# Patient Record
Sex: MALE | Race: WHITE | NOT HISPANIC OR LATINO | Employment: UNEMPLOYED | URBAN - METROPOLITAN AREA
[De-identification: names, ages, dates, MRNs, and addresses within clinical notes are randomized per-mention and may not be internally consistent; named-entity substitution may affect disease eponyms.]

---

## 2018-04-03 ENCOUNTER — HOSPITAL ENCOUNTER (EMERGENCY)
Facility: CLINIC | Age: 28
Discharge: HOME | End: 2018-04-03
Attending: EMERGENCY MEDICINE | Admitting: EMERGENCY MEDICINE

## 2018-04-03 VITALS
WEIGHT: 175 LBS | DIASTOLIC BLOOD PRESSURE: 88 MMHG | SYSTOLIC BLOOD PRESSURE: 154 MMHG | OXYGEN SATURATION: 98 % | TEMPERATURE: 96.2 F | BODY MASS INDEX: 27.41 KG/M2 | HEART RATE: 100 BPM | RESPIRATION RATE: 18 BRPM

## 2018-04-03 DIAGNOSIS — F15.10 METHAMPHETAMINE ABUSE (H): ICD-10-CM

## 2018-04-03 LAB
AMPHETAMINES UR QL SCN: POSITIVE
BARBITURATES UR QL: NEGATIVE
BENZODIAZ UR QL: NEGATIVE
CANNABINOIDS UR QL SCN: NEGATIVE
COCAINE UR QL: NEGATIVE
ETHANOL UR QL SCN: NEGATIVE
OPIATES UR QL SCN: NEGATIVE

## 2018-04-03 PROCEDURE — 99284 EMERGENCY DEPT VISIT MOD MDM: CPT | Mod: 25 | Performed by: EMERGENCY MEDICINE

## 2018-04-03 PROCEDURE — 99284 EMERGENCY DEPT VISIT MOD MDM: CPT | Mod: Z6 | Performed by: EMERGENCY MEDICINE

## 2018-04-03 PROCEDURE — 80320 DRUG SCREEN QUANTALCOHOLS: CPT | Performed by: EMERGENCY MEDICINE

## 2018-04-03 PROCEDURE — 25000132 ZZH RX MED GY IP 250 OP 250 PS 637: Performed by: EMERGENCY MEDICINE

## 2018-04-03 PROCEDURE — 80307 DRUG TEST PRSMV CHEM ANLYZR: CPT | Performed by: EMERGENCY MEDICINE

## 2018-04-03 PROCEDURE — 25000128 H RX IP 250 OP 636: Performed by: EMERGENCY MEDICINE

## 2018-04-03 RX ORDER — DIPHENHYDRAMINE HYDROCHLORIDE 50 MG/ML
50 INJECTION INTRAMUSCULAR; INTRAVENOUS ONCE
Status: COMPLETED | OUTPATIENT
Start: 2018-04-03 | End: 2018-04-03

## 2018-04-03 RX ORDER — HALOPERIDOL 5 MG/ML
10 INJECTION INTRAMUSCULAR ONCE
Status: COMPLETED | OUTPATIENT
Start: 2018-04-03 | End: 2018-04-03

## 2018-04-03 RX ORDER — OLANZAPINE 10 MG/1
10 TABLET, ORALLY DISINTEGRATING ORAL ONCE
Status: DISCONTINUED | OUTPATIENT
Start: 2018-04-03 | End: 2018-04-03 | Stop reason: HOSPADM

## 2018-04-03 RX ORDER — LORAZEPAM 2 MG/ML
1 INJECTION INTRAMUSCULAR ONCE
Status: COMPLETED | OUTPATIENT
Start: 2018-04-03 | End: 2018-04-03

## 2018-04-03 RX ADMIN — LORAZEPAM 1 MG: 2 INJECTION INTRAMUSCULAR at 10:53

## 2018-04-03 RX ADMIN — DIPHENHYDRAMINE HYDROCHLORIDE 50 MG: 50 INJECTION, SOLUTION INTRAMUSCULAR; INTRAVENOUS at 10:53

## 2018-04-03 RX ADMIN — HALOPERIDOL LACTATE 10 MG: 5 INJECTION, SOLUTION INTRAMUSCULAR at 10:52

## 2018-04-03 ASSESSMENT — ENCOUNTER SYMPTOMS
COLOR CHANGE: 0
HEADACHES: 0
HYPERACTIVE: 1
SLEEP DISTURBANCE: 1
NERVOUS/ANXIOUS: 0
SHORTNESS OF BREATH: 0
NECK STIFFNESS: 0
DIFFICULTY URINATING: 0
FEVER: 0
ABDOMINAL PAIN: 0
EYE REDNESS: 0
ARTHRALGIAS: 0
CONFUSION: 0
AGITATION: 1

## 2018-04-03 NOTE — ED NOTES
Sign out Provider: Cindi      Sign out Plan: Patient presented with symptoms of agitation and psychosis, likely due to a reported history of significant methamphetamine abuse.  He required physical restraint and was given medications for anxiety and psychosis.  The evaluating physician requested that he be reassessed once he is more alert and awake, and presumably is not as intoxicated with methamphetamine.      Reassessment: The patient has been sleeping continuously since sign out.  I attempted to arouse him with little success.  He appears in no medical distress.      Disposition: Patient will have to be signed out to the evening physician at shift change.  Once he is awake and alert he can be reassessed, as to whether his symptoms appear more due to true mental health issues or simply to an episode of methamphetamine intoxication.       Chalo Reddy MD  04/03/18 6136

## 2018-04-03 NOTE — ED NOTES
Patient placed into 5 point restraints. Continues to yell out and swear at staff 1:1 attendant initiated upon application of restraints.

## 2018-04-03 NOTE — ED NOTES
"Pt awake and was given a dinner tray, pt then attempted to leave and was stopped by security, escorted back to room per security, able to talk with pt, cooperative at this time, pt denies feeling suicidal or wanting to harm others, pt states \"I got into some bad shit\"  "

## 2018-04-03 NOTE — ED AVS SNAPSHOT
[facility], Emergency Department    [address]    [place] [address]    Phone:  [phone]    Fax:  [phone]                                       Etienne Dupree   MRN: [id number]    Department:  [facility], Emergency Department   Date of Visit:  4/3/2018           Patient Information     Date Of Birth          1990        Your diagnoses for this visit were:     Methamphetamine abuse        You were seen by oRbbie Puente MD.        Discharge Instructions       Follow-up for outpatient chemical dependency assessment and treatment (see Resource Sheet provided).    Return if you feel unsafe or for other concerns.        24 Hour Appointment Hotline       To make an appointment at any [facility] clinic, call [phone] ([phone]). If you don't have a family doctor or clinic, we will help you find one. [facility] clinics are conveniently located to serve the needs of you and your family.             Review of your medicines      Our records show that you are taking the medicines listed below. If these are incorrect, please call your family doctor or clinic.        Dose / Directions Last dose taken    LISINOPRIL PO        Take  by mouth.   Refills:  0        NO ACTIVE MEDICATIONS        Refills:  0        OLANZapine 10 MG tablet   Commonly known as:  zyPREXA   Dose:  10 mg   Quantity:  30 tablet        Take 1 tablet (10 mg) by mouth At Bedtime   Refills:  1                Procedures and tests performed during your visit     Drug abuse screen 6 urine (tox)    Restraints Violent or Self-Destructive Adult (Age 18 or Older)      Orders Needing Specimen Collection     None      Pending Results     No orders found from 4/1/2018 to 4/4/2018.            Pending Culture Results     No orders found from 4/1/2018 to 4/4/2018.            Pending Results Instructions     If you had any lab results that were not finalized at the time of your Discharge, you can call the ED Lab Result RN at  "[phone]. You will be contacted by this team for any positive Lab results or changes in treatment. The nurses are available 7 days a week from 10A to 6:30P.  You can leave a message 24 hours per day and they will return your call.        Thank you for choosing [facility]       Thank you for choosing [facility] for your care. Our goal is always to provide you with excellent care. Hearing back from our patients is one way we can continue to improve our services. Please take a few minutes to complete the written survey that you may receive in the mail after you visit with us. Thank you!        [employer] Information     [employer] lets you send messages to your doctor, view your test results, renew your prescriptions, schedule appointments and more. To sign up, go to [email or web address] . Click on \"Log in\" on the left side of the screen, which will take you to the Welcome page. Then click on \"Sign up Now\" on the right side of the page.     You will be asked to enter the access code listed below, as well as some personal information. Please follow the directions to create your username and password.     Your access code is: [email or web address]  Expires: 2018  7:03 PM     Your access code will  in 90 days. If you need help or a new code, please call your [facility] clinic or [phone].        Care EveryWhere ID     This is your Care EveryWhere ID. This could be used by other organizations to access your [facility] medical records  [id number]        Equal Access to Services     LINDSAY MARTIN : Hadii marisela brown Solelia, waaxda luqadaha, qaybta kaalmada edward, jorje chandler . So [facility] [phone].    ATENCIÓN: Si habla español, tiene a melton disposición servicios gratuitos de asistencia lingüística. Llame al [phone].    We comply with applicable federal civil rights laws and [place] laws. We do not discriminate on the basis of race, color, national origin, age, disability, sex, " sexual orientation, or gender identity.            After Visit Summary       This is your record. Keep this with you and show to your community pharmacist(s) and doctor(s) at your next visit.

## 2018-04-03 NOTE — ED NOTES
Out in hallway, increasing loud and agitated. Refusing to return to room. Able to eventually talk him back to his room.  while in room barricaded door with bed. Talking loudly but standing in calmly in room

## 2018-04-03 NOTE — DISCHARGE INSTRUCTIONS
Follow-up for outpatient chemical dependency assessment and treatment (see Resource Sheet provided).    Return if you feel unsafe or for other concerns.

## 2018-04-03 NOTE — ED NOTES
Refusing to open door to take medication. Continues to talk loudly in room staring out window, MD aware.  Will allow time alone as long as he remains safe, security present in hallway

## 2018-04-03 NOTE — ED AVS SNAPSHOT
[facility], [place], Emergency Department    [address]    [facility] [address]    Phone:  [phone]    Fax:  [phone]                                       Etienne Dupree   MRN: [id number]    Department:  [facility], Emergency Department   Date of Visit:  4/3/2018           After Visit Summary Signature Page     I have received my discharge instructions, and my questions have been answered. I have discussed any challenges I see with this plan with the nurse or doctor.    ..........................................................................................................................................  Patient/Patient Representative Signature      ..........................................................................................................................................  Patient Representative Print Name and Relationship to Patient    ..................................................               ................................................  Date                                            Time    ..........................................................................................................................................  Reviewed by Signature/Title    ...................................................              ..............................................  Date                                                            Time

## 2018-04-03 NOTE — ED PROVIDER NOTES
"    [facility] EMERGENCY DEPARTMENT ([facility])    4/03/18       History     Chief Complaint   Patient presents with     Agitation     disorganzied thought, manic behaviors,  reports took \"medications maybe too much\"  unable to verbalize what medications,\" something like Straterra but natural form\"     HPI  Etienne Dupree is a 27 year old male with a medical history significant for bipolar 1 disorder, schizophrenia, polysubstance abuse, ADHD and depression who presents to the Emergency Department by police for evaluation of agitation.  Police reports that the patient was found by a bystander who saw the patient on a corner talking to himself.  The bystander also noted that the patient was wearing shorts outside, which was not appropriate clothing as it was snowing out.  The bystander then called 911.  The police report that the patient had disorganized thoughts and manic behaviors.    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  The patient reports that he is currently transitioning to the [employer] from [place].  He is currently staying at a [facility] with a friend.  The patient states that today he was looking to buy a car, by a new phone and look for lodging.  Patient states that he went to look at a few cars today and after went to a [employer] store to buy a phone.  Patient was attempting to by phone; however, the  were called to the [employer].  He reports that the police stated he was \"wandering\".  Patient here states that his last methamphetamine use was a few days ago and states that he uses marijuana only intermittently.  He denies any other illicit drug use.  Patient here is wanting to just \"crash here\".  Patient reports that he last slept yesterday.    I have reviewed the Medications, Allergies, Past Medical and Surgical History, and Social History " in the Epic system.    Past Medical History:   Diagnosis Date     ADHD (attention deficit hyperactivity disorder)      Bipolar 1 disorder (H)      Depressive disorder        History reviewed. No pertinent surgical history.    No family history on file.    Social History   Substance Use Topics     Smoking status: Current Every Day Smoker     Packs/day: 0.50     Smokeless tobacco: Never Used     Alcohol use Yes      Comment: 1.75 on weekends       Current Facility-Administered Medications   Medication     OLANZapine zydis (zyPREXA) ODT tab 10 mg     Current Outpatient Prescriptions   Medication     OLANZapine (ZYPREXA) 10 MG tablet     LISINOPRIL PO     NO ACTIVE MEDICATIONS      No Known Allergies      Review of Systems   Constitutional: Negative for fever.   HENT: Negative for congestion.    Eyes: Negative for redness.   Respiratory: Negative for shortness of breath.    Cardiovascular: Negative for chest pain.   Gastrointestinal: Negative for abdominal pain.   Genitourinary: Negative for difficulty urinating.   Musculoskeletal: Negative for arthralgias and neck stiffness.   Skin: Negative for color change.   Neurological: Negative for headaches.   Psychiatric/Behavioral: Positive for agitation, behavioral problems and sleep disturbance. Negative for confusion, self-injury and suicidal ideas. The patient is hyperactive. The patient is not nervous/anxious.    All other systems reviewed and are negative.      Physical Exam   BP: (!) 174/120  Pulse: 78  Heart Rate: 74  Temp: 96.2  F (35.7  C)  Resp: 18  Weight: 79.4 kg (175 lb)  SpO2: 100 %      Physical Exam   Constitutional: No distress.   HENT:   Head: Atraumatic.   Mouth/Throat: Oropharynx is clear and moist. No oropharyngeal exudate.   Eyes: Pupils are equal, round, and reactive to light. No scleral icterus.   Cardiovascular: Normal heart sounds and intact distal pulses.    Pulmonary/Chest: Breath sounds normal. No respiratory distress.   Abdominal: Soft. Bowel  sounds are normal. There is no tenderness.   Musculoskeletal: He exhibits no edema or tenderness.   Skin: Skin is warm. No rash noted. He is not diaphoretic.   Psychiatric: His affect is labile and inappropriate. His speech is tangential. He is agitated. Cognition and memory are impaired. He expresses impulsivity and inappropriate judgment. He expresses no suicidal ideation. He expresses no suicidal plans. He exhibits abnormal recent memory.       ED Course   9:00 AM  The patient was seen and examined by Robbie Puente MD in Room ED10.     ED Course     Procedures        Results for orders placed or performed during the hospital encounter of 04/03/18 (from the past 48 hour(s))   Drug abuse screen 6 urine (tox)   Result Value Ref Range    Amphetamine Qual Urine Positive (A) NEG^Negative    Barbiturates Qual Urine Negative NEG^Negative    Benzodiazepine Qual Urine Negative NEG^Negative    Cannabinoids Qual Urine Negative NEG^Negative    Cocaine Qual Urine Negative NEG^Negative    Ethanol Qual Urine Negative NEG^Negative    Opiates Qualitative Urine Negative NEG^Negative     11:34 PM  In person face to face assessment completed, including an evaluation of the patient's immediate reaction to the intervention, complete review of systems assessment, behavioral assessment and review/assessment of history, drugs and medications, recent labs, etc., and behavioral condition.  The patient experienced: No adverse physical outcome from seclusion/restraint initiation.  The intervention of restraint or seclusion needs to terminate.       Labs Ordered and Resulted from Time of ED Arrival Up to the Time of Departure from the ED   DRUG ABUSE SCREEN 6 CHEM DEP URINE ([facility]) - Abnormal; Notable for the following:        Result Value    Amphetamine Qual Urine Positive (*)     All other components within normal limits            Assessments & Plan (with Medical Decision Making)   27-year-old male with a history of bipolar 1, ADHD,  depression and methamphetamine abuse presents via police after bystander noted patient to be talking to himself and wearing shorts outside.  Differential includes julianna, methamphetamine or other drug abuse, psychosis, other.  In the emergency room, urine toxicology screen was positive for amphetamines.  Secondary to progressive agitation, the patient was treated with Haldol, Ativan, Benadryl and placed in restraints.  This was effective in reducing patient's symptoms.  He was observed to be able to sleep multiple hours without difficulty and without restraints.  Patient will be reassessed upon awakening.  If he continues manifesting evidence of psychosis presumably related to methamphetamine use, he may require admission.    I have reviewed the nursing notes.    I have reviewed the findings, diagnosis, plan and need for follow up with the patient.    New Prescriptions    No medications on file       Final diagnoses:   Methamphetamine abuse     IFlorentin, am serving as a trained medical scribe to document services personally performed by Robbie Puente MD, based on the provider's statements to me.   Robbie DARBY MD, was physically present and have reviewed and verified the accuracy of this note documented by Florentin Alex.    4/3/2018   [facility], EMERGENCY DEPARTMENT     Robbie Puente MD  04/03/18 0598

## 2018-04-03 NOTE — ED NOTES
Opened room door and states he would take medication.  Given Zyprexa ODT but took out of his mouth immediately and placed medication on window ledge then closed door.  Began to talk louder and pace in room

## 2018-04-03 NOTE — ED NOTES
Continued to talk loudly then started to pull thermostat off wall. security opened door and code 21 called.  Unable to redirect and unwilling to cooperate with instructions to move away from the door.  Bed force away from door and security did hands on to attempt to walk to room 14,  Patient began to resist and fought with security.  Take down completed by code 21 team according to policy.

## 2018-04-03 NOTE — ED NOTES
Sleeping, writer woke patient up and verbally asked if patient could cooperate and remain safe , with no property destruction or threats to staff.  Patient agreed by shaking head yes. Restraints removed. Patient rolled onto his side and fell back to sleep

## 2020-05-29 ENCOUNTER — TRANSFERRED RECORDS (OUTPATIENT)
Dept: HEALTH INFORMATION MANAGEMENT | Facility: CLINIC | Age: 30
End: 2020-05-29

## 2020-07-17 NOTE — ED NOTES
Patient received as sign out at change of shift pending sobriety and awakening from chemical sedation.  The patient arrived to the emergency department agitated and required chemical sedation.  He is now awake, alert, conversant, and completely oriented.  He is ambulatory with a normal gait pattern.  The patient reports using some methamphetamine and having an adverse reaction to it today.  He states that he feels back to baseline.  He denies any hallucinations.  He denies any paranoia.  He denies suicidal and homicidal ideation.  He denies any recent illness or medical concerns.  The patient states that he is interested in some resources for outpatient chemical dependency assessment and treatment.  The patient was provided those resources and is discharged at this time.     Artis Burrows MD  04/03/18 1591     Benzoyl Peroxide Counseling: Patient counseled that medicine may cause skin irritation and bleach clothing.  In the event of skin irritation, the patient was advised to reduce the amount of the drug applied or use it less frequently.   The patient verbalized understanding of the proper use and possible adverse effects of benzoyl peroxide.  All of the patient's questions and concerns were addressed.

## 2021-02-05 ENCOUNTER — HOSPITAL ENCOUNTER (EMERGENCY)
Facility: CLINIC | Age: 31
Discharge: HOME | End: 2021-02-05
Attending: EMERGENCY MEDICINE | Admitting: EMERGENCY MEDICINE
Payer: MEDICAID

## 2021-02-05 VITALS
RESPIRATION RATE: 16 BRPM | HEIGHT: 71 IN | SYSTOLIC BLOOD PRESSURE: 177 MMHG | DIASTOLIC BLOOD PRESSURE: 106 MMHG | TEMPERATURE: 98.6 F | WEIGHT: 215.4 LBS | BODY MASS INDEX: 30.15 KG/M2 | HEART RATE: 76 BPM | OXYGEN SATURATION: 99 %

## 2021-02-05 DIAGNOSIS — Z76.0 ENCOUNTER FOR MEDICATION REFILL: ICD-10-CM

## 2021-02-05 PROCEDURE — 99282 EMERGENCY DEPT VISIT SF MDM: CPT | Performed by: EMERGENCY MEDICINE

## 2021-02-05 PROCEDURE — 99283 EMERGENCY DEPT VISIT LOW MDM: CPT | Performed by: EMERGENCY MEDICINE

## 2021-02-05 RX ORDER — BUPROPION HYDROCHLORIDE 150 MG/1
150 TABLET ORAL EVERY MORNING
Qty: 30 TABLET | Refills: 0 | Status: SHIPPED | OUTPATIENT
Start: 2021-02-05 | End: 2021-02-11

## 2021-02-05 RX ORDER — HYDROCHLOROTHIAZIDE 12.5 MG/1
12.5 TABLET ORAL DAILY
Qty: 30 TABLET | Refills: 0 | Status: SHIPPED | OUTPATIENT
Start: 2021-02-05 | End: 2021-02-11

## 2021-02-05 ASSESSMENT — MIFFLIN-ST. JEOR: SCORE: 1949.18

## 2021-02-05 NOTE — ED TRIAGE NOTES
Pt arrives to triage with needing medication refill. Pt [facility] released from senior living. Pt takes Wellbutrin 150mg daily and hydrochlorothiazide 12.5mg. Pt does not have a primary care provider or psychiatrist. A&O BP elevated

## 2021-02-05 NOTE — ED PROVIDER NOTES
[facility] EMERGENCY DEPARTMENT ([facility])  2/05/21  History     Chief Complaint   Patient presents with     Medication Refill     The history is provided by the patient and medical records.     Etienne Dupree is a 30 year old male with a past medical history significant for bipolar disorder, ADHD, schizophrenia and polysubstance abuse, and hypertension who presents to the Emergency Department requesting a refill of his prescription medication.  Patient reports that he was just released from snf, and would like a refill of his Wellbutrin 150 mg, and hydrochlorothiazide 12.5 mg.  Patient reports he would like a referral to a primary physician, and notes that he does have a follow-up appointment with a psychiatrist in approximately 2 weeks    I have reviewed the Medications, Allergies, Past Medical and Surgical History, and Social History in the [employer] system.  PAST MEDICAL HISTORY:   Past Medical History:   Diagnosis Date     ADHD (attention deficit hyperactivity disorder)      Bipolar 1 disorder (H)      Depressive disorder        PAST SURGICAL HISTORY: History reviewed. No pertinent surgical history.    Past medical history, past surgical history, medications, and allergies were reviewed with the patient. Additional pertinent items: None    FAMILY HISTORY: History reviewed. No pertinent family history.    SOCIAL HISTORY:   Social History     Tobacco Use     Smoking status: Current Every Day Smoker     Packs/day: 0.50     Types: Vaping Device     Smokeless tobacco: Current User   Substance Use Topics     Alcohol use: Not Currently     Social history was reviewed with the patient. Additional pertinent items: None      Discharge Medication List as of 2/5/2021  6:18 PM      START taking these medications    Details   buPROPion (WELLBUTRIN XL) 150 MG 24 hr tablet Take 1 tablet (150 mg) by mouth every morning, Disp-30 tablet, R-0, E-Prescribe      hydrochlorothiazide (HYDRODIURIL) 12.5 MG tablet  "Take 1 tablet (12.5 mg) by mouth daily, Disp-30 tablet, R-0, E-Prescribe         CONTINUE these medications which have NOT CHANGED    Details   OLANZapine (ZYPREXA) 10 MG tablet Take 1 tablet (10 mg) by mouth At Bedtime, Disp-30 tablet, R-1, Local Print         STOP taking these medications       LISINOPRIL PO Comments:   Reason for Stopping:         NO ACTIVE MEDICATIONS Comments:   Reason for Stopping:                No Known Allergies     Review of Systems  A complete review of systems was performed with pertinent positives and negatives noted in the HPI, and all other systems negative.    Physical Exam   BP: (!) 177/106  Pulse: 76  Temp: 98.6  F (37  C)  Resp: 16  Height: 180.3 cm (5' 11\")  Weight: 97.7 kg (215 lb 6.4 oz)  SpO2: 99 %      Physical Exam  Vitals signs and nursing note reviewed.   Constitutional:       General: He is not in acute distress.     Appearance: He is not diaphoretic.   HENT:      Head: Normocephalic and atraumatic.   Eyes:      General: No scleral icterus.     Pupils: Pupils are equal, round, and reactive to light.   Cardiovascular:      Rate and Rhythm: Normal rate and regular rhythm.      Heart sounds: Normal heart sounds.   Pulmonary:      Effort: No respiratory distress.      Breath sounds: Normal breath sounds.   Abdominal:      General: Bowel sounds are normal.      Palpations: Abdomen is soft.      Tenderness: There is no abdominal tenderness.   Musculoskeletal:         General: No tenderness.   Skin:     General: Skin is warm.      Capillary Refill: Capillary refill takes less than 2 seconds.      Findings: No rash.   Neurological:      General: No focal deficit present.      Mental Status: He is alert and oriented to person, place, and time.   Psychiatric:         Mood and Affect: Mood normal.         Behavior: Behavior normal.         ED Course        Procedures    No results found for this or any previous visit (from the past 24 hour(s)).  Medications - No data to display "         Assessments & Plan (with Medical Decision Making)   Patient was seen and examined. Patient has no acute complaints today. Will provide one month prescriptions for both hydrochlorothiazide and Wellbutrin. Provided info for [facility]. Discharged in stable condition.     I have reviewed the nursing notes.    I have reviewed the findings, diagnosis, plan and need for follow up with the patient.    Discharge Medication List as of 2/5/2021  6:18 PM      START taking these medications    Details   buPROPion (WELLBUTRIN XL) 150 MG 24 hr tablet Take 1 tablet (150 mg) by mouth every morning, Disp-30 tablet, R-0, E-Prescribe      hydrochlorothiazide (HYDRODIURIL) 12.5 MG tablet Take 1 tablet (12.5 mg) by mouth daily, Disp-30 tablet, R-0, E-Prescribe             Final diagnoses:   Encounter for medication refill   Moy DARBY am serving as a trained medical scribe to document services personally performed by Madeline South DO, based on the provider's statements to me.      IMadeline DO, was physically present and have reviewed and verified the accuracy of this note documented by Moy Clemente     2/5/2021   [facility] EMERGENCY DEPARTMENT       Madeline South DO  02/06/21 0031

## 2021-02-09 ENCOUNTER — TRANSFERRED RECORDS (OUTPATIENT)
Dept: HEALTH INFORMATION MANAGEMENT | Facility: CLINIC | Age: 31
End: 2021-02-09

## 2021-02-11 ENCOUNTER — OFFICE VISIT (OUTPATIENT)
Dept: FAMILY MEDICINE | Facility: CLINIC | Age: 31
End: 2021-02-11
Payer: MEDICAID

## 2021-02-11 VITALS
OXYGEN SATURATION: 97 % | HEIGHT: 71 IN | SYSTOLIC BLOOD PRESSURE: 145 MMHG | RESPIRATION RATE: 16 BRPM | HEART RATE: 94 BPM | TEMPERATURE: 98.3 F | WEIGHT: 216 LBS | BODY MASS INDEX: 30.24 KG/M2 | DIASTOLIC BLOOD PRESSURE: 80 MMHG

## 2021-02-11 DIAGNOSIS — F33.41 RECURRENT MAJOR DEPRESSIVE DISORDER, IN PARTIAL REMISSION (H): ICD-10-CM

## 2021-02-11 DIAGNOSIS — Z23 NEED FOR PROPHYLACTIC VACCINATION AND INOCULATION AGAINST INFLUENZA: ICD-10-CM

## 2021-02-11 DIAGNOSIS — F90.9 ATTENTION DEFICIT HYPERACTIVITY DISORDER (ADHD), UNSPECIFIED ADHD TYPE: ICD-10-CM

## 2021-02-11 DIAGNOSIS — I10 ESSENTIAL HYPERTENSION: ICD-10-CM

## 2021-02-11 DIAGNOSIS — F15.21 METHAMPHETAMINE USE DISORDER, MODERATE, IN EARLY REMISSION (H): ICD-10-CM

## 2021-02-11 DIAGNOSIS — F17.200 NICOTINE USE DISORDER: Primary | ICD-10-CM

## 2021-02-11 LAB
% GRANULOCYTES: 66.6 %G (ref 40–75)
ANION GAP SERPL CALCULATED.3IONS-SCNC: 2 MMOL/L (ref 3–14)
BILIRUBIN UR: NEGATIVE MG/DL
BLOOD UR: NEGATIVE MG/DL
BUN SERPL-MCNC: 12 MG/DL (ref 7–30)
CALCIUM SERPL-MCNC: 9.4 MG/DL (ref 8.5–10.1)
CHLORIDE SERPL-SCNC: 105 MMOL/L (ref 94–109)
CHOLEST SERPL-MCNC: 113 MG/DL
CO2 SERPL-SCNC: 33 MMOL/L (ref 20–32)
CREAT SERPL-MCNC: 0.92 MG/DL (ref 0.66–1.25)
GFR SERPL CREATININE-BSD FRML MDRD: >90 ML/MIN/{1.73_M2}
GLUCOSE SERPL-MCNC: 101 MG/DL (ref 70–99)
GLUCOSE URINE: NEGATIVE
GRANULOCYTES #: 3.9 K/UL (ref 1.6–8.3)
HBA1C MFR BLD: 5.3 % (ref 4.1–5.7)
HCT VFR BLD AUTO: 46.2 % (ref 40–53)
HDLC SERPL-MCNC: 52 MG/DL
HEMOGLOBIN: 14.5 G/DL (ref 13.3–17.7)
HIV 1+2 AB+HIV1 P24 AG SERPL QL IA: NONREACTIVE
KETONES UR QL: NEGATIVE MG/DL
LDLC SERPL CALC-MCNC: 48 MG/DL
LEUKOCYTE ESTERASE UR: NEGATIVE
LYMPHOCYTES # BLD AUTO: 1.6 K/UL (ref 0.8–5.3)
LYMPHOCYTES NFR BLD AUTO: 27.6 %L (ref 20–48)
MCH RBC QN AUTO: 28.5 PG (ref 26.5–35)
MCHC RBC AUTO-ENTMCNC: 31.4 G/DL (ref 32–36)
MCV RBC AUTO: 91 FL (ref 78–100)
MID #: 0.3 K/UL (ref 0–2.2)
MID %: 5.8 %M (ref 0–20)
NITRITE UR QL STRIP: NEGATIVE MG/DL
NONHDLC SERPL-MCNC: 61 MG/DL
PH UR STRIP: 6 [PH] (ref 4.5–8)
PLATELET # BLD AUTO: 240 K/UL (ref 150–450)
POTASSIUM SERPL-SCNC: 4.2 MMOL/L (ref 3.4–5.3)
PROTEIN UR: NEGATIVE MG/DL
RBC # BLD AUTO: 5.08 M/UL (ref 4.4–5.9)
SODIUM SERPL-SCNC: 140 MMOL/L (ref 133–144)
SP GR UR STRIP: 1.02 (ref 1–1.03)
TRIGL SERPL-MCNC: 64 MG/DL
TSH SERPL DL<=0.005 MIU/L-ACNC: 1.63 MU/L (ref 0.4–4)
UROBILINOGEN UR STRIP-ACNC: NORMAL E.U./DL
WBC # BLD AUTO: 5.9 K/UL (ref 4–11)

## 2021-02-11 PROCEDURE — 87389 HIV-1 AG W/HIV-1&-2 AB AG IA: CPT | Mod: QW | Performed by: FAMILY MEDICINE

## 2021-02-11 PROCEDURE — 36415 COLL VENOUS BLD VENIPUNCTURE: CPT | Performed by: FAMILY MEDICINE

## 2021-02-11 PROCEDURE — 90471 IMMUNIZATION ADMIN: CPT | Performed by: STUDENT IN AN ORGANIZED HEALTH CARE EDUCATION/TRAINING PROGRAM

## 2021-02-11 PROCEDURE — 85025 COMPLETE CBC W/AUTO DIFF WBC: CPT | Performed by: STUDENT IN AN ORGANIZED HEALTH CARE EDUCATION/TRAINING PROGRAM

## 2021-02-11 PROCEDURE — 80061 LIPID PANEL: CPT | Mod: QW | Performed by: FAMILY MEDICINE

## 2021-02-11 PROCEDURE — 84443 ASSAY THYROID STIM HORMONE: CPT | Mod: QW | Performed by: FAMILY MEDICINE

## 2021-02-11 PROCEDURE — 81003 URINALYSIS AUTO W/O SCOPE: CPT | Performed by: STUDENT IN AN ORGANIZED HEALTH CARE EDUCATION/TRAINING PROGRAM

## 2021-02-11 PROCEDURE — 99204 OFFICE O/P NEW MOD 45 MIN: CPT | Mod: 25 | Performed by: STUDENT IN AN ORGANIZED HEALTH CARE EDUCATION/TRAINING PROGRAM

## 2021-02-11 PROCEDURE — 80048 BASIC METABOLIC PNL TOTAL CA: CPT | Mod: QW | Performed by: FAMILY MEDICINE

## 2021-02-11 PROCEDURE — 90686 IIV4 VACC NO PRSV 0.5 ML IM: CPT | Performed by: STUDENT IN AN ORGANIZED HEALTH CARE EDUCATION/TRAINING PROGRAM

## 2021-02-11 PROCEDURE — 83036 HEMOGLOBIN GLYCOSYLATED A1C: CPT | Performed by: STUDENT IN AN ORGANIZED HEALTH CARE EDUCATION/TRAINING PROGRAM

## 2021-02-11 RX ORDER — LOSARTAN POTASSIUM 25 MG/1
25 TABLET ORAL DAILY
Qty: 90 TABLET | Refills: 3 | Status: SHIPPED | OUTPATIENT
Start: 2021-02-11 | End: 2021-03-25

## 2021-02-11 RX ORDER — BUPROPION HYDROCHLORIDE 150 MG/1
150 TABLET ORAL 2 TIMES DAILY
Qty: 180 TABLET | Refills: 3 | Status: SHIPPED | OUTPATIENT
Start: 2021-02-11 | End: 2021-05-13

## 2021-02-11 RX ORDER — HYDROCHLOROTHIAZIDE 12.5 MG/1
12.5 TABLET ORAL DAILY
Qty: 90 TABLET | Refills: 3 | Status: SHIPPED | OUTPATIENT
Start: 2021-02-11 | End: 2021-03-25

## 2021-02-11 ASSESSMENT — MIFFLIN-ST. JEOR: SCORE: 1951.9

## 2021-02-11 NOTE — PROGRESS NOTES
Preceptor Attestation:   Patient seen and discussed with the resident. Assessment and plan reviewed with resident and agreed upon.   Supervising Physician:  DO [facility]

## 2021-02-11 NOTE — PATIENT INSTRUCTIONS
Patient Education   Here is the plan from today's visit    1. Nicotine use disorder  - nicotine (NICOTROL) 10 MG inhaler; Use 1 cartridge as needed for urge to smoke by puffing over course of 20min.  Use 6-16 cart/day; reduce number of cart/day over 6-12 weeks.  Dispense: 12 each; Refill: 3    2. Recurrent major depressive disorder, in partial remission (H)  - buPROPion (WELLBUTRIN XL) 150 MG 24 hr tablet; Take 1 tablet (150 mg) by mouth 2 times daily  Dispense: 180 tablet; Refill: 3    3. Attention deficit hyperactivity disorder (ADHD), unspecified ADHD type  - buPROPion (WELLBUTRIN XL) 150 MG 24 hr tablet; Take 1 tablet (150 mg) by mouth 2 times daily  Dispense: 180 tablet; Refill: 3    4. Essential hypertension      Please call or return to clinic if your symptoms don't go away.    Follow up plan  Please make a clinic appointment for follow up with me (JOSE A BOSS) in 4-6 weeks for blood pressure follow up.     Thank you for coming to [facility] today.  Lab Testing:  **If you had lab testing today and your results are reassuring or normal they will be mailed to you or sent through [employer] within 7 days.   **If the lab tests need quick action we will call you with the results.  The phone number we will call with results is # [phone] (home) . If this is not the best number please call our clinic and change the number.  Medication Refills:  If you need any refills please call your pharmacy and they will contact us.   If you need to  your refill at a new pharmacy, please contact the new pharmacy directly. The new pharmacy will help you get your medications transferred faster.   Scheduling:  If you have any concerns about today's visit or wish to schedule another appointment please call our office during normal business hours [phone] (8-5:00 M-F)  If a referral was made to a [facility] and you don't get a call from central scheduling please call [phone].  If a  Mammogram was ordered for you at [facility] call [phone] to schedule or change your appointment.  If you had an XRay/CT/Ultrasound/MRI ordered the number is [phone] to schedule or change your radiology appointment.   Medical Concerns:  If you have urgent medical concerns please call [phone] at any time of the day.    Mariana Banks MD

## 2021-02-11 NOTE — PROGRESS NOTES
Male New Patient Note   Assessment & Plan   Nicotine use disorder  Pt reports that he is currently smoking 7-10 cigarettes/day and using smokeless tobacco. He reports that he was previously able to quit smoking using the Nicotrol inhaler   - nicotine (NICOTROL) 10 MG inhaler  Dispense: 12 each; Refill: 3    Recurrent major depressive disorder, in partial remission (H)  Patient has been on Wellbutrin for quite some time.  Feels that the 150 mg dose in the morning helped significantly, though seems to wear off in the mid afternoon despite being a 24-hour tablet.  In discussion with the patient today today, as 150 mg is a lower dose of Wellbutrin, will double that and increase to 300 mg daily, split 150 mg twice daily.  If this becomes an issue with insurance, we certainly can increase a single once daily dose to 30 mg at 1 time.  However, based on how the patient is describing the medications wearing off as far as being able to focus, I think the twice daily dosing dosing will likely be better suited for this patient.  In addition to helping with major depressive disorder, Wellbutrin may also help with smoking cessation for nicotine use disorder as above.  - buPROPion (WELLBUTRIN XL) 150 MG 24 hr tablet  Dispense: 180 tablet; Refill: 3  - TSH with free T4 reflex    Attention deficit hyperactivity disorder (ADHD), unspecified ADHD type  Patient has had longstanding attention deficit disorder, has previously been on Vyvanse.  However, patient also has a history of substance use disorder, primarily methamphetamine.  This is since concerning as far as getting him started back up on a an ADHD medication.  He is already on Wellbutrin, which certainly can be used for ADHD.  It does seem to improve his concentration at least early in the day.  As above, we will increase his Wellbutrin dose 250 mg twice daily.  He has an appointment upcoming with [facility], where he should have any ADHD prescriptions  addressed.  He did have a TSH drawn today, which was normal.  - buPROPion (WELLBUTRIN XL) 150 MG 24 hr tablet  Dispense: 180 tablet; Refill: 3  - TSH with free T4 reflex    Essential hypertension  Patient has a history of hypertension, beginning in his 20s.  He has previously been on a combination ACE and thiazide.  Due to recent incarceration, he has not been optimized on his blood pressure management.  He has significant concerns regarding the level of his blood pressure.  His systolic blood pressures are often in the 160s to 170s.  On exam today, exam is largely unremarkable other than an elevated blood pressure of 145/88.  As it is unclear if he has ever had a full work-up for hypertension, a significant work-up was completed today, which shows no obvious causes of hypertension, nor any electrolyte derangement from his HCTZ.  Patient is continue on his HCTZ 12.5.  As he has previously needed to be on a combination therapy, he is also started on losartan low-dose of 25.  I suspect, over time, we will likely have to increase these.  Ultimately, the goal would be to find a regimen that we will be able to be dosed as a single tablet combination medication.  - CBC with Diff Plt ([facility])  - Urinalysis, Micro If (UA) ([facility])  - Hemoglobin A1c ([facility])  - hydrochlorothiazide (HYDRODIURIL) 12.5 MG tablet  Dispense: 90 tablet; Refill: 3  - losartan (COZAAR) 25 MG tablet  Dispense: 90 tablet; Refill: 3  - TSH with free T4 reflex  - HIV Antigen Antibody Combo  - Lipid panel reflex to direct LDL Non-fasting  - Basic metabolic panel    Methamphetamine use disorder, moderate, in early remission (H)  Patient has a history of methamphetamine use disorder.  He was recently incarcerated related to substance use this use disorder.  He is currently living in safe housing/recovery housing, feels like it is okay there.  He does have concerns as there do seem to be people coming and going frequently, it is a little difficult to  "feel motivated at sometimes.  However, he is very involved in politics regarding arrests related to substances.  He does seem quite motivated to maintain her sobriety, currently 15 months sober.  He does not drink any alcohol at this time, no other drug use, other than nicotine use.  As he has previously injected substances, despite having a reported negative HIV test on initially entering assisted, we did repeat an HIV test today, which is negative.  - HIV Antigen Antibody Combo  - Pt counseled to reach out if he feels tempted toward relapse     Need for prophylactic vaccination and inoculation against influenza  - INFLUENZA VACCINE IM > 6 MONTHS VALENT IIV4 [90142]      Tobacco Cessation:   reports that he has been smoking vaping device. He has been smoking about 0.50 packs per day. He uses smokeless tobacco. 7-10 1/3-1/2 ppd  Tobacco Cessation Action Plan: Pharmacotherapies : other Nicotine replacement    BMI:   Estimated body mass index is 30.13 kg/m  as calculated from the following:    Height as of this encounter: 1.803 m (5' 11\").    Weight as of this encounter: 98 kg (216 lb).     No follow-ups on file.    Emilio Wells MS4     I was present with the medical student who participated in the service and in the documentation of this note. I have verified the history and personally performed the physical exam and medical decision making, and have verified the content of the note, which accurately reflects my assessment of the patient and the plan of care    Mariana Banks MD  [facility] RHODA Clifton is a 30 year old who presents to clinic today to establish primary care and get assessment and evaluation for depression, HTN, ADHD, and Nicotine dependence.     HPI   Pt reports that he was recently released from assisted on 2/1/21. He had previously received his healthcare through the assisted system. And came in today to establish a PCP. Pt has been sober for 15 months and is " "currently active in a program to support his sobriety.    Pt reports that he is currently smoking 7-10 cigarettes/day and using smokeless tobacco. He reports that he was previously able to quit smoking using the Nicotrol inhaler.   Pt reports that the bupropion is working well during the am, but around 1400 he becomes depressed and losses ability to focus and concentrate during tasks.  Pt reports that he has experiences HTN beginning at age 22yo. He has been monitoring his BP at home and has had sys BP in the 170s. Today's BP of 145/88 he reports to be one of his best. He denies and CP, palpitations, tremors or headaches.   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     Living at [facility]    Meth - started cocaine at 16, and 17 meth. 19 ecstacy, 23 meth again -3rd degree possession.     Alcohol - not been much of a problem   15 months sober from all substances (except nicotine)  Working with [employer] to DOC. 2nd chance act.     Last vyvanse has been about 1-2 years       Review of Systems   Constitutional, HEENT, cardiovascular, pulmonary, gi and gu systems are negative, except as otherwise noted.      Objective    BP (!) 145/80   Pulse 94   Temp 98.3  F (36.8  C) (Oral)   Resp 16   Ht 1.803 m (5' 11\")   Wt 98 kg (216 lb)   SpO2 97%   BMI 30.13 kg/m    Body mass index is 30.13 kg/m .  Physical Exam  Constitutional:       Appearance: Normal appearance.   HENT:      Head: Normocephalic.   Eyes:      General: No scleral icterus.     Extraocular Movements: Extraocular movements intact.      Conjunctiva/sclera: Conjunctivae normal.   Neck:      Musculoskeletal: Normal range of motion.   Cardiovascular:      Rate and Rhythm: Normal rate.   Pulmonary:      Effort: Pulmonary effort is normal.   Musculoskeletal: Normal range of motion. "   Neurological:      General: No focal deficit present.      Mental Status: He is alert and oriented to person, place, and time.   Psychiatric:         Mood and Affect: Mood normal.         Behavior: Behavior normal.         Thought Content: Thought content normal.         Judgment: Judgment normal.             Results from this visit  Results for orders placed or performed in visit on 02/11/21   CBC with Diff Plt ([facility])     Status: Abnormal   Result Value Ref Range    WBC 5.9 4.0 - 11.0 K/uL    Lymphocytes # 1.6 0.8 - 5.3 K/uL    % Lymphocytes 27.6 20.0 - 48.0 %L    Mid # 0.3 0.0 - 2.2 K/uL    Mid % 5.8 0.0 - 20.0 %M    GRANULOCYTES # 3.9 1.6 - 8.3 K/uL    % Granulocytes 66.6 40.0 - 75.0 %G    RBC 5.08 4.40 - 5.90 M/uL    Hemoglobin 14.5 13.3 - 17.7 g/dL    Hematocrit 46.2 40.0 - 53.0 %    MCV 91.0 78.0 - 100.0 fL    MCH 28.5 26.5 - 35.0 pg    MCHC 31.4 (L) 32.0 - 36.0 g/dL    Platelets 240.0 150.0 - 450.0 K/uL   Urinalysis, Micro If (UA) ([facility])     Status: None   Result Value Ref Range    Specific Gravity Urine 1.025 1.005 - 1.030    pH Urine 6.0 4.5 - 8.0    Leukocyte Esterase UR Negative NEGATIVE    Nitrite Urine Negative NEGATIVE mg/dL    Protein UR Negative NEGATIVE mg/dL    Glucose Urine Negative NEGATIVE    Ketones Urine Negative NEGATIVE mg/dL    Urobilinogen mg/dL 0.2 E.U./dL 0.2 E.U./dL E.U./dL    Bilirubin UR Negative NEGATIVE mg/dL    Blood UR Negative NEGATIVE mg/dL   Hemoglobin A1c ([facility])     Status: None   Result Value Ref Range    Hemoglobin A1C 5.3 4.1 - 5.7 %   TSH with free T4 reflex     Status: None   Result Value Ref Range    TSH 1.63 0.40 - 4.00 mU/L   HIV Antigen Antibody Combo     Status: None   Result Value Ref Range    HIV Antigen Antibody Combo Nonreactive NR^Nonreactive       Lipid panel reflex to direct LDL Non-fasting     Status: None   Result Value Ref Range    Cholesterol 113 <200 mg/dL    Triglycerides 64 <150 mg/dL    HDL Cholesterol 52 >39 mg/dL    LDL Cholesterol  Calculated 48 <100 mg/dL    Non HDL Cholesterol 61 <130 mg/dL   Basic metabolic panel     Status: Abnormal   Result Value Ref Range    Sodium 140 133 - 144 mmol/L    Potassium 4.2 3.4 - 5.3 mmol/L    Chloride 105 94 - 109 mmol/L    Carbon Dioxide 33 (H) 20 - 32 mmol/L    Anion Gap 2 (L) 3 - 14 mmol/L    Glucose 101 (H) 70 - 99 mg/dL    Urea Nitrogen 12 7 - 30 mg/dL    Creatinine 0.92 0.66 - 1.25 mg/dL    GFR Estimate >90 >60 mL/min/[1.73_m2]    GFR Estimate If Black >90 >60 mL/min/[1.73_m2]    Calcium 9.4 8.5 - 10.1 mg/dL

## 2021-02-12 ENCOUNTER — TELEPHONE (OUTPATIENT)
Dept: FAMILY MEDICINE | Facility: CLINIC | Age: 31
End: 2021-02-12

## 2021-02-12 DIAGNOSIS — F17.200 NICOTINE USE DISORDER: Primary | ICD-10-CM

## 2021-02-12 NOTE — TELEPHONE ENCOUNTER
Central Prior Authorization Team   Phone: [phone]      Prior Authorization Approval    Authorization Effective Date: 2/11/2021  Authorization Expiration Date: 6/10/2021  Medication: Nicotrol 10mg inh - APPROVED  Approved Dose/Quantity: 168 FOR 10  Reference #:     Insurance Company: [place] Medicaid ([facility]) - Phone [phone] Fax [phone]  Expected CoPay:       CoPay Card Available:      Foundation Assistance Needed:    Which Pharmacy is filling the prescription (Not needed for infusion/clinic administered): [facility] [place], [place] - [address]   Pharmacy Notified: Yes  Patient Notified: Yes (**Instructed pharmacy to notify patient when script is ready to /ship.**)

## 2021-02-12 NOTE — TELEPHONE ENCOUNTER
Central Prior Authorization Team   Phone: [phone]      PA Initiation    Medication: Nicotrol 10mg inh - INITIATED  Insurance Company: [place] Medicaid ([facility]) - Phone [phone] Fax [phone]  Pharmacy Filling the Rx: [place], [place] - [address]   Filling Pharmacy Phone: [phone]  Filling Pharmacy Fax: [phone]  Start Date: 2/12/2021

## 2021-02-12 NOTE — TELEPHONE ENCOUNTER
Hello, this patient's insurance will not pay for the following medication without a Prior Authorization.   ?  The Patient's insurance company is:  [place] Medicaid  Insurance Phone Number: [phone]  The patient's ID number is: [id number]  Drug Name: Nicotrol 10 mg inh?  NDC: [address]-2020-18?  Qty: 12 gm    If needed, our pharmacy's NPI is: [id number]  Please advise if you will pursue a prior authorization for this medication or change the order. Contact [facility] with any questions.  ?  Thanks,  ?  Onesimo Bryan CPhT  ?  [facility]?  PH: [phone]  Fax: [phone]

## 2021-02-15 ENCOUNTER — TRANSFERRED RECORDS (OUTPATIENT)
Dept: HEALTH INFORMATION MANAGEMENT | Facility: CLINIC | Age: 31
End: 2021-02-15

## 2021-02-27 ENCOUNTER — HEALTH MAINTENANCE LETTER (OUTPATIENT)
Age: 31
End: 2021-02-27

## 2021-03-01 ENCOUNTER — IMMUNIZATION (OUTPATIENT)
Dept: NURSING | Facility: CLINIC | Age: 31
End: 2021-03-01
Payer: MEDICAID

## 2021-03-01 PROCEDURE — 0001A PR COVID VAC PFIZER DIL RECON 30 MCG/0.3 ML IM: CPT

## 2021-03-01 PROCEDURE — 91300 PR COVID VAC PFIZER DIL RECON 30 MCG/0.3 ML IM: CPT

## 2021-03-15 ENCOUNTER — OFFICE VISIT (OUTPATIENT)
Dept: OPTOMETRY | Facility: CLINIC | Age: 31
End: 2021-03-15
Payer: MEDICAID

## 2021-03-15 DIAGNOSIS — H52.13 MYOPIA OF BOTH EYES: ICD-10-CM

## 2021-03-15 DIAGNOSIS — H52.223 REGULAR ASTIGMATISM OF BOTH EYES: ICD-10-CM

## 2021-03-15 DIAGNOSIS — Z01.00 ENCOUNTER FOR EXAMINATION OF EYES AND VISION WITHOUT ABNORMAL FINDINGS: Primary | ICD-10-CM

## 2021-03-15 PROCEDURE — 92015 DETERMINE REFRACTIVE STATE: CPT | Performed by: OPTOMETRIST

## 2021-03-15 PROCEDURE — 92004 COMPRE OPH EXAM NEW PT 1/>: CPT | Performed by: OPTOMETRIST

## 2021-03-15 ASSESSMENT — REFRACTION_MANIFEST
OS_AXIS: 095
OS_AXIS: 084
OD_AXIS: 100
OS_CYLINDER: +0.75
OD_AXIS: 095
OS_SPHERE: -0.50
OD_CYLINDER: +0.50
OD_CYLINDER: +0.50
OD_SPHERE: -0.75
OS_SPHERE: -0.25
OS_CYLINDER: +1.00
METHOD_AUTOREFRACTION: 1
OD_SPHERE: -0.75

## 2021-03-15 ASSESSMENT — VISUAL ACUITY
OS_SC: 20/30
OS_SC: 20/25
OS_SC+: -1
OD_SC: 20/30-1
OD_SC: 20/20
METHOD: SNELLEN - LINEAR

## 2021-03-15 ASSESSMENT — CUP TO DISC RATIO
OS_RATIO: 0.4
OD_RATIO: 0.4

## 2021-03-15 ASSESSMENT — EXTERNAL EXAM - RIGHT EYE: OD_EXAM: NORMAL

## 2021-03-15 ASSESSMENT — TONOMETRY
IOP_METHOD: APPLANATION
OD_IOP_MMHG: 20
OS_IOP_MMHG: 24

## 2021-03-15 ASSESSMENT — SLIT LAMP EXAM - LIDS
COMMENTS: NORMAL
COMMENTS: NORMAL

## 2021-03-15 ASSESSMENT — CONF VISUAL FIELD
METHOD: COUNTING FINGERS
OS_NORMAL: 1
OD_NORMAL: 1

## 2021-03-15 ASSESSMENT — EXTERNAL EXAM - LEFT EYE: OS_EXAM: NORMAL

## 2021-03-15 NOTE — PROGRESS NOTES
Chief Complaint   Patient presents with     Annual Eye Exam      Struggles with night driving, reports astigmatism.  States he's worn glasses in the past as needed.   Has been without glasses for 4 years   Last Eye Exam: 2016  Dilated Previously: Yes. Signs and symptoms of dilation were discussed. Patient consents to dilation today.    What are you currently using to see?  does not use glasses or contacts       Distance Vision Acuity: Noticed gradual change in both eyes    Near Vision Acuity: Satisfied with vision while reading and using computer unaided    Eye Comfort: good  Do you use eye drops? : No    Renetta Gaytan CPO        Medical, surgical and family histories reviewed and updated 3/15/2021.       OBJECTIVE: See Ophthalmology exam    ASSESSMENT:    ICD-10-CM    1. Encounter for examination of eyes and vision without abnormal findings  Z01.00    2. Myopia of both eyes  H52.13    3. Regular astigmatism of both eyes  H52.223       PLAN:     Patient Instructions   Etienne was advised of today's exam findings.  Optional to fill new glasses prescription, mild glasses prescription   Wearing the glasses will improve your vision slightly, and you may notice the largest improvement when driving at night.   Copy of glasses Rx provided today.    Return in 2 years for eye exam, or sooner if needed.    The effects of the dilating drops last for 4- 6 hours.  You will be more sensitive to light and vision will be blurry up close.  Mydriatic sunglasses were given if needed.      Robbie Anton O.D.  [address] [place]  [address]    [phone]

## 2021-03-15 NOTE — PATIENT INSTRUCTIONS
Etienne was advised of today's exam findings.  Optional to fill new glasses prescription, mild glasses prescription   Wearing the glasses will improve your vision slightly, and you may notice the largest improvement when driving at night.   Copy of glasses Rx provided today.    Return in 2 years for eye exam, or sooner if needed.    The effects of the dilating drops last for 4- 6 hours.  You will be more sensitive to light and vision will be blurry up close.  Mydriatic sunglasses were given if needed.      Robbie Anton O.D.  [address] [place]  [address]    [phone]

## 2021-03-15 NOTE — LETTER
3/15/2021         RE: Etienne Dupree  [address]  [place] [address]        Dear Colleague,    Thank you for referring your patient, Etienne Dupree, to the [facility]. Please see a copy of my visit note below.    Chief Complaint   Patient presents with     Annual Eye Exam      Struggles with night driving, reports astigmatism.  States he's worn glasses in the past as needed.   Has been without glasses for 4 years   Last Eye Exam: 2016  Dilated Previously: Yes. Signs and symptoms of dilation were discussed. Patient consents to dilation today.    What are you currently using to see?  does not use glasses or contacts       Distance Vision Acuity: Noticed gradual change in both eyes    Near Vision Acuity: Satisfied with vision while reading and using computer unaided    Eye Comfort: good  Do you use eye drops? : No    Renetta Gaytan CPO        Medical, surgical and family histories reviewed and updated 3/15/2021.       OBJECTIVE: See Ophthalmology exam    ASSESSMENT:    ICD-10-CM    1. Encounter for examination of eyes and vision without abnormal findings  Z01.00    2. Myopia of both eyes  H52.13    3. Regular astigmatism of both eyes  H52.223       PLAN:     Patient Instructions   Etienne was advised of today's exam findings.  Optional to fill new glasses prescription, mild glasses prescription   Wearing the glasses will improve your vision slightly, and you may notice the largest improvement when driving at night.   Copy of glasses Rx provided today.    Return in 2 years for eye exam, or sooner if needed.    The effects of the dilating drops last for 4- 6 hours.  You will be more sensitive to light and vision will be blurry up close.  Mydriatic sunglasses were given if needed.      Robbie Anton O.D.  [facility]  [address]  [place]  [address]    [phone]           Again, thank you for allowing me to participate in the care of  your patient.        Sincerely,        Robbie Anton, OD

## 2021-03-22 ENCOUNTER — IMMUNIZATION (OUTPATIENT)
Dept: NURSING | Facility: CLINIC | Age: 31
End: 2021-03-22
Attending: NURSE PRACTITIONER
Payer: MEDICAID

## 2021-03-22 PROCEDURE — 0002A PR COVID VAC PFIZER DIL RECON 30 MCG/0.3 ML IM: CPT

## 2021-03-22 PROCEDURE — 91300 PR COVID VAC PFIZER DIL RECON 30 MCG/0.3 ML IM: CPT

## 2021-03-25 ENCOUNTER — OFFICE VISIT (OUTPATIENT)
Dept: FAMILY MEDICINE | Facility: CLINIC | Age: 31
End: 2021-03-25
Payer: MEDICAID

## 2021-03-25 VITALS
BODY MASS INDEX: 30.27 KG/M2 | TEMPERATURE: 99.4 F | SYSTOLIC BLOOD PRESSURE: 123 MMHG | RESPIRATION RATE: 18 BRPM | WEIGHT: 217 LBS | DIASTOLIC BLOOD PRESSURE: 86 MMHG | HEART RATE: 136 BPM | OXYGEN SATURATION: 97 %

## 2021-03-25 DIAGNOSIS — F90.9 ATTENTION DEFICIT HYPERACTIVITY DISORDER (ADHD), UNSPECIFIED ADHD TYPE: Primary | ICD-10-CM

## 2021-03-25 DIAGNOSIS — I10 ESSENTIAL HYPERTENSION: ICD-10-CM

## 2021-03-25 DIAGNOSIS — R00.0 TACHYCARDIA: ICD-10-CM

## 2021-03-25 PROCEDURE — 99214 OFFICE O/P EST MOD 30 MIN: CPT | Mod: GC | Performed by: STUDENT IN AN ORGANIZED HEALTH CARE EDUCATION/TRAINING PROGRAM

## 2021-03-25 RX ORDER — LOSARTAN POTASSIUM AND HYDROCHLOROTHIAZIDE 12.5; 5 MG/1; MG/1
1 TABLET ORAL DAILY
Qty: 90 TABLET | Refills: 3 | Status: SHIPPED | OUTPATIENT
Start: 2021-03-25 | End: 2021-05-13

## 2021-03-25 ASSESSMENT — PATIENT HEALTH QUESTIONNAIRE - PHQ9: SUM OF ALL RESPONSES TO PHQ QUESTIONS 1-9: 10

## 2021-03-25 NOTE — PATIENT INSTRUCTIONS
Patient Education   Here is the plan from today's visit    1. Attention deficit hyperactivity disorder (ADHD), unspecified ADHD type  - BEHAVIORAL HEALTH REFERRAL ([facility] interal and external)    2. Essential hypertension  - losartan-hydrochlorothiazide (HYZAAR) 50-12.5 MG tablet; Take 1 tablet by mouth daily  Dispense: 90 tablet; Refill: 3      Please call or return to clinic if your symptoms don't go away.    Follow up plan  Please make a clinic appointment for follow up with me (JOSE A BOSS) in 2-3 weeks for ADHD and hypertension follow up.     Thank you for coming to [facility] today.  Lab Testing:  **If you had lab testing today and your results are reassuring or normal they will be mailed to you or sent through [employer] within 7 days.   **If the lab tests need quick action we will call you with the results.  The phone number we will call with results is # [phone] (home) [phone] (work). If this is not the best number please call our clinic and change the number.  Medication Refills:  If you need any refills please call your pharmacy and they will contact us.   If you need to  your refill at a new pharmacy, please contact the new pharmacy directly. The new pharmacy will help you get your medications transferred faster.   Scheduling:  If you have any concerns about today's visit or wish to schedule another appointment please call our office during normal business hours [phone] (8-5:00 M-F)  If a referral was made to a [facility] and you don't get a call from central scheduling please call [phone].  If a Mammogram was ordered for you at [facility] call [phone] to schedule or change your appointment.  If you had an XRay/CT/Ultrasound/MRI ordered the number is [phone] to schedule or change your radiology appointment.   Medical Concerns:  If you have urgent medical concerns please call [phone] at any time of the day.    Jose A Frederick  MD Darren

## 2021-03-25 NOTE — PROGRESS NOTES
Preceptor Attestation:   Patient seen, evaluated and discussed with the resident. I have verified the content of the note, which accurately reflects my assessment of the patient and the plan of care.   Supervising Physician:  Ainsley Josue MD

## 2021-03-25 NOTE — PROGRESS NOTES
Assessment & Plan     Attention deficit hyperactivity disorder (ADHD), unspecified ADHD type  Patient reports a history of ADHD.  At her last visit, he was given a referral to an outside location for ADHD testing.  He states that he has been seen at [facility], no medications were started at that time.  He states that he had formal ADHD testing while incarcerated in [facility].  We do not have those records nor do we have records from [facility] at this time.  He states that he is to be starting school in May, would like to get on something that will be helpful for him now prior to that time.  He is at times hard to redirect, gives an inconsistent history.  He is very fidgety in the room.  As I do not have supporting documentation for his diagnosis of ADHD, in the context of his history of substance use disorder, I am very very hesitant to start him on any medications at this time.  He states that he has previously tried Strattera, had GI upset from that.  He states that he is also tried albuterol in the past, which was too strong for him.  He is specifically requesting Vyvanse.  I am hesitant to start him on anything at this time.  He is given a referral to our behavioral health specialist and psychiatry for assistance with medication therapy.  We are also going to be obtaining medical records from the correctional facility as well as [facility] in the meantime.  - BEHAVIORAL HEALTH REFERRAL ([facility] interal and external)    Essential hypertension  Tachycardia  Patient has had hypertension for many years, continues to be uncontrolled. At our last visit he was prescribed losartan at 23, and was not taking it consistently over the past few weeks. He continues to have elevated pressures, though they are somewhat improved, at 140-150/60-80. He had significant workup at our last visit which does not reveal any underlying cause of his hypertension. He reported a headache with losartan, however, he is agreeable to  increasing the dose in order to provide a combination of losartan and hydrochlorothiazide. We will plan to follow up in about 3 weeks for recheck on hypertension. Notably, his heart rate was also elevated initially. On auscultation, it does sound that his heart rate is close to the low 100's, upper 90's. I suspect his tachycardia may be related to his current fidgeting/aggitation on exam. However, if he remains tachycardic at our next visit, will plan to check an EKG.   - losartan-hydrochlorothiazide (HYZAAR) 50-12.5 MG tablet  Dispense: 90 tablet; Refill: 3      Return in about 3 weeks (around 4/15/2021) for ADHD and HTN follow up.    Mariana Banks MD  [facility] RHODA Clifton is a 30 year old who presents for the following health issues   Chief Complaint   Patient presents with     Hypertension     he would like to discuss switching to a combination pill instead of 2 pills     Medication Request     ADHD- starting school and would like to restart medications. Unable to concentrate or sit still. Disruptive to people around him per pt.      Refill Request     hydrochlorothiazide       HPI   Patient has had blood pressures in the 140-150/60-80's at his current living place, taken by staff, not by himself. He has intermittently refused taking losartan. States losartan gave him a headache.  Denies any lightheadedness, dizziness, chest pain, shortness of breath.    Also inquiring about ADHD medications, saying that [facility].  He is requesting Vyvanse.      Review of Systems   Constitutional, HEENT, cardiovascular, pulmonary, gi and gu systems are negative, except as otherwise noted.      Objective    /86   Pulse 136   Temp 99.4  F (37.4  C) (Oral)   Resp 18   Wt 98.4 kg (217 lb)   SpO2 97%   BMI 30.27 kg/m    Body mass index is 30.27 kg/m .  Physical Exam  Constitutional:       Appearance: Normal appearance.   HENT:      Head: Normocephalic.   Eyes:       General: No scleral icterus.     Extraocular Movements: Extraocular movements intact.      Conjunctiva/sclera: Conjunctivae normal.   Neck:      Musculoskeletal: Normal range of motion.   Cardiovascular:      Rate and Rhythm: Normal rate.   Pulmonary:      Effort: Pulmonary effort is normal.   Musculoskeletal: Normal range of motion.   Neurological:      General: No focal deficit present.      Mental Status: He is alert and oriented to person, place, and time.   Psychiatric:         Attention and Perception: Attention normal.         Mood and Affect: Affect normal.         Speech: Speech is rapid and pressured and tangential.         Thought Content: Thought content normal.         Cognition and Memory: Cognition normal.      Comments: Inconsistent history/memory            Results from last visit:  Office Visit on 02/11/2021   Component Date Value Ref Range Status     WBC 02/11/2021 5.9  4.0 - 11.0 K/uL Final     Lymphocytes # 02/11/2021 1.6  0.8 - 5.3 K/uL Final     % Lymphocytes 02/11/2021 27.6  20.0 - 48.0 %L Final     Mid # 02/11/2021 0.3  0.0 - 2.2 K/uL Final     Mid % 02/11/2021 5.8  0.0 - 20.0 %M Final     GRANULOCYTES # 02/11/2021 3.9  1.6 - 8.3 K/uL Final     % Granulocytes 02/11/2021 66.6  40.0 - 75.0 %G Final     RBC 02/11/2021 5.08  4.40 - 5.90 M/uL Final     Hemoglobin 02/11/2021 14.5  13.3 - 17.7 g/dL Final     Hematocrit 02/11/2021 46.2  40.0 - 53.0 % Final     MCV 02/11/2021 91.0  78.0 - 100.0 fL Final     MCH 02/11/2021 28.5  26.5 - 35.0 pg Final     MCHC 02/11/2021 31.4* 32.0 - 36.0 g/dL Final     Platelets 02/11/2021 240.0  150.0 - 450.0 K/uL Final     Specific Gravity Urine 02/11/2021 1.025  1.005 - 1.030 Final     pH Urine 02/11/2021 6.0  4.5 - 8.0 Final     Leukocyte Esterase UR 02/11/2021 Negative  NEGATIVE Final     Nitrite Urine 02/11/2021 Negative  NEGATIVE mg/dL Final     Protein UR 02/11/2021 Negative  NEGATIVE mg/dL Final     Glucose Urine 02/11/2021 Negative  NEGATIVE Final      Ketones Urine 02/11/2021 Negative  NEGATIVE mg/dL Final     Urobilinogen mg/dL 02/11/2021 0.2 E.U./dL  0.2 E.U./dL E.U./dL Final     Bilirubin UR 02/11/2021 Negative  NEGATIVE mg/dL Final     Blood UR 02/11/2021 Negative  NEGATIVE mg/dL Final     Hemoglobin A1C 02/11/2021 5.3  4.1 - 5.7 % Final     TSH 02/11/2021 1.63  0.40 - 4.00 mU/L Final     HIV Antigen Antibody Combo 02/11/2021 Nonreactive  NR^Nonreactive     Final    HIV-1 p24 Ag & HIV-1/HIV-2 Ab Not Detected     Cholesterol 02/11/2021 113  <200 mg/dL Final     Triglycerides 02/11/2021 64  <150 mg/dL Final     HDL Cholesterol 02/11/2021 52  >39 mg/dL Final     LDL Cholesterol Calculated 02/11/2021 48  <100 mg/dL Final    Desirable:       <100 mg/dl     Non HDL Cholesterol 02/11/2021 61  <130 mg/dL Final     Sodium 02/11/2021 140  133 - 144 mmol/L Final     Potassium 02/11/2021 4.2  3.4 - 5.3 mmol/L Final     Chloride 02/11/2021 105  94 - 109 mmol/L Final     Carbon Dioxide 02/11/2021 33* 20 - 32 mmol/L Final     Anion Gap 02/11/2021 2* 3 - 14 mmol/L Final     Glucose 02/11/2021 101* 70 - 99 mg/dL Final     Urea Nitrogen 02/11/2021 12  7 - 30 mg/dL Final     Creatinine 02/11/2021 0.92  0.66 - 1.25 mg/dL Final     GFR Estimate 02/11/2021 >90  >60 mL/min/[1.73_m2] Final    Comment: Non  GFR Calc  Starting 12/18/2018, serum creatinine based estimated GFR (eGFR) will be   calculated using the Chronic Kidney Disease Epidemiology Collaboration   (CKD-EPI) equation.       GFR Estimate If Black 02/11/2021 >90  >60 mL/min/[1.73_m2] Final    Comment:  GFR Calc  Starting 12/18/2018, serum creatinine based estimated GFR (eGFR) will be   calculated using the Chronic Kidney Disease Epidemiology Collaboration   (CKD-EPI) equation.       Calcium 02/11/2021 9.4  8.5 - 10.1 mg/dL Final         This office note has been dictated.

## 2021-03-30 ENCOUNTER — APPOINTMENT (OUTPATIENT)
Dept: OPTOMETRY | Facility: CLINIC | Age: 31
End: 2021-03-30
Payer: MEDICAID

## 2021-03-30 PROCEDURE — 92340 FIT SPECTACLES MONOFOCAL: CPT | Performed by: OPTOMETRIST

## 2021-03-31 ENCOUNTER — TELEPHONE (OUTPATIENT)
Dept: PSYCHOLOGY | Facility: CLINIC | Age: 31
End: 2021-03-31

## 2021-03-31 NOTE — TELEPHONE ENCOUNTER
Psychiatry Consult Referral:  Please schedule this patient with Dr. Raygoza. Check with the patient if they are able to do a video visit.  They will need access to either a smartphone or a laptop with camera/microphone.  Please talk to patient about ability to do video visits.  These consults should really be done as a video visit if at all possible.  If patient does not have access to technology, we can offer them to come into clinic to be at one of our computers to do the video visit with the psychiatrist.      This is for a one time consult only, not for ongoing psychiatric care.  She will provide recommendations to the PCP for ongoing care.     Please let the patient know that this is an educational consult clinic.  For that reason, Dr. Raygoza and a resident will be seeing the patient together virtually.     If you are unable to reach the patient after two phone calls, please send a letter and close this encounter.    Thank you!

## 2021-04-16 ENCOUNTER — TELEPHONE (OUTPATIENT)
Dept: FAMILY MEDICINE | Facility: CLINIC | Age: 31
End: 2021-04-16

## 2021-04-16 ENCOUNTER — VIRTUAL VISIT (OUTPATIENT)
Dept: FAMILY MEDICINE | Facility: CLINIC | Age: 31
End: 2021-04-16
Payer: MEDICAID

## 2021-04-16 DIAGNOSIS — Z76.5 MALINGERING: ICD-10-CM

## 2021-04-16 DIAGNOSIS — F60.2 ANTISOCIAL PERSONALITY DISORDER (H): ICD-10-CM

## 2021-04-16 DIAGNOSIS — I10 BENIGN ESSENTIAL HYPERTENSION: ICD-10-CM

## 2021-04-16 DIAGNOSIS — F60.3 BORDERLINE PERSONALITY DISORDER (H): Primary | ICD-10-CM

## 2021-04-16 PROCEDURE — 99214 OFFICE O/P EST MOD 30 MIN: CPT | Mod: 95 | Performed by: STUDENT IN AN ORGANIZED HEALTH CARE EDUCATION/TRAINING PROGRAM

## 2021-04-16 RX ORDER — ATOMOXETINE 10 MG/1
10 CAPSULE ORAL DAILY PRN
Qty: 30 CAPSULE | Refills: 0 | Status: SHIPPED | OUTPATIENT
Start: 2021-04-16 | End: 2021-05-13

## 2021-04-16 ASSESSMENT — PATIENT HEALTH QUESTIONNAIRE - PHQ9: SUM OF ALL RESPONSES TO PHQ QUESTIONS 1-9: 7

## 2021-04-16 NOTE — PATIENT INSTRUCTIONS
Patient Education   Here is the plan from today's visit    Attention deficit hyperactivity disorder (ADHD), unspecified ADHD type  We will try strattera for inattention. Let me know how it is going.   - atomoxetine (STRATTERA) 10 MG capsule; Take 1 capsule (10 mg) by mouth daily as needed (inattention)  Dispense: 30 capsule; Refill: 0      Please call or return to clinic if your symptoms don't go away.    Follow up plan  No follow-ups on file.    Thank you for coming to [facility] today.  Lab Testing:  **If you had lab testing today and your results are reassuring or normal they will be mailed to you or sent through [facility] within 7 days.   **If the lab tests need quick action we will call you with the results.  **If you are having labs done on a different day, please call [phone] to schedule at [facility] or [phone] for other [facility] locations.   The phone number we will call with results is # [phone] (home) [phone] (work). If this is not the best number please call our clinic and change the number.  Medication Refills:  If you need any refills please call your pharmacy and they will contact us.   If you need to  your refill at a new pharmacy, please contact the new pharmacy directly. The new pharmacy will help you get your medications transferred faster.   Scheduling:  If you have any concerns about today's visit or wish to schedule another appointment please call our office during normal business hours [phone] (8-5:00 M-F)  If a referral was made to a [facility] and you don't get a call from central scheduling please call [phone].  If a Mammogram was ordered for you at [facility] call [phone] to schedule or change your appointment.  If you had an XRay/CT/Ultrasound/MRI ordered the number is [phone] to schedule or change your radiology appointment.   Medical Concerns:  If you have urgent medical concerns please  call [phone] at any time of the day.    Mariana Banks MD

## 2021-04-16 NOTE — TELEPHONE ENCOUNTER
Rn spoke to patient as requested and attempted to cancel  appointment with Dr. Raygoza. Patient expressed frustration and did not understand why he can't be seen by [facility]. Patient states he told PCP that he wanted to keep all of his services in the same system-does not want to continue with [facility]. Patient states he is about to graduate from [employer] and start school in June and therefore needs to make sure he has his medications in order to be successful. Patient requesting to speak with Dr. Banks about a plan as writer did not know the details of his situation- RN assisted with scheduling video visit for this afternoon with PCP. Routing to providers as BRENDAN.   Mariia Short RN

## 2021-04-16 NOTE — TELEPHONE ENCOUNTER
4/16/21 Per Mariia KIRKLAND telephone note this morning after talking to patient, it was agreed with  and  that I would wait until after video visit today to schedule. Will know more after appointment.    Shannan Traylor  Care Coordinator

## 2021-04-16 NOTE — TELEPHONE ENCOUNTER
"4/15/21 Contacted patient, per  to discuss scheduling back into [facility]. Patient is currently residing at [facility] as in patient. We established that patient prefer to go to the [place] location [address] [place] [address], [phone]. [phone]. Patient states that anytime of the day works for him. Per patient, he \"is in group(s) from 8:00a.m - 2:00p.m but, can make the appointment happen\". I explained that I would contact him tomorrow morning with appointment information, patient agrees.    Shannan Traylor  Care Coordinator    "

## 2021-04-16 NOTE — PROGRESS NOTES
Preceptor Attestation:   I discussed the patient with the resident. I have verified the content of the note, which accurately reflects my assessment of the patient and the plan of care. Consider formal adhd evaluation before any adhd medications for future with current complex history.    Supervising Physician:  Alistair Villarreal MD, MD.

## 2021-04-16 NOTE — PROGRESS NOTES
Etienne is a 30 year old who is being evaluated via a billable video visit.      How would you like to obtain your AVS? MyChart  Will anyone else be joining your video visit? No    Video Start Time: 4:38 PM    Assessment & Plan   Patient is a 30-year-old male with past medical history of substance use disorder, substance of choice primarily meth.  He was incarcerated last year for meth possession.  During her last visit, patient was noting that he believes he has ADHD, at that time had not recalled that he had seen [facility].  Release of information had been obtained previously for [facility] as well as [facility].  In the interim, I did obtain these records, see below for more details on these.  Today, patient primarily has a visit to discuss why a psychiatry consultation with our psychiatric professional here in clinic has been canceled.    This patient has already been seen by a psychiatrist, and as a documentation has noted, he had been offered multiple nonstimulant medications for ADHD treatment, without any formal testing documentation completed.  He, per documentation, did not like any of these suggestions, actually became quite angry during some of their visits.  Patient continues to reside in a prison house.  He notes that he is set up to start school in June, worried about returning to drugs again to focus.  He is already maxed out on Wellbutrin.  In review of documentation from the [email or web address]], patient apparently had MMPI and other personality testing completed, which have been reviewed by multiple mental health providers.  It seems that there is not one true consensus, though antisocial personality disorder, borderline personality disorder, malingering were all suggested.  In some documentation, they suspect that he was not truthfully answering some of the questions.  However, does note that he was able to concentrate on the entire  questionnaires without difficulty.    Nonetheless, in our conversation today, I did offer multiple solutions including returning to [facility] for medication therapy, reconnecting with [facility] (as he states he has previously been given stimulants through them, though broke his contract with them) and plan to have urine drug screens completed at our clinic, referral to long-term psychiatry at the [facility], though this takes quite a bit of time to get into, weight less often 6 months or longer.  I also offered to prescribe what [facility] had suggested, which was Strattera.  He became frustrated with all of these options, though ultimately did agree to Strattera.  He does continue to state that Strattera has previously caused upset stomach.  Due to my concern that ADHD may not actually be a true diagnosis for him anymore, though he may have had the diagnosis of the child, a very low dose of Strattera was ordered at 10 mg daily.  This, if it does work, also may decrease his GI side effects due to the low dose.    Ultimately, I do worry that this patient does have an underlying personality disorder, is not fully committed to sobriety, and I do worry that he will, despite what I do, return to street drug use, with high likelihood of repeat incarceration.    Borderline personality disorder (H)  ADHD  - atomoxetine (STRATTERA) 10 MG capsule  Dispense: 30 capsule; Refill: 0    Antisocial personality disorder (H)    In review of prior documentation, patient was released from prison on 25 mg of hydrochlorothiazide, metoprolol 50 mg BID, venlafaxine 150 daily. While there, he was also on D-cream once daily PRN, bacitracin to abrasions, ibuprofen as needed, omeprazole 20 mg daily, and variations of lamotrigine doses anywhere from 25 mg daily to 100 daily.     Patient had multiple negative covid tests including, almost all of which were in the [facility] system.   January 2021: 6, 14, 21st December 2020: 1,8,15,22,29 November 2020:  "13, 19 July 2020: 7, 14, 24  June 2020: 9, 16, 23,30    Reviewed notes from [facility] form visits on 2/15/21  Recommendations to do 300 every day of buproprion rather than BID dosing for th eXL prescription. Offered strattera for ahdd, which he declined, also ddlining guanfacine, clonididine, and increased bupropion off lable.     Visit 3/8/2021, no changes  2/9/2021 (initial visit) dianosis of MDD, recurrent moderate.     Review of prior external note(s) from - Outside records from [facility], [facility]       No follow-ups on file.    Mariana Banks MD  [facility]    The author of this note documented a reason for not sharing it with the patient.  Note is intentionally not shared with the patient as we have not been able to discuss these prior diagnoses in person.  I do feel that it will harm our patient provider relationship for him to first hear about this through documentation accessible over my chart.      Deep Clifton is a 30 year old who presents for the following health issues   Chief Complaint   Patient presents with     RECHECK     mental health        HPI   Unsure what to talk about.   Set up a plan.     Therapist there. Wanted a copy. Starting couples therapy soon.     Treatment: graduate on April 29th. wtoe a proprosal to not have to go to [place].   After graduation, going to [employer] [place] and inensive outpatient at [facility]  Talk with . Registered for June classes. (2). Starting in June - 1st?   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  Have a position at [employer]. 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     Classes: english, political science .     Daughter 13, been cutting, trying to run away.       Worried about using to get homework done  Relapses have been because needing to get stuff down.             Review of Systems "   Constitutional, HEENT, cardiovascular, pulmonary, gi and gu systems are negative, except as otherwise noted.      Objective           Vitals:  No vitals were obtained today due to virtual visit.    Physical Exam   GENERAL: Healthy, alert and no distress  EYES: Eyes grossly normal to inspection.  No discharge or erythema, or obvious scleral/conjunctival abnormalities.  RESP: No audible wheeze, cough, or visible cyanosis.  No visible retractions or increased work of breathing.    SKIN: Visible skin clear. No significant rash, abnormal pigmentation or lesions.  NEURO: Cranial nerves grossly intact.  Mentation and speech appropriate for age.  PSYCH: Mentation appears normal, affect normal with occasional anger/frustration, judgement and insight impaired. normal speech and appearance well-groomed.      This office note has been dictated.          Video-Visit Details    Type of service:  Video Visit    Video End Time:5:04    Originating Location (pt. Location): Other treatment center    Distant Location (provider location):  [facility]     Platform used for Video Visit: [employer]

## 2021-04-21 ENCOUNTER — TELEPHONE (OUTPATIENT)
Dept: FAMILY MEDICINE | Facility: CLINIC | Age: 31
End: 2021-04-21

## 2021-04-21 NOTE — TELEPHONE ENCOUNTER
4/21/21 Reached out to patient once again and had to leave a message. Again, left my direct number. [facility].    Shannan Traylor  Care Coordinator

## 2021-04-21 NOTE — TELEPHONE ENCOUNTER
4/19/21 Reached out to patient to schedule with [facility]. Left message for patient, along with my direct number. Will try to reach patient again.    Shannan Traylor  Care Coordinator

## 2021-04-26 NOTE — TELEPHONE ENCOUNTER
4/23/21 Attempted to return patient call, no answer.    Shannan Traylor  Care Coordinator      4/26/21 Attempted once again to reach patient, left message.    Shannan Traylor  Care Coordinator

## 2021-05-05 ENCOUNTER — NURSE TRIAGE (OUTPATIENT)
Dept: FAMILY MEDICINE | Facility: CLINIC | Age: 31
End: 2021-05-05

## 2021-05-05 ENCOUNTER — ALLIED HEALTH/NURSE VISIT (OUTPATIENT)
Dept: NURSING | Facility: CLINIC | Age: 31
End: 2021-05-05
Payer: COMMERCIAL

## 2021-05-05 VITALS — SYSTOLIC BLOOD PRESSURE: 179 MMHG | DIASTOLIC BLOOD PRESSURE: 106 MMHG

## 2021-05-05 DIAGNOSIS — F41.9 ANXIETY: Primary | ICD-10-CM

## 2021-05-05 PROCEDURE — 99207 PR NO CHARGE NURSE ONLY: CPT

## 2021-05-05 NOTE — NURSING NOTE
Etienne Dupree is a 30 year old male who presents with Anxiety, Suicidal ideation/plan intention and consumption of marijuana that may have been laced.    See triage note.

## 2021-05-05 NOTE — TELEPHONE ENCOUNTER
Patient ambulated independently into the clinic.  Patient states that he smoked Marijuana that may have been laced with unknown substance at 8.30am before going to work. Patient is thinking maybe PCP.  He was late for his shift this morning.  After smoking the Marijuana joint, he reports his throat started tightening and developed dry mouth-patient is noted pacing back and forth to the clinic room sink, spitting and drinking water from the faucet.  Patient is in a recovery treatment group, has been sober for 60 days and relapsed 2 days ago.  Patient reports that he has a therapist virtually.  Patient was making random statements, stated that he was at the ED and was paranoid about their findings.  He was speaking fast, expressed concerns of overdosing and if he can gain access to narcan.  Patient denies any other symptoms or concerns at this time.    Patient was advised to return to the ER for further evaluation and treatment for substances that he ingested.  Patient was advised to reach out to his therapist for additional support and guidance on recovery and to establish care with a provider to continue managing his overall health.    Patient verbalized understanding and has no further questions or concerns at this time.    30-45 minutes later:  Noted patient coming from the clinic lab area.  He was asked why he was still in the clinic instead of the ER.  Patient states that he has just vomited (gel like substances) and continues to feel like there is more.  Patient was noted to be sweating profusely on face but he stated that it was water.  Patient requested a new mask from the  and left the building.              Reason for Disposition    Very strange, paranoid, or confused behavior    Additional Information    Negative: Coma (e.g., not moving, not talking, not responding to stimuli)    Negative: Difficult to awaken or acting confused (e.g., disoriented, slurred speech)    Negative: Seeing or hearing or  "feeling things that are not there (i.e., auditory, visual, or tactile hallucinations)    Negative: Slow, shallow and weak breathing    Negative: Seizure    Negative: Violent behavior, or threatening to physically hurt or kill someone    Negative: Sounds like a life-threatening emergency to the triager    Negative: Suicide thoughts, threats, and attempts: questions or concerns about    Negative: Other significant medical symptom is present, see that guideline (e.g., chest pain, headache, vomiting)    Negative: Alcohol use, abuse, or dependence: question or problem related to    Negative: Depression is main problem or symptom (e.g., feelings of sadness or hopelessness)    Answer Assessment - Initial Assessment Questions  1. DRUG: \"What drug(s) are you using?\"       Marijuana (maybe laced with unknown substance)  2. ROUTE: \"How are you using this drug?\" (e.g., swallow, smoke, inject, moeller, snort)      smoked  3. HOW OFTEN: \"How many days per week do you typically use it?\"      Relapsed 2 days ago  4.  HOW MUCH: \"How much do you use?\"       A few hits  5. LAST 24 HOURS: \"Have you used it in the last 24 hours?\"      8.30am  6. DRINKING PROBLEM: \"Do you have or have you ever had an alcohol problem?\"        7. SYMPTOMS: \"What symptoms are you currently experiencing?\" (e.g., none, headache, chest pain, abdominal pain, vomiting)      Seating, pacing, anxious, paranoid, reports emesis X 1 (gel like stuff)  8. DETOX PROGRAM: \"Have you ever gone through a substance abuse (detox) program?\"      Is in treatment and has been sober X 60 days  9. THERAPIST: \"Do you have a counselor or therapist? Name?\"      In a treatment group  10. SUPPORT: \"Who is with you now?\" \"Who do you live with?\" \"Do you have family or friends nearby who you can talk to?\"         yes    Protocols used: SUBSTANCE ABUSE AND [id number]      "

## 2021-05-13 ENCOUNTER — OFFICE VISIT (OUTPATIENT)
Dept: FAMILY MEDICINE | Facility: CLINIC | Age: 31
End: 2021-05-13
Payer: COMMERCIAL

## 2021-05-13 VITALS
OXYGEN SATURATION: 97 % | DIASTOLIC BLOOD PRESSURE: 85 MMHG | SYSTOLIC BLOOD PRESSURE: 161 MMHG | HEART RATE: 71 BPM | BODY MASS INDEX: 30.13 KG/M2 | TEMPERATURE: 99 F | RESPIRATION RATE: 16 BRPM | WEIGHT: 216 LBS

## 2021-05-13 DIAGNOSIS — F33.41 RECURRENT MAJOR DEPRESSIVE DISORDER, IN PARTIAL REMISSION (H): ICD-10-CM

## 2021-05-13 DIAGNOSIS — F15.21 METHAMPHETAMINE USE DISORDER, MODERATE, IN EARLY REMISSION (H): Primary | ICD-10-CM

## 2021-05-13 DIAGNOSIS — I10 ESSENTIAL HYPERTENSION: ICD-10-CM

## 2021-05-13 DIAGNOSIS — F90.9 ATTENTION DEFICIT HYPERACTIVITY DISORDER (ADHD), UNSPECIFIED ADHD TYPE: ICD-10-CM

## 2021-05-13 LAB
AMPHETAMINES QUAL: NEGATIVE
BARBITURATES QUAL URINE: NEGATIVE
BENZODIAZEPINE QUAL URINE: NEGATIVE
BUPRENORPHINE QUAL URINE: NEGATIVE
CANNABINOIDS UR QL SCN: NEGATIVE
COCAINE QUAL URINE: NEGATIVE
METHAMPHETAMINE: NEGATIVE
METHODONE QUAL: NEGATIVE
MORPHINE QUAL: NEGATIVE
OXYCODONE QUAL: NEGATIVE
PHENCYCLIDINE: NEGATIVE
PROPOXYPHENE: NEGATIVE
TEMPERATURE OF URINE WAS BETWEEN 90-100 DEGREES F: YES
TRICYCLIC ANTIDEPRESSANTS: NEGATIVE

## 2021-05-13 PROCEDURE — 80306 DRUG TEST PRSMV INSTRMNT: CPT | Performed by: STUDENT IN AN ORGANIZED HEALTH CARE EDUCATION/TRAINING PROGRAM

## 2021-05-13 PROCEDURE — 99214 OFFICE O/P EST MOD 30 MIN: CPT | Mod: GC | Performed by: STUDENT IN AN ORGANIZED HEALTH CARE EDUCATION/TRAINING PROGRAM

## 2021-05-13 RX ORDER — HYDROCHLOROTHIAZIDE 12.5 MG/1
12.5 TABLET ORAL EVERY MORNING
Qty: 90 TABLET | Refills: 3 | Status: ON HOLD | OUTPATIENT
Start: 2021-05-13 | End: 2022-11-11

## 2021-05-13 RX ORDER — LOSARTAN POTASSIUM 25 MG/1
25 TABLET ORAL DAILY
Qty: 90 TABLET | Refills: 3 | Status: SHIPPED | OUTPATIENT
Start: 2021-05-13 | End: 2022-09-08

## 2021-05-13 RX ORDER — ATOMOXETINE 10 MG/1
10 CAPSULE ORAL DAILY PRN
Qty: 30 CAPSULE | Refills: 0 | Status: SHIPPED | OUTPATIENT
Start: 2021-05-13 | End: 2021-06-09

## 2021-05-13 RX ORDER — BUPROPION HYDROCHLORIDE 300 MG/1
300 TABLET ORAL EVERY MORNING
Qty: 90 TABLET | Refills: 3 | Status: SHIPPED | OUTPATIENT
Start: 2021-05-13 | End: 2021-09-09

## 2021-05-13 NOTE — PATIENT INSTRUCTIONS
DZ discs  [place] for the [facility] has a full 18    Plan for ADHD treatment:   1. Full dose wellbutrin in the morning (300 mg all at once)  2. At your 9 o'clock break, bring the strattera and take one of them, see if that is working to get you through group.     Give this a try for a few days and see if it is getting you through group. If not, you can go ahead and take the strattera right away in the morning then. We will revisit this at your next appointment.    Patient Education   Here is the plan from today's visit    1. Methamphetamine use disorder, moderate, in early remission (H)  - Rapid Urine Drug Screen ([facility])    2. Recurrent major depressive disorder, in partial remission (H)  - buPROPion (WELLBUTRIN XL) 300 MG 24 hr tablet; Take 1 tablet (300 mg) by mouth every morning  Dispense: 90 tablet; Refill: 3    3. Attention deficit hyperactivity disorder (ADHD), unspecified ADHD type    4. Essential hypertension  - losartan (COZAAR) 25 MG tablet; Take 1 tablet (25 mg) by mouth daily  Dispense: 90 tablet; Refill: 3  - hydrochlorothiazide (HYDRODIURIL) 12.5 MG tablet; Take 1 tablet (12.5 mg) by mouth every morning  Dispense: 90 tablet; Refill: 3    5. Borderline personality disorder (H)  - atomoxetine (STRATTERA) 10 MG capsule; Take 1 capsule (10 mg) by mouth daily as needed (inattention)  Dispense: 30 capsule; Refill: 0      Please call or return to clinic if your symptoms don't go away.    Follow up plan  Return in about 2 weeks (around 5/27/2021).    Thank you for coming to [facility] today.  Lab Testing:  **If you had lab testing today and your results are reassuring or normal they will be mailed to you or sent through [employer] within 7 days.   **If the lab tests need quick action we will call you with the results.  **If you are having labs done on a different day, please call [phone] to schedule at \[facility]\"" Lab or [phone] for other [facility] locations.    The phone number we will call with results is # [phone] (home) [phone] (work). If this is not the best number please call our clinic and change the number.  Medication Refills:  If you need any refills please call your pharmacy and they will contact us.   If you need to  your refill at a new pharmacy, please contact the new pharmacy directly. The new pharmacy will help you get your medications transferred faster.   Scheduling:  If you have any concerns about today's visit or wish to schedule another appointment please call our office during normal business hours [phone] (8-5:00 M-F)  If a referral was made to a [facility] and you don't get a call from central scheduling please call [phone].  If a Mammogram was ordered for you at [facility] call [phone] to schedule or change your appointment.  If you had an EKG/XRay/CT/Ultrasound/MRI ordered the number is [phone] to schedule or change your radiology appointment.   Medical Concerns:  If you have urgent medical concerns please call [phone] at any time of the day.    Mariana Banks MD

## 2021-05-13 NOTE — PROGRESS NOTES
Preceptor Attestation:  Patient seen and evaluated in person. I discussed the patient with the resident. I have verified the content of the note, which accurately reflects my assessment of the patient and the plan of care.   Supervising Physician:  Caryl Morse DO

## 2021-05-13 NOTE — PROGRESS NOTES
Assessment & Plan     Methamphetamine use disorder, moderate, in early remission (H)  Patient is a 31-year-old male who has been incarcerated previously for methamphetamine possession.  He most recently got out of an outpatient treatment program, into sober housing.  However, on 5/4/2021, a another resident at the sober housing had methamphetamine, which he used.  Patient had horrible side effects from this, was seen in the emergency department.  He has continued to be sober since that incident.  Urine drug screen is collected today which is negative, without any signs of substance abuse.  - Rapid Urine Drug Screen ([facility])    Recurrent major depressive disorder, in partial remission (H)  Patient is a history of major depression, he is also on Wellbutrin for inattention.  His insurance is preferring if he were to go on the 300 mg tablet rather than the 150 mg extended release tablet twice daily.  We will try this, so a new prescription for 300 mg tablets is sent to the pharmacy for him.  Instructed him to take this right away in the morning, as it will have some good effects for his attention.  - buPROPion (WELLBUTRIN XL) 300 MG 24 hr tablet  Dispense: 90 tablet; Refill: 3    Attention deficit hyperactivity disorder (ADHD), unspecified ADHD type  Patient has inattention, we started him on Strattera about a month ago.  He states that this actually seems to be helping him, though does tend to wear off later in the day.  For now, as we are changing the Wellbutrin regimen, will keep him on 10 mg once daily.  However, I would be willing to go up to 10 mg twice daily, maximum dose of 40 mg daily.  Want to be hesitant with the increase in this as I worry that ultimately it may worsen his desire to use methamphetamine.  For now, instructed him to take the 10 mg tablets sometime in the mid morning, which should help him get through the rest of his morning work shift, as well as the early afternoon counseling.  This may  need to be adjusted based on how this seems to be helping him.  At our visit in 2 weeks, we will revisit this and consider increasing the dose to 10 mg twice daily.  - atomoxetine (STRATTERA) 10 MG capsule  Dispense: 30 capsule; Refill: 0    Essential hypertension  Patient is a history of hypertension.  He has not had any of his medications since his last sober house.  He also noted that the combination of losartan 50 mg grams hydrochlorothiazide 12.5 mg was causing some upset stomach.  We will go back to the split dosing of losartan 25 mg and hydrochlorothiazide 12.5.  He should return in 2 weeks to have her blood pressure recheck as well as repeat discussion of ADHD.  - losartan (COZAAR) 25 MG tablet  Dispense: 90 tablet; Refill: 3  - hydrochlorothiazide (HYDRODIURIL) 12.5 MG tablet  Dispense: 90 tablet; Refill: 3       Return in about 2 weeks (around 5/27/2021).    aMriana Banks MD  [facility] RHODA Clifton is a 30 year old who presents for the following health issues   Chief Complaint   Patient presents with     Recheck Medication       HPI   [employer] now [address] [place] [id number]  Got a new job through [employer]. uma information technology course.  12:30-2:50   Another interview at a golf course.     Strattera, no gut side effects.     Collegiate program at [employer]? Doesn't start until the fall. - stipend.     Start school next month in June for summer courses, 2 classes.   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       Review of Systems   Constitutional, HEENT, cardiovascular, pulmonary, gi and gu systems are negative, except as otherwise noted.      Objective    BP (!) 161/85   Pulse 71   Temp 99  F (37.2  C) (Oral)   Resp 16   Wt 98 kg (216 lb)   SpO2 97%   BMI 30.13 kg/m    Body mass index is 30.13 kg/m .  Physical Exam  Constitutional:       Appearance: Normal appearance.   HENT:      Head: Normocephalic.   Eyes:      General: No scleral icterus.     Extraocular Movements:  Extraocular movements intact.      Conjunctiva/sclera: Conjunctivae normal.   Neck:      Musculoskeletal: Normal range of motion.   Cardiovascular:      Rate and Rhythm: Normal rate.   Pulmonary:      Effort: Pulmonary effort is normal.   Musculoskeletal: Normal range of motion.   Neurological:      General: No focal deficit present.      Mental Status: He is alert and oriented to person, place, and time.   Psychiatric:         Attention and Perception: Attention normal.         Mood and Affect: Mood and affect normal.         Speech: Speech normal.         Behavior: Behavior normal. Behavior is cooperative.         Thought Content: Thought content normal.         Cognition and Memory: Cognition and memory normal.         Results from this visit  Results for orders placed or performed in visit on 05/13/21   Rapid Urine Drug Screen ([facility])     Status: None   Result Value Ref Range    Cannabinoids Qual Urine Negative NEGATIVE    Phencyclidine Negative NEGATIVE    Cocaine Qual Urine Negative NEGATIVE    Methamphetamine Qual Negative NEGATIVE    Morphine Qual Negative NEGATIVE    Amphetamines Qual Negative NEGATIVE    Benzodiazepine Qual Urine Negative NEGATIVE    Tricyclic Antidepressants Negative NEGATIVE    Methadone Qual Negative NEGATIVE    Barbiturates Qual Urine Negative NEGATIVE    Oxycodone Qual Negative NEGATIVE    Propoxyphene Negative NEGATIVE    Buprenorphine Qual Urine Negative NEGATIVE    Temperature of Urine was Between  Degrees F YES YES         This office note has been dictated.

## 2021-05-14 ENCOUNTER — TELEPHONE (OUTPATIENT)
Dept: FAMILY MEDICINE | Facility: CLINIC | Age: 31
End: 2021-05-14

## 2021-05-14 NOTE — TELEPHONE ENCOUNTER
Prior Authorization Retail Medication Request    Medication/Dose: Bupropion xl 150mg  ICD code Recurrent major depressive disorder, in partial remission (H) [F33.41]     Insurance Name: [facility]  Insurance ID:           Subscriber: [id number]

## 2021-05-17 NOTE — TELEPHONE ENCOUNTER
PA Initiation    Medication: Bupropion xl 150mg  Insurance Company: [employer] - Phone [phone] Fax [phone]  Pharmacy Filling the Rx: [place], [place] - [address]   Filling Pharmacy Phone: [phone]  Filling Pharmacy Fax: [phone]  Start Date: 5/17/2021

## 2021-05-20 NOTE — TELEPHONE ENCOUNTER
Prior Authorization:     Denied Reason: see chart    Alternatives: None available    Pharmacy notified.  Routing to MD    Please advise if you would like to move forward with the appeal process or plan to  prescribe an alternative medication. If Appeal is desired a letter of medical necessity with denial rationale is needed to start the appeal process.   Route to [place] Pool when completed.    Claire Schuster CMA on 5/20/2021 at 1:30 PM

## 2021-05-26 ENCOUNTER — OFFICE VISIT (OUTPATIENT)
Dept: FAMILY MEDICINE | Facility: CLINIC | Age: 31
End: 2021-05-26
Payer: COMMERCIAL

## 2021-05-26 VITALS
WEIGHT: 203.2 LBS | HEART RATE: 96 BPM | OXYGEN SATURATION: 99 % | SYSTOLIC BLOOD PRESSURE: 148 MMHG | TEMPERATURE: 98.1 F | DIASTOLIC BLOOD PRESSURE: 91 MMHG | RESPIRATION RATE: 16 BRPM | BODY MASS INDEX: 28.34 KG/M2

## 2021-05-26 DIAGNOSIS — R41.840 INATTENTION: ICD-10-CM

## 2021-05-26 DIAGNOSIS — I10 ESSENTIAL HYPERTENSION: Primary | ICD-10-CM

## 2021-05-26 PROCEDURE — 99214 OFFICE O/P EST MOD 30 MIN: CPT | Mod: GC | Performed by: STUDENT IN AN ORGANIZED HEALTH CARE EDUCATION/TRAINING PROGRAM

## 2021-05-26 NOTE — PROGRESS NOTES
Assessment & Plan   Patient is a pleasant 31-year-old male with past medical history of substance use disorder, hypertension, and attention.  He presents today for follow-up on blood pressure and recent mental health medication changes.    Essential hypertension  Regarding hypertension, patient's blood pressure today initially mildly elevated in the 140s over 90s, improved on recheck to the 140s over 80s.  Patient has not had any lightheadedness, dizziness, headaches, vision changes, chest pain, shortness of breath, new leg pain or leg swelling, upset stomach, or any other new symptoms.  He feels the separate dosing of the losartan and the hydrochlorothiazide has helped decrease his upset stomach symptoms.  He has not had any falls.  While his blood pressure does seem to be coming within normal range, we certainly should follow-up on this again soon.  No medication changes are made today, however, in the future, could consider addition of guanfacine both for the mental health and blood pressure effects.    Inattention  Regarding inattention, patient regimen was changed during her last visit to 300 mg of Wellbutrin in the morning, with the plan to take the 1 capsule of Strattera midmorning.  He has been doing this as we have discussed, which does seem to help actually quite significantly with getting him through his early afternoon group therapy sessions.  Of note, patient is to be starting school soon within the next couple weeks.  I instructed him that if he is feeling like he is having a hard time with maintaining focus during this, we should have another visit to discuss this, could potentially increase to twice daily dosing of the Strattera as needed.  Overall, we do need to be mindful of the fact that he has a history of substance use disorder, with a recent brief relapse.      No follow-ups on file.    Mariana Banks MD  [facility] RHODA Clifton is a 31 year old who  presents for the following health issues   Chief Complaint   Patient presents with     Recheck Medication       HPI   Presents for follow-up primarily on blood pressure.  He also discuss the recent changes in Strattera and Wellbutrin.    Patient has been taking the losartan and hydrochlorothiazide tablets separately, which has not had any return of upset stomach.    144/84      Review of Systems   Constitutional, HEENT, cardiovascular, pulmonary, gi and gu systems are negative, except as otherwise noted.      Objective    BP (!) 148/91   Pulse 96   Temp 98.1  F (36.7  C) (Oral)   Resp 16   Wt 92.2 kg (203 lb 3.2 oz)   SpO2 99%   BMI 28.34 kg/m    Body mass index is 28.34 kg/m .  Physical Exam  Constitutional:       Appearance: Normal appearance.   HENT:      Head: Normocephalic.   Eyes:      General: No scleral icterus.     Extraocular Movements: Extraocular movements intact.      Conjunctiva/sclera: Conjunctivae normal.   Neck:      Musculoskeletal: Normal range of motion.   Cardiovascular:      Rate and Rhythm: Normal rate.   Pulmonary:      Effort: Pulmonary effort is normal.   Musculoskeletal: Normal range of motion.   Neurological:      General: No focal deficit present.      Mental Status: He is alert and oriented to person, place, and time.   Psychiatric:      Comments: Pleasant, normal mood and affect today          This office note has been dictated.

## 2021-05-26 NOTE — TELEPHONE ENCOUNTER
Decision made with patient to go to 300 mg at once rather than BID dosing of 150 mg. No further need for prior auth.   Mariana Banks MD

## 2021-05-26 NOTE — PROGRESS NOTES
Preceptor Attestation:   Patient seen, evaluated and discussed with the resident. I have verified the content of the note, which accurately reflects my assessment of the patient and the plan of care.   Supervising Physician:  Juanpablo Peguero MD

## 2021-06-05 NOTE — PATIENT INSTRUCTIONS
Patient Education   Here is the plan from today's visit    1. Essential hypertension    2. Inattention      Please call or return to clinic if your symptoms don't go away.    Follow up plan  No follow-ups on file.    Thank you for coming to [facility] today.  COVID-19 Vaccine:  If you are eligible for the COVID-19 vaccine, you can schedule via [employer] or call [facility] at [address] [place] [address]. If you need assistance with scheduling, please speak to a Care Coordinator or your provider.   Lab Testing:  **If you had lab testing today and your results are reassuring or normal they will be mailed to you or sent through [employer] within 7 days.   **If the lab tests need quick action we will call you with the results.  **If you are having labs done on a different day, please call [phone] to schedule at [facility] or [phone] for other [facility] locations.   The phone number we will call with results is # [phone] (home) [phone] (work). If this is not the best number please call our clinic and change the number.  Medication Refills:  If you need any refills please call your pharmacy and they will contact us.   If you need to  your refill at a new pharmacy, please contact the new pharmacy directly. The new pharmacy will help you get your medications transferred faster.   Scheduling:  If you have any concerns about today's visit or wish to schedule another appointment please call our office during normal business hours [phone] (8-5:00 M-F)  If a referral was made to a [facility] and you don't get a call from central scheduling please call [phone].  If a Mammogram was ordered for you at [facility] call [phone] to schedule or change your appointment.  If you had an EKG/XRay/CT/Ultrasound/MRI ordered the number is [phone] to schedule or change your radiology appointment.   Medical Concerns:  If you have urgent medical concerns  please call [phone] at any time of the day.    Mariana Banks MD

## 2021-06-09 DIAGNOSIS — F90.9 ATTENTION DEFICIT HYPERACTIVITY DISORDER (ADHD), UNSPECIFIED ADHD TYPE: ICD-10-CM

## 2021-06-09 RX ORDER — ATOMOXETINE 10 MG/1
10 CAPSULE ORAL DAILY PRN
Qty: 30 CAPSULE | Refills: 2 | Status: ON HOLD | OUTPATIENT
Start: 2021-06-09 | End: 2022-11-11

## 2021-06-09 NOTE — TELEPHONE ENCOUNTER
"Request for medication refill:  atomoxetine (STRATTERA) 10 MG capsule    Providers if patient needs an appointment and you are willing to give a one month supply please refill for one month and  send a letter/MyChart using \".SMILLIMITEDREFILL\" .smillimited and route chart to \"P [facility] \" (Giving one month refill in non controlled medications is strongly recommended before denial)    If refill has been denied, meaning absolutely no refills without visit, please complete the smart phrase \".smirxrefuse\" and route it to the \"P [facility] MED REFILLS\"  pool to inform the patient and the pharmacy.    Rosario Dave        "

## 2021-07-12 ENCOUNTER — HOSPITAL ENCOUNTER (EMERGENCY)
Facility: CLINIC | Age: 31
Discharge: LEFT BEFORE DISCHARGE | End: 2021-07-12
Payer: COMMERCIAL

## 2021-07-12 ENCOUNTER — HOSPITAL ENCOUNTER (EMERGENCY)
Facility: CLINIC | Age: 31
Discharge: HOME | End: 2021-07-12
Payer: COMMERCIAL

## 2021-07-12 VITALS
RESPIRATION RATE: 18 BRPM | TEMPERATURE: 98.9 F | OXYGEN SATURATION: 98 % | DIASTOLIC BLOOD PRESSURE: 111 MMHG | SYSTOLIC BLOOD PRESSURE: 163 MMHG | HEART RATE: 123 BPM

## 2021-07-17 ENCOUNTER — HOSPITAL ENCOUNTER (EMERGENCY)
Facility: CLINIC | Age: 31
Discharge: HOME | End: 2021-07-17
Payer: COMMERCIAL

## 2021-08-12 ENCOUNTER — HOSPITAL ENCOUNTER (EMERGENCY)
Facility: CLINIC | Age: 31
Discharge: LEFT BEFORE DISCHARGE | End: 2021-08-12
Attending: EMERGENCY MEDICINE | Admitting: EMERGENCY MEDICINE
Payer: COMMERCIAL

## 2021-08-12 ENCOUNTER — APPOINTMENT (OUTPATIENT)
Dept: GENERAL RADIOLOGY | Facility: CLINIC | Age: 31
End: 2021-08-12
Attending: EMERGENCY MEDICINE
Payer: COMMERCIAL

## 2021-08-12 VITALS
OXYGEN SATURATION: 95 % | BODY MASS INDEX: 26.51 KG/M2 | TEMPERATURE: 98.1 F | RESPIRATION RATE: 19 BRPM | HEIGHT: 69 IN | DIASTOLIC BLOOD PRESSURE: 92 MMHG | WEIGHT: 179 LBS | HEART RATE: 110 BPM | SYSTOLIC BLOOD PRESSURE: 141 MMHG

## 2021-08-12 DIAGNOSIS — R46.89 BEHAVIORAL CHANGE: ICD-10-CM

## 2021-08-12 DIAGNOSIS — S93.402A SPRAIN OF LEFT ANKLE, UNSPECIFIED LIGAMENT, INITIAL ENCOUNTER: ICD-10-CM

## 2021-08-12 LAB
ALBUMIN SERPL-MCNC: 4.7 G/DL (ref 3.4–5)
ALBUMIN UR-MCNC: NEGATIVE MG/DL
ALP SERPL-CCNC: 93 U/L (ref 40–150)
ALT SERPL W P-5'-P-CCNC: 63 U/L (ref 0–70)
AMPHETAMINES UR QL SCN: ABNORMAL
ANION GAP SERPL CALCULATED.3IONS-SCNC: 9 MMOL/L (ref 3–14)
APPEARANCE UR: CLEAR
AST SERPL W P-5'-P-CCNC: 138 U/L (ref 0–45)
BARBITURATES UR QL: ABNORMAL
BASOPHILS # BLD AUTO: 0.1 10E3/UL (ref 0–0.2)
BASOPHILS NFR BLD AUTO: 0 %
BENZODIAZ UR QL: ABNORMAL
BILIRUB SERPL-MCNC: 0.7 MG/DL (ref 0.2–1.3)
BILIRUB UR QL STRIP: NEGATIVE
BUN SERPL-MCNC: 13 MG/DL (ref 7–30)
CALCIUM SERPL-MCNC: 8.7 MG/DL (ref 8.5–10.1)
CANNABINOIDS UR QL SCN: ABNORMAL
CHLORIDE BLD-SCNC: 102 MMOL/L (ref 94–109)
CO2 SERPL-SCNC: 22 MMOL/L (ref 20–32)
COCAINE UR QL: ABNORMAL
COLOR UR AUTO: ABNORMAL
CREAT SERPL-MCNC: 0.98 MG/DL (ref 0.66–1.25)
EOSINOPHIL # BLD AUTO: 0 10E3/UL (ref 0–0.7)
EOSINOPHIL NFR BLD AUTO: 0 %
ERYTHROCYTE [DISTWIDTH] IN BLOOD BY AUTOMATED COUNT: 12.9 % (ref 10–15)
GFR SERPL CREATININE-BSD FRML MDRD: >90 ML/MIN/1.73M2
GLUCOSE BLD-MCNC: 101 MG/DL (ref 70–99)
GLUCOSE UR STRIP-MCNC: NEGATIVE MG/DL
HCT VFR BLD AUTO: 44.6 % (ref 40–53)
HGB BLD-MCNC: 14.7 G/DL (ref 13.3–17.7)
HGB UR QL STRIP: NEGATIVE
HOLD SPECIMEN: NORMAL
IMM GRANULOCYTES # BLD: 0.1 10E3/UL
IMM GRANULOCYTES NFR BLD: 0 %
KETONES UR STRIP-MCNC: NEGATIVE MG/DL
LEUKOCYTE ESTERASE UR QL STRIP: NEGATIVE
LYMPHOCYTES # BLD AUTO: 1.7 10E3/UL (ref 0.8–5.3)
LYMPHOCYTES NFR BLD AUTO: 11 %
MCH RBC QN AUTO: 28.7 PG (ref 26.5–33)
MCHC RBC AUTO-ENTMCNC: 33 G/DL (ref 31.5–36.5)
MCV RBC AUTO: 87 FL (ref 78–100)
MONOCYTES # BLD AUTO: 1.1 10E3/UL (ref 0–1.3)
MONOCYTES NFR BLD AUTO: 7 %
NEUTROPHILS # BLD AUTO: 12 10E3/UL (ref 1.6–8.3)
NEUTROPHILS NFR BLD AUTO: 82 %
NITRATE UR QL: NEGATIVE
NRBC # BLD AUTO: 0 10E3/UL
NRBC BLD AUTO-RTO: 0 /100
OPIATES UR QL SCN: ABNORMAL
PH UR STRIP: 6 [PH] (ref 5–7)
PLATELET # BLD AUTO: 251 10E3/UL (ref 150–450)
POTASSIUM BLD-SCNC: 3.6 MMOL/L (ref 3.4–5.3)
PROT SERPL-MCNC: 8.2 G/DL (ref 6.8–8.8)
RBC # BLD AUTO: 5.12 10E6/UL (ref 4.4–5.9)
RBC URINE: 1 /HPF
SODIUM SERPL-SCNC: 133 MMOL/L (ref 133–144)
SP GR UR STRIP: 1.01 (ref 1–1.03)
SPERM #/AREA URNS HPF: PRESENT /HPF
UROBILINOGEN UR STRIP-MCNC: NORMAL MG/DL
WBC # BLD AUTO: 14.9 10E3/UL (ref 4–11)
WBC URINE: 2 /HPF

## 2021-08-12 PROCEDURE — 99285 EMERGENCY DEPT VISIT HI MDM: CPT | Mod: 25 | Performed by: EMERGENCY MEDICINE

## 2021-08-12 PROCEDURE — 80053 COMPREHEN METABOLIC PANEL: CPT | Performed by: EMERGENCY MEDICINE

## 2021-08-12 PROCEDURE — 73610 X-RAY EXAM OF ANKLE: CPT | Mod: 26 | Performed by: RADIOLOGY

## 2021-08-12 PROCEDURE — 80307 DRUG TEST PRSMV CHEM ANLYZR: CPT | Performed by: EMERGENCY MEDICINE

## 2021-08-12 PROCEDURE — 85025 COMPLETE CBC W/AUTO DIFF WBC: CPT | Performed by: EMERGENCY MEDICINE

## 2021-08-12 PROCEDURE — 81001 URINALYSIS AUTO W/SCOPE: CPT | Performed by: EMERGENCY MEDICINE

## 2021-08-12 PROCEDURE — 36415 COLL VENOUS BLD VENIPUNCTURE: CPT | Performed by: EMERGENCY MEDICINE

## 2021-08-12 PROCEDURE — 73610 X-RAY EXAM OF ANKLE: CPT | Mod: LT

## 2021-08-12 PROCEDURE — 73630 X-RAY EXAM OF FOOT: CPT | Mod: LT

## 2021-08-12 PROCEDURE — 73630 X-RAY EXAM OF FOOT: CPT | Mod: 26 | Performed by: RADIOLOGY

## 2021-08-12 PROCEDURE — 250N000013 HC RX MED GY IP 250 OP 250 PS 637: Performed by: EMERGENCY MEDICINE

## 2021-08-12 PROCEDURE — 99284 EMERGENCY DEPT VISIT MOD MDM: CPT | Performed by: EMERGENCY MEDICINE

## 2021-08-12 RX ORDER — IBUPROFEN 600 MG/1
600 TABLET, FILM COATED ORAL ONCE
Status: COMPLETED | OUTPATIENT
Start: 2021-08-12 | End: 2021-08-12

## 2021-08-12 RX ORDER — IBUPROFEN 200 MG
400 TABLET ORAL EVERY 8 HOURS PRN
Qty: 60 TABLET | Refills: 0 | Status: ON HOLD | OUTPATIENT
Start: 2021-08-12 | End: 2022-11-11

## 2021-08-12 RX ADMIN — IBUPROFEN 600 MG: 600 TABLET ORAL at 12:20

## 2021-08-12 ASSESSMENT — ENCOUNTER SYMPTOMS
DYSURIA: 0
NAUSEA: 0
VOMITING: 0
COUGH: 0
SHORTNESS OF BREATH: 0
FEVER: 0
COLOR CHANGE: 1
SEIZURES: 0
APPETITE CHANGE: 1

## 2021-08-12 ASSESSMENT — MIFFLIN-ST. JEOR: SCORE: 1747.32

## 2021-08-12 NOTE — ED NOTES
"Pt refusing IV and Labs. Stated, \"I feel mistreated and I don't want to be here anymore I have a lot of things to do.\" Dr. Dow notified and printed Rx for Ibuprofen and bought pt AMA paperwork. Pt let writer draw blood and he provided a urine sample. Pt in agreement to stay and wait for his test results. Ok for pt to eat per Dr. Dow. Pt given meal and is waiting in his room.     Jeannie Cameron RN on 8/12/2021 at 1:34 PM    "

## 2021-08-12 NOTE — ED NOTES
Bed: ED08  Expected date:   Expected time:   Means of arrival:   Comments:  [facility] 426 with a 32 yo M reports of seizure, anxiety. 5 mg versed given, restrained. Triaged yellow in 8 mins

## 2021-08-12 NOTE — ED TRIAGE NOTES
"[facility] from [facility]. Pt has been there the past 16 days for SA treatment. Pt with hx of substance abuse - meth hx. Staff noted a change in his behavior - eating pennies, ripping up cardboard, anxious, and agitated. Then pt had \"full body seizure\" for approx 2 minutes. 5 mg Versed given in route @ 1003 to calm him down. VSS ex HTN and tachy 125's.   "

## 2021-08-12 NOTE — DISCHARGE INSTRUCTIONS
Please make an appointment to follow up with Your Primary Care Provider in 1-2 days for further evaluation and care. We have not finished your evaluation in the emergency department today for your possible seizure and change in behavior.  Since you are requesting to leave before finishing the evaluation, you are leaving AGAINST MEDICAL ADVICE.  Please return the emergency department if you have any numbness or weakness in the left foot or toes, any headaches, fever, confusion, any other concerns.

## 2021-08-12 NOTE — ED NOTES
"Pt spoke with Dr. Dow and stated \"I am not suicidal. I am in residential treatment for drugs. I just got out of [facility] four days ago and I haven't used.\" . Pt just wants to go to get to his acupuncture appointment. Pt declined DEC assignment and needs for  treatment. Pt signed AMA paperwork and left at  2:23 PM.    Jeannie Cameron RN on 8/12/2021 at 2:26 PM    "

## 2021-08-12 NOTE — ED PROVIDER NOTES
"    [place] EMERGENCY DEPARTMENT ([facility])  8/12/21  History     Chief Complaint   Patient presents with     Seizures     The history is provided by the patient and medical records.     Etienne Dupree is a 31 year old male with a past medical history significant for bipolar disorder, ADHD, schizophrenia and polysubstance abuse, and hypertension who presents to the Emergency Department via EMS from [facility].  Per EMS, patient has been at the [facility] for the last 16 days for methamphetamine treatment.  EMS reports that the treatment staff had noticed a change in the patient's behavior which included \"eating pennies, ripping up cardboard, worsening anxiety and agitation\".  Staff also noted the patient \"had a full body seizure for approximately 2 minutes\".  EMS reportedly gave 5 mg of Versed in route. They reported to the ED nurse that this behavior change occurred today after he had been outside the facility yesterday for a doctor's appointment patient states that he was a little shaky earlier, but denies possibility of seizure. Denies tongue biting, no incontinence. No headache. Based on report from EMS, there was no postictal period.    Upon his ED evaluation, patient believes he is currently here for an evaluation of left ankle pain after playing football 2 days ago.  Patient reports he had twisted his left ankle while playing, and subsequently endorses some pain and bruising to the lateral left ankle.  Patient also endorses some tenderness over the top of his left foot.  He has not had this injury evaluated yet.  He denies any numbness to the lower extremities.  No current headache or neck pain.  In regards to the patient's suspected seizure, patient \"remembers being a little shaky this morning\" that he attributes to him being increasingly tired.  He actively denies experiencing any loss of consciousness.  Patient adamantly denies having a seizure earlier this morning.  He does " report he has not been eating lately, and is not sure why.  He otherwise denies any fever, cough, or other URI symptoms.  No abdominal pain, nausea, or vomiting.  Patient denies any recent methamphetamine use, and states he last used approximately 2 weeks ago.  Patient denies any abnormal diet, or ingestion of foreign objects.      Past Medical History  Past Medical History:   Diagnosis Date     ADHD (attention deficit hyperactivity disorder)      Bipolar 1 disorder (H)      Depressive disorder      No past surgical history on file.  ibuprofen (ADVIL/MOTRIN) 200 MG tablet  atomoxetine (STRATTERA) 10 MG capsule  buPROPion (WELLBUTRIN XL) 300 MG 24 hr tablet  hydrochlorothiazide (HYDRODIURIL) 12.5 MG tablet  losartan (COZAAR) 25 MG tablet      No Known Allergies  Family History  No family history on file.  Social History   Social History     Tobacco Use     Smoking status: Current Every Day Smoker     Packs/day: 0.50     Types: Vaping Device     Smokeless tobacco: Current User   Substance Use Topics     Alcohol use: Not Currently     Drug use: Yes      Past medical history, past surgical history, medications, allergies, family history, and social history were reviewed with the patient. No additional pertinent items.     I have reviewed the Medications, Allergies, Past Medical and Surgical History, and Social History in the Epic system.    Review of Systems   Constitutional: Positive for appetite change (Decreased). Negative for fever.   Respiratory: Negative for cough and shortness of breath.    Cardiovascular: Negative for chest pain.   Gastrointestinal: Negative for nausea and vomiting.   Genitourinary: Negative for dysuria.   Musculoskeletal:        L-ankle pain   L-anterior foot pain   Skin: Positive for color change (Bruising to lateral L-ankle).   Neurological: Negative for seizures and syncope.   All other systems reviewed and are negative.      Physical Exam   BP: (!) 141/92  Pulse: 110  Temp: 98.1  F (36.7  " C)  Resp: 19  Height: 175.3 cm (5' 9\")  Weight: 81.2 kg (179 lb)  SpO2: 95 %      Physical Exam  Vitals and nursing note reviewed.       GEN:  Well developed, no acute distress  HEENT:  EOMI, Mucous membranes are moist.   Cardio:  RRR, no murmur, radial pulses equal bilaterally  PULM:  Lungs clear, good air movement, no wheezes, rales   Abd:  Soft, normal bowel sounds, no focal tenderness  Musculoskeletal:  normal range of motion of the left foot and ankle. There is swelling and ecchymosis on the dorsum of the left foot and on the lateral aspect of the left ankle. Some tenderness just inferior and anterior to the lateral malleolus. No tenderness over the fifth metatarsal no tenderness over the proximal fibula. No open wound or bleeding. No abrasion. No calf swelling or calf tenderness  Neuro:  Alert and oriented X3, Follows commands, moving all extremities spontaneously. Normal strength and sensation throughout the lower extremities bilaterally.  Skin:  Warm, dry    ED Course     At 11:15 AM the patient was seen and examined by Kelly Dow MD in Room ED08.        Procedures       Patient was given ibuprofen for his pain  X-rays of the left ankle and left foot were reviewed by me and results are shown below.  Labs were ordered to evaluate for why he may have had a change in behavior. Patient refused to wait for the lab results. He states he has another doctor appointment this afternoon and needs to leave in order to get to this appointment. Patient is oriented, able to answer questions appropriately, I do not think he is holdable. He was discharged AGAINST MEDICAL ADVICE since he had not finished his work-up.  Shortly after the patient signed out, his labs returned and they are nondiagnostic.  Prior to signing out AMA, patient denied having any suicidal thoughts. I asked if he was having difficulty with his bipolar disease and he states \"it's not bipolar, its drugs\".       Results for orders placed or " performed during the hospital encounter of 08/12/21 (from the past 24 hour(s))   Foot XR, G/E 3 views, left    Narrative    3 views left foot and 3 views left ankle radiographs 8/12/2021 12:37  PM    History: trauma    Comparison: None available.    Findings:  AP, oblique and lateral views of the left ankle were obtained.    No acute osseous abnormality.    No substantial degenerative change.    Soft tissue swelling particularly around ankle especially over the  lateral malleolus and anteriorly.    AP, oblique, and lateral  views of the left foot were obtained.     No acute osseous abnormality.      Lisfranc articulation alignment is congruent on these non-weight  bearing images.    No substantial degenerative change. Tiny Achilles tendon insertion  enthesophyte.    Soft tissue is unremarkable.      Impression    Impression: Soft tissue swelling particularly around ankle especially  over the lateral malleolus and anteriorly. No acute osseous  abnormality.    Back&         SYSTEM ID:  [id number]   XR Ankle Left G/E 3 Views    Narrative    3 views left foot and 3 views left ankle radiographs 8/12/2021 12:37  PM    History: trauma    Comparison: None available.    Findings:  AP, oblique and lateral views of the left ankle were obtained.    No acute osseous abnormality.    No substantial degenerative change.    Soft tissue swelling particularly around ankle especially over the  lateral malleolus and anteriorly.    AP, oblique, and lateral  views of the left foot were obtained.     No acute osseous abnormality.      Lisfranc articulation alignment is congruent on these non-weight  bearing images.    No substantial degenerative change. Tiny Achilles tendon insertion  enthesophyte.    Soft tissue is unremarkable.      Impression    Impression: Soft tissue swelling particularly around ankle especially  over the lateral malleolus and anteriorly. No acute osseous  abnormality.    Back&         SYSTEM ID:   [id number]   CBC with platelets differential    Narrative    The following orders were created for panel order CBC with platelets differential.  Procedure                               Abnormality         Status                     ---------                               -----------         ------                     CBC with platelets and d...[[id number]]  Abnormal            Final result                 Please view results for these tests on the individual orders.   Comprehensive metabolic panel   Result Value Ref Range    Sodium 133 133 - 144 mmol/L    Potassium 3.6 3.4 - 5.3 mmol/L    Chloride 102 94 - 109 mmol/L    Carbon Dioxide (CO2) 22 20 - 32 mmol/L    Anion Gap 9 3 - 14 mmol/L    Urea Nitrogen 13 7 - 30 mg/dL    Creatinine 0.98 0.66 - 1.25 mg/dL    Calcium 8.7 8.5 - 10.1 mg/dL    Glucose 101 (H) 70 - 99 mg/dL    Alkaline Phosphatase 93 40 - 150 U/L     (H) 0 - 45 U/L    ALT 63 0 - 70 U/L    Protein Total 8.2 6.8 - 8.8 g/dL    Albumin 4.7 3.4 - 5.0 g/dL    Bilirubin Total 0.7 0.2 - 1.3 mg/dL    GFR Estimate >90 >60 mL/min/1.73m2   [facility] Draw    Narrative    The following orders were created for panel order [facility] Draw.  Procedure                               Abnormality         Status                     ---------                               -----------         ------                     Extra Blue Top Tube[[id number]]                              Final result               Extra Red Top Tube[[id number]]                               Final result               Extra Green Top (Lithium...[[id number]]                      Final result               Extra Purple Top Tube[[id number]]                            Final result                 Please view results for these tests on the individual orders.   CBC with platelets and differential   Result Value Ref Range    WBC Count 14.9 (H) 4.0 - 11.0 10e3/uL    RBC Count 5.12 4.40 - 5.90 10e6/uL    Hemoglobin 14.7 13.3 - 17.7 g/dL    Hematocrit 44.6 40.0 - 53.0  %    MCV 87 78 - 100 fL    MCH 28.7 26.5 - 33.0 pg    MCHC 33.0 31.5 - 36.5 g/dL    RDW 12.9 10.0 - 15.0 %    Platelet Count 251 150 - 450 10e3/uL    % Neutrophils 82 %    % Lymphocytes 11 %    % Monocytes 7 %    % Eosinophils 0 %    % Basophils 0 %    % Immature Granulocytes 0 %    NRBCs per 100 WBC 0 <1 /100    Absolute Neutrophils 12.0 (H) 1.6 - 8.3 10e3/uL    Absolute Lymphocytes 1.7 0.8 - 5.3 10e3/uL    Absolute Monocytes 1.1 0.0 - 1.3 10e3/uL    Absolute Eosinophils 0.0 0.0 - 0.7 10e3/uL    Absolute Basophils 0.1 0.0 - 0.2 10e3/uL    Absolute Immature Granulocytes 0.1 (H) <=0.0 10e3/uL    Absolute NRBCs 0.0 10e3/uL   Extra Blue Top Tube   Result Value Ref Range    Hold Specimen JIC    Extra Red Top Tube   Result Value Ref Range    Hold Specimen JIC    Extra Green Top (Lithium Heparin) Tube   Result Value Ref Range    Hold Specimen JIC    Extra Purple Top Tube   Result Value Ref Range    Hold Specimen JIC    Urine Drugs of Abuse Screen    Narrative    The following orders were created for panel order Urine Drugs of Abuse Screen.  Procedure                               Abnormality         Status                     ---------                               -----------         ------                     Drug abuse screen 1 urin...[[id number]]  Abnormal            Final result                 Please view results for these tests on the individual orders.   UA with Microscopic reflex to Culture    Specimen: Urine, Clean Catch   Result Value Ref Range    Color Urine Light Yellow Colorless, Straw, Light Yellow, Yellow    Appearance Urine Clear Clear    Glucose Urine Negative Negative mg/dL    Bilirubin Urine Negative Negative    Ketones Urine Negative Negative mg/dL    Specific Gravity Urine 1.009 1.003 - 1.035    Blood Urine Negative Negative    pH Urine 6.0 5.0 - 7.0    Protein Albumin Urine Negative Negative mg/dL    Urobilinogen Urine Normal Normal, 2.0 mg/dL    Nitrite Urine Negative Negative    Leukocyte Esterase  Urine Negative Negative    Sperm Urine Present (A) None Seen /HPF    RBC Urine 1 <=2 /HPF    WBC Urine 2 <=5 /HPF    Narrative    Urine Culture not indicated   Drug abuse screen 1 urine (ED)   Result Value Ref Range    Amphetamines Urine Screen Negative Screen Negative    Barbiturates Urine Screen Negative Screen Negative    Benzodiazepines Urine Screen Positive (A) Screen Negative    Cannabinoids Urine Screen Negative Screen Negative    Cocaine Urine Screen Negative Screen Negative    Opiates Urine Screen Negative Screen Negative     Medications   ibuprofen (ADVIL/MOTRIN) tablet 600 mg (600 mg Oral Given 8/12/21 1220)             Assessments & Plan (with Medical Decision Making)   Patient was brought in by EMS for possible seizure. Based on EMS description, the patient was not postictal at all and patient states he was shaky but did not have loss of consciousness, no incontinence, no tongue biting. I think seizure is unlikely. The rehab facility noticed odd behavior. Patient is denying recent drug use, however, does admit to regular use of methamphetamine in the past. I suspect that his behavior today may be drug use related. He is able to contract for safety and is ambulating normally without a limp or apparent pain in the left foot or ankle. I do not think the patient is holdable. He left the emergency department prior to finishing the work-up. Patient states he needed to go in order to get to an appointment later today for acupuncture.    I have reviewed the nursing notes.    I have reviewed the findings, diagnosis, plan and need for follow up with the patient.    Discharge Medication List as of 8/12/2021  2:32 PM      START taking these medications    Details   ibuprofen (ADVIL/MOTRIN) 200 MG tablet Take 2 tablets (400 mg) by mouth every 8 hours as needed for pain, Disp-60 tablet, R-0, Local Print             Final diagnoses:   Behavioral change - suspect substance use   Sprain of left ankle, unspecified  ligament, initial encounter       I, Moy Clemente am serving as a trained medical scribe to document services personally performed by Kelly Dow MD, based on the provider's statements to me.      I, Kelly Dow MD, was physically present and have reviewed and verified the accuracy of this note documented by Moy Clemente.     Kelly Dow MD  8/12/2021   [facility] EMERGENCY DEPARTMENT     Kelly Dow MD  08/12/21 5629

## 2021-09-09 ENCOUNTER — OFFICE VISIT (OUTPATIENT)
Dept: FAMILY MEDICINE | Facility: CLINIC | Age: 31
End: 2021-09-09
Payer: COMMERCIAL

## 2021-09-09 ENCOUNTER — TELEPHONE (OUTPATIENT)
Dept: FAMILY MEDICINE | Facility: CLINIC | Age: 31
End: 2021-09-09

## 2021-09-09 VITALS
TEMPERATURE: 98.2 F | RESPIRATION RATE: 16 BRPM | HEART RATE: 69 BPM | WEIGHT: 204 LBS | BODY MASS INDEX: 28.56 KG/M2 | HEIGHT: 71 IN | SYSTOLIC BLOOD PRESSURE: 133 MMHG | DIASTOLIC BLOOD PRESSURE: 82 MMHG | OXYGEN SATURATION: 98 %

## 2021-09-09 DIAGNOSIS — F60.3 BORDERLINE PERSONALITY DISORDER (H): ICD-10-CM

## 2021-09-09 DIAGNOSIS — Z02.89 ENCOUNTER FOR COMPLETION OF FORM WITH PATIENT: Primary | ICD-10-CM

## 2021-09-09 DIAGNOSIS — I10 BENIGN ESSENTIAL HYPERTENSION: ICD-10-CM

## 2021-09-09 DIAGNOSIS — Z00.00 PREVENTATIVE HEALTH CARE: ICD-10-CM

## 2021-09-09 DIAGNOSIS — F17.200 TOBACCO DEPENDENCE: ICD-10-CM

## 2021-09-09 DIAGNOSIS — Z23 ENCOUNTER FOR IMMUNIZATION: ICD-10-CM

## 2021-09-09 PROBLEM — F15.10 METHAMPHETAMINE ABUSE (H): Status: ACTIVE | Noted: 2021-09-09

## 2021-09-09 PROBLEM — T14.91XA SUICIDAL BEHAVIOR WITH ATTEMPTED SELF-INJURY (H): Status: ACTIVE | Noted: 2021-08-27

## 2021-09-09 PROBLEM — F15.221: Status: ACTIVE | Noted: 2018-08-03

## 2021-09-09 PROCEDURE — 90746 HEPB VACCINE 3 DOSE ADULT IM: CPT | Performed by: STUDENT IN AN ORGANIZED HEALTH CARE EDUCATION/TRAINING PROGRAM

## 2021-09-09 PROCEDURE — 99214 OFFICE O/P EST MOD 30 MIN: CPT | Mod: 25 | Performed by: STUDENT IN AN ORGANIZED HEALTH CARE EDUCATION/TRAINING PROGRAM

## 2021-09-09 PROCEDURE — 90471 IMMUNIZATION ADMIN: CPT | Mod: 59 | Performed by: STUDENT IN AN ORGANIZED HEALTH CARE EDUCATION/TRAINING PROGRAM

## 2021-09-09 RX ORDER — HYDROCHLOROTHIAZIDE 12.5 MG/1
12.5 TABLET ORAL
COMMUNITY
Start: 2021-08-15 | End: 2022-09-08

## 2021-09-09 RX ORDER — LOSARTAN POTASSIUM 25 MG/1
25 TABLET ORAL
COMMUNITY
Start: 2021-08-29 | End: 2021-09-09

## 2021-09-09 RX ORDER — HYDROCHLOROTHIAZIDE 12.5 MG/1
12.5 CAPSULE ORAL
COMMUNITY
Start: 2021-08-24 | End: 2021-09-09

## 2021-09-09 RX ORDER — ATOMOXETINE 10 MG/1
10 CAPSULE ORAL
COMMUNITY
Start: 2021-08-29 | End: 2021-09-09

## 2021-09-09 RX ORDER — BUPROPION HYDROCHLORIDE 300 MG/1
300 TABLET ORAL
COMMUNITY
Start: 2021-08-29 | End: 2021-09-09

## 2021-09-09 RX ORDER — BUPROPION HYDROCHLORIDE 150 MG/1
150 TABLET ORAL
COMMUNITY
Start: 2021-07-19 | End: 2021-09-09

## 2021-09-09 RX ORDER — LOSARTAN POTASSIUM AND HYDROCHLOROTHIAZIDE 12.5; 5 MG/1; MG/1
1 TABLET ORAL
COMMUNITY
End: 2021-09-09

## 2021-09-09 RX ORDER — BUPROPION HYDROCHLORIDE 150 MG/1
150 TABLET ORAL DAILY
COMMUNITY
Start: 2021-07-19 | End: 2021-09-09

## 2021-09-09 RX ORDER — IBUPROFEN 200 MG
400 TABLET ORAL
COMMUNITY
End: 2021-09-09

## 2021-09-09 ASSESSMENT — MIFFLIN-ST. JEOR: SCORE: 1893.47

## 2021-09-09 ASSESSMENT — PATIENT HEALTH QUESTIONNAIRE - PHQ9: SUM OF ALL RESPONSES TO PHQ QUESTIONS 1-9: 13

## 2021-09-09 NOTE — PATIENT INSTRUCTIONS
Patient Education   Here is the plan from today's visit    1. Encounter for completion of form with patient  Faxed to the [facility] for you.    2. Preventative health care  3. Encounter for immunization  - HEPATITIS B VACCINE,ADULT,IM    4. Benign essential hypertension  Off of blood pressure medications currently, has refill available to him.    6. Tobacco dependence  Quitting discussed, encouraged.      Please call or return to clinic if your symptoms don't go away.    Follow up plan  Return in about 6 months (around 3/9/2022), or if symptoms worsen or fail to improve, for Routine preventive.    Thank you for coming to [facility] today.  COVID-19 Vaccine:  [facility] has walk-in appointments for COVID-19 vaccines. No appointment needed!   You also have the option of receiving Moderna vaccine during your physician appointment. Please ask your care team for more information!  Lab Testing:  **If you had lab testing today and your results are reassuring or normal they will be mailed to you or sent through [employer] within 7 days.   **If the lab tests need quick action we will call you with the results.  **If you are having labs done on a different day, please call [phone] to schedule at [facility] or [phone] for other [facility] locations.   The phone number we will call with results is # [phone] (home) [phone] (work). If this is not the best number please call our clinic and change the number.  Medication Refills:  If you need any refills please call your pharmacy and they will contact us.   If you need to  your refill at a new pharmacy, please contact the new pharmacy directly. The new pharmacy will help you get your medications transferred faster.   Scheduling:  If you have any concerns about today's visit or wish to schedule another appointment please call our office during normal business hours [phone] (8-5:00 M-F)  If a referral was made to a  [facility] and you don't get a call from central scheduling please call [phone].  If a Mammogram was ordered for you at [facility] call [phone] to schedule or change your appointment.  If you had an EKG/XRay/CT/Ultrasound/MRI ordered the number is [phone] to schedule or change your radiology appointment.   Medical Concerns:  If you have urgent medical concerns please call [phone] at any time of the day.    Anisha Donohue, DO

## 2021-09-09 NOTE — TELEPHONE ENCOUNTER
Hello, this patient's insurance will not pay for the following medication without a Prior Authorization.   ?  The Patient's insurance company is: [employer]  Insurance Phone Number: [phone]  The patient's ID number is: [id number]  Drug Name: Nicotrol 10 mg inh  NDC: [address]-38  Qty: 168  If needed, our pharmacy's NPI is: [id number]     Please advise if you will pursue a prior authorization for this medication or change the order. Contact [facility] with any questions.  ?  Thanks,  ?  Onesimo Bryan CPhT  ?  [facility]?  PH: [phone]  Fax: [phone]

## 2021-09-09 NOTE — TELEPHONE ENCOUNTER
Prior Authorization Approval    Authorization Effective Date: 8/10/2021  Authorization Expiration Date: 9/9/2022  Medication: Nicotrol 10 mg inh-APPROVED  Approved Dose/Quantity:   Reference #:     Insurance Company: [employer] - Phone [phone] Fax [phone]  Expected CoPay:       CoPay Card Available:      Foundation Assistance Needed:    Which Pharmacy is filling the prescription (Not needed for infusion/clinic administered): [facility] [place], [place] - [address]   Pharmacy Notified: Yes  Patient Notified: No    Pharmacy will notify patient when medication is ready.

## 2021-09-09 NOTE — TELEPHONE ENCOUNTER
Central Prior Authorization Team   Phone: [phone]      PA Initiation    Medication: Nicotrol 10 mg inh-Initiated  Insurance Company: [employer] - Phone [phone] Fax [phone]  Pharmacy Filling the Rx: [facility] [place], [place] - [address]   Filling Pharmacy Phone: [phone]  Filling Pharmacy Fax:    Start Date: 9/9/2021

## 2021-09-09 NOTE — PROGRESS NOTES
"    Assessment & Plan     Encounter for completion of form with patient  Form was reviewed with patient and filled out with their participation.  Patient had reasonable expectations of form.  Listed his disability as psychiatric, and listed at lasting possibly until winter 2022.  I agree with his assessment, especially since he is working with a residential treatment center at this time.  Records reviewed from [facility] emergency department, [facility] emergency department, [facility], and [facility] emergency department for information for form completion.    Preventative health care  Patient due for preventative care visit,  hepatitis C screening, and pneumococcal vaccine. Current tobacco user, cessation counseling advised.    Encounter for immunization  - HEPATITIS B VACCINE,ADULT,IM      Borderline personality disorder (H)  Psychiatric diagnoses considered when completing form.  Has been referred to psychiatry at an external visit.  When filling out the PHQ-9, patient admitted to self-injurious thoughts.  When discussing these results patient revealed that these thoughts are in the past, and he has sought treatment for them.  Not currently expressing suicidal or homicidal thoughts.  Currently in a treatment facility.    Form was reviewed with patient and filled out with their participation.          BMI:   Estimated body mass index is 28.4 kg/m  as calculated from the following:    Height as of this encounter: 1.805 m (5' 11.06\").    Weight as of this encounter: 92.5 kg (204 lb).   Weight management plan: Discussed healthy diet and exercise guidelines    Work on weight loss  Regular exercise    Return in about 6 months (around 3/9/2022), or if symptoms worsen or fail to improve, for Routine preventive.    Anisha Donohue DO  [facility] RHODA Clifton is a 31 year old who presents for the completion of a medical opinion form.   HPI     Completion of a Form  Etienne has visited several " "healthcare facilities over the past few months, including emergency department visits.  He has been in treatment facilities in the past.  Recently he has been asked to leave a treatment facility due to methamphetamine use while inpatient.  He visited an emergency department with suicidal thoughts after this incident, was referred to a crisis center and then residential treatment facility -where he currently resides.  Due to the nature of his treatment he is unable to do part or full-time work until winter 2022, or approximately 3 months.    Review of Systems   Constitutional, HEENT, cardiovascular, pulmonary, gi and gu systems are negative, except as otherwise noted.      Objective    /82   Pulse 69   Temp 98.2  F (36.8  C) (Oral)   Resp 16   Ht 1.805 m (5' 11.06\")   Wt 92.5 kg (204 lb)   SpO2 98%   BMI 28.40 kg/m    Body mass index is 28.4 kg/m .  Physical Exam   GENERAL: healthy, alert and no distress  EYES: Eyes grossly normal to inspection, PERRL and conjunctivae and sclerae normal  NECK: no adenopathy, no asymmetry, masses, or scars and thyroid normal to palpation  RESP: lungs clear to auscultation - no rales, rhonchi or wheezes  CV: regular rate and rhythm, normal S1 S2, no S3 or S4, no murmur, click or rub, no peripheral edema and peripheral pulses strong  MS: no gross musculoskeletal defects noted, no edema  NEURO: Normal strength and tone, mentation intact and speech normal  PSYCH: mentation appears normal, affect normal/bright      "

## 2021-09-14 ENCOUNTER — TELEPHONE (OUTPATIENT)
Dept: FAMILY MEDICINE | Facility: CLINIC | Age: 31
End: 2021-09-14

## 2021-09-14 NOTE — TELEPHONE ENCOUNTER
BOY sent the medical opinion form via email to [email or web address] BOY notified Etienne that the form had been resent and that SW had a copy of it if they still have not received it, he should just call again.     BRIAN Bishop  Pronouns: She/Her/Hers  , Care Coordination  [facility]  [phone]

## 2021-09-14 NOTE — TELEPHONE ENCOUNTER
[facility] phone call message - order or referral request for patient:     Order or referral being requested:   Pt states had a medical opinion form filled out with Dr Donohue, was told it was sent. Pt called [facility] and they said they did not receive it and pt only has an unsigned copy of the forms. Please advise.    Fax#: [phone]    Additional Comments: Pt had called from a different number than one on file.    OK to leave a message on voice mail? Yes    Primary language: English      needed? No    Call taken on September 14, 2021 at 2:24 PM by Tino Odonnell

## 2021-10-02 ENCOUNTER — HEALTH MAINTENANCE LETTER (OUTPATIENT)
Age: 31
End: 2021-10-02

## 2022-03-19 ENCOUNTER — HEALTH MAINTENANCE LETTER (OUTPATIENT)
Age: 32
End: 2022-03-19

## 2022-05-16 DIAGNOSIS — F90.9 ATTENTION DEFICIT HYPERACTIVITY DISORDER (ADHD), UNSPECIFIED ADHD TYPE: Primary | ICD-10-CM

## 2022-05-16 RX ORDER — BUPROPION HYDROCHLORIDE 300 MG/1
TABLET ORAL
COMMUNITY
Start: 2022-02-07 | End: 2022-05-16

## 2022-05-16 NOTE — TELEPHONE ENCOUNTER

## 2022-05-17 RX ORDER — BUPROPION HYDROCHLORIDE 300 MG/1
300 TABLET ORAL EVERY MORNING
Qty: 30 TABLET | Refills: 0 | Status: ON HOLD | OUTPATIENT
Start: 2022-05-17 | End: 2022-11-11

## 2022-06-02 ENCOUNTER — TRANSFERRED RECORDS (OUTPATIENT)
Dept: HEALTH INFORMATION MANAGEMENT | Facility: CLINIC | Age: 32
End: 2022-06-02

## 2022-06-02 ENCOUNTER — TELEPHONE (OUTPATIENT)
Dept: CARE COORDINATION | Facility: CLINIC | Age: 32
End: 2022-06-02
Payer: COMMERCIAL

## 2022-06-02 NOTE — TELEPHONE ENCOUNTER
Reached out to patient to schedule per MD request. Phone was unanswered and unable to LVM. I will continue to reach out.

## 2022-06-23 NOTE — TELEPHONE ENCOUNTER
Reached out to patient to schedule follow up with MD. No answer, no VM.    Jada Hart  Care Coordinator  [facility]  Phone:[phone]

## 2022-06-28 ENCOUNTER — MEDICAL CORRESPONDENCE (OUTPATIENT)
Dept: HEALTH INFORMATION MANAGEMENT | Facility: CLINIC | Age: 32
End: 2022-06-28

## 2022-09-02 ENCOUNTER — DOCUMENTATION ONLY (OUTPATIENT)
Dept: FAMILY MEDICINE | Facility: CLINIC | Age: 32
End: 2022-09-02

## 2022-09-02 NOTE — PROGRESS NOTES
"When opening a documentation only encounter, be sure to enter in \"Chief Complaint\" Forms and in \" Comments\" Title of form, description if needed.    Etienne is a 32 year old  male  Form received via: Fax  Form now resides in: Provider Ready    Gale Garcia      Form has been completed by provider.     Form sent out via: Fax to [facility] at Fax Number: [phone]  Patient informed: n/a  Output date: September 2, 2022    Gale Garcia      **Please close the encounter**                    "

## 2022-09-03 ENCOUNTER — HEALTH MAINTENANCE LETTER (OUTPATIENT)
Age: 32
End: 2022-09-03

## 2022-09-08 ENCOUNTER — HOSPITAL ENCOUNTER (INPATIENT)
Facility: CLINIC | Age: 32
LOS: 62 days | Discharge: PSYCHIATRIC OR SUBSTANCE USE FACILITY | End: 2022-11-14
Attending: EMERGENCY MEDICINE | Admitting: PSYCHIATRY & NEUROLOGY
Payer: COMMERCIAL

## 2022-09-08 ENCOUNTER — TELEPHONE (OUTPATIENT)
Dept: BEHAVIORAL HEALTH | Facility: CLINIC | Age: 32
End: 2022-09-08

## 2022-09-08 DIAGNOSIS — F60.9: ICD-10-CM

## 2022-09-08 DIAGNOSIS — F15.10 METHAMPHETAMINE ABUSE (H): ICD-10-CM

## 2022-09-08 DIAGNOSIS — F31.32 MODERATE DEPRESSED BIPOLAR I DISORDER (H): ICD-10-CM

## 2022-09-08 DIAGNOSIS — F60.3 BORDERLINE PERSONALITY DISORDER (H): Primary | ICD-10-CM

## 2022-09-08 DIAGNOSIS — F15.94 METHAMPHETAMINE-INDUCED MOOD DISORDER (H): ICD-10-CM

## 2022-09-08 DIAGNOSIS — F60.9 PERSONALITY DISORDER (H): ICD-10-CM

## 2022-09-08 DIAGNOSIS — I10 ESSENTIAL HYPERTENSION: ICD-10-CM

## 2022-09-08 DIAGNOSIS — F15.20 AMPHETAMINE DEPENDENCE (H): ICD-10-CM

## 2022-09-08 DIAGNOSIS — Z11.52 ENCOUNTER FOR SCREENING LABORATORY TESTING FOR SEVERE ACUTE RESPIRATORY SYNDROME CORONAVIRUS 2 (SARS-COV-2): ICD-10-CM

## 2022-09-08 LAB
AMPHETAMINES UR QL SCN: ABNORMAL
BARBITURATES UR QL: ABNORMAL
BENZODIAZ UR QL: ABNORMAL
CANNABINOIDS UR QL SCN: ABNORMAL
COCAINE UR QL: ABNORMAL
OPIATES UR QL SCN: ABNORMAL
SARS-COV-2 RNA RESP QL NAA+PROBE: NEGATIVE

## 2022-09-08 PROCEDURE — 99283 EMERGENCY DEPT VISIT LOW MDM: CPT | Performed by: EMERGENCY MEDICINE

## 2022-09-08 PROCEDURE — U0003 INFECTIOUS AGENT DETECTION BY NUCLEIC ACID (DNA OR RNA); SEVERE ACUTE RESPIRATORY SYNDROME CORONAVIRUS 2 (SARS-COV-2) (CORONAVIRUS DISEASE [COVID-19]), AMPLIFIED PROBE TECHNIQUE, MAKING USE OF HIGH THROUGHPUT TECHNOLOGIES AS DESCRIBED BY CMS-2020-01-R: HCPCS | Performed by: EMERGENCY MEDICINE

## 2022-09-08 PROCEDURE — 99285 EMERGENCY DEPT VISIT HI MDM: CPT | Mod: 25 | Performed by: EMERGENCY MEDICINE

## 2022-09-08 PROCEDURE — 90791 PSYCH DIAGNOSTIC EVALUATION: CPT

## 2022-09-08 PROCEDURE — C9803 HOPD COVID-19 SPEC COLLECT: HCPCS | Performed by: EMERGENCY MEDICINE

## 2022-09-08 PROCEDURE — 80307 DRUG TEST PRSMV CHEM ANLYZR: CPT | Performed by: EMERGENCY MEDICINE

## 2022-09-08 PROCEDURE — 250N000013 HC RX MED GY IP 250 OP 250 PS 637: Performed by: FAMILY MEDICINE

## 2022-09-08 RX ORDER — LOSARTAN POTASSIUM 25 MG/1
25 TABLET ORAL DAILY
Status: DISCONTINUED | OUTPATIENT
Start: 2022-09-08 | End: 2022-09-08

## 2022-09-08 RX ORDER — OLANZAPINE 10 MG/1
10 TABLET ORAL
Status: ON HOLD | COMMUNITY
End: 2022-11-11

## 2022-09-08 RX ORDER — OLANZAPINE 10 MG/1
20 TABLET ORAL AT BEDTIME
Status: DISCONTINUED | OUTPATIENT
Start: 2022-09-08 | End: 2022-11-04

## 2022-09-08 RX ORDER — MULTIVIT WITH MINERALS/LUTEIN
500 TABLET ORAL DAILY
Status: ON HOLD | COMMUNITY
End: 2022-11-11

## 2022-09-08 RX ORDER — DIVALPROEX SODIUM 500 MG/1
1000 TABLET, DELAYED RELEASE ORAL 2 TIMES DAILY
Status: DISCONTINUED | OUTPATIENT
Start: 2022-09-08 | End: 2022-09-14

## 2022-09-08 RX ORDER — DOCUSATE SODIUM 100 MG/1
100 CAPSULE, LIQUID FILLED ORAL DAILY PRN
Status: ON HOLD | COMMUNITY
End: 2022-11-11

## 2022-09-08 RX ORDER — OLANZAPINE 20 MG/1
20 TABLET ORAL AT BEDTIME
Status: ON HOLD | COMMUNITY
End: 2022-11-11

## 2022-09-08 RX ORDER — DIVALPROEX SODIUM 500 MG/1
1000 TABLET, DELAYED RELEASE ORAL 2 TIMES DAILY
Status: ON HOLD | COMMUNITY
End: 2022-11-11

## 2022-09-08 RX ORDER — IBUPROFEN 400 MG/1
400 TABLET, FILM COATED ORAL EVERY 8 HOURS PRN
Status: DISCONTINUED | OUTPATIENT
Start: 2022-09-08 | End: 2022-11-14 | Stop reason: HOSPADM

## 2022-09-08 RX ORDER — VITAMIN B COMPLEX
1 TABLET ORAL DAILY
Status: ON HOLD | COMMUNITY
End: 2022-11-11

## 2022-09-08 RX ORDER — OLANZAPINE 10 MG/1
10 TABLET ORAL DAILY
Status: DISCONTINUED | OUTPATIENT
Start: 2022-09-09 | End: 2022-10-31

## 2022-09-08 RX ORDER — OMEGA-3 FATTY ACIDS/FISH OIL 300-1000MG
1 CAPSULE ORAL DAILY
COMMUNITY
End: 2022-12-01

## 2022-09-08 RX ORDER — HYDROCHLOROTHIAZIDE 12.5 MG/1
12.5 TABLET ORAL EVERY MORNING
Status: DISCONTINUED | OUTPATIENT
Start: 2022-09-09 | End: 2022-09-08

## 2022-09-08 RX ADMIN — OLANZAPINE 20 MG: 10 TABLET, FILM COATED ORAL at 21:02

## 2022-09-08 RX ADMIN — DIVALPROEX SODIUM 1000 MG: 500 TABLET, DELAYED RELEASE ORAL at 21:02

## 2022-09-08 ASSESSMENT — ACTIVITIES OF DAILY LIVING (ADL)
ADLS_ACUITY_SCORE: 35

## 2022-09-08 ASSESSMENT — ENCOUNTER SYMPTOMS
NERVOUS/ANXIOUS: 0
HALLUCINATIONS: 0
HYPERACTIVE: 0
AGITATION: 1
SLEEP DISTURBANCE: 0

## 2022-09-08 NOTE — ED TRIAGE NOTES
Triage Assessment     Row Name 09/08/22 4380       Triage Assessment (Adult)    Airway WDL WDL       Respiratory WDL    Respiratory WDL WDL       Skin Circulation/Temperature WDL    Skin Circulation/Temperature WDL WDL       Cardiac WDL    Cardiac WDL WDL       Peripheral/Neurovascular WDL    Peripheral Neurovascular WDL WDL       Cognitive/Neuro/Behavioral WDL    Cognitive/Neuro/Behavioral WDL WDL

## 2022-09-08 NOTE — ED PROVIDER NOTES
[facility] ED Mental Health Handoff Note:       Brief HPI:  This is a 32 year old male signed out to me by Dr. Gordillo.  See initial ED Provider note for full details of the presentation. Interval history is pertinent for no acute events.    Home meds reviewed and ordered/administered: Yes    Medically stable for inpatient mental health admission: Yes.    Evaluated by mental health: Yes. The recommendation is for inpatient mental health treatment. Bed search in process    Safety concerns: At the time I received sign out, there were no safety concerns.    Hold Status:  Active Orders   Legal    Emergency Hospitalization Hold (72 Hr Hold)     Frequency: Effective Now     Start Date/Time: 09/08/22 1632      Number of Occurrences: Until Specified            Exam:   Patient Vitals for the past 24 hrs:   BP Temp Temp src Pulse Resp SpO2   09/08/22 1314 (!) 142/105 -- -- 72 -- --   09/08/22 1313 -- 98.1  F (36.7  C) Oral -- 16 99 %           ED Course:    Medications   divalproex sodium delayed-release (DEPAKOTE) DR tablet 1,000 mg (has no administration in time range)   hydrochlorothiazide (HYDRODIURIL) tablet 12.5 mg (has no administration in time range)   ibuprofen (ADVIL/MOTRIN) tablet 400 mg (has no administration in time range)   losartan (COZAAR) tablet 25 mg (has no administration in time range)   nicotine (NICORETTE) gum 4 mg (has no administration in time range)   OLANZapine (zyPREXA) tablet 10 mg (has no administration in time range)   OLANZapine (zyPREXA) tablet 20 mg (has no administration in time range)            There were no significant events during my shift.    Patient was signed out to the oncoming provider      Impression:    ICD-10-CM    1. Methamphetamine abuse (H)  F15.10    2. Personality disorder (H)  F60.9        Plan:    1. Awaiting inpatient mental health admission/transfer.      RESULTS:   Results for orders placed or performed during the hospital encounter of 09/08/22 (from the past 24  hour(s))   Urine Drugs of Abuse Screen     Status: Abnormal    Collection Time: 09/08/22  2:22 PM    Narrative    The following orders were created for panel order Urine Drugs of Abuse Screen.  Procedure                               Abnormality         Status                     ---------                               -----------         ------                     Drug abuse screen 1 urin...[[id number]]  Abnormal            Final result                 Please view results for these tests on the individual orders.   Drug abuse screen 1 urine (ED)     Status: Abnormal    Collection Time: 09/08/22  2:22 PM   Result Value Ref Range    Amphetamines Urine Screen Positive (A) Screen Negative    Barbiturates Urine Screen Negative Screen Negative    Benzodiazepines Urine Screen Negative Screen Negative    Cannabinoids Urine Screen Negative Screen Negative    Cocaine Urine Screen Negative Screen Negative    Opiates Urine Screen Negative Screen Negative   Asymptomatic COVID-19 Virus (Coronavirus) by PCR Nasopharyngeal     Status: Normal    Collection Time: 09/08/22  3:50 PM    Specimen: Nasopharyngeal; Swab   Result Value Ref Range    SARS CoV2 PCR Negative Negative    Narrative    Testing was performed using the Xpert Xpress SARS-CoV-2 Assay on the   [employer]. Additional information about   this Emergency Use Authorization (EUA) assay can be found via the Lab   Guide. This test should be ordered for the detection of SARS-CoV-2 in   individuals who meet SARS-CoV-2 clinical and/or epidemiological   criteria. Test performance is unknown in asymptomatic patients. This   test is for in vitro diagnostic use under the FDA EUA for   laboratories certified under CLIA to perform high complexity testing.   This test has not been FDA cleared or approved. A negative result   does not rule out the presence of PCR inhibitors in the specimen or   target RNA in concentration below the limit of detection for the    assay. The possibility of a false negative should be considered if   the patient's recent exposure or clinical presentation suggests   COVID-19. This test was validated by the [facility]. This laboratory is certified under the Clinical Laboratory Improvement Amendments of 1988 (CLIA-88) as qualified to perform high complexity laboratory testing.               MD Maureen Vasquez David, MD  09/08/22 6025

## 2022-09-08 NOTE — PHARMACY-ADMISSION MEDICATION HISTORY
Admission Medication History Completed by Pharmacy    See [facility] Admission Navigator for allergy information, preferred outpatient pharmacy, prior to admission medications and immunization status.     Medication History Sources:     Dispense report and patient history    Changes made to PTA medication list (reason):    Added:   o Ascorbic Acid 250 mg tablet --- 2 tab PO daily  o Vitamin D3 1000 unit tablet -- 1 tab PO daily  o Omega 3 Fatty Acid 1000 mg Capsule -- 1 cap PO daily   o Docusate 100 mg capsule PRN constipation    Deleted:   o Omeprazole 20 mg capsule -- 1 cap PO daily QAM  o Losartan 25 mg tab -- 1 tab PO daily (patient stated he thought he was taking this medication but there is no recent fill history and was not included in medications brought at admittance)    Changed: None    Additional Information:    Patient was able to clearly communicate his medication history including strength and frequency.     He stated that he uses on average 12 mg of nicotine per day with the nicotine inhaler and nicorette gum 4 mg     He takes one 10 mg olanzapine daily in the AM and one 10 mg with a 20 mg olanzapine tablet in PM for total PM dose of 30 mg     He discontinued Atomoxetine 10 mg cap -- once daily and hydrochlorothiazide 12.5 mg-- once daily, over a month ago     Prior to Admission medications    Medication Sig Last Dose Taking? Auth Provider Long Term End Date   buPROPion (WELLBUTRIN XL) 300 MG 24 hr tablet Take 1 tablet (300 mg) by mouth every morning NEED APPOINTMENT Past Month Yes Tamera Gomez MD Yes    divalproex sodium delayed-release (DEPAKOTE) 500 MG DR tablet Take 1,000 mg by mouth 2 times daily 9/8/2022 at AM Yes Reported, Patient Yes    docusate sodium (COLACE) 100 MG capsule Take 100 mg by mouth daily as needed for constipation Past Week at PRN Yes Reported, Patient     ibuprofen (ADVIL/MOTRIN) 200 MG tablet Take 2 tablets (400 mg) by mouth every 8 hours as needed for pain Past Week at PRN Yes  Kelly Dow MD     nicotine (NICOTROL) 10 MG inhaler Inhale 1 Cartridge into the lungs 3 times daily as needed 9/8/2022 at PRN Yes Reported, Patient     nicotine polacrilex (NICORETTE) 4 MG gum Take 4 mg by mouth every 6 hours as needed Past Week at PRN Yes Reported, Patient     OLANZapine (ZYPREXA) 10 MG tablet Take 10 mg by mouth in the morning and take 10 mg tablet with 20 mg tablet by mouth in the evening for a total PM dose of 30 mg 9/8/2022 at AM Yes Reported, Patient No    OLANZapine (ZYPREXA) 20 MG tablet Take 20 mg by mouth At Bedtime 9/7/2022 at PM Yes Reported, Patient No    omega 3 1000 MG CAPS Take 1 capsule by mouth daily 9/8/2022 at AM Yes Reported, Patient     vitamin C (ASCORBIC ACID) 250 MG tablet Take 500 mg by mouth daily 9/8/2022 at AM Yes Reported, Patient     Vitamin D3 (CHOLECALCIFEROL) 25 mcg (1000 units) tablet Take 1 tablet by mouth daily 9/8/2022 at AM Yes Reported, Patient     atomoxetine (STRATTERA) 10 MG capsule Take 1 capsule (10 mg) by mouth daily as needed (inattention)  Patient not taking: Reported on 9/8/2022 Not Taking  Mariana Banks MD     hydrochlorothiazide (HYDRODIURIL) 12.5 MG tablet Take 1 tablet (12.5 mg) by mouth every morning  Patient not taking: Reported on 9/8/2022 Not Taking  Caryl Morse DO Yes        Date completed: 09/08/22    Medication history completed by: Mary Kate Cox, Pharmacy intern
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
## 2022-09-08 NOTE — ED NOTES
Pt was in [facility]   Jose Durham arranged to have him transferred here.
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
## 2022-09-08 NOTE — SAFE
Etienne Dupree  September 8, 2022  SAFE Note    Critical Safety Issues: Pt presents due revocation of provisional discharge following behavioral concerns at an IRT facility after relapsing on meth.       Current Suicidal Ideation/Self-Injurious Concerns/Methods: None - N/A      Current or Historical Inappropriate Sexual Behavior: No      Current or Historical Aggression/Homicidal Ideation: History of Violence and Impaired Self-Control      Triggers: None    Guardianship Status: Etienne Dupree is his own guardian.. Guardianship paperwork is not required.    This patient is a child/adolescent: No    This patient has additional special visitor precautions: No    Updated care team: Yes: Provider in agreement.     For additional details see full LMHP assessment.       Radha Langford, MARGARITASW

## 2022-09-08 NOTE — PROGRESS NOTES
[facility]  Inpatient Primary Care Note    Etienne Dupree MRN# [id number]   Age: 32 year old YOB: 1990     9/8/2022 2:17 PM    Patient admitted: 9/8/2022 12:43 PM  Reason for admission: No admission diagnoses are documented for this encounter.  Primary inpatient team: ED, [facility] clinic: [facility]  Primary care provider: Opal Hanks DO         Primary provider communication:     1. I have reviewed the patient s admission History & Physical: Yes  2. I have reviewed associated daily notes: Yes Appreciate thoughtful cares by primary team  3. Additional comments: Visited Etienne for a social visit as his PCP at [facility]. We discussed the events leading up to his hospitalization and reviewed his history with sobriety. I recommended that he make an appointment at [facility] when he discharges to follow up on preventative medicine and chronic health conditions.     I appreciate the contributions of the inpatient team to the care of my patient.  If there are questions regarding this patient, the best way to contact me is Epic message.    Opal Hanks, DO   she/her  PGY-3  Family Medicine

## 2022-09-09 ENCOUNTER — TELEPHONE (OUTPATIENT)
Dept: BEHAVIORAL HEALTH | Facility: CLINIC | Age: 32
End: 2022-09-09

## 2022-09-09 PROCEDURE — 250N000013 HC RX MED GY IP 250 OP 250 PS 637: Performed by: FAMILY MEDICINE

## 2022-09-09 RX ADMIN — DIVALPROEX SODIUM 1000 MG: 500 TABLET, DELAYED RELEASE ORAL at 22:02

## 2022-09-09 RX ADMIN — DIVALPROEX SODIUM 1000 MG: 500 TABLET, DELAYED RELEASE ORAL at 08:40

## 2022-09-09 RX ADMIN — OLANZAPINE 10 MG: 10 TABLET, FILM COATED ORAL at 08:42

## 2022-09-09 RX ADMIN — OLANZAPINE 20 MG: 10 TABLET, FILM COATED ORAL at 22:03

## 2022-09-09 RX ADMIN — NICOTINE POLACRILEX 4 MG: 2 GUM, CHEWING ORAL at 19:06

## 2022-09-09 ASSESSMENT — ACTIVITIES OF DAILY LIVING (ADL)
ADLS_ACUITY_SCORE: 35

## 2022-09-09 NOTE — ED NOTES
[facility] ED Mental Health Handoff Note:       Brief HPI:  This is a 32 year old male signed out to me by Dr. Dr. Owens see initial ED Provider note for full details of the presentation. No events during my shift.    Home meds reviewed and ordered/administered: Yes    Medically stable for inpatient mental health admission: Yes.    Evaluated by mental health: Yes. The recommendation is for inpatient mental health treatment. Bed search in process    Safety concerns: At the time I received sign out, there were no safety concerns.    Hold Status:  Active Orders   Legal    Emergency Hospitalization Hold (72 Hr Hold)     Frequency: Effective Now     Start Date/Time: 09/08/22 1632      Number of Occurrences: Until Specified       Exam:   Patient Vitals for the past 24 hrs:   BP Temp Temp src Pulse Resp SpO2   09/09/22 1401 122/51 97.3  F (36.3  C) Oral 56 16 97 %   09/08/22 2002 (!) 144/71 98.4  F (36.9  C) Oral 64 18 96 %     General: No acute distress, calm and cooperative  Lungs: Breathing comfortably in room air  Neuro: Awake alert normal gait.  Moves all sides of body equally  Psych: Calm and cooperative.    ED Course:    Medications   divalproex sodium delayed-release (DEPAKOTE) DR tablet 1,000 mg (1,000 mg Oral Given 9/9/22 0840)   ibuprofen (ADVIL/MOTRIN) tablet 400 mg (has no administration in time range)   nicotine polacrilex (NICORETTE) gum 4 mg (has no administration in time range)   OLANZapine (zyPREXA) tablet 10 mg (10 mg Oral Given 9/9/22 0842)   OLANZapine (zyPREXA) tablet 20 mg (20 mg Oral Given 9/8/22 2102)            There were no significant events during my shift.    Patient was signed out to the oncoming provider, Dr. Maya      Impression:    ICD-10-CM    1. Methamphetamine abuse (H)  F15.10    2. Personality disorder (H)  F60.9        Plan:    1. Awaiting mental health evaluation/recommendations.      RESULTS:   Results for orders placed or performed during the hospital encounter of  09/08/22 (from the past 24 hour(s))   Asymptomatic COVID-19 Virus (Coronavirus) by PCR Nasopharyngeal     Status: Normal    Collection Time: 09/08/22  3:50 PM    Specimen: Nasopharyngeal; Swab   Result Value Ref Range    SARS CoV2 PCR Negative Negative    Narrative    Testing was performed using the Xpert Xpress SARS-CoV-2 Assay on the   Cepheid Gene-Xpert Instrument Systems. Additional information about   this Emergency Use Authorization (EUA) assay can be found via the Lab   Guide. This test should be ordered for the detection of SARS-CoV-2 in   individuals who meet SARS-CoV-2 clinical and/or epidemiological   criteria. Test performance is unknown in asymptomatic patients. This   test is for in vitro diagnostic use under the FDA EUA for   laboratories certified under CLIA to perform high complexity testing.   This test has not been FDA cleared or approved. A negative result   does not rule out the presence of PCR inhibitors in the specimen or   target RNA in concentration below the limit of detection for the   assay. The possibility of a false negative should be considered if   the patient's recent exposure or clinical presentation suggests   COVID-19. This test was validated by the [facility]. This laboratory is certified under the Clinical Laboratory Improvement Amendments of 1988 (CLIA-88) as qualified to perform high complexity laboratory testing.               MD Willie Blanc, Ford Martinez MD  09/09/22 4492

## 2022-09-09 NOTE — ED NOTES
Triage & Transition Services, Extended Care     Etienne Dupree  September 9, 2022    Etienne is followed related to Placement delay: Patient under committment. Please see initial DEC Crisis Assessment completed for complete assessment information. Medical record is reviewed. While patient is in the ED, care team is working towards Learn and Demonstrate at [facility] on Crisis Stabilization. Additional notes include: Patient is to be held at  until his CM identifies alternative placement.    Writer attempted to meet with patient x2 for a therapeutic check-in. Patient was asleep during both attempts.    Patient is currently on the list for inpatient placement.    There are not significant status changes.     Plan:    Extended Care will follow and meet with patient/family/care team as able or requested.     ROBERTO BEARD, BAYRON, LGSW, [facility], Extended Care   [phone]

## 2022-09-09 NOTE — TELEPHONE ENCOUNTER
R: per 8 am bed search:    [facility] is posting 0 beds.     [facility] is posting 0 beds.     [facility] is posting 0 beds.     [facility] is posting 0 beds.     [facility] is posting 0 beds.      [facility] is posting 0 beds.     [facility] is posting 0 beds. Mixed unit 12+. Low acuity only.     [facility] is positing 0 beds. No aggression.     [facility] is posting 0 beds.     [facility] is posting 0 beds. Low acuity only.    [facility] is posting 0 beds.    [facility] is posting 0 beds. Low acuity.     [facility] is posting 1 beds. 72 hr hold required. Per Brit, they have low acuity beds only.        [facility] is posting 1 beds. Very low acuity only    [facility] is posting 1 beds. Vol only, No Hx of aggression, violence or assault. No sexual offenders. No 72 hr holds.     [facility] is posting 5 beds. (Must have the cognitive ability to do programming. No aggressive or violent behavior or recent HX in the last 2 yrs. MH must be primary.)    [facility] is posting 0 beds. Low acuity only. Violence and aggression capped. Pt meets their exclusionary criteria.    [facility] hospital is posting 0 beds. Low acuity, Neg Covid.    [facility] is posting 0 beds. Covid neg. Vol only. Combined adolescent and adult unit. No aggressive or violent behavior. No registered sex offenders.     [place] is posting 6 beds. Per Melissa, low acuity only.    [facility] is posting 3 beds. Per Delfina, no high acuity beds avail.

## 2022-09-09 NOTE — TELEPHONE ENCOUNTER
R:      Bed search update @ 02:25       [facility]: @ cap per website      [facility]: @ cap per website      [facility]: @ cap per website      [facility]: @ cap per website      [facility]: @ cap per website      [facility]: @ cap per website  [place]: @ cap per website      [facility]: @ cap per website      [facility]: @ cap per website      [facility]: @ cap per website      [facility]: @ cap per website      [facility]: @ cap per website      [facility]: @ cap per website  [facility]: @ cap per website      [facility]: @ cap per website      [facility]: Posting 3 beds. Per Richardson @ 01:50 they are reviewing beyond their cap and only have very low acuity available. He recommends calling back later to see if beds are still available. Pt not appropriate      [facility]: Posting 1 bed. No hx of aggression. Voluntary patients only. Meets exclusionary criteria  [facility]: Posting 6 beds. Must have the cognitive ability to do programming. No aggressive or violent behavior or recent HX in the last 2 yrs. MH must be primary. Meets exclusionary criteria       [facility]: @ cap per website      [facility] @ cap per website  [facility]: @ cap per website  [facility]: Posting 6 beds. Per Lili @ 02:00, they do not have an acuity-appropriate bed avail       [facility]: Posting 3 beds. Low acuity only. Pt not appropriate            Pt remains on work list until appropriate placement is available

## 2022-09-10 ENCOUNTER — TELEPHONE (OUTPATIENT)
Dept: BEHAVIORAL HEALTH | Facility: CLINIC | Age: 32
End: 2022-09-10

## 2022-09-10 PROCEDURE — 250N000013 HC RX MED GY IP 250 OP 250 PS 637: Performed by: FAMILY MEDICINE

## 2022-09-10 RX ADMIN — DIVALPROEX SODIUM 1000 MG: 500 TABLET, DELAYED RELEASE ORAL at 20:14

## 2022-09-10 RX ADMIN — NICOTINE POLACRILEX 4 MG: 2 GUM, CHEWING ORAL at 08:58

## 2022-09-10 RX ADMIN — DIVALPROEX SODIUM 1000 MG: 500 TABLET, DELAYED RELEASE ORAL at 08:15

## 2022-09-10 RX ADMIN — NICOTINE POLACRILEX 4 MG: 2 GUM, CHEWING ORAL at 15:21

## 2022-09-10 RX ADMIN — OLANZAPINE 10 MG: 10 TABLET, FILM COATED ORAL at 08:15

## 2022-09-10 RX ADMIN — OLANZAPINE 20 MG: 10 TABLET, FILM COATED ORAL at 21:08

## 2022-09-10 ASSESSMENT — ACTIVITIES OF DAILY LIVING (ADL)
ADLS_ACUITY_SCORE: 35

## 2022-09-10 NOTE — TELEPHONE ENCOUNTER
R: 7:45AM Bed search update: anywhere, PD revoked.     [facility] is posting 0 beds.   [facility] is posting 0 beds.  [facility] is posting 0 beds.  [facility] is posting 0 beds.  [facility] is posting 0 beds.  [facility] is posting 0 beds.  [facility] is posting 0 beds.  [facility] is posting 0 beds.  [facility] is posting 0 bed. Mixed unit 12+. Low acuity only.   [facility] is positing 0 beds. No aggression.  Neg COVID required.  [facility] is posting 0 beds.   [facility] is posting 0 bed. Low acuity only.  [facility] is posting 0 beds. Low acuity only. Current neg COVID required  [facility] is posting 0 beds. Low acuity.   [facility] is posting 3 beds. Low acuity only. 72 hr hold required. [phone].  Cannot take anyone on commitment.    [facility] is posting 0 bed.  Low acuity only at this time.   [facility] is posting 3 beds. Vol only, No Hx of aggression, violence or assault. No sexual offenders. No 72 hr holds.  [facility] is posting 5 beds. (Must have the cognitive ability to do programming. No aggressive or violent behavior or recent HX in the last 2 yrs. MH must be primary.)  [facility] is posting 0 beds. Low acuity only. Violence and aggression capped.   [facility] is posting 0 bed. Low acuity, Neg Covid.   [facility] is posting 0 beds. Covid neg. Vol only. Combined adolescent and adult unit. No aggressive or violent behavior. No registered sex offenders.   [facility] is posting 14 beds. Call for details. [phone]  [facility] is posting 3 beds. No aggression.  [phone]    Pt remains on waitlist pending available bed.

## 2022-09-10 NOTE — ED NOTES
Pt has been sleeping most of this shift, will wake up only to eat.  When awake pt's behavior has been appropriate,pt stays in his room when awake.  VSS. Denies any pain. Compliant with his meds this shift.  Offered patient personal hygiene items.  Pt declined.

## 2022-09-10 NOTE — TELEPHONE ENCOUNTER
R:      Bed search update @ 2AM:       [facility]: @ cap per website      [facility]: @ cap per website      [facility]: @ cap per website      [facility]: @ cap per website      [facility]: @ cap per website      [facility]: @ cap per website  [facility]: @ cap per website      [facility]: @ cap per website      [facility]: Posting 1 bed. Low acuity only/Mixed unit. Meets exclusionary criteria       [facility]: @ cap per website      [facility]: @ cap per website      [facility]: @ cap per website      [facility]: Does not review after 10PM      [facility]: @ cap per website      [facility]: @ cap per website      [facility]: @ cap per website       [facility]: @ cap per website      [facility]: Posting 6 beds. Must have the cognitive ability to do programming. No aggressive or violent behavior or recent HX in the last 2 yrs. MH must be primary. Meets exclusionary criteria       [facility]: @ cap per website      [facility]: @ cap per website  [facility]: @ cap per website  [place]: Posting 14 beds. Called @ 01:58 - no answer/Left VM. Facility already reviewing multiple referrals      [facility]: Posting 3 beds. Low acuity. Pt not appropriate             Pt remains on work list until appropriate placement is available

## 2022-09-10 NOTE — TELEPHONE ENCOUNTER
Updated Bed Search @ 6pm:  Per chart review, intake can look anywhere for placement     [facility] has 0 appropriate beds available. Phone: [phone]  [facility] posting 0 available beds. Phone: [phone]  [facility] posting 0 available beds. Phone: [phone]  [facility] posting 0 available beds. Phone: [phone]  [place] posting 0 available beds. Phone: [phone]  [facility] posting 0 available beds. Phone:[phone]  [facility] posting 0 available beds. Phone: [phone]  [facility] posting 0 available beds. Phone: [phone]   [place] posting 0 available beds. Phone: [phone]  [facility] posting 0 available beds. Phone: [phone]  [facility] posting 0 available beds. Phone: [phone]  [place] posting 0 available beds. Phone: [phone]  [facility] posting 0 available beds. Phone: [phone]  [facility] posting 0 available beds. Phone: [phone]  [place] posting 0 available beds. Phone: [phone]  [facility] posting 1 available beds. Phone:[phone]. Very low acuity, mood disorder only. Pt not appropriate.   [facility] posting 0 available beds. Phone: [phone]  [facility] posting 3 available beds. Phone:[phone]. 72 HH required. Low acuity only. Pt not appropriate.   [facility] posting 1 available beds. Phone: [phone]. Low acuity only. Pt not appropriate.   [facility] posting 1 available beds. Phone: [phone]. Voluntary pts only. Low acuity. Pt not appropriate.   [facility] posting 0 available beds. Phone: [phone]   [facility] posting 5 available beds. Phone: [phone]. NO CD. Low acuity only. Pt not appropriate.   [facility] posting 0 available beds. Phone: [phone]  [facility] posting 0 available beds. Phone: [phone]  [facility] posting 0 available beds. Phone: [phone]  [facility] posting 0 available beds. Phone: [phone]  [facility] posting 0 available beds. Phone:  [phone]  [facility] posting 0 available beds. Phone: [phone]  [facility] posting 14 available beds. Phone: [phone]. Pt not appropriate d/t legal/MCC/PO concerns and crossing state lines.   [facility] posting 3 available beds. Phone: [phone]. Low acuity only. Pt not appropriate.     Pt remains on work list pending appropriate bed placement

## 2022-09-10 NOTE — PROGRESS NOTES
Patient received in sign-out from previous ED provider with the patient awaiting behavioral health inpatient bed availability. Home medications reconciled by previous provider. No acute events during this provider's care. Patient signed out to oncoming provider with plan for continued monitoring until behavioral health inpatient bed is ready.     --  Cristin Henderson MD   9/10/2022 at 9:13 AM   Emergency Medicine

## 2022-09-10 NOTE — ED NOTES
Triage & Transition Services, Extended Care     Etienne Dupree  September 10, 2022    Etienne is followed related to Complex Care Plan which indicates commitment and IP recommendation. Please see initial DEC Crisis Assessment completed for complete assessment information. Medical record is reviewed. While patient is in the ED, care team is working towards Learn and Demonstrate at Le[facility] One Skill Focused on Crisis Stabilization. Additional notes include: N/A    Patient remains on the worklist for IP admission.    There are not significant status changes.     Plan:    Extended Care will follow and meet with patient/family/care team as able or requested.     ROBERTO BEARD, BAYRON, LGSW, [facility], Extended Care   [phone]

## 2022-09-10 NOTE — ED NOTES
Pt requesting shower, unable to take pt to shower at this time. Pt offered other hygiene products, pt declined. Calm and cooperative in room.

## 2022-09-11 ENCOUNTER — TELEPHONE (OUTPATIENT)
Dept: BEHAVIORAL HEALTH | Facility: CLINIC | Age: 32
End: 2022-09-11

## 2022-09-11 PROCEDURE — 250N000013 HC RX MED GY IP 250 OP 250 PS 637: Performed by: EMERGENCY MEDICINE

## 2022-09-11 PROCEDURE — 250N000013 HC RX MED GY IP 250 OP 250 PS 637: Performed by: FAMILY MEDICINE

## 2022-09-11 RX ORDER — BUPROPION HYDROCHLORIDE 300 MG/1
300 TABLET ORAL EVERY MORNING
Status: DISCONTINUED | OUTPATIENT
Start: 2022-09-11 | End: 2022-11-14 | Stop reason: HOSPADM

## 2022-09-11 RX ADMIN — OLANZAPINE 10 MG: 10 TABLET, FILM COATED ORAL at 08:01

## 2022-09-11 RX ADMIN — BUPROPION HYDROCHLORIDE 300 MG: 150 TABLET, EXTENDED RELEASE ORAL at 09:13

## 2022-09-11 RX ADMIN — DIVALPROEX SODIUM 1000 MG: 500 TABLET, DELAYED RELEASE ORAL at 19:51

## 2022-09-11 RX ADMIN — NICOTINE POLACRILEX 4 MG: 2 GUM, CHEWING ORAL at 09:21

## 2022-09-11 RX ADMIN — OLANZAPINE 20 MG: 10 TABLET, FILM COATED ORAL at 19:54

## 2022-09-11 RX ADMIN — NICOTINE POLACRILEX 4 MG: 2 GUM, CHEWING ORAL at 17:56

## 2022-09-11 RX ADMIN — DIVALPROEX SODIUM 1000 MG: 500 TABLET, DELAYED RELEASE ORAL at 08:00

## 2022-09-11 ASSESSMENT — ACTIVITIES OF DAILY LIVING (ADL)
ADLS_ACUITY_SCORE: 35

## 2022-09-11 NOTE — TELEPHONE ENCOUNTER
R:      Bed search update @  02:33:     [facility]: @ cap per website      [facility]: @ cap per website      [facility]: @ cap per website      [facility]: @ cap per website      [facility]: @ cap per website      [facility]: @ cap per website  [facility]: @ cap per website      [facility]: @ cap per website      [facility]: @ cap per website      [facility]: @ cap per website      [facility]: @ cap per website      [facility]: @ cap per website      [facility]: Does not review after 10PM      [facility]: @ cap per website      [facility]: Posting 1 bed. Per Makenna @ 00:41 they are full     [facility]: Posting 2 beds. Makenna @ 00:41 is reviewing another referral currently  [facility]: Posting 1 bed. No hx of aggression. Voluntary only. Meets exclusionary criteria       [facility]: Posting 5 beds. Low acuity only. Must have the cognitive ability to do programming. No aggressive or violent behavior or recent HX in the last 2 yrs. MH must be primary. Meets exclusionary criteria        [facility]: @ cap per website      [facility]  [facility]: @ cap per website  [facility]: Posting 14 beds. Per Alida @ 02:08 they do not have high acuity beds available (No psychosis, julianna, aggression hx). Meets exclusionary criteria        [facility]: Posting 3 beds. Low acuity only. Pt not appropriate            Pt remains on work list until appropriate placement is available

## 2022-09-11 NOTE — ED NOTES
[facility] ED Mental Health Handoff Note:       Brief HPI:  This is a 32 year old male signed out to me by Dr. Spencer.  See initial ED Provider note for full details of the presentation.  Patient with methamphetamine use, acute psychosis and aggressive behavior.  Placed on 72-hour hold.  Plan for mental health admission.    Home meds reviewed and ordered/administered: Yes    Medically stable for inpatient mental health admission: Yes.    Evaluated by mental health: Yes. The recommendation is for inpatient mental health treatment. Bed search in process    Safety concerns: At the time I received sign out, there were no safety concerns.    Hold Status:  Active Orders   Legal    Emergency Hospitalization Hold (72 Hr Hold)     Frequency: Effective Now     Start Date/Time: 09/08/22 1632      Number of Occurrences: Until Specified           Exam:   Patient Vitals for the past 24 hrs:   BP Temp Temp src Pulse Resp SpO2   09/11/22 1054 (!) 158/78 97.9  F (36.6  C) Oral 57 20 98 %   09/10/22 1934 139/83 97.6  F (36.4  C) Oral 63 18 98 %           ED Course:    Medications   divalproex sodium delayed-release (DEPAKOTE) DR tablet 1,000 mg (1,000 mg Oral Given 9/11/22 0800)   ibuprofen (ADVIL/MOTRIN) tablet 400 mg (has no administration in time range)   nicotine (NICORETTE) gum 4 mg (4 mg Oral Given 9/11/22 0921)   OLANZapine (zyPREXA) tablet 10 mg (10 mg Oral Given 9/11/22 0801)   OLANZapine (zyPREXA) tablet 20 mg (20 mg Oral Given 9/10/22 2108)   buPROPion (WELLBUTRIN XL) 24 hr tablet 300 mg (300 mg Oral Given 9/11/22 0913)            There were no significant events during my shift.    Patient was signed out to the oncoming provider, Dr. Lund      Impression:    ICD-10-CM    1. Methamphetamine abuse (H)  F15.10    2. Personality disorder (H)  F60.9        Plan:    1. Awaiting inpatient mental health admission/transfer.      RESULTS:   No results found for this visit on 09/08/22 (from the past 24  hour(s)).          Etienne Iyer, DO Iyer, Etienne Chavarria,   09/11/22 1503

## 2022-09-11 NOTE — ED NOTES
Triage & Transition Services, Extended Care     Etienne Dupree  September 11, 2022    Etienne is followed related to Long wait time for admission: Patient has been in the ED for 74+ hours. Please see initial DEC Crisis Assessment completed for complete assessment information. Medical record is reviewed. While patient is in the ED, care team is working towards Learn and Demonstrate at Le[facility] One Skill Focused on Crisis Stabilization. Additional notes include: patient being followed by CM for treatment/commitment needs.    Writer met with patient briefly in the ED. Patient was pleasant and reported no mental health concerns at this time. Per RN, patient has been stable in the ED with no concerns or additional symptoms at this time.    There are not significant status changes.     Plan:    Extended Care will follow and meet with patient/family/care team as able or requested.     ROBERTO BEARD, BAYRON, LGSW, [facility], Extended Care   [phone]

## 2022-09-11 NOTE — TELEPHONE ENCOUNTER
R:  Patient cleared and ready for behavioral bed placement: Yes    Bed Search (completed at 9am ; 1pm  [facility] is [facility] 0 beds.   [facility] is [facility] 0 beds.  [facility] is posting 0 beds.  [facility] is posting 0 beds.  [facility] is posting 0 beds.  [facility] is posting 0 beds.      Called outside the [facility] facilities  With no availability to review.    [facility] at capacity. The pt remains on the waitlist. Intake continues to identify appropriate bed placement.

## 2022-09-12 PROCEDURE — 250N000013 HC RX MED GY IP 250 OP 250 PS 637: Performed by: FAMILY MEDICINE

## 2022-09-12 PROCEDURE — 250N000013 HC RX MED GY IP 250 OP 250 PS 637: Performed by: EMERGENCY MEDICINE

## 2022-09-12 RX ADMIN — NICOTINE POLACRILEX 4 MG: 2 GUM, CHEWING ORAL at 17:19

## 2022-09-12 RX ADMIN — NICOTINE POLACRILEX 4 MG: 2 GUM, CHEWING ORAL at 08:09

## 2022-09-12 RX ADMIN — OLANZAPINE 10 MG: 10 TABLET, FILM COATED ORAL at 08:09

## 2022-09-12 RX ADMIN — DIVALPROEX SODIUM 1000 MG: 500 TABLET, DELAYED RELEASE ORAL at 22:31

## 2022-09-12 RX ADMIN — BUPROPION HYDROCHLORIDE 300 MG: 150 TABLET, EXTENDED RELEASE ORAL at 08:09

## 2022-09-12 RX ADMIN — OLANZAPINE 20 MG: 10 TABLET, FILM COATED ORAL at 22:02

## 2022-09-12 RX ADMIN — DIVALPROEX SODIUM 1000 MG: 500 TABLET, DELAYED RELEASE ORAL at 08:09

## 2022-09-12 ASSESSMENT — ACTIVITIES OF DAILY LIVING (ADL)
ADLS_ACUITY_SCORE: 35

## 2022-09-12 NOTE — ED NOTES
Triage & Transition Services, Extended Care     Etienne Dpuree  September 12, 2022       Etienne is followed related to Long wait time for admission: 97+ hours in the ED. Please see initial DEC/[facility] Crisis Assessment completed by Radha Maher on 9/8/2022 for complete assessment information. Notable concerns include: pt is currently on a 72 hour hold which expires 9/13/2022 at 1631 however, Pt has an active committment through [facility] and revocation paperwork was received from [employer]  Jose.      Current Supports and Contact Information  [facility]  Jose- [phone]    Case Management  Writer spoke with MARIA EUGENIA Garcia and provided requested update.     Current Presentation:   Pt was calm, cooperative and engaged. Pt reported that he is doing alright. He inquired about the [facility] and he was informed that he would be transferred there shortly. Writer provided answers to questions about [facility] and care plan. Pt shared that his ultimate goal is to find a customized living program and gave verbal consent for Writer to speak with his [facility] .      Mental Status Exam:   Appearance: awake, alert  Attitude: cooperative  Eye Contact: good  Mood: good  Affect: appropriate and in normal range  Speech: clear, coherent  Psychomotor Behavior: no evidence of tardive dyskinesia, dystonia, or tics  Thought Process:  linear  Associations: no loose associations  Thought Content: no evidence of suicidal ideation or homicidal ideation  Insight: fair  Judgement: fair  Oriented to: time, person, and place  Attention Span and Concentration: intact  Recent and Remote Memory: intact    Diagnosis:   296.52 Bipolar I Disorder Current or Most Recent Episode Depressed, Moderate  Substance-Related & Addictive Disorders Stimulant Use Disorder:  Meth, Specify current severity:  Moderate  304.40 (F15.20) Moderate, Amphetamine type substance  301.9 (F60.9) Unspecified Personality Disorder     Plan  Inpatient  mental health admission  Pt has an active committment in [facility]. Revocation paperwork was received and sent to central intake.     Extended Care will follow and meet with patient/family/care team as able or requested.     Mago Bauer, [facility]  Extended Care   [phone]

## 2022-09-12 NOTE — ED NOTES
Pt. Provided with new scrubs . Pt states I would like bigger scrubs. Pt does not appear to be in distress at this time

## 2022-09-12 NOTE — TELEPHONE ENCOUNTER
0103 Bed Search Update:    [facility]-No beds available  [facility]-No beds available  [facility]-No beds available  [facility]-No beds available  [facility]-No beds available  [facility]-No beds available  [facility]-No beds available  [facility]-No beds available  [facility]-No beds available  [facility]-No beds available.  Low acuity only.  Mixed unit adol/adult/kimber  -No beds available  [facility]-No beds available  [facility]-No beds available  [place]-No beds available  [facility]-No beds available  [facility]  [facility]-No beds available.  Must be on a 72 HH. No intake after 10 PM.  [facility]-No beds available  [facility] [place]-Voluntary/Habematolel only. No hx violence or sexual assault.  [facility]-No beds available  [facility]-No beds available  [facility]-No recent hx violence/aggression. Must be able to program in groups.  [facility]-Low acuity only.  [facility]-No beds available.  No recent violence or aggression.  [facility]-No beds available  [facility] [place]-No beds available  [facility]-Per [facility] on 9/11, pt meets exclusionary criteria.  [facility]-No beds available    Remains on wait list.

## 2022-09-12 NOTE — TELEPHONE ENCOUNTER
Updated Bed Search @ 630pm:   Per chart review, intake can look anywhere for placement     [facility] has 0 appropriate beds available. Phone: [phone]  [facility] posting 0 available beds. Phone: [phone]  [facility] posting 0 available beds. Phone: [phone]  [facility] posting 0 available beds. Phone: [phone]  [facility] posting 0 available beds. Phone: [phone]  [facility] posting 0 available beds. Phone:[phone]  [facility] posting 0 available beds. Phone: [phone]  [facility] posting 0 available beds. Phone: [phone]   [place] posting 0 available beds. Phone: [phone]  [facility] posting 0 available beds. Phone: [phone]  [facility] posting 1 available beds. Phone: [phone]. Mixed unit. Covid neg. Low acuity. Pt not appropriate.   [place] posting 0 available beds. Phone: [phone]  [facility] posting 0 available beds. Phone: [phone]  [facility] posting 1 available beds. Phone: [phone]. Low acuity. Pt not appropriate.   [place] posting 0 available beds. Phone: [phone]  [facility] posting 0 available beds. Phone:[phone]  [facility] posting 0 available beds. Phone: [phone]  [facility] posting 1 available beds. Phone:[phone]. 72 HH required, low acuity, covid evelyne. Pt not appropriate.   [facility] posting 1 available beds. Phone: [phone]. Low acuity. Pt not appropriate.   [facility] posting 0 available beds. Phone: [phone]  [facility] posting 0 available beds. Phone: [phone]   [facility] posting 8 available beds. Phone: [phone]. Low acuity. No CD. Covid neg. Pt not appropriate.   [facility] posting 0 available beds. Phone: [phone]  [facility] posting 0 available beds. Phone: [phone]  [facility] posting 0 available beds. Phone: [phone]  [facility] posting 0 available beds. Phone: [phone]  [facility] posting 0 available beds. Phone:  [phone]  [facility] posting 0 available beds. Phone: [phone]  [facility] posting 8 available beds. Phone: [phone]. Per call to facility, no answer, left VM, will check back later.   [facility] posting 0 available beds. Phone: [phone]    Pt remains on work list pending appropriate bed placement.

## 2022-09-13 ENCOUNTER — HOSPITAL ENCOUNTER (EMERGENCY)
Age: 32
End: 2022-09-13
Attending: PSYCHIATRY & NEUROLOGY
Payer: COMMERCIAL

## 2022-09-13 ENCOUNTER — TELEPHONE (OUTPATIENT)
Dept: BEHAVIORAL HEALTH | Facility: CLINIC | Age: 32
End: 2022-09-13

## 2022-09-13 PROBLEM — F60.9 PERSONALITY DISORDER (H): Status: ACTIVE | Noted: 2022-09-13

## 2022-09-13 PROCEDURE — 250N000013 HC RX MED GY IP 250 OP 250 PS 637: Performed by: EMERGENCY MEDICINE

## 2022-09-13 PROCEDURE — 124N000002 HC R&B MH UMMC

## 2022-09-13 PROCEDURE — 250N000013 HC RX MED GY IP 250 OP 250 PS 637: Performed by: FAMILY MEDICINE

## 2022-09-13 PROCEDURE — 250N000013 HC RX MED GY IP 250 OP 250 PS 637: Performed by: PSYCHIATRY & NEUROLOGY

## 2022-09-13 RX ORDER — ACETAMINOPHEN 325 MG/1
650 TABLET ORAL EVERY 4 HOURS PRN
Status: DISCONTINUED | OUTPATIENT
Start: 2022-09-13 | End: 2022-11-14 | Stop reason: HOSPADM

## 2022-09-13 RX ORDER — AMOXICILLIN 250 MG
1 CAPSULE ORAL 2 TIMES DAILY PRN
Status: DISCONTINUED | OUTPATIENT
Start: 2022-09-13 | End: 2022-11-14 | Stop reason: HOSPADM

## 2022-09-13 RX ORDER — HYDROXYZINE HYDROCHLORIDE 25 MG/1
25 TABLET, FILM COATED ORAL EVERY 4 HOURS PRN
Status: DISCONTINUED | OUTPATIENT
Start: 2022-09-13 | End: 2022-11-14 | Stop reason: HOSPADM

## 2022-09-13 RX ORDER — LANOLIN ALCOHOL/MO/W.PET/CERES
3 CREAM (GRAM) TOPICAL
Status: DISCONTINUED | OUTPATIENT
Start: 2022-09-13 | End: 2022-11-14 | Stop reason: HOSPADM

## 2022-09-13 RX ORDER — OLANZAPINE 10 MG/2ML
10 INJECTION, POWDER, FOR SOLUTION INTRAMUSCULAR 3 TIMES DAILY PRN
Status: DISCONTINUED | OUTPATIENT
Start: 2022-09-13 | End: 2022-11-14 | Stop reason: HOSPADM

## 2022-09-13 RX ORDER — HYDROCHLOROTHIAZIDE 12.5 MG/1
12.5 TABLET ORAL EVERY MORNING
Status: DISCONTINUED | OUTPATIENT
Start: 2022-09-13 | End: 2022-11-14 | Stop reason: HOSPADM

## 2022-09-13 RX ORDER — OLANZAPINE 10 MG/1
10 TABLET ORAL 3 TIMES DAILY PRN
Status: DISCONTINUED | OUTPATIENT
Start: 2022-09-13 | End: 2022-11-14 | Stop reason: HOSPADM

## 2022-09-13 RX ORDER — MAGNESIUM HYDROXIDE/ALUMINUM HYDROXICE/SIMETHICONE 120; 1200; 1200 MG/30ML; MG/30ML; MG/30ML
30 SUSPENSION ORAL EVERY 4 HOURS PRN
Status: DISCONTINUED | OUTPATIENT
Start: 2022-09-13 | End: 2022-11-14 | Stop reason: HOSPADM

## 2022-09-13 RX ADMIN — NICOTINE POLACRILEX 4 MG: 2 GUM, CHEWING ORAL at 07:01

## 2022-09-13 RX ADMIN — DIVALPROEX SODIUM 1000 MG: 500 TABLET, DELAYED RELEASE ORAL at 21:04

## 2022-09-13 RX ADMIN — BUPROPION HYDROCHLORIDE 300 MG: 150 TABLET, EXTENDED RELEASE ORAL at 09:03

## 2022-09-13 RX ADMIN — OLANZAPINE 10 MG: 10 TABLET, FILM COATED ORAL at 09:03

## 2022-09-13 RX ADMIN — NICOTINE POLACRILEX 4 MG: 4 GUM, CHEWING BUCCAL at 17:25

## 2022-09-13 RX ADMIN — HYDROCHLOROTHIAZIDE 12.5 MG: 12.5 TABLET ORAL at 09:03

## 2022-09-13 RX ADMIN — OLANZAPINE 20 MG: 10 TABLET, FILM COATED ORAL at 21:03

## 2022-09-13 RX ADMIN — DIVALPROEX SODIUM 1000 MG: 500 TABLET, DELAYED RELEASE ORAL at 09:02

## 2022-09-13 ASSESSMENT — ACTIVITIES OF DAILY LIVING (ADL)
ADLS_ACUITY_SCORE: 35
DRESSING/BATHING_DIFFICULTY: NO
ADLS_ACUITY_SCORE: 35
CONCENTRATING,_REMEMBERING_OR_MAKING_DECISIONS_DIFFICULTY: NO
ADLS_ACUITY_SCORE: 28
ADLS_ACUITY_SCORE: 35
FALL_HISTORY_WITHIN_LAST_SIX_MONTHS: NO
WALKING_OR_CLIMBING_STAIRS_DIFFICULTY: NO
WEAR_GLASSES_OR_BLIND: NO
DOING_ERRANDS_INDEPENDENTLY_DIFFICULTY: NO
ADLS_ACUITY_SCORE: 28
CHANGE_IN_FUNCTIONAL_STATUS_SINCE_ONSET_OF_CURRENT_ILLNESS/INJURY: NO
ADLS_ACUITY_SCORE: 35
ADLS_ACUITY_SCORE: 35
ADLS_ACUITY_SCORE: 28
ADLS_ACUITY_SCORE: 35
ADLS_ACUITY_SCORE: 45
DIFFICULTY_EATING/SWALLOWING: NO
ADLS_ACUITY_SCORE: 35
TOILETING_ISSUES: NO
ADLS_ACUITY_SCORE: 35

## 2022-09-13 NOTE — ED NOTES
Etienne Dupree  September 13, 2022        Etienne is followed related to Long wait time for admission: 123+ hours in the ED. Please see initial DEC/[facility] Crisis Assessment completed by Radha Maher on 9/8/2022 for complete assessment information. Notable concerns include: pt is currently on a 72 hour hold which expires 9/13/2022 at 1631 however, Pt has an active committment through [facility] and revocation paperwork was received from [facility]  Jose.      Current Supports and Contact Information  [facility]  Jose- [phone]     Case Management  Writer spoke with MARIA EUGENIA Garcia about revocation documents and need for additional documents.     Writer received copy of order to apprehend & detain, transport and hold from [facility]. There was some confusing between extended care, central intake and providers whether this was the document needed to continue holding pt past 72 hours. Risk management was consulted. They reported that the above document requires [facility] to hold the patient until there is a hearing on the case. Pt will remain on the inpatient mental health admission wait list.        Current Presentation:   Pt was calm, cooperative and engaged. Pt reported that he is doing alright. Writer and pt reviewed current care plan. Pt voiced understanding.                 Mental Status Exam:   Appearance: awake, alert  Attitude: cooperative  Eye Contact: good  Mood: good  Affect: appropriate and in normal range  Speech: clear, coherent  Psychomotor Behavior: no evidence of tardive dyskinesia, dystonia, or tics  Thought Process:  linear  Associations: no loose associations  Thought Content: no evidence of suicidal ideation or homicidal ideation  Insight: fair  Judgement: fair  Oriented to: time, person, and place  Attention Span and Concentration: intact  Recent and Remote Memory: intact     Diagnosis:   296.52 Bipolar I Disorder Current or Most Recent Episode Depressed,  Moderate  Substance-Related & Addictive Disorders Stimulant Use Disorder:  Meth, Specify current severity:  Moderate  304.40 (F15.20) Moderate, Amphetamine type substance  301.9 (F60.9) Unspecified Personality Disorder      Plan  Inpatient mental health admission  Pt has an active committment in [facility]. Revocation paperwork was received and sent to central intake.      Extended Care will follow and meet with patient/family/care team as able or requested.      Mago Bauer, [facility]  Extended Care   [phone]

## 2022-09-13 NOTE — ED NOTES
[facility] ED Mental Health Handoff Note:       Brief HPI:  This is a 32 year old male signed out to me by Dr. Peacock.  See initial ED Provider note for full details of the presentation. Patient with methamphetamine use, acute psychosis and aggressive behavior.  Placed on 72-hour hold.  Plan for mental health admission.    Home meds reviewed and ordered/administered: Yes    Medically stable for inpatient mental health admission: Yes.    Evaluated by mental health: Yes. The recommendation is for inpatient mental health treatment. Bed search in process    Safety concerns: At the time I received sign out, there were no safety concerns.    Hold Status:  Active Orders   Legal    Emergency Hospitalization Hold (72 Hr Hold)     Frequency: Effective Now     Start Date/Time: 09/08/22 1632      Number of Occurrences: Until Specified           Exam:   Patient Vitals for the past 24 hrs:   BP Temp Temp src Pulse Resp SpO2   09/12/22 2159 (!) 153/75 97.1  F (36.2  C) Oral 67 18 95 %   09/12/22 0820 (!) 169/78 98.2  F (36.8  C) Oral 82 18 98 %           ED Course:    Medications   divalproex sodium delayed-release (DEPAKOTE) DR tablet 1,000 mg (1,000 mg Oral Given 9/12/22 2231)   ibuprofen (ADVIL/MOTRIN) tablet 400 mg (has no administration in time range)   nicotine (NICORETTE) gum 4 mg (4 mg Oral Given 9/12/22 1719)   OLANZapine (zyPREXA) tablet 10 mg (10 mg Oral Given 9/12/22 0809)   OLANZapine (zyPREXA) tablet 20 mg (20 mg Oral Given 9/12/22 2202)   buPROPion (WELLBUTRIN XL) 24 hr tablet 300 mg (300 mg Oral Given 9/12/22 0809)   hydrochlorothiazide (HYDRODIURIL) tablet 12.5 mg (has no administration in time range)            There were no significant events during my shift.    Patient was signed out to the oncoming provider, Dr. Lopez        Impression:    ICD-10-CM    1. Methamphetamine abuse (H)  F15.10    2. Personality disorder (H)  F60.9        Plan:    1. Awaiting inpatient mental health  admission/transfer.      RESULTS:   No results found for this visit on 09/08/22 (from the past 24 hour(s)).          DO Shireen Cronin, Etienne Chavarria DO  09/13/22 0434

## 2022-09-13 NOTE — TELEPHONE ENCOUNTER
0005 Bed Search Update:    [facility] [place] [facility] available  [facility]-No beds available  [facility]-No beds available  [facility]-No beds available  [facility]-No beds available  [facility]-No beds available  [facility]-No beds available  [facility]-No beds available  [facility]-No beds available  [facility]-  Low acuity only.  Mixed unit adol/adult/kimber  [facility]-No beds available  [facility]-No beds available  [facility]-Low acuity only  [place]-No beds available  [facility]-No beds available  [facility]-No beds available  [facility]-Cannot take pts on commitment.  Must be on a 72 HH. No intake after 10 PM.  [facility]-No beds available  [facility] [place]-Voluntary/[place] only. No hx violence or sexual assault.  [facility]-No beds available  [facility]-No beds available  [facility]-No recent hx violence/aggression. Must be able to program in groups.  [facility]-Low acuity only.  [facility]-No beds available.  No recent violence or aggression.  [facility]-No beds available  [facility]-No beds available  [facility]-Declined on 9/11 due to meeting exclusionary criteria.  [facility]-No beds available    Remains on wait list.

## 2022-09-13 NOTE — PLAN OF CARE
09/13/22 1758   Valuables   Patient Belongings locker;sent to security per site process   Patient Belongings Put in Hospital Secure Location (Security or Locker, etc.) bracelet;cash/credit card;cell phone/electronics;clothing;jewelry;laptop computer;medication(s);money (see comment);necklace;purse/wallet;wallet;other (see comments)   Did you bring any home meds/supplements to the hospital?  Yes   Disposition of meds  Sent to security/pharmacy per site process     In [address] locker (Transfer to [address]):  - 1 backpack  - 1 black bag  - 2 phones, no  cord  - laptop w/  cord  - two wallets w/ various cards  - vape   - tin of Grizzly chewing tobacco  - pair socks  - pair boxers  - black pants  - t shirt   - beanie  - folders w/ various paperwork  - multiple notebooks  - 2 books   - cloth mask  - various jewelry (5 necklaces, three bracelets)  - various rocks and crystals   - various change  - bag of various toiletries    Sent to security in envelope #[id number]:  [place] 's License; - Visa Debit Card #[id number]; - [employer] Credit Card #[id number]; - 3 sealed bags of coins; - unsealed half dollar coin     Bag of medications delivered to nursing station on [address] upon admission to unit    Sent to security in envelope #[id number] (While at the Hospital, 10/2):  Cash: $60 (20 X 3); EBT card (8349); Visa card ([id number]).  A             Admission:  I am responsible for any personal items that are not sent to the safe or pharmacy.  [facility] is not responsible for loss, theft or damage of any property in my possession.    Signature:  _________________________________ Date: _______  Time: _____                                              Staff Signature:  ____________________________ Date: ________  Time: _____      2nd Staff person, if patient is unable/unwilling to sign:    Signature: ________________________________ Date: ________  Time: _____     Discharge:  Grzegorz has returned all of my personal  belongings:    Signature: _________________________________ Date: ________  Time: _____                                          Staff Signature:  ____________________________ Date: ________  Time: _____

## 2022-09-13 NOTE — ED NOTES
ED to Behavioral Floor Handoff    SITUATION  Etienne Dupree is a 32 year old male who speaks English and [place] on the streets The patient arrived in the ED by ambulance from [facility]\[id number] with a complaint of Psychiatric Evaluation (Came from [facility]- mental health assessment)  .The patient's current symptoms started/worsened not any mins ago and during this time the symptoms have decreased.   In the ED, pt was diagnosed with   Final diagnoses:   Methamphetamine abuse (H)   Personality disorder (H)        Initial vitals were: BP: (!) 142/105  Pulse: 72  Temp: 98.1  F (36.7  C)  Resp: 16  SpO2: 99 %   --------  Is the patient diabetic? No   If yes, last blood glucose? --     If yes, was this treated in the ED? --  --------  Is the patient inebriated (ETOH) No or Impaired on other substances? No  MSSA done? No  Last MSSA score: --    Were withdrawal symptoms treated? No  Does the patient have a seizure history? No. If yes, date of most recent seizure--  --------  Is the patient patient experiencing suicidal ideation? denies current or recent suicidal ideation     Homicidal ideation? denies current or recent homicidal ideation or behaviors.    Self-injurious behavior/urges? denies current or recent self injurious behavior or ideation.  ------  Was pt aggressive in the ED No  Was a code called No  Is the pt now cooperative? Yes  -------  Meds given in ED:   Medications   divalproex sodium delayed-release (DEPAKOTE) DR tablet 1,000 mg (1,000 mg Oral Given 9/13/22 0902)   ibuprofen (ADVIL/MOTRIN) tablet 400 mg (has no administration in time range)   nicotine (NICORETTE) gum 4 mg (4 mg Oral Given 9/13/22 0701)   OLANZapine (zyPREXA) tablet 10 mg (10 mg Oral Given 9/13/22 0903)   OLANZapine (zyPREXA) tablet 20 mg (20 mg Oral Given 9/12/22 2202)   buPROPion (WELLBUTRIN XL) 24 hr tablet 300 mg (300 mg Oral Given 9/13/22 0903)   hydrochlorothiazide (HYDRODIURIL) tablet 12.5 mg (12.5 mg Oral Given 9/13/22 0903)       Family present during ED course? No  Family currently present? No    BACKGROUND  Does the patient have a cognitive impairment or developmental disability? No  Allergies:   Allergies   Allergen Reactions     Penicillins    .   Social demographics are   Social History     Socioeconomic History     Marital status: Single     Spouse name: None     Number of children: None     Years of education: None     Highest education level: None   Tobacco Use     Smoking status: Current Every Day Smoker     Packs/day: 0.00     Types: Other     Smokeless tobacco: Current User     Tobacco comment: Rx nicotene gum    Substance and Sexual Activity     Alcohol use: Not Currently     Drug use: Yes        ASSESSMENT  Labs results   Labs Ordered and Resulted from Time of ED Arrival to Time of ED Departure   DRUG ABUSE SCREEN 1 URINE (ED) - Abnormal       Result Value    Amphetamines Urine Screen Positive (*)     Barbiturates Urine Screen Negative      Benzodiazepines Urine Screen Negative      Cannabinoids Urine Screen Negative      Cocaine Urine Screen Negative      Opiates Urine Screen Negative     COVID-19 VIRUS (CORONAVIRUS) BY PCR - Normal    SARS CoV2 PCR Negative        Imaging Studies: No results found for this or any previous visit (from the past 24 hour(s)).   Most recent vital signs /79   Pulse 79   Temp 98.2  F (36.8  C) (Oral)   Resp 18   SpO2 98%    Abnormal labs/tests/findings requiring intervention:---   Pain control: good  Nausea control: good    RECOMMENDATION  Are any infection precautions needed (MRSA, VRE, etc.)? No If yes, what infection? --  ---  Does the patient have mobility issues? independently. If yes, what device does the pt use? ---  ---  Is patient on 72 hour hold or commitment? Yes If on 72 hour hold, have hold and rights been given to patient? commitment  Are admitting orders written if after 10 p.m. ?No  Tasks needing to be completed:---     Dani Jean-Baptiste RN   ascom-- [facility]   [phone] [facility]    [phone] [facility]

## 2022-09-13 NOTE — ED NOTES
[facility] ED Mental Health Handoff Note:       Brief HPI:  This is a 32 year old male signed out to me by Dr. Iyer.  See initial ED Provider note for full details of the presentation. Interval history is pertinent for patient wanting to be discharged.  Patient is that he wants to be go to an urgent facility.  Patient has a history of aggression and psychosis when using methamphetamine.  Patient is currently calm and cooperative and very pleasant.  Case was discussed with the extended care behavioral health .  Patient is currently on a commitment and so cannot be discharged from the hospital.  Patient 72-hour hold will run out this evening but since he is on a commitment he will be kept in the hospital until he is admitted.  This plan of care was discussed with the patient..    Home meds reviewed and ordered/administered: Yes    Medically stable for inpatient mental health admission: Yes.    Evaluated by mental health: Yes. The recommendation is for inpatient mental health treatment. Bed search in process    Safety concerns: At the time I received sign out, there were no safety concerns.    Hold Status:  Active Orders   Legal    Emergency Hospitalization Hold (72 Hr Hold)     Frequency: Effective Now     Start Date/Time: 09/08/22 1632      Number of Occurrences: Until Specified            Exam:   Patient Vitals for the past 24 hrs:   BP Temp Temp src Pulse Resp SpO2   09/13/22 0555 136/57 -- -- 64 18 98 %   09/12/22 2159 (!) 153/75 97.1  F (36.2  C) Oral 67 18 95 %           ED Course:    Medications   divalproex sodium delayed-release (DEPAKOTE) DR tablet 1,000 mg (1,000 mg Oral Given 9/13/22 0902)   ibuprofen (ADVIL/MOTRIN) tablet 400 mg (has no administration in time range)   nicotine (NICORETTE) gum 4 mg (4 mg Oral Given 9/13/22 0701)   OLANZapine (zyPREXA) tablet 10 mg (10 mg Oral Given 9/13/22 0903)   OLANZapine (zyPREXA) tablet 20 mg (20 mg Oral Given 9/12/22 2202)   buPROPion (WELLBUTRIN XL)  24 hr tablet 300 mg (300 mg Oral Given 9/13/22 0903)   hydrochlorothiazide (HYDRODIURIL) tablet 12.5 mg (12.5 mg Oral Given 9/13/22 0903)            There were no significant events during my shift.    Patient was signed out to the oncoming provider.       Impression:    ICD-10-CM    1. Methamphetamine abuse (H)  F15.10    2. Personality disorder (H)  F60.9        Plan:    1. Awaiting mental health evaluation/recommendations.      RESULTS:   No results found for this visit on 09/08/22 (from the past 24 hour(s)).          MD Jessica Mcgraw Kruti Tripathi, MD  09/13/22 1047

## 2022-09-13 NOTE — ED NOTES
Pt a&ox4 and speaks in clear and full sentences, breathing even and unlabored, no visible signs of distress, pt calm and cooperative at this time, pt denies si/hi, pt denies ah/vh, denies pain, safety precautions observed, will continue to monitor. Pt watchinng movies on his phone calm and cooperative.

## 2022-09-13 NOTE — CARE PLAN
Client admitted to floor to Room 135, brought to floor by office, male staff searched belongings, meds given to writer and put in envelope.  Client oriented to room and admission completed.   Denies SI/HI AVH.

## 2022-09-13 NOTE — ED NOTES
Pt report and continuity of care given to  RN station 12. Naima valdez arranging transportation via staff and security.

## 2022-09-13 NOTE — TELEPHONE ENCOUNTER
R: The pt is currently in the [facility] awaiting placement.     Intake Morning Bed Search (Done at 8:00a)  [facility] is posting 0 beds.   [facility] is posting 0 beds.  [facility] is posting 0 beds.  [facility] is posting 0 beds.  [facility] is posting 0 beds.  [facility] is posting 0 beds.  [facility] is posting 0 beds.  [facility] is posting 0 beds. Low acuity.    [facility] is posting 1 bed. Mixed unit 12+. Low acuity only. Pt does not fit open bed available.  [facility] is positing 0 beds. No aggression.   [facility] is posting 0 beds.   [facility] is posting 2 beds. Low acuity only.Pt does not fit open bed available.  [facility] is posting 0 beds. Low acuity only.   [facility] is posting 0 beds.   [facility] is posting 1 bed. Low acuity only. 72 hr hold required. Pt does not fit open bed available.  [facility] is posting 0 beds. Low acuity.    [facility] is posting 0 beds. Vol only, No Hx of aggression, violence, or assault. No sexual offenders. No 72 hr holds.    [facility] is posting 7 beds. (Must have the cognitive ability to do programming. No aggressive or violent behavior or recent HX in the last 2 yrs. MH must be primary). Pt does not fit open bed available.  [facility] is posting 0 beds. Low acuity only. Violence and aggression capped.   [facility] is posting 0 beds. Low acuity, Neg Covid.   [facility] is posting 0 beds. Covid neg. Vol only. Combined adolescent and adult unit. No aggressive or violent behavior. No registered sex offenders.   [facility] is posting 8 beds. No Covid needed. Call for details. Pt does not fit open bed available - pt has legal concerns preventing him from crossing state lines.  [facility] is posting 2 beds. Mixed Unit (13+). Pt does not fit open bed available.    8:06a [facility] at capacity. The pt remains on the  "waitlist. Intake continues to identify appropriate bed placement.    9:25a Intake received call from [facility]. [facility] will work on figuring out the pt's commitment status- pt's hold is due to  today 2022. Petition to commit the pt was filed 2022- no response has been recived.  will connect with the court for an update on the pt's pt to commit. Intake awaiting response.     10:31a Intake received notification from [facility]- court paperwork has been faxed.     11:15a Intake reviewed fax- paperwork is for apprehension, detain and hold orders not a petition to commit. Paperwork states \"medical facility shall hold the respondent pending further order of the court\".    11:22a Intake notified [facility] that this not the correct documentation. [facility] to continue look into the pt's court documentation papers for commitment. Per [facility] & [facility] supervisor the pt is already on commitment through [facility] and was on a provisional d/c. D/c was revoked and it is our understanding that this document means the hospital has to hold and treat until further court orders as it is signed by a . Intake will page provider to discuss    11:49a Intake paged the provider to go over court document for Unit 12 (Federico TIRADO).     12:19p Provider returned paged- per provider the pt is not meeting IP MH criteria. Pt is clinically stable- not endorsing SI/HI/AH/VH, pt is calm and cooperative. Provider suggest reaching out to the risk management department to support the pt for clarification for admission regarding legal documents. Pt would benefit from a Crisis bed or IRTS Facility vs MICD treatment.    12:25p Intake reached out to [facility] to rely provider concerns. Intake requested Risk Management to assess the legal documents. Intake requests the pt be reassessed due to not meeting IP MH criteria. Intake relayed the providers recommendation for a crisis bed, IRTS or MICD support treatment. " Intake awaiting an update from [facility].      1:01p Intake received notification from Risk Management- pt cannot be discharged, pt is under a court order. Leadership has been notified. Intake awaiting leadership's response.     1:20p Per Dr Caballero, it is appropriate to proceed with admission until the court hearing is completed.    2:08p Intake called Unit 12. Intake went over admission details with the RN. The unit will call the ER for report.     2:14p Intake called the [place] [facility] to go over bed placement.

## 2022-09-13 NOTE — ED NOTES
"Pt to nurse desk on the phone, states he's on the phone with \"[facility]\". Stating they are willing to take him and the SW \"just needs to send a referral\". RN called [facility] again asking for them to update him.  "

## 2022-09-14 LAB
ALBUMIN SERPL-MCNC: 3.4 G/DL (ref 3.4–5)
ALP SERPL-CCNC: 69 U/L (ref 40–150)
ALT SERPL W P-5'-P-CCNC: 17 U/L (ref 0–70)
ANION GAP SERPL CALCULATED.3IONS-SCNC: 6 MMOL/L (ref 3–14)
AST SERPL W P-5'-P-CCNC: 12 U/L (ref 0–45)
BASOPHILS # BLD AUTO: 0 10E3/UL (ref 0–0.2)
BASOPHILS NFR BLD AUTO: 1 %
BILIRUB SERPL-MCNC: 0.2 MG/DL (ref 0.2–1.3)
BUN SERPL-MCNC: 16 MG/DL (ref 7–30)
CALCIUM SERPL-MCNC: 8.8 MG/DL (ref 8.5–10.1)
CHLORIDE BLD-SCNC: 105 MMOL/L (ref 94–109)
CHOLEST SERPL-MCNC: 106 MG/DL
CO2 SERPL-SCNC: 29 MMOL/L (ref 20–32)
CREAT SERPL-MCNC: 0.74 MG/DL (ref 0.66–1.25)
EOSINOPHIL # BLD AUTO: 0.2 10E3/UL (ref 0–0.7)
EOSINOPHIL NFR BLD AUTO: 4 %
ERYTHROCYTE [DISTWIDTH] IN BLOOD BY AUTOMATED COUNT: 13.2 % (ref 10–15)
GFR SERPL CREATININE-BSD FRML MDRD: >90 ML/MIN/1.73M2
GLUCOSE BLD-MCNC: 91 MG/DL (ref 70–99)
HBA1C MFR BLD: 5.5 % (ref 0–5.6)
HCT VFR BLD AUTO: 43 % (ref 40–53)
HDLC SERPL-MCNC: 29 MG/DL
HGB BLD-MCNC: 14.1 G/DL (ref 13.3–17.7)
IMM GRANULOCYTES # BLD: 0.1 10E3/UL
IMM GRANULOCYTES NFR BLD: 2 %
LDLC SERPL CALC-MCNC: 24 MG/DL
LYMPHOCYTES # BLD AUTO: 2 10E3/UL (ref 0.8–5.3)
LYMPHOCYTES NFR BLD AUTO: 40 %
MCH RBC QN AUTO: 28.1 PG (ref 26.5–33)
MCHC RBC AUTO-ENTMCNC: 32.8 G/DL (ref 31.5–36.5)
MCV RBC AUTO: 86 FL (ref 78–100)
MONOCYTES # BLD AUTO: 0.3 10E3/UL (ref 0–1.3)
MONOCYTES NFR BLD AUTO: 7 %
NEUTROPHILS # BLD AUTO: 2.2 10E3/UL (ref 1.6–8.3)
NEUTROPHILS NFR BLD AUTO: 46 %
NONHDLC SERPL-MCNC: 77 MG/DL
NRBC # BLD AUTO: 0 10E3/UL
NRBC BLD AUTO-RTO: 0 /100
PLATELET # BLD AUTO: 178 10E3/UL (ref 150–450)
POTASSIUM BLD-SCNC: 3.8 MMOL/L (ref 3.4–5.3)
PROT SERPL-MCNC: 6.8 G/DL (ref 6.8–8.8)
RBC # BLD AUTO: 5.01 10E6/UL (ref 4.4–5.9)
SODIUM SERPL-SCNC: 140 MMOL/L (ref 133–144)
TRIGL SERPL-MCNC: 263 MG/DL
TSH SERPL DL<=0.005 MIU/L-ACNC: 2.66 MU/L (ref 0.4–4)
VALPROATE SERPL-MCNC: 89 MG/L
WBC # BLD AUTO: 4.9 10E3/UL (ref 4–11)

## 2022-09-14 PROCEDURE — 250N000013 HC RX MED GY IP 250 OP 250 PS 637: Performed by: PSYCHIATRY & NEUROLOGY

## 2022-09-14 PROCEDURE — 80053 COMPREHEN METABOLIC PANEL: CPT | Performed by: PSYCHIATRY & NEUROLOGY

## 2022-09-14 PROCEDURE — 250N000013 HC RX MED GY IP 250 OP 250 PS 637: Performed by: EMERGENCY MEDICINE

## 2022-09-14 PROCEDURE — 80061 LIPID PANEL: CPT | Performed by: PSYCHIATRY & NEUROLOGY

## 2022-09-14 PROCEDURE — 99222 1ST HOSP IP/OBS MODERATE 55: CPT | Mod: AI | Performed by: PSYCHIATRY & NEUROLOGY

## 2022-09-14 PROCEDURE — 124N000002 HC R&B MH UMMC

## 2022-09-14 PROCEDURE — 83036 HEMOGLOBIN GLYCOSYLATED A1C: CPT | Performed by: PSYCHIATRY & NEUROLOGY

## 2022-09-14 PROCEDURE — 36415 COLL VENOUS BLD VENIPUNCTURE: CPT | Performed by: PSYCHIATRY & NEUROLOGY

## 2022-09-14 PROCEDURE — 84443 ASSAY THYROID STIM HORMONE: CPT | Performed by: PSYCHIATRY & NEUROLOGY

## 2022-09-14 PROCEDURE — 80164 ASSAY DIPROPYLACETIC ACD TOT: CPT | Performed by: PSYCHIATRY & NEUROLOGY

## 2022-09-14 PROCEDURE — 85025 COMPLETE CBC W/AUTO DIFF WBC: CPT | Performed by: PSYCHIATRY & NEUROLOGY

## 2022-09-14 PROCEDURE — G0177 OPPS/PHP; TRAIN & EDUC SERV: HCPCS

## 2022-09-14 RX ORDER — DIVALPROEX SODIUM 500 MG/1
1000 TABLET, DELAYED RELEASE ORAL 2 TIMES DAILY
Status: DISCONTINUED | OUTPATIENT
Start: 2022-09-14 | End: 2022-11-14 | Stop reason: HOSPADM

## 2022-09-14 RX ADMIN — HYDROCHLOROTHIAZIDE 12.5 MG: 12.5 TABLET ORAL at 08:06

## 2022-09-14 RX ADMIN — NICOTINE POLACRILEX 4 MG: 4 GUM, CHEWING BUCCAL at 11:43

## 2022-09-14 RX ADMIN — OLANZAPINE 20 MG: 10 TABLET, FILM COATED ORAL at 21:18

## 2022-09-14 RX ADMIN — BUPROPION HYDROCHLORIDE 300 MG: 150 TABLET, EXTENDED RELEASE ORAL at 07:57

## 2022-09-14 RX ADMIN — NICOTINE POLACRILEX 4 MG: 4 GUM, CHEWING BUCCAL at 16:02

## 2022-09-14 RX ADMIN — NICOTINE POLACRILEX 4 MG: 4 GUM, CHEWING BUCCAL at 19:41

## 2022-09-14 RX ADMIN — OLANZAPINE 10 MG: 10 TABLET, FILM COATED ORAL at 07:58

## 2022-09-14 RX ADMIN — DIVALPROEX SODIUM 1000 MG: 500 TABLET, DELAYED RELEASE ORAL at 19:41

## 2022-09-14 RX ADMIN — DIVALPROEX SODIUM 1000 MG: 500 TABLET, DELAYED RELEASE ORAL at 08:06

## 2022-09-14 RX ADMIN — MELATONIN TAB 3 MG 3 MG: 3 TAB at 19:41

## 2022-09-14 ASSESSMENT — ACTIVITIES OF DAILY LIVING (ADL)
ADLS_ACUITY_SCORE: 28
ORAL_HYGIENE: INDEPENDENT
DRESS: INDEPENDENT
ADLS_ACUITY_SCORE: 28

## 2022-09-14 NOTE — PROGRESS NOTES
Patient has been calm and cooperative on the unit with peers and staff and denies pain and discomfort at this time, patient has been medication compliant and denies thoughts of harming himself or others, patient denies auditory or visual hallucinations and is able to communicate his needs clearly to staff, patient had a snack and slept through the night, will continue to monitor for changes
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
## 2022-09-14 NOTE — PLAN OF CARE
09/14/22 1002   Interprofessional Rounds   Participants ;CTC;nursing;psychiatrist;social work   Team Discussion   Participants Tash Caballero MD, Yonatan Cruz MD (Resident), Keeley, RN, Alda, RN, MARITA Purdy Nursing Supervisor, ABHINAV Retana   Progress Continuing to assess   Anticipated length of stay 5-7 days   Continued Stay Criteria/Rationale New patient   Medical/Physical No medical concerns noted   Plan Psychiatry Team will meet with patient daily to assess psychiatric needs and to discuss medication options/side effects; during hospitalization patient will be encouraged to attend therapy groups and to participate in unit programming. CTC will coordinate disposition and aftercare plan.   Rationale for change in precautions or plan No change   Anticipated Discharge Disposition other (see comments)   PRECAUTIONS AND SAFETY    Behavioral Orders   Procedures    Code 1 - Restrict to Unit    Routine Programming     As clinically indicated    Status 15     Every 15 minutes.       Safety  Safety WDL: WDL
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
## 2022-09-14 NOTE — SUMMARY OF CARE
Client is pleasant and cooperative.  Takes meds as ordered.  Interacts with peers, attends groups.  Listens to music.   Will cont to monitor.   MARITA Sawyer

## 2022-09-14 NOTE — PLAN OF CARE
09/14/22 1425   Group Therapy Session   Group Attendance attended group session   Time Session Began 1315   Time Session Ended 1405   Total Time patient participated (minutes) 50   Total # Attendees 3   Group Type psychotherapeutic   Group Topic Covered coping skills/lifestyle management   Group Session Detail Positive thinking group discussion and worksheet   Patient Response/Contribution cooperative with task;discussed personal experience with topic;organized   Patient Response Detail Eitenne was pleasant and cooperative throughout group. He was willing to share his personal experiences and thoughts with his peers. He demonstrated the ability to listen to others as well as share his own thoughts.

## 2022-09-15 LAB — SARS-COV-2 RNA RESP QL NAA+PROBE: NEGATIVE

## 2022-09-15 PROCEDURE — 250N000013 HC RX MED GY IP 250 OP 250 PS 637: Performed by: PSYCHIATRY & NEUROLOGY

## 2022-09-15 PROCEDURE — H0001 ALCOHOL AND/OR DRUG ASSESS: HCPCS

## 2022-09-15 PROCEDURE — 99231 SBSQ HOSP IP/OBS SF/LOW 25: CPT | Performed by: PSYCHIATRY & NEUROLOGY

## 2022-09-15 PROCEDURE — 90853 GROUP PSYCHOTHERAPY: CPT

## 2022-09-15 PROCEDURE — 124N000002 HC R&B MH UMMC

## 2022-09-15 PROCEDURE — G0177 OPPS/PHP; TRAIN & EDUC SERV: HCPCS

## 2022-09-15 PROCEDURE — U0005 INFEC AGEN DETEC AMPLI PROBE: HCPCS | Performed by: PSYCHIATRY & NEUROLOGY

## 2022-09-15 PROCEDURE — 250N000013 HC RX MED GY IP 250 OP 250 PS 637: Performed by: EMERGENCY MEDICINE

## 2022-09-15 RX ADMIN — OLANZAPINE 20 MG: 10 TABLET, FILM COATED ORAL at 20:13

## 2022-09-15 RX ADMIN — NICOTINE POLACRILEX 4 MG: 4 GUM, CHEWING BUCCAL at 18:26

## 2022-09-15 RX ADMIN — NICOTINE POLACRILEX 4 MG: 4 GUM, CHEWING BUCCAL at 11:43

## 2022-09-15 RX ADMIN — HYDROXYZINE HYDROCHLORIDE 25 MG: 25 TABLET, FILM COATED ORAL at 21:43

## 2022-09-15 RX ADMIN — DIVALPROEX SODIUM 1000 MG: 500 TABLET, DELAYED RELEASE ORAL at 08:00

## 2022-09-15 RX ADMIN — NICOTINE POLACRILEX 4 MG: 4 GUM, CHEWING BUCCAL at 16:33

## 2022-09-15 RX ADMIN — OLANZAPINE 10 MG: 10 TABLET, FILM COATED ORAL at 08:00

## 2022-09-15 RX ADMIN — HYDROCHLOROTHIAZIDE 12.5 MG: 12.5 TABLET ORAL at 08:00

## 2022-09-15 RX ADMIN — NICOTINE POLACRILEX 4 MG: 4 GUM, CHEWING BUCCAL at 08:01

## 2022-09-15 RX ADMIN — BUPROPION HYDROCHLORIDE 300 MG: 150 TABLET, EXTENDED RELEASE ORAL at 08:00

## 2022-09-15 RX ADMIN — DIVALPROEX SODIUM 1000 MG: 500 TABLET, DELAYED RELEASE ORAL at 20:12

## 2022-09-15 ASSESSMENT — ACTIVITIES OF DAILY LIVING (ADL)
ADLS_ACUITY_SCORE: 28
HYGIENE/GROOMING: INDEPENDENT
ADLS_ACUITY_SCORE: 28
ADLS_ACUITY_SCORE: 28
ORAL_HYGIENE: INDEPENDENT
ADLS_ACUITY_SCORE: 28
DRESS: INDEPENDENT
LAUNDRY: UNABLE TO COMPLETE
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
## 2022-09-15 NOTE — PLAN OF CARE
Assessment/Intervention/Current Symtoms and Care Coordination  -Chart review    -Rounded with team, addressed patient needs/concerns: Patient had interview with Angelina @ [facility] today. Will wait for a call from Angelina to discuss how the interview went.  There is a CD Consult ordered but patient has not completed this assessment yet.      Current Symptoms include the following: Patient has been calm and cooperative today. He has attended therapy groups and actively participated.     Discharge Plan or Goal  Pending stabilization & development of a safe discharge plan.  Considerations include:  IRTS: CD residential treatment; [facility] placement.     Barriers to Discharge  Patient requires further psychiatric stabilization due to current symptomology    Referral Status  No referrals have been made this hospitalization    Legal Status  Commitment/Aguiar:  [facility]
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
## 2022-09-15 NOTE — PROGRESS NOTES
Patient has been observed to be calm and cooperative, appropriate with peers and staff, denies suicidal and homicidal ideations, denies auditory and visual hallucinations, no paranoia observed, speech is normal rate and volume, relevant and coherent. Observation ongoing.

## 2022-09-15 NOTE — DISCHARGE INSTRUCTIONS
Behavioral Discharge Planning and Instructions    Summary: You were admitted on 9/8/2022  due to  Violation of PD and revocation of PD .  You were treated by Dr. Lin and discharged on 11/14/2022 from [facility]     Main Diagnosis:   Mood disorder, unspecified (Substance induced vs 2/2 Bipolar I Disorder)  Amphetamine Use Disorder, moderate  Unspecified personality disorder  Historical diagnosis of ADHD  TBI in 2016    Health Care Follow-up:   [facility] [place]  Admission Date/Time: 11/14/2022 at 10:00 AM,   Address: [address] [place] [address]  Phone: [phone]      [facility]   Medication management appointment: 11/29/2022 at 10:30 AM. Be ready for your appointment at 10:00 AM. This will be a virtual appointment   Provider: LENCHO Meeks  Phone: [phone]       [facility]  Appointment Date/Time: 1/10/2023 at 10:00 AM. This will be a virtual appointment   Therapy Provider: Debbie Soto   Phone: [phone]    [facility]  Appointment Date/Time: 1/13/2023 at 9:30 AM. This will a virtual appointment  Psychiatry Provider: Joseph Bassett   Phone: [phone]        Other Providers:   [facility]  Jose - [phone]  [facility] - Leslie Rae [phone]  [facility] Restricted Recipient Coordinator - Blanca at [phone]    [facility] Transportation Services: [phone]  [facility] ID:[id number]      Attend all scheduled appointments with your outpatient providers. Call at least 24 hours in advance if you need to reschedule an appointment to ensure continued access to your outpatient providers.     Major Treatments, Procedures and Findings:  You were provided with: a psychiatric assessment, assessed for medical stability, medication evaluation and/or management, group therapy, CD evaluation/assessment, and milieu  "management    Symptoms to Report: feeling more aggressive, increased confusion, losing more sleep, mood getting worse, or thoughts of suicide    Early warning signs can include: increased depression or anxiety sleep disturbances increased thoughts or behaviors of suicide or self-harm  increased unusual thinking, such as paranoia or hearing voices    Safety and Wellness:  Take all medicines as directed.  Make no changes unless your doctor suggests them. Follow treatment recommendations.  Refrain from alcohol and non-prescribed drugs.  Ask your support system to help you reduce your access to items that could harm yourself or others. Items could include:  Firearms  Medicines (both prescribed and over-the-counter)  Knives and other sharp objects  Ropes and like materials  Car keys  If there is a concern for safety, call 911. If there is a concern for safety, call 911.    Resources:   Crisis Intervention: 416.613.3731 or 383-087-9350 (TTY: 799.795.5907).  Call anytime for help.  National [place] on Mental Illness (www.mn.eugenio.org): 687.320.4180 or 261-528-8643.  Alcoholics Anonymous (www.alcoholics-anonymous.org): Check your phone book for your local chapter.  Suicide Awareness Voices of Education (SAVE) (www.save.org): 795-799-DPKE (2840)  National Suicide Prevention Line (www.mentalhealthmn.org): 537-612-PVKV (1792)  Mental Health Consumer/Survivor Network of [place] (www.mhcsn.net): 364.990.5214 or 277-710-6735  Mental Health Association of [place] (www.mentalhealth.org): 942.994.5996 or 696-187-7362  Self- Management and Recovery Training., SMART-- Toll free: 789.643.1722  www.[employer].org  [facility] Crisis Response 500-403-0417  [facility] Crisis (COPE) Response - Adult [phone]  [facility] Crisis Response - Adult [phone]  [facility] Rapid Response 398-519-5392  Text 4 Life: txt \"LIFE\" to 38502 for immediate support and crisis intervention  Crisis text line: Text \"[place]\" to 330951. Free, " confidential, 24/7.    Crisis Line: [facility] ([facility])  [address], [place] [place], [address]  ([phone]    General Medication Instructions:   See your medication sheet(s) for instructions.   Take all medicines as directed.  Make no changes unless your doctor suggests them.   Go to all your doctor visits.  Be sure to have all your required lab tests. This way, your medicines can be refilled on time.  Do not use any drugs not prescribed by your doctor.  Avoid alcohol.    Advance Directives:   Scanned document on file with [employer]? No scanned doc  Is document scanned? No. Copy Requested.  Honoring Choices Your Rights Handout: Informed and given  Was more information offered? Pt declined    The Treatment team has appreciated the opportunity to work with you. If you have any questions or concerns about your recent admission, you can contact the unit which can receive your call 24 hours a day, 7 days a week. They will be able to get in touch with a Provider if needed. The unit number is [phone].
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
## 2022-09-15 NOTE — CONSULTS
9/15/2022    CD consult completed.  Not sure he will get accepted into a residential facility due to using 1x in past 8 months. Pt might only get accepted into an IOP with sober housing.   Will send ROIs to [facility] and make referrals.     Recommendations:   1. Abstain from all non-prescribed mood-altering substances  2. Take all medications as prescribed  3. Enter and complete a co-occurring residential or IOP with sober housing treatment program  4. Follow all recommendations upon discharge from treatment. Recommendations may include, but are not limited to: extended treatment, outpatient treatment and/or sober housing.  5. Follow all recommendations of your medical providers  6. Follow all recommendations of your mental health providers  7. Follow all conditions/recommendations of commitment through [facility]     Clinical Substantiation:  Patient is currently homeless and lacks a sober living environment. Patient lacks applying sober coping skills and lacks a sober peer support network. Patient has dual issues of mental health and substance use in which his mental health symptoms are exacerbated by his substance use. Patient is on MICD commitment with [facility]. Patient had 8 months of sobriety with a 1x use episode, and is open to further treatment options.      Referrals/Alternatives:  [facility]   RESIDENTIAL ADMISSIONS ([facility] I, [facility] II, [facility] III)  Phone: [phone]  Fax: [phone]  [email or web address]  [email or web address]  Phone: [phone]  Fax: [phone]     [place]   Phone: [phone]  Fax: [phone]     [facility] Team for Referrals  Phone: [phone]  Fax: [phone]  Prefers not [facility] since he has been there     Pt asked for referral to one treatment center outside [facility] area:  [facility]  [address] [place] [address]  Phone: [phone]  Fax: [phone]  Admission: [facility] Marquez:  [phone]     MNOTY consult completed by: LINK Goodwin.  Phone Number: [phone]  E-mail Address: [email or web address] Mental Health and Addiction Services Evaluation Department  [address] [place] [address]     *Due to regulation of Title 42 of the Code of Federal Regulations (CFR) Part 2: Confidentiality laws apply to this note and the information wherein.  Thus, this note cannot be copy and pasted into any other health care staff's note nor can it be included in general medical records sent to ANY outside agency without the patient's written consent.

## 2022-09-15 NOTE — PLAN OF CARE
"Etienne is alert and oriented. He is visible on the unit. He is engaged with peers and staff and attends group.   His behavior is calm and he is pleasant and cooperative with care.   Pt request his medications prior to breakfast and complied with all scheduled meds.   He appears to be listening to headphones intermittently throughout the day. No agitation or aggressive behavior noted.   Pt denies all subjective psych sx including anxiety, depression, hallucinations. Denies SI/HI. No overt psychosis or delusional content noted.     Noted patient to be swaying back and forth from one leg to another as he stood. Pt states this movement is \"just what a do, a habit\", and denies akathisia.     Problem: Plan of Care - These are the overarching goals to be used throughout the patient stay.    Goal: Plan of Care Review/Shift Note  Description: The Plan of Care Review/Shift note should be completed every shift.  The Outcome Evaluation is a brief statement about your assessment that the patient is improving, declining, or no change.  This information will be displayed automatically on your shift note.  9/15/2022 1019 by Angelina Ariza RN  Outcome: Ongoing, Progressing     Problem: Violence Risk or Actual  Goal: Anger and Impulse Control  Outcome: Ongoing, Progressing   Goal Outcome Evaluation:    Plan of Care Reviewed With: patient                 "

## 2022-09-15 NOTE — PROGRESS NOTES
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 [phone]  Admission: Parish Staloch: [phone]       MONTY consult completed by: Shea Short [facility].  Phone Number: [phone]  E-mail Address: [email or web address] Mental Health and Addiction Services Evaluation Department  [address] [place] [address]     *Due to regulation of Title 42 of the Code of Federal Regulations (CFR) Part 2: Confidentiality laws apply to this note and the information wherein.  Thus, this note cannot be copy and pasted into any other health care staff's note nor can it be included in general medical records sent to ANY outside agency without the patient's written consent.

## 2022-09-15 NOTE — PLAN OF CARE
09/15/22 1700   Group Therapy Session   Group Attendance attended group session   Time Session Began 1620   Time Session Ended 1710   Total Time patient participated (minutes) 45   Total # Attendees 3   Group Type Psychotherapy   Group Topic Covered Identifying and keeping supportive relationships for mental health maintenance   Group Session Detail Importance of positive support was reviewed. Patients took turn to identify positive relationships they have and how they plan to use the relationships for crisis prevention and insight orientation.    Patient Response/Contribution Pt was engaged in the session. Discussed his challenges with relationships and trust. Pt reported that she is interested in developing healthy relationships. Reported that he had 2 interviews today and looks forward discharging when he is stabilized - to a CD/MI facility.   Patient Response Detail Reported willingness to practice skills discussed. Pt kept his focus on the group despite being distracted by another patient that was having difficulty within the milieu.

## 2022-09-16 PROCEDURE — 99232 SBSQ HOSP IP/OBS MODERATE 35: CPT | Performed by: PSYCHIATRY & NEUROLOGY

## 2022-09-16 PROCEDURE — G0177 OPPS/PHP; TRAIN & EDUC SERV: HCPCS

## 2022-09-16 PROCEDURE — 250N000013 HC RX MED GY IP 250 OP 250 PS 637: Performed by: PSYCHIATRY & NEUROLOGY

## 2022-09-16 PROCEDURE — 250N000013 HC RX MED GY IP 250 OP 250 PS 637: Performed by: EMERGENCY MEDICINE

## 2022-09-16 PROCEDURE — 124N000002 HC R&B MH UMMC

## 2022-09-16 RX ORDER — PRAZOSIN HYDROCHLORIDE 1 MG/1
1 CAPSULE ORAL AT BEDTIME
Status: DISCONTINUED | OUTPATIENT
Start: 2022-09-16 | End: 2022-09-26

## 2022-09-16 RX ADMIN — BUPROPION HYDROCHLORIDE 300 MG: 150 TABLET, EXTENDED RELEASE ORAL at 08:02

## 2022-09-16 RX ADMIN — NICOTINE POLACRILEX 4 MG: 4 GUM, CHEWING BUCCAL at 16:48

## 2022-09-16 RX ADMIN — OLANZAPINE 20 MG: 10 TABLET, FILM COATED ORAL at 19:59

## 2022-09-16 RX ADMIN — DIVALPROEX SODIUM 1000 MG: 500 TABLET, DELAYED RELEASE ORAL at 19:59

## 2022-09-16 RX ADMIN — NICOTINE POLACRILEX 4 MG: 4 GUM, CHEWING BUCCAL at 19:43

## 2022-09-16 RX ADMIN — OLANZAPINE 10 MG: 10 TABLET, FILM COATED ORAL at 08:03

## 2022-09-16 RX ADMIN — HYDROCHLOROTHIAZIDE 12.5 MG: 12.5 TABLET ORAL at 08:02

## 2022-09-16 RX ADMIN — NICOTINE POLACRILEX 4 MG: 4 GUM, CHEWING BUCCAL at 08:02

## 2022-09-16 RX ADMIN — PRAZOSIN HYDROCHLORIDE 1 MG: 1 CAPSULE ORAL at 19:59

## 2022-09-16 RX ADMIN — NICOTINE POLACRILEX 4 MG: 4 GUM, CHEWING BUCCAL at 12:00

## 2022-09-16 RX ADMIN — DIVALPROEX SODIUM 1000 MG: 500 TABLET, DELAYED RELEASE ORAL at 08:02

## 2022-09-16 ASSESSMENT — ACTIVITIES OF DAILY LIVING (ADL)
ADLS_ACUITY_SCORE: 28
ORAL_HYGIENE: INDEPENDENT
ADLS_ACUITY_SCORE: 28
HYGIENE/GROOMING: INDEPENDENT
ADLS_ACUITY_SCORE: 28
ORAL_HYGIENE: INDEPENDENT
LAUNDRY: WITH SUPERVISION
ADLS_ACUITY_SCORE: 28
DRESS: INDEPENDENT;SCRUBS (BEHAVIORAL HEALTH)
ADLS_ACUITY_SCORE: 28
ADLS_ACUITY_SCORE: 28
HYGIENE/GROOMING: INDEPENDENT
DRESS: INDEPENDENT;SCRUBS (BEHAVIORAL HEALTH)
LAUNDRY: WITH SUPERVISION

## 2022-09-16 NOTE — PLAN OF CARE
Goal Outcome Evaluation:  Problem: Plan of Care - These are the overarching goals to be used throughout the patient stay.    Goal: Absence of Hospital-Acquired Illness or Injury  Outcome: Ongoing, Progressing  Intervention: Identify and Manage Fall Risk  Recent Flowsheet Documentation  Taken 9/16/2022 0812 by Geovanni Chen, RN  Safety Promotion/Fall Prevention: clutter free environment maintained  Intervention: Prevent Skin Injury  Recent Flowsheet Documentation  Taken 9/16/2022 0812 by Geovanni Chen RN  Body Position: position changed independently       Plan of Care Reviewed With: patient      Pt present in the milieu, socialized with peers, listen to music on his headphone, and participated in group activities. Pt calm and polite and occasionally portrayed a brighter mood but overall maintained flat affect. Pt maintained a calm compulsion even with lord and creaming noise from  another peer. Pt denies nor expressed pain and normal breathing pattern and denies shortness of breath. Ate 100% both breakfast and lunch and pt voiding freely and no BM issue per pt. Pt denies  all mental health psych symptoms and contracted for safety. No outburst behavior observed or reported and safety checks completed per protocol.  Pt stated he have a court date on 09/20/22 and 09/22/202 and this information related to the  and  presently working on this.

## 2022-09-16 NOTE — PLAN OF CARE
Problem: Sleep Disturbance  Goal: Adequate Sleep/Rest  Outcome: Ongoing, Progressing   Goal Outcome Evaluation:    Patient appeared to sleep 6 hours this night shift.  No prns given or requested.  No concerns were reported or noted.  Up once for a snack during the night.  Calm, cooperative, and polite.

## 2022-09-16 NOTE — PLAN OF CARE
"   09/16/22 1541   Group Therapy Session   Group Attendance attended group session   Time Session Began 1030   Time Session Ended 1215   Total Time patient participated (minutes) 90   Total # Attendees 4,3   Group Type psychoeducation;task skill   Group Topic Covered coping skills/lifestyle management;emotions/expression;relaxation techniques;structured socialization   Group Session Detail Topic group - Resilience, OT clinic   Patient Response/Contribution able to recall/repeat info presented;cooperative with task;listened actively;organized     Pt had excellent participation throughout the day.  Pt made insightful comments during topic group, able to help identify what the term resilience means, where it may come from, and discussed his own ability to move forward despite setbacks.  Pt shares he is willing, determined, and reliable, \"I put that on my job applications too, it's true!\".  Pt again touched on the fact he hasn't had more than a year out of prison at a time, but feels \"I can do it this time!\" Which led to a discussion on how sobriety and mental health stability will help him.    OT clinic - pt continues to put time and effort into his task work.  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  Pt is friendly with peers, and has not been pulled into negativity from a peer that has had behavioral issues.  "

## 2022-09-16 NOTE — PLAN OF CARE
"  Problem: Plan of Care - These are the overarching goals to be used throughout the patient stay.    Goal: Plan of Care Review/Shift Note  Description: The Plan of Care Review/Shift note should be completed every shift.  The Outcome Evaluation is a brief statement about your assessment that the patient is improving, declining, or no change.  This information will be displayed automatically on your shift note.  Outcome: Ongoing, Progressing   Goal Outcome Evaluation:    Patient has been visible on the unit since the start of the shift, social with peers and staff. Pt denies all psych symptoms including SI/SIB, auditory hallucinations. Attended group therapy this shift. Pt received PRN Nicotine gum three times this shift. Pt is eating and drinking adequately, ate 100% of his dinner. Medication complaint, took all scheduled med on time. At 2143 pt requested and received PRN hydroxyzine 25 Mg, stated \"It's too tense around here\"  Pt had Covid-19 test done this shift result pending. No behavioral event noted or reported this shift.     "

## 2022-09-16 NOTE — PLAN OF CARE
Problem: Plan of Care - These are the overarching goals to be used throughout the patient stay.    Goal: Absence of Hospital-Acquired Illness or Injury  9/16/2022 1854 by Geovanni Chen RN  Outcome: Ongoing, Progressing  9/16/2022 1119 by Geovanni Chen RN  Outcome: Ongoing, Progressing  Intervention: Identify and Manage Fall Risk  Recent Flowsheet Documentation  Taken 9/16/2022 1621 by Geovanni Chen RN  Safety Promotion/Fall Prevention: clutter free environment maintained  Taken 9/16/2022 0812 by Geovanni Chen RN  Safety Promotion/Fall Prevention: clutter free environment maintained  Intervention: Prevent Skin Injury  Recent Flowsheet Documentation  Taken 9/16/2022 1621 by Geovanni Chen RN  Body Position: position changed independently  Taken 9/16/2022 0812 by Geovanni Chen RN  Body Position: position changed independently   Goal Outcome Evaluation:    Plan of Care Reviewed With: patient   Pt alert and oriented x4, coherent thought process and able to make needs known. Pt cooperated with medications and received prn nicotine gum twice during the shift. Pt present in the milieu, and socialized with selected peers and maintained a blunted and flat affect. Pt maintained a calm and polite compulsion despite a belligerent and disorganized peer in the unit. Pt denies all mental health psych symptoms and contracted for safety. Pt ate 100% for dinner and HS snacks and denies nausea and vomiting. Pt stated he is voiding freely and no BM issue. Pt started on minipress today at HS. No outburst behavior observed or reported and safety checks completed per protocol.
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
## 2022-09-16 NOTE — PLAN OF CARE
Assessment/Intervention/Current Symtoms and Care Coordination  -Chart review    -Rounded with team, addressed patient needs/concerns  Current Symptoms include the following:     Discharge Plan or Goal  Pending stabilization & development of a safe discharge plan.  Considerations include: Residential CD Treatment and/or [facility]    Barriers to Discharge  Patient requires further psychiatric stabilization due to current symptomology    Referral Status  Referrals for multiple residential CD treatment referrals made    Legal Status  Judicial Court Hold

## 2022-09-16 NOTE — PROGRESS NOTES
Writer spoke with Salvador Abdalla CM and patient is on a Judicial Hold so needs to remain hospitalized until next week 22 until  determines if a hearing will take place. Patient on commitment and Aguiar that will  in October.

## 2022-09-17 PROCEDURE — 250N000013 HC RX MED GY IP 250 OP 250 PS 637: Performed by: PSYCHIATRY & NEUROLOGY

## 2022-09-17 PROCEDURE — 250N000013 HC RX MED GY IP 250 OP 250 PS 637: Performed by: EMERGENCY MEDICINE

## 2022-09-17 PROCEDURE — 124N000002 HC R&B MH UMMC

## 2022-09-17 RX ADMIN — NICOTINE POLACRILEX 4 MG: 4 GUM, CHEWING BUCCAL at 17:06

## 2022-09-17 RX ADMIN — NICOTINE POLACRILEX 4 MG: 4 GUM, CHEWING BUCCAL at 10:02

## 2022-09-17 RX ADMIN — DIVALPROEX SODIUM 1000 MG: 500 TABLET, DELAYED RELEASE ORAL at 19:17

## 2022-09-17 RX ADMIN — HYDROXYZINE HYDROCHLORIDE 25 MG: 25 TABLET, FILM COATED ORAL at 01:12

## 2022-09-17 RX ADMIN — HYDROCHLOROTHIAZIDE 12.5 MG: 12.5 TABLET ORAL at 08:02

## 2022-09-17 RX ADMIN — OLANZAPINE 20 MG: 10 TABLET, FILM COATED ORAL at 19:18

## 2022-09-17 RX ADMIN — NICOTINE POLACRILEX 4 MG: 4 GUM, CHEWING BUCCAL at 20:08

## 2022-09-17 RX ADMIN — OLANZAPINE 10 MG: 10 TABLET, FILM COATED ORAL at 08:02

## 2022-09-17 RX ADMIN — DIVALPROEX SODIUM 1000 MG: 500 TABLET, DELAYED RELEASE ORAL at 08:02

## 2022-09-17 RX ADMIN — PRAZOSIN HYDROCHLORIDE 1 MG: 1 CAPSULE ORAL at 19:18

## 2022-09-17 RX ADMIN — BUPROPION HYDROCHLORIDE 300 MG: 150 TABLET, EXTENDED RELEASE ORAL at 08:02

## 2022-09-17 RX ADMIN — NICOTINE POLACRILEX 4 MG: 4 GUM, CHEWING BUCCAL at 08:02

## 2022-09-17 ASSESSMENT — ACTIVITIES OF DAILY LIVING (ADL)
ADLS_ACUITY_SCORE: 28
HYGIENE/GROOMING: INDEPENDENT
LAUNDRY: WITH SUPERVISION
ADLS_ACUITY_SCORE: 28
DRESS: INDEPENDENT;SCRUBS (BEHAVIORAL HEALTH)
DRESS: INDEPENDENT
ADLS_ACUITY_SCORE: 28
ORAL_HYGIENE: INDEPENDENT
ADLS_ACUITY_SCORE: 28
ADLS_ACUITY_SCORE: 28
LAUNDRY: WITH SUPERVISION
HYGIENE/GROOMING: INDEPENDENT
ORAL_HYGIENE: INDEPENDENT

## 2022-09-17 NOTE — PLAN OF CARE
Pt observed in the [facility] area socializing with peers and playing cards most of the morning shift otherwise napping in his room. Pt was social with peers and staff, presents with full range affect as well as calm mood. Pt did not attend any groups this shift. Pt denies SI/SIB/HI/AVH as well as anxiety and depression. Pt denied any physical discomfort this shift. Pt was medication compliant and reported feeling drowsy as a side effect to his scheduled medications. Pt's goal for the day was to read a book. Staff will continue to monitor and support with the current plan of care.

## 2022-09-17 NOTE — PROGRESS NOTES
Pt slep most of the shift. He woke up in the middle of the shift and requested prn hydroxyzine 25 mg after medication he went back to his room to sleep. Will continue to monitor patient.

## 2022-09-18 PROCEDURE — 250N000013 HC RX MED GY IP 250 OP 250 PS 637: Performed by: PSYCHIATRY & NEUROLOGY

## 2022-09-18 PROCEDURE — 250N000013 HC RX MED GY IP 250 OP 250 PS 637: Performed by: EMERGENCY MEDICINE

## 2022-09-18 PROCEDURE — 90853 GROUP PSYCHOTHERAPY: CPT

## 2022-09-18 PROCEDURE — 124N000002 HC R&B MH UMMC

## 2022-09-18 RX ADMIN — DIVALPROEX SODIUM 1000 MG: 500 TABLET, DELAYED RELEASE ORAL at 19:01

## 2022-09-18 RX ADMIN — HYDROCHLOROTHIAZIDE 12.5 MG: 12.5 TABLET ORAL at 07:52

## 2022-09-18 RX ADMIN — NICOTINE POLACRILEX 4 MG: 4 GUM, CHEWING BUCCAL at 10:30

## 2022-09-18 RX ADMIN — OLANZAPINE 10 MG: 10 TABLET, FILM COATED ORAL at 07:52

## 2022-09-18 RX ADMIN — OLANZAPINE 20 MG: 10 TABLET, FILM COATED ORAL at 19:12

## 2022-09-18 RX ADMIN — NICOTINE POLACRILEX 4 MG: 4 GUM, CHEWING BUCCAL at 18:59

## 2022-09-18 RX ADMIN — NICOTINE POLACRILEX 4 MG: 4 GUM, CHEWING BUCCAL at 16:42

## 2022-09-18 RX ADMIN — BUPROPION HYDROCHLORIDE 300 MG: 150 TABLET, EXTENDED RELEASE ORAL at 07:52

## 2022-09-18 RX ADMIN — DIVALPROEX SODIUM 1000 MG: 500 TABLET, DELAYED RELEASE ORAL at 07:52

## 2022-09-18 RX ADMIN — PRAZOSIN HYDROCHLORIDE 1 MG: 1 CAPSULE ORAL at 19:01

## 2022-09-18 RX ADMIN — NICOTINE POLACRILEX 4 MG: 4 GUM, CHEWING BUCCAL at 14:26

## 2022-09-18 RX ADMIN — NICOTINE POLACRILEX 4 MG: 4 GUM, CHEWING BUCCAL at 08:18

## 2022-09-18 ASSESSMENT — ACTIVITIES OF DAILY LIVING (ADL)
ORAL_HYGIENE: INDEPENDENT
DRESS: SCRUBS (BEHAVIORAL HEALTH);INDEPENDENT
DRESS: SCRUBS (BEHAVIORAL HEALTH);INDEPENDENT
LAUNDRY: WITH SUPERVISION
ADLS_ACUITY_SCORE: 28
LAUNDRY: WITH SUPERVISION
ADLS_ACUITY_SCORE: 28
HYGIENE/GROOMING: INDEPENDENT
ADLS_ACUITY_SCORE: 28
ADLS_ACUITY_SCORE: 28
HYGIENE/GROOMING: INDEPENDENT
ADLS_ACUITY_SCORE: 28
ORAL_HYGIENE: INDEPENDENT

## 2022-09-18 NOTE — PLAN OF CARE
Problem: Plan of Care - These are the overarching goals to be used throughout the patient stay.    Goal: Absence of Hospital-Acquired Illness or Injury  Outcome: Ongoing, Progressing  Intervention: Identify and Manage Fall Risk  Recent Flowsheet Documentation  Taken 9/18/2022 1041 by Geovanni Chen, RN  Safety Promotion/Fall Prevention: clutter free environment maintained  Intervention: Prevent Skin Injury  Recent Flowsheet Documentation  Taken 9/18/2022 1041 by Geovanni Chen RN  Body Position: position changed independently   Goal Outcome Evaluation:    Plan of Care Reviewed With: patient      Pt alert and oriented x4, cooperated with medications and no side effect observed or reported during the morning shift. Pt present in the milieu, socialized with peers, watched television and using head phone at times in his room and hallway. No outburst behavior observed or reported. Pt visited by both sister and mother and ANS authorized dual visit because both drove from far away and pt was not happy that the visit was only 30 minutes. Pt denies all mental health psych symptoms and contracted for safety maintained a brighter mood most of the shift. Maintained good appetite and denies nausea and vomiting, voiding freely and no BM issue per pt. Pt independent with all activities, normal breathing pattern, and vital sign stable. /74   Pulse 68   Temp 96.9  F (36.1  C) (Tympanic)   Resp 16   SpO2 96%

## 2022-09-18 NOTE — PLAN OF CARE
Problem: Sleep Disturbance  Goal: Adequate Sleep/Rest  Outcome: Ongoing, Progressing  Intervention: Promote Sleep/Rest  Sleep/Rest Enhancement:    awakenings minimized    noise level reduced    regular sleep/rest pattern promoted     Pt in bed at beginning of shift, breathing quiet and unlabored. Pt slept through shift. Pt slept 7 hours.      No pt complaints or concerns at this time.      No PRNs or snacks given.

## 2022-09-18 NOTE — PLAN OF CARE
Problem: Plan of Care - These are the overarching goals to be used throughout the patient stay.    Goal: Absence of Hospital-Acquired Illness or Injury  9/18/2022 1810 by Geovanni Chen RN  Outcome: Ongoing, Progressing  9/18/2022 1219 by Geovanni Chen RN  Outcome: Ongoing, Progressing  Intervention: Identify and Manage Fall Risk  Recent Flowsheet Documentation  Taken 9/18/2022 1659 by Geovanni Chen RN  Safety Promotion/Fall Prevention: clutter free environment maintained  Taken 9/18/2022 1041 by Geovanni Chen RN  Safety Promotion/Fall Prevention: clutter free environment maintained  Intervention: Prevent Skin Injury  Recent Flowsheet Documentation  Taken 9/18/2022 1659 by Geovanni Chen RN  Body Position: position changed independently  Taken 9/18/2022 1041 by Geovanni Chen RN  Body Position: position changed independently   Goal Outcome Evaluation:    Plan of Care Reviewed With: patient      Pt present in the milieu, socialized with peers and attended and participated in group. Alert oriented x4, coherent thought process, speech clear and logical and cooperated with medications. Denies all mental health psych symptoms and contracted for safety. Brighter mood  most of the shift and no outburst behavior observed or reported. Ate 100% for dinner and denies and voiding freely and no BM issue per pt. Denies shortness of breathing and vital sign stable.

## 2022-09-18 NOTE — PLAN OF CARE
Problem: Plan of Care - These are the overarching goals to be used throughout the patient stay.    Goal: Absence of Hospital-Acquired Illness or Injury  Outcome: Ongoing, Progressing  Intervention: Identify and Manage Fall Risk  Recent Flowsheet Documentation  Taken 9/17/2022 1618 by Geovanni Chen, RN  Safety Promotion/Fall Prevention: clutter free environment maintained  Intervention: Prevent Skin Injury  Recent Flowsheet Documentation  Taken 9/17/2022 1618 by Geovanni Chen RN  Body Position: position changed independently   Goal Outcome Evaluation:    Plan of Care Reviewed With: patient      Pt alert and oriented x4, able to make needs known and cooperated with medications. Pt present in the milieu and at time pacing the hallway using his headphone. Pt socialized with selected peer and kept to self other times and observed mood being calm and polite. Pt showered this evening per request and print out of his new medication minipress given to pt. Pt denies all mental health psych symptoms and contracted for safety. Eating well, voiding freely and denies any BM issue. Normal breathing pattern and vital stable. /80 (BP Location: Left arm, Patient Position: Sitting, Cuff Size: Adult Regular)   Pulse 68   Temp 96.9  F (36.1  C) (Tympanic)   Resp 16   SpO2 97%

## 2022-09-19 ENCOUNTER — TELEPHONE (OUTPATIENT)
Dept: BEHAVIORAL HEALTH | Facility: CLINIC | Age: 32
End: 2022-09-19

## 2022-09-19 PROCEDURE — G0177 OPPS/PHP; TRAIN & EDUC SERV: HCPCS

## 2022-09-19 PROCEDURE — 124N000002 HC R&B MH UMMC

## 2022-09-19 PROCEDURE — 250N000013 HC RX MED GY IP 250 OP 250 PS 637: Performed by: PSYCHIATRY & NEUROLOGY

## 2022-09-19 PROCEDURE — H2032 ACTIVITY THERAPY, PER 15 MIN: HCPCS

## 2022-09-19 PROCEDURE — 250N000013 HC RX MED GY IP 250 OP 250 PS 637: Performed by: EMERGENCY MEDICINE

## 2022-09-19 PROCEDURE — 99231 SBSQ HOSP IP/OBS SF/LOW 25: CPT | Performed by: PSYCHIATRY & NEUROLOGY

## 2022-09-19 RX ORDER — FLUORIDE TOOTHPASTE
15 TOOTHPASTE DENTAL 4 TIMES DAILY PRN
Status: DISCONTINUED | OUTPATIENT
Start: 2022-09-19 | End: 2022-11-14 | Stop reason: HOSPADM

## 2022-09-19 RX ADMIN — PRAZOSIN HYDROCHLORIDE 1 MG: 1 CAPSULE ORAL at 19:24

## 2022-09-19 RX ADMIN — NICOTINE POLACRILEX 4 MG: 4 GUM, CHEWING BUCCAL at 18:06

## 2022-09-19 RX ADMIN — OLANZAPINE 20 MG: 10 TABLET, FILM COATED ORAL at 19:24

## 2022-09-19 RX ADMIN — BUPROPION HYDROCHLORIDE 300 MG: 150 TABLET, EXTENDED RELEASE ORAL at 07:59

## 2022-09-19 RX ADMIN — NICOTINE POLACRILEX 4 MG: 4 GUM, CHEWING BUCCAL at 10:28

## 2022-09-19 RX ADMIN — NICOTINE POLACRILEX 4 MG: 4 GUM, CHEWING BUCCAL at 08:00

## 2022-09-19 RX ADMIN — NICOTINE POLACRILEX 4 MG: 4 GUM, CHEWING BUCCAL at 20:07

## 2022-09-19 RX ADMIN — NICOTINE POLACRILEX 4 MG: 4 GUM, CHEWING BUCCAL at 21:30

## 2022-09-19 RX ADMIN — NICOTINE POLACRILEX 4 MG: 4 GUM, CHEWING BUCCAL at 12:49

## 2022-09-19 RX ADMIN — OLANZAPINE 10 MG: 10 TABLET, FILM COATED ORAL at 07:59

## 2022-09-19 RX ADMIN — NICOTINE POLACRILEX 4 MG: 4 GUM, CHEWING BUCCAL at 14:09

## 2022-09-19 RX ADMIN — NICOTINE POLACRILEX 4 MG: 4 GUM, CHEWING BUCCAL at 16:09

## 2022-09-19 RX ADMIN — DIVALPROEX SODIUM 1000 MG: 500 TABLET, DELAYED RELEASE ORAL at 19:24

## 2022-09-19 RX ADMIN — DIVALPROEX SODIUM 1000 MG: 500 TABLET, DELAYED RELEASE ORAL at 07:59

## 2022-09-19 RX ADMIN — HYDROCHLOROTHIAZIDE 12.5 MG: 12.5 TABLET ORAL at 07:59

## 2022-09-19 ASSESSMENT — ACTIVITIES OF DAILY LIVING (ADL)
HYGIENE/GROOMING: INDEPENDENT
ADLS_ACUITY_SCORE: 28
ADLS_ACUITY_SCORE: 28
ADLS_ACUITY_SCORE: 29
ORAL_HYGIENE: INDEPENDENT
ADLS_ACUITY_SCORE: 28
ADLS_ACUITY_SCORE: 29
ADLS_ACUITY_SCORE: 28
ADLS_ACUITY_SCORE: 29
ADLS_ACUITY_SCORE: 28
ADLS_ACUITY_SCORE: 28
DRESS: SCRUBS (BEHAVIORAL HEALTH);INDEPENDENT
ADLS_ACUITY_SCORE: 28
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
## 2022-09-19 NOTE — PLAN OF CARE
Assessment/Intervention/Current Symtoms and Care Coordination  -Chart review  -Rounded with team, addressed patient needs/concerns     Discharge Plan or Goal  Pending stabilization & development of a safe discharge plan.  Considerations include: Residential CD Treatment and/or [facility]     Barriers to Discharge  Patient requires further psychiatric stabilization due to current symptomology     Referral Status  Referrals for multiple residential CD treatment referrals made  [facility]: Left a  with admissions [phone] to get update on referral.   [facility]: Spoke to admissions [phone] and they received referral and are reviewing it.   [facility]: Denied him because they think his mental health is too severe.   [facility]: Pt had interview. Waiting to hear back from , Angelina, regarding timing of possible placement.      Legal Status  Judicial Court Hold   Hearing info:   9/20 at 8:30am (PD revoked)  9/20 at 3pm (criminal case)

## 2022-09-19 NOTE — PLAN OF CARE
Patient transferred to Station -2 at 13:00 via staff and , He is on court hold/Aguiar.  Patient appeared to be in an excited and hyperactive mood, has order to wear his own cloth and was very excited to do so. Staff assisted patient with going through his belongings as he picked what he needed to keep in room. Patient was oriented to room and Unit. He is visible in Milieu and participating in afternoon group. Continue plan of care   Problem: Plan of Care - These are the overarching goals to be used throughout the patient stay.    Goal: Plan of Care Review/Shift Note  Description: The Plan of Care Review/Shift note should be completed every shift.  The Outcome Evaluation is a brief statement about your assessment that the patient is improving, declining, or no change.  This information will be displayed automatically on your shift note.  9/19/2022 1448 by Darlene Fields, RN  Outcome: Ongoing, Progressing  9/19/2022 1448 by Darlene Fields, RN  Outcome: Ongoing, Progressing   Goal Outcome Evaluation:

## 2022-09-19 NOTE — PROGRESS NOTES
Patient will be transferred to station 20 today. He is optimistic to go. He denies SI/SIB. Denies depression/AH/VH today. Pleasant and cooperative with staff and peers. Will give report to station 20.

## 2022-09-19 NOTE — PLAN OF CARE
Pt asleep at start of shift.     Breathing quiet and unlabored when sleeping.     Pt had no c/o pain or discomfort during the HS.     Appears to have slept 6.25 hours.     Pt on no precautions. Single room order.     Will continue to monitor and assess.    Goal Outcome Evaluation:    Problem: Plan of Care - These are the overarching goals to be used throughout the patient stay.    Goal: Absence of Hospital-Acquired Illness or Injury  Intervention: Identify and Manage Fall Risk  Recent Flowsheet Documentation  Taken 9/18/2022 2200 by Bhavana Saleh RN  Safety Promotion/Fall Prevention: nonskid shoes/slippers when out of bed     Problem: Fall Injury Risk  Goal: Absence of Fall and Fall-Related Injury  Intervention: Identify and Manage Contributors  Recent Flowsheet Documentation  Taken 9/18/2022 2200 by Bhavana Saleh RN  Medication Review/Management: medications reviewed  Intervention: Promote Injury-Free Environment  Recent Flowsheet Documentation  Taken 9/18/2022 2200 by Bhavana Saleh, RN  Safety Promotion/Fall Prevention: nonskid shoes/slippers when out of bed

## 2022-09-19 NOTE — TELEPHONE ENCOUNTER
11:32 AM Intake received call from Dr. Caballero that patient needs to be transferred to unit 20 due to safety reasons on station 12. Dr. Caballero completed the DOC to DOC report for patient and patient has been added to queue for station 20 and charge RN has been notified. Patient can be transferred on evening shift for station 20 due to staffing and the need for a private room per charge RN on station 20.

## 2022-09-19 NOTE — PROGRESS NOTES
"   09/18/22 2200   Group Therapy Session   Group Attendance attended group session   Time Session Began 1620   Time Session Ended 1700   Total Time patient participated (minutes) 40   Total # Attendees 3   Group Type psychotherapeutic   Group Topic Covered coping skills/lifestyle management   Group Session Detail tatyana, drawing and coloring- coping skils and process discussion   Patient Response/Contribution cooperative with task   Pt reported mood 7 of 10. He made a drawing of a tree upon writer's suggestion to make trees. His tree had a face, it looked like a devil with horns. He processed that the face was just \" trees are alive.\"  "

## 2022-09-19 NOTE — PLAN OF CARE
09/19/22 1613   Group Therapy Session   Group Attendance attended group session   Time Session Began 1030   Time Session Ended 1215   Total # Attendees 5, 3   Group Type recreation;psychoeducation;task skill   Group Topic Covered structured socialization;emotions/expression;coping skills/lifestyle management   Group Session Detail [facility] group - write your own progress note, OT clinic   Patient Response/Contribution cooperative with task;able to recall/repeat info presented;expressed readiness to alter behaviors   Pt was insightful during topic group, states he's feeling calm and has better mood balance.  Pt reports he's on PTSD medications at night, and they've been helpful.  Pt highlighted that he's been much better at calmly asking for things to get his needs met, instead of letting frustration take over.     OT clinic- took time in creating a canvas [employer] picture that he is looking forward to painting.  Pt is able to work independently once set up with task, and enjoys this time - states he needs to do things like this outside of the hospital.

## 2022-09-20 PROCEDURE — 250N000013 HC RX MED GY IP 250 OP 250 PS 637

## 2022-09-20 PROCEDURE — G0177 OPPS/PHP; TRAIN & EDUC SERV: HCPCS

## 2022-09-20 PROCEDURE — 250N000013 HC RX MED GY IP 250 OP 250 PS 637: Performed by: EMERGENCY MEDICINE

## 2022-09-20 PROCEDURE — 99232 SBSQ HOSP IP/OBS MODERATE 35: CPT

## 2022-09-20 PROCEDURE — 250N000013 HC RX MED GY IP 250 OP 250 PS 637: Performed by: PSYCHIATRY & NEUROLOGY

## 2022-09-20 PROCEDURE — 124N000002 HC R&B MH UMMC

## 2022-09-20 RX ORDER — NICOTINE 21 MG/24HR
1 PATCH, TRANSDERMAL 24 HOURS TRANSDERMAL DAILY
Status: DISCONTINUED | OUTPATIENT
Start: 2022-09-20 | End: 2022-11-14 | Stop reason: HOSPADM

## 2022-09-20 RX ADMIN — NICOTINE POLACRILEX 4 MG: 4 GUM, CHEWING BUCCAL at 07:42

## 2022-09-20 RX ADMIN — OLANZAPINE 20 MG: 10 TABLET, FILM COATED ORAL at 18:41

## 2022-09-20 RX ADMIN — NICOTINE 1 PATCH: 14 PATCH, EXTENDED RELEASE TRANSDERMAL at 16:35

## 2022-09-20 RX ADMIN — NICOTINE POLACRILEX 4 MG: 4 GUM, CHEWING BUCCAL at 09:20

## 2022-09-20 RX ADMIN — PRAZOSIN HYDROCHLORIDE 1 MG: 1 CAPSULE ORAL at 18:40

## 2022-09-20 RX ADMIN — HYDROCHLOROTHIAZIDE 12.5 MG: 12.5 TABLET ORAL at 08:00

## 2022-09-20 RX ADMIN — BUPROPION HYDROCHLORIDE 300 MG: 150 TABLET, EXTENDED RELEASE ORAL at 08:00

## 2022-09-20 RX ADMIN — NICOTINE POLACRILEX 4 MG: 4 GUM, CHEWING BUCCAL at 10:22

## 2022-09-20 RX ADMIN — NICOTINE POLACRILEX 4 MG: 4 GUM, CHEWING BUCCAL at 12:09

## 2022-09-20 RX ADMIN — NICOTINE POLACRILEX 4 MG: 4 GUM, CHEWING BUCCAL at 14:55

## 2022-09-20 RX ADMIN — DIVALPROEX SODIUM 1000 MG: 500 TABLET, DELAYED RELEASE ORAL at 18:41

## 2022-09-20 RX ADMIN — DIVALPROEX SODIUM 1000 MG: 500 TABLET, DELAYED RELEASE ORAL at 08:00

## 2022-09-20 RX ADMIN — OLANZAPINE 10 MG: 10 TABLET, FILM COATED ORAL at 08:00

## 2022-09-20 RX ADMIN — NICOTINE POLACRILEX 4 MG: 4 GUM, CHEWING BUCCAL at 16:36

## 2022-09-20 ASSESSMENT — ACTIVITIES OF DAILY LIVING (ADL)
ADLS_ACUITY_SCORE: 29
ADLS_ACUITY_SCORE: 29
HYGIENE/GROOMING: INDEPENDENT
ORAL_HYGIENE: INDEPENDENT
ADLS_ACUITY_SCORE: 29
LAUNDRY: WITH SUPERVISION
ORAL_HYGIENE: INDEPENDENT
ADLS_ACUITY_SCORE: 29
HYGIENE/GROOMING: INDEPENDENT
ADLS_ACUITY_SCORE: 29
DRESS: SCRUBS (BEHAVIORAL HEALTH)
DRESS: INDEPENDENT

## 2022-09-20 NOTE — PLAN OF CARE
Patient alert and oriented. Patient requested snacks but patient was sleeping when this writer checked him in the room. Patient no mental health symptoms noted.  Patient no behavioral issues or any safety concerns at this time. Will continue to monitor the patient and provide therapeutic intervention as needed. Notify MD with any concerns. Will continue with current plan of care. Patient had 6.25 total hours of sleep this shift.  Problem: Sleep Disturbance  Goal: Adequate Sleep/Rest  Outcome: Ongoing, Progressing

## 2022-09-20 NOTE — PLAN OF CARE
Patient was bright on approach, denied any thoughts of self harm and suicidal ideations, stated his anxiety is mild and controlled with medication management, he is future oriented and is looking forward to developing positive skills and applying it out in the community, presents with good insight but lacks judgement, patient denied pain, and discomfort, he is medication compliant and requested to take his HS medications early, appeared well groomed, affect was congruent with mood, denies hallucinations, endorsed impulsivity and acknowledged progression towards controlling it, no other behavioral concerns noted.   Problem: Plan of Care - These are the overarching goals to be used throughout the patient stay.    Goal: Optimal Comfort and Wellbeing  Outcome: Ongoing, Progressing  Intervention: Provide Person-Centered Care  Recent Flowsheet Documentation  Taken 9/20/2022 1753 by Kelly Buitrago RN  Trust Relationship/Rapport:   questions encouraged   emotional support provided   thoughts/feelings acknowledged   reassurance provided     Problem: Violence Risk or Actual  Goal: Anger and Impulse Control  Outcome: Ongoing, Progressing  Intervention: Minimize Safety Risk  Recent Flowsheet Documentation  Taken 9/20/2022 1753 by Kelly Buitrago RN  Behavior Management:   behavioral plan reviewed   impulse control promoted   boundaries reinforced  Intervention: Promote Self-Control  Recent Flowsheet Documentation  Taken 9/20/2022 1753 by Kelly Buitrago RN  Supportive Measures: self-care encouraged     Problem: Suicidal Behavior  Goal: Suicidal Behavior is Absent or Managed  Outcome: Ongoing, Progressing  Flowsheets (Taken 9/20/2022 1757)  Mutually Determined Action Steps (Suicidal Behavior Absent/Managed):   shares suicidal thoughts   verbalizes safety check rationale   sets future-oriented goal   identifies protective factors  Individualized Action Step (Suicidal Behavior Absent/Managed): Patient was able to verbalize  undrstanding of getting help before behaving voilantly.   Goal Outcome Evaluation:    Plan of Care Reviewed With: patient

## 2022-09-20 NOTE — PROGRESS NOTES
09/19/22 1725   Group Therapy Session   Group Attendance attended group session   Time Session Began 2000   Time Session Ended 2100   Total Time patient participated (minutes) 60   Total # Attendees 6   Group Type expressive therapy   Group Topic Covered emotions/expression   Patient Response/Contribution cooperative with task   Art Therapy directive was to identify a character of strength and to create a drawing of something that reminds pt of this person. Pts were then encouraged to visualize the qualities of this character of strength and the ways they may already embody some of these qualities.  Goals of directive: to identify personal strengths and goals, emotional expression  Pt was an engaged participant, focused on task for the full duration of group. Pt finished drawing and briefly processed with author and group. Pt created a drawing of the statue of liberty. Pt said that the statue of liberty symbolizes freedom for pt. Pt said that with having freedom pt finds personal strength.  Pts mood was calm, pleasant participant.

## 2022-09-20 NOTE — PROGRESS NOTES
SPIRITUAL HEALTH SERVICES  SPIRITUAL ASSESSMENT Progress Note  [facility] Station 20     REFERRAL SOURCE: I did try to visit patient Etienne per [facility]  referral. However, pt was not available because pt is new for the unit as I informed from the unit staff.    PLAN: I will remain open to provide spiritual care for the pt as needed.    Ozzie Arias M.Div. ([place]), M.Th., D.Min., [facility]  Staff   Pager [phone]

## 2022-09-20 NOTE — PLAN OF CARE
[facility] Occupational Therapy Group Intervention Note     09/20/22 1332   Group Therapy Session   Group Attendance attended group session   Time Session Began 1015   Time Session Ended 1145   Total Time patient participated (minutes) 90   Total # Attendees 6   Group Type task skill;psychoeducation;life skill   Group Topic Covered coping skills/lifestyle management;relapse prevention;self-care activities;structured socialization   Group Session Detail 1) Education was provided on various ways to take care of one's emotional, physical, social, mental, and occupational self as protective factors.      2) Clinic - coping skill exploration, creative expression within personally meaningful activities, and observation of social, cognitive, and kinesthetic performance skills   Patient Response/Contribution cooperative with task;discussed personal experience with topic;listened actively   Patient Response Detail 1) Able to identify >2 self-care strategies within daily routine. IND to engage in hands-on activity to follow as he created a weekly plan for activity follow-through.     2) IND to initiate a detailed canvas project. Recalled specific directions from time on [address]. IND to follow-through and organize work environment. Although verbally impulsive, remained pleasant to peers and writer upon approach.      Madeline Rosenberg OT on 9/20/2022 at 1:32 PM

## 2022-09-20 NOTE — PLAN OF CARE
Assessment/Intervention/Current Symtoms and Care Coordination  -Chart review  -Rounded with team, addressed patient needs/concerns  - Writer spoke with Jose DEL C[facility]. Recommendation is [facility] placement due patient needed a higher level of care at this time.      Discharge Plan or Goal  Pending stabilization & development of a safe discharge plan.  Considerations include: Residential CD Treatment and[facility]     Barriers to Discharge  Patient requires further psychiatric stabilization due to current symptomology     Referral Status  Referrals for multiple residential CD treatment referrals made  [facility]: Pt is not appropriate for programming   [facility]: Denied due to patient's severe mental illness.   [facility]: Denied him because they think his mental health is too severe.     [facility]: Pt had interview. Waiting to hear back from , Angelina, regarding timing of possible placement.      Legal Status  Judicial Court Hold   Hearing info:   9/20 at 8:30am (PD revoked)  9/20 at 3pm (criminal case)

## 2022-09-20 NOTE — PLAN OF CARE
"  Problem: Plan of Care - These are the overarching goals to be used throughout the patient stay.    Goal: Patient-Specific Goal (Individualized)  Description: You can add care plan individualizations to a care plan. Examples of Individualization might be:  \"Parent requests to be called daily at 9am for status\", \"I have a hard time hearing out of my right ear\", or \"Do not touch me to wake me up as it startles me\".  Outcome: Ongoing, Progressing  Goal: Optimal Comfort and Wellbeing  Outcome: Ongoing, Progressing  Intervention: Provide Person-Centered Care  Recent Flowsheet Documentation  Taken 9/19/2022 1725 by Munira Sauer, RN  Trust Relationship/Rapport:   care explained   choices provided   emotional support provided   empathic listening provided   questions answered   questions encouraged   reassurance provided   thoughts/feelings acknowledged   Goal Outcome Evaluation:    Plan of Care Reviewed With: patient      Pt was visible in the milieu, interact with peers and staff. Pt presets with a bright/pleasant affect, appropriate upon approach, denies all mental health symptoms, including anxiety and depression, SI/SIB/HI/AVH, denies pain. Patient had an adequate intake for dinner and snacks, medication compliant, PRN nicotine gum given per pt request, contracts for safety.            "

## 2022-09-20 NOTE — PLAN OF CARE
Pt was visible in the milieu. Able  to attend groups in the morning. Pt denies SI/SIB/Hallucinations/depression. Pt endorsed anxiety due to his court hearings scheduled for today. Pt reports that he did not sleep well due to having a roommate.  Pt is happy though that there is an exercise bike he can use in the unit. Pt endorsed anxiety at 2/10. Denied depression, SI/SIB or hallucinations.  Pt took naps in the afternoon. Nutrition and hydration was adequate.   Will continue to monitor.     Problem: Fall Injury Risk  Goal: Absence of Fall and Fall-Related Injury  Outcome: Ongoing, Progressing  Intervention: Identify and Manage Contributors  Recent Flowsheet Documentation  Taken 9/20/2022 1500 by Brigitte Gonzalez RN  Self-Care Promotion: independence encouraged     Problem: Violence Risk or Actual  Goal: Anger and Impulse Control  Outcome: Ongoing, Progressing  Intervention: Promote Self-Control  Recent Flowsheet Documentation  Taken 9/20/2022 1500 by Brigitte Gonzalez RN  Supportive Measures: self-care encouraged     Problem: Suicidal Behavior  Goal: Suicidal Behavior is Absent or Managed  Outcome: Ongoing, Progressing   Goal Outcome Evaluation:
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
## 2022-09-21 PROCEDURE — 250N000013 HC RX MED GY IP 250 OP 250 PS 637: Performed by: PSYCHIATRY & NEUROLOGY

## 2022-09-21 PROCEDURE — 250N000013 HC RX MED GY IP 250 OP 250 PS 637: Performed by: EMERGENCY MEDICINE

## 2022-09-21 PROCEDURE — 124N000002 HC R&B MH UMMC

## 2022-09-21 PROCEDURE — 250N000013 HC RX MED GY IP 250 OP 250 PS 637

## 2022-09-21 PROCEDURE — G0177 OPPS/PHP; TRAIN & EDUC SERV: HCPCS

## 2022-09-21 PROCEDURE — 99232 SBSQ HOSP IP/OBS MODERATE 35: CPT

## 2022-09-21 RX ADMIN — NICOTINE POLACRILEX 4 MG: 4 GUM, CHEWING BUCCAL at 12:47

## 2022-09-21 RX ADMIN — OLANZAPINE 10 MG: 10 TABLET, FILM COATED ORAL at 07:58

## 2022-09-21 RX ADMIN — DIVALPROEX SODIUM 1000 MG: 500 TABLET, DELAYED RELEASE ORAL at 19:47

## 2022-09-21 RX ADMIN — NICOTINE POLACRILEX 4 MG: 4 GUM, CHEWING BUCCAL at 17:37

## 2022-09-21 RX ADMIN — NICOTINE POLACRILEX 4 MG: 4 GUM, CHEWING BUCCAL at 10:00

## 2022-09-21 RX ADMIN — NICOTINE 1 PATCH: 14 PATCH, EXTENDED RELEASE TRANSDERMAL at 09:27

## 2022-09-21 RX ADMIN — NICOTINE POLACRILEX 4 MG: 4 GUM, CHEWING BUCCAL at 19:47

## 2022-09-21 RX ADMIN — PRAZOSIN HYDROCHLORIDE 1 MG: 1 CAPSULE ORAL at 19:47

## 2022-09-21 RX ADMIN — HYDROCHLOROTHIAZIDE 12.5 MG: 12.5 TABLET ORAL at 07:58

## 2022-09-21 RX ADMIN — BUPROPION HYDROCHLORIDE 300 MG: 150 TABLET, EXTENDED RELEASE ORAL at 07:58

## 2022-09-21 RX ADMIN — NICOTINE POLACRILEX 4 MG: 4 GUM, CHEWING BUCCAL at 14:04

## 2022-09-21 RX ADMIN — OLANZAPINE 20 MG: 10 TABLET, FILM COATED ORAL at 19:47

## 2022-09-21 RX ADMIN — NICOTINE POLACRILEX 4 MG: 4 GUM, CHEWING BUCCAL at 07:08

## 2022-09-21 RX ADMIN — DIVALPROEX SODIUM 1000 MG: 500 TABLET, DELAYED RELEASE ORAL at 07:58

## 2022-09-21 ASSESSMENT — ACTIVITIES OF DAILY LIVING (ADL)
ADLS_ACUITY_SCORE: 29
DRESS: SCRUBS (BEHAVIORAL HEALTH);INDEPENDENT
LAUNDRY: UNABLE TO COMPLETE
ADLS_ACUITY_SCORE: 29
ORAL_HYGIENE: INDEPENDENT
ADLS_ACUITY_SCORE: 29
HYGIENE/GROOMING: INDEPENDENT
ADLS_ACUITY_SCORE: 29
HYGIENE/GROOMING: INDEPENDENT
ADLS_ACUITY_SCORE: 29
ORAL_HYGIENE: INDEPENDENT
DRESS: INDEPENDENT
ADLS_ACUITY_SCORE: 29
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
## 2022-09-21 NOTE — PLAN OF CARE
"  Pt is visible in the milieu. Requested medications at the beginning of the shift, saying I have court today. Endorsed Anxiety at 4 and depression at 6. Denied having SI or hallucinations. Pt was happy that he is able to work on his financial assistance. No other goals for today.     No behavioral concerns at this time.     Problem: Plan of Care - These are the overarching goals to be used throughout the patient stay.    Goal: Patient-Specific Goal (Individualized)  Description: You can add care plan individualizations to a care plan. Examples of Individualization might be:  \"Parent requests to be called daily at 9am for status\", \"I have a hard time hearing out of my right ear\", or \"Do not touch me to wake me up as it startles me\".  Outcome: Ongoing, Progressing     Problem: Suicidal Behavior  Goal: Suicidal Behavior is Absent or Managed  Outcome: Ongoing, Progressing     Problem: Violence Risk or Actual  Goal: Anger and Impulse Control  Intervention: Promote Self-Control  Recent Flowsheet Documentation  Taken 9/21/2022 1300 by Brigitte Gonzalez RN  Supportive Measures:   self-care encouraged   active listening utilized   Goal Outcome Evaluation:    Plan of Care Reviewed With: patient                 "
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
## 2022-09-21 NOTE — PLAN OF CARE
Patient no complains of pain and discomfort. Patient appears calm, slept most of the shift. Patient no mental health symptoms noted.  Patient no behavioral issues or any safety concerns at this time. Will continue to monitor the patient and provide therapeutic intervention as needed. Notify MD with any concerns. Will continue with current plan of care.  Problem: Sleep Disturbance  Goal: Adequate Sleep/Rest  Outcome: Ongoing, Progressing

## 2022-09-22 PROCEDURE — 250N000013 HC RX MED GY IP 250 OP 250 PS 637: Performed by: PSYCHIATRY & NEUROLOGY

## 2022-09-22 PROCEDURE — 250N000013 HC RX MED GY IP 250 OP 250 PS 637

## 2022-09-22 PROCEDURE — 250N000013 HC RX MED GY IP 250 OP 250 PS 637: Performed by: EMERGENCY MEDICINE

## 2022-09-22 PROCEDURE — 124N000002 HC R&B MH UMMC

## 2022-09-22 PROCEDURE — 99232 SBSQ HOSP IP/OBS MODERATE 35: CPT

## 2022-09-22 RX ADMIN — HYDROCHLOROTHIAZIDE 12.5 MG: 12.5 TABLET ORAL at 08:04

## 2022-09-22 RX ADMIN — NICOTINE POLACRILEX 4 MG: 4 GUM, CHEWING BUCCAL at 08:04

## 2022-09-22 RX ADMIN — PRAZOSIN HYDROCHLORIDE 1 MG: 1 CAPSULE ORAL at 19:16

## 2022-09-22 RX ADMIN — BUPROPION HYDROCHLORIDE 300 MG: 150 TABLET, EXTENDED RELEASE ORAL at 08:03

## 2022-09-22 RX ADMIN — DIVALPROEX SODIUM 1000 MG: 500 TABLET, DELAYED RELEASE ORAL at 08:04

## 2022-09-22 RX ADMIN — OLANZAPINE 10 MG: 10 TABLET, FILM COATED ORAL at 08:04

## 2022-09-22 RX ADMIN — DIVALPROEX SODIUM 1000 MG: 500 TABLET, DELAYED RELEASE ORAL at 19:16

## 2022-09-22 RX ADMIN — NICOTINE POLACRILEX 4 MG: 4 GUM, CHEWING BUCCAL at 15:25

## 2022-09-22 RX ADMIN — OLANZAPINE 20 MG: 10 TABLET, FILM COATED ORAL at 19:16

## 2022-09-22 RX ADMIN — NICOTINE POLACRILEX 4 MG: 4 GUM, CHEWING BUCCAL at 16:45

## 2022-09-22 RX ADMIN — NICOTINE POLACRILEX 4 MG: 4 GUM, CHEWING BUCCAL at 18:17

## 2022-09-22 RX ADMIN — NICOTINE 1 PATCH: 14 PATCH, EXTENDED RELEASE TRANSDERMAL at 08:06

## 2022-09-22 ASSESSMENT — ACTIVITIES OF DAILY LIVING (ADL)
ADLS_ACUITY_SCORE: 29
DRESS: SCRUBS (BEHAVIORAL HEALTH);INDEPENDENT
DRESS: SCRUBS (BEHAVIORAL HEALTH);INDEPENDENT
HYGIENE/GROOMING: INDEPENDENT
ORAL_HYGIENE: INDEPENDENT
LAUNDRY: WITH SUPERVISION
HYGIENE/GROOMING: INDEPENDENT
ADLS_ACUITY_SCORE: 29
ADLS_ACUITY_SCORE: 29
ORAL_HYGIENE: INDEPENDENT

## 2022-09-22 NOTE — PLAN OF CARE
Patient denied pain and discomfort. Patient appears calm, slept most of the shift. Patient had snacks. Patient no mental health symptoms noted.  Patient no behavioral issues or any safety concerns at this time. Will continue to monitor the patient and provide therapeutic intervention as needed. Notify MD with any concerns. Will continue with current plan of care. Patient had 6.5 total hours of sleep this shift.  Problem: Sleep Disturbance  Goal: Adequate Sleep/Rest  Outcome: Ongoing, Progressing

## 2022-09-22 NOTE — PLAN OF CARE
[facility] Occupational Therapy Group Intervention Note     09/21/22 1500   Group Therapy Session   Group Attendance attended group session   Time Session Began 1315   Time Session Ended 1430   Total Time patient participated (minutes) 60   Total # Attendees 6   Group Type task skill;life skill   Group Topic Covered coping skills/lifestyle management;structured socialization   Group Session Detail Clinic - coping skill exploration, creative expression within personally meaningful activities, and observation of social, cognitive, and kinesthetic performance skills   Patient Response/Contribution cooperative with task;organized   Patient Response Detail Congruent affect. IND to participate in a detailed canvas project. Somewhat impulsive with requests, otherwise, pleasant and cooperative. Socially appropriate with peers.      Madeline Rosenberg OT on 9/22/2022 at 8:24 AM

## 2022-09-22 NOTE — PLAN OF CARE
Assessment/Intervention/Current Symtoms and Care Coordination  -Chart review  -Rounded with team, addressed patient needs/concerns  - Writer spoke with Angelina,  at [facility] ([phone]). Angelina requested pt's progress notes and med record to be faxed at [phone]. Writer faxed requested records.          Discharge Plan or Goal  Pending stabilization & development of a safe discharge plan.  Considerations include: [facility] placement      Barriers to Discharge  Patient requires further psychiatric stabilization due to current symptomology. Door to door transfer. PD revoked.      Referral Status   None today       Legal Status  Judicial Court Hold

## 2022-09-22 NOTE — PLAN OF CARE
Problem: Plan of Care - These are the overarching goals to be used throughout the patient stay.    Goal: Absence of Hospital-Acquired Illness or Injury  Outcome: Ongoing, Progressing  Intervention: Identify and Manage Fall Risk  Recent Flowsheet Documentation  Taken 9/21/2022 1656 by Munira Sauer RN  Safety Promotion/Fall Prevention:    clutter free environment maintained    nonskid shoes/slippers when out of bed    safety round/check completed  Intervention: Prevent Skin Injury  Recent Flowsheet Documentation  Taken 9/21/2022 1656 by Munira Sauer RN  Body Position: position changed independently  Skin Protection: protective footwear used  Intervention: Prevent and Manage VTE (Venous Thromboembolism) Risk  Recent Flowsheet Documentation  Taken 9/21/2022 1656 by Munira Sauer RN  Activity Management: up ad alondra  Goal: Optimal Comfort and Wellbeing  Outcome: Ongoing, Progressing  Intervention: Provide Person-Centered Care  Recent Flowsheet Documentation  Taken 9/21/2022 1656 by Munira Sauer RN  Trust Relationship/Rapport:    care explained    choices provided    thoughts/feelings acknowledged    questions answered     Problem: Behavioral Health Plan of Care  Goal: Optimal Comfort and Wellbeing  Outcome: Ongoing, Progressing  Intervention: Provide Person-Centered Care  Recent Flowsheet Documentation  Taken 9/21/2022 1656 by Munira Sauer RN  Trust Relationship/Rapport:    care explained    choices provided    thoughts/feelings acknowledged    questions answered  Goal: Adheres to Safety Considerations for Self and Others  Outcome: Ongoing, Progressing  Intervention: Develop and Maintain Individualized Safety Plan  Recent Flowsheet Documentation  Taken 9/21/2022 1656 by Munira Sauer RN  Safety Measures:    safety rounds completed    suicide check-in completed   Goal Outcome Evaluation:    Plan of Care Reviewed With: patient        Pt was visible in the milieu, interact with peers and staff, was  appropriate and cooperative with cares. Presents with a bright affect, full range. Denies all psych symptoms including anxiety and depression, SI/SIB/HI/AVH, and all other symptoms. Pt denies pain. Had an adequate intake, PRN nicotine given, medication compliant, and contracts for safety.

## 2022-09-22 NOTE — PLAN OF CARE
Goal Outcome Evaluation:    Plan of Care Reviewed With: patient     Problem: Plan of Care - These are the overarching goals to be used throughout the patient stay.    Goal: Plan of Care Review/Shift Note  Outcome: Ongoing, Progressing  Flowsheets (Taken 9/22/2022 0824)  Plan of Care Reviewed With: patient   Pt presented with a bright affect, calm mood. Denied pain, all mental health symptoms, and contracted for safety. Compliant with all medications, no side effectives observed or reported. Did not attend community meeting or any other groups. Spend most of the time in his room napping. Writer encouraged pt to attend groups, promised to go next group if reminded, but when writer went to invite pt for the 1415 group, he pulled bedding over his head and asked writer to leave him alone. Pt was isolative and did not engage with staff or peers much. No other concerns note or expressed
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
## 2022-09-23 PROCEDURE — 250N000013 HC RX MED GY IP 250 OP 250 PS 637: Performed by: EMERGENCY MEDICINE

## 2022-09-23 PROCEDURE — 250N000013 HC RX MED GY IP 250 OP 250 PS 637: Performed by: PSYCHIATRY & NEUROLOGY

## 2022-09-23 PROCEDURE — 124N000002 HC R&B MH UMMC

## 2022-09-23 PROCEDURE — 250N000013 HC RX MED GY IP 250 OP 250 PS 637

## 2022-09-23 RX ADMIN — DIVALPROEX SODIUM 1000 MG: 500 TABLET, DELAYED RELEASE ORAL at 07:59

## 2022-09-23 RX ADMIN — OLANZAPINE 20 MG: 10 TABLET, FILM COATED ORAL at 19:22

## 2022-09-23 RX ADMIN — OLANZAPINE 10 MG: 10 TABLET, FILM COATED ORAL at 07:59

## 2022-09-23 RX ADMIN — PRAZOSIN HYDROCHLORIDE 1 MG: 1 CAPSULE ORAL at 19:22

## 2022-09-23 RX ADMIN — NICOTINE POLACRILEX 4 MG: 4 GUM, CHEWING BUCCAL at 18:29

## 2022-09-23 RX ADMIN — BUPROPION HYDROCHLORIDE 300 MG: 150 TABLET, EXTENDED RELEASE ORAL at 07:59

## 2022-09-23 RX ADMIN — NICOTINE POLACRILEX 4 MG: 4 GUM, CHEWING BUCCAL at 17:04

## 2022-09-23 RX ADMIN — HYDROCHLOROTHIAZIDE 12.5 MG: 12.5 TABLET ORAL at 07:59

## 2022-09-23 RX ADMIN — NICOTINE 1 PATCH: 14 PATCH, EXTENDED RELEASE TRANSDERMAL at 07:59

## 2022-09-23 RX ADMIN — DIVALPROEX SODIUM 1000 MG: 500 TABLET, DELAYED RELEASE ORAL at 19:23

## 2022-09-23 RX ADMIN — NICOTINE POLACRILEX 4 MG: 4 GUM, CHEWING BUCCAL at 19:32

## 2022-09-23 RX ADMIN — NICOTINE POLACRILEX 4 MG: 4 GUM, CHEWING BUCCAL at 07:59

## 2022-09-23 ASSESSMENT — ACTIVITIES OF DAILY LIVING (ADL)
ADLS_ACUITY_SCORE: 29
ORAL_HYGIENE: INDEPENDENT
ORAL_HYGIENE: INDEPENDENT
HYGIENE/GROOMING: INDEPENDENT
LAUNDRY: WITH SUPERVISION
ADLS_ACUITY_SCORE: 29
DRESS: INDEPENDENT
ADLS_ACUITY_SCORE: 29
ADLS_ACUITY_SCORE: 29
LAUNDRY: UNABLE TO COMPLETE
ADLS_ACUITY_SCORE: 29
DRESS: SCRUBS (BEHAVIORAL HEALTH)
ADLS_ACUITY_SCORE: 29
ADLS_ACUITY_SCORE: 29
HYGIENE/GROOMING: INDEPENDENT
ADLS_ACUITY_SCORE: 29

## 2022-09-23 NOTE — PLAN OF CARE
Problem: Plan of Care - These are the overarching goals to be used throughout the patient stay.    Goal: Optimal Comfort and Wellbeing  Outcome: Ongoing, Progressing  Intervention: Provide Person-Centered Care  Recent Flowsheet Documentation  Taken 9/22/2022 1627 by Munira Sauer RN  Trust Relationship/Rapport:    care explained    choices provided    empathic listening provided    questions answered    thoughts/feelings acknowledged     Problem: Behavioral Health Plan of Care  Goal: Optimal Comfort and Wellbeing  Outcome: Ongoing, Progressing  Intervention: Provide Person-Centered Care  Recent Flowsheet Documentation  Taken 9/22/2022 1627 by Munira Sauer RN  Trust Relationship/Rapport:    care explained    choices provided    empathic listening provided    questions answered    thoughts/feelings acknowledged  Goal: Adheres to Safety Considerations for Self and Others  Outcome: Ongoing, Progressing  Intervention: Develop and Maintain Individualized Safety Plan  Recent Flowsheet Documentation  Taken 9/22/2022 1627 by Munira Sauer RN  Safety Measures:    suicide check-in completed    safety rounds completed  Goal: Absence of New-Onset Illness or Injury  Outcome: Ongoing, Progressing  Intervention: Identify and Manage Fall Risk  Recent Flowsheet Documentation  Taken 9/22/2022 1627 by Munira Sauer RN  Safety Measures:    suicide check-in completed    safety rounds completed  Goal: Optimized Coping Skills in Response to Life Stressors  Outcome: Ongoing, Progressing  Intervention: Promote Effective Coping Strategies  Recent Flowsheet Documentation  Taken 9/22/2022 1627 by Munira Sauer RN  Supportive Measures:    active listening utilized    self-care encouraged    verbalization of feelings encouraged   Goal Outcome Evaluation:    Plan of Care Reviewed With: patient      Pt was visible on the unit, interacting with staff and peers, presents with  bright affect, full range, appropriate and cooperative with  cares, denies all psych symptoms but appears restless occasionally. Pt denies pain, was medication complaint. PRN nicotine gum given this shift, contracts for safety.

## 2022-09-23 NOTE — PLAN OF CARE
Assessment/Intervention/Current Symtoms and Care Coordination  Met with team. Reviewed patient's chart and progress notes. Writer received notice of remote zoom hearing for extending Civil Commitment. Hearing date/time is 10/4/2022 at 10:30 AM. Copy provided to patient.        Discharge Plan or Goal  Pending stabilization & development of a safe discharge plan.  Considerations include: [facility] placement      Barriers to Discharge  Patient requires further psychiatric stabilization due to current symptomology. Door to door transfer. PD revoked.      Referral Status   None today        Legal Status  Judicial Court Hold

## 2022-09-23 NOTE — PLAN OF CARE
Pt appears to have slept for 6.75 hours. No PRNs given or requested ; no concerns noted noted this shift. Will continue to monitor and offer support.     Problem: Sleep Disturbance  Goal: Adequate Sleep/Rest  Outcome: Ongoing, Progressing   Goal Outcome Evaluation:

## 2022-09-23 NOTE — PLAN OF CARE
Pt was intermittently visible in the milieu. Got his medications early on the shift. Was pleasant upon approach. Pt took a couple of naps during the shift. Stated that he had a good sleep and there is not much to do. Pt reported that he was able to read on his book which is about [employer] and  meditation. Denied all mental health symptoms. No behavioral concerns at this time.     Problem: Violence Risk or Actual  Goal: Anger and Impulse Control  Outcome: Ongoing, Progressing  Intervention: Promote Self-Control  Recent Flowsheet Documentation  Taken 9/23/2022 1350 by Brigitte Gonzalez RN  Supportive Measures: active listening utilized     Problem: Suicidal Behavior  Goal: Suicidal Behavior is Absent or Managed  Outcome: Ongoing, Progressing     Problem: Sleep Disturbance  Goal: Adequate Sleep/Rest  Outcome: Ongoing, Progressing     Problem: Behavioral Health Plan of Care  Goal: Patient-Specific Goal (Individualization)  Outcome: Ongoing, Progressing   Goal Outcome Evaluation:    Plan of Care Reviewed With: patient

## 2022-09-24 PROCEDURE — 250N000013 HC RX MED GY IP 250 OP 250 PS 637: Performed by: PSYCHIATRY & NEUROLOGY

## 2022-09-24 PROCEDURE — 250N000013 HC RX MED GY IP 250 OP 250 PS 637

## 2022-09-24 PROCEDURE — 250N000013 HC RX MED GY IP 250 OP 250 PS 637: Performed by: EMERGENCY MEDICINE

## 2022-09-24 PROCEDURE — 124N000002 HC R&B MH UMMC

## 2022-09-24 RX ADMIN — OLANZAPINE 20 MG: 10 TABLET, FILM COATED ORAL at 19:18

## 2022-09-24 RX ADMIN — NICOTINE POLACRILEX 4 MG: 4 GUM, CHEWING BUCCAL at 12:13

## 2022-09-24 RX ADMIN — DIVALPROEX SODIUM 1000 MG: 500 TABLET, DELAYED RELEASE ORAL at 19:09

## 2022-09-24 RX ADMIN — NICOTINE 1 PATCH: 14 PATCH, EXTENDED RELEASE TRANSDERMAL at 08:06

## 2022-09-24 RX ADMIN — BUPROPION HYDROCHLORIDE 300 MG: 150 TABLET, EXTENDED RELEASE ORAL at 08:05

## 2022-09-24 RX ADMIN — NICOTINE POLACRILEX 4 MG: 4 GUM, CHEWING BUCCAL at 13:18

## 2022-09-24 RX ADMIN — DIVALPROEX SODIUM 1000 MG: 500 TABLET, DELAYED RELEASE ORAL at 08:05

## 2022-09-24 RX ADMIN — PRAZOSIN HYDROCHLORIDE 1 MG: 1 CAPSULE ORAL at 19:09

## 2022-09-24 RX ADMIN — NICOTINE POLACRILEX 4 MG: 4 GUM, CHEWING BUCCAL at 19:09

## 2022-09-24 RX ADMIN — HYDROCHLOROTHIAZIDE 12.5 MG: 12.5 TABLET ORAL at 08:05

## 2022-09-24 RX ADMIN — NICOTINE POLACRILEX 4 MG: 4 GUM, CHEWING BUCCAL at 17:18

## 2022-09-24 RX ADMIN — OLANZAPINE 10 MG: 10 TABLET, FILM COATED ORAL at 08:05

## 2022-09-24 ASSESSMENT — ACTIVITIES OF DAILY LIVING (ADL)
ADLS_ACUITY_SCORE: 29
LAUNDRY: UNABLE TO COMPLETE
ADLS_ACUITY_SCORE: 29
ADLS_ACUITY_SCORE: 29
ORAL_HYGIENE: INDEPENDENT
ORAL_HYGIENE: INDEPENDENT
ADLS_ACUITY_SCORE: 29
ADLS_ACUITY_SCORE: 29
DRESS: INDEPENDENT
ADLS_ACUITY_SCORE: 29
ADLS_ACUITY_SCORE: 29
DRESS: INDEPENDENT
HYGIENE/GROOMING: INDEPENDENT
ADLS_ACUITY_SCORE: 29
HYGIENE/GROOMING: INDEPENDENT
LAUNDRY: UNABLE TO COMPLETE
ADLS_ACUITY_SCORE: 29
ADLS_ACUITY_SCORE: 29

## 2022-09-24 NOTE — PLAN OF CARE
Patient was visible, social and appropriate on interaction, patient denied suicidal and self harm ideation, insight appears to have improved since admission, reported happy feelings and calm mood, presented with full range affect, denies pain and discomfort, no other concerns noted.   Problem: Suicidal Behavior  Goal: Suicidal Behavior is Absent or Managed  Outcome: Ongoing, Progressing  Flowsheets (Taken 9/23/2022 2216)  Mutually Determined Action Steps (Suicidal Behavior Absent/Managed):   shares suicidal thoughts   verbalizes safety check rationale   identifies home safety strategy     Problem: Plan of Care - These are the overarching goals to be used throughout the patient stay.    Goal: Plan of Care Review/Shift Note  Description: The Plan of Care Review/Shift note should be completed every shift.  The Outcome Evaluation is a brief statement about your assessment that the patient is improving, declining, or no change.  This information will be displayed automatically on your shift note.  Recent Flowsheet Documentation  Taken 9/23/2022 2211 by Kelly Buitrago RN  Plan of Care Reviewed With: patient  Goal: Absence of Hospital-Acquired Illness or Injury  Intervention: Identify and Manage Fall Risk  Recent Flowsheet Documentation  Taken 9/23/2022 2211 by Kelly Buitrago RN  Safety Promotion/Fall Prevention:   clutter free environment maintained   nonskid shoes/slippers when out of bed   safety round/check completed  Goal: Optimal Comfort and Wellbeing  Intervention: Provide Person-Centered Care  Recent Flowsheet Documentation  Taken 9/23/2022 2211 by Kelly Buitrago RN  Trust Relationship/Rapport:   emotional support provided   empathic listening provided   reassurance provided   thoughts/feelings acknowledged     Problem: Fall Injury Risk  Goal: Absence of Fall and Fall-Related Injury  Intervention: Identify and Manage Contributors  Recent Flowsheet Documentation  Taken 9/23/2022 2211 by Kelly Buitrago  RN  Medication Review/Management: medications reviewed  Intervention: Promote Injury-Free Environment  Recent Flowsheet Documentation  Taken 9/23/2022 2211 by Kelly Buitrago RN  Safety Promotion/Fall Prevention:   clutter free environment maintained   nonskid shoes/slippers when out of bed   safety round/check completed     Problem: Behavioral Health Plan of Care  Goal: Optimal Comfort and Wellbeing  Intervention: Provide Person-Centered Care  Recent Flowsheet Documentation  Taken 9/23/2022 2211 by Kelly Buitrago RN  Trust Relationship/Rapport:   emotional support provided   empathic listening provided   reassurance provided   thoughts/feelings acknowledged  Goal: Plan of Care Review  Recent Flowsheet Documentation  Taken 9/23/2022 2211 by Kelly Buitrago RN  Plan of Care Reviewed With: patient  Patient Agreement with Plan of Care: agrees  Goal: Adheres to Safety Considerations for Self and Others  Intervention: Develop and Maintain Individualized Safety Plan  Recent Flowsheet Documentation  Taken 9/23/2022 2211 by Kelly Buitrago RN  Safety Measures: safety rounds completed  Goal: Absence of New-Onset Illness or Injury  Intervention: Identify and Manage Fall Risk  Recent Flowsheet Documentation  Taken 9/23/2022 2211 by Kelly Buitrago RN  Safety Measures: safety rounds completed  Goal: Develops/Participates in Therapeutic [place] to Support Successful Transition  Intervention: Foster Therapeutic [place]  Recent Flowsheet Documentation  Taken 9/23/2022 2211 by Kelly Buitrago RN  Trust Relationship/Rapport:   emotional support provided   empathic listening provided   reassurance provided   thoughts/feelings acknowledged   Goal Outcome Evaluation:    Plan of Care Reviewed With: patient

## 2022-09-24 NOTE — PLAN OF CARE
"    Pt goal today is to finish reading his book. Took naps in the morning, comes out for meals and medication. Nutrition and hydration adequate. Speaks with a loud voice and able to make his needs known. Pt said that he really slept good, denies any pain, denies all mental health symptoms.     No behavioral concerns at this time.     PRN: Nicorette gums      Problem: Plan of Care - These are the overarching goals to be used throughout the patient stay.    Goal: Patient-Specific Goal (Individualized)  Description: You can add care plan individualizations to a care plan. Examples of Individualization might be:  \"Parent requests to be called daily at 9am for status\", \"I have a hard time hearing out of my right ear\", or \"Do not touch me to wake me up as it startles me\".  Outcome: Ongoing, Progressing     Problem: Suicidal Behavior  Goal: Suicidal Behavior is Absent or Managed  Outcome: Ongoing, Progressing     Problem: Sleep Disturbance  Goal: Adequate Sleep/Rest  Outcome: Ongoing, Progressing     Problem: Behavioral Health Plan of Care  Goal: Patient-Specific Goal (Individualization)  Outcome: Ongoing, Progressing  Flowsheets (Taken 9/24/2022 6106)  Patient Personal Strengths:   expressive of emotions   expressive of needs   Goal Outcome Evaluation:    Plan of Care Reviewed With: patient                 "

## 2022-09-25 PROCEDURE — 250N000013 HC RX MED GY IP 250 OP 250 PS 637: Performed by: PSYCHIATRY & NEUROLOGY

## 2022-09-25 PROCEDURE — 124N000002 HC R&B MH UMMC

## 2022-09-25 PROCEDURE — 250N000013 HC RX MED GY IP 250 OP 250 PS 637

## 2022-09-25 PROCEDURE — 250N000013 HC RX MED GY IP 250 OP 250 PS 637: Performed by: EMERGENCY MEDICINE

## 2022-09-25 RX ADMIN — HYDROCHLOROTHIAZIDE 12.5 MG: 12.5 TABLET ORAL at 07:58

## 2022-09-25 RX ADMIN — BUPROPION HYDROCHLORIDE 300 MG: 150 TABLET, EXTENDED RELEASE ORAL at 07:58

## 2022-09-25 RX ADMIN — OLANZAPINE 20 MG: 10 TABLET, FILM COATED ORAL at 19:15

## 2022-09-25 RX ADMIN — HYDROXYZINE HYDROCHLORIDE 25 MG: 25 TABLET, FILM COATED ORAL at 21:34

## 2022-09-25 RX ADMIN — NICOTINE POLACRILEX 4 MG: 4 GUM, CHEWING BUCCAL at 07:58

## 2022-09-25 RX ADMIN — NICOTINE POLACRILEX 4 MG: 4 GUM, CHEWING BUCCAL at 18:32

## 2022-09-25 RX ADMIN — MELATONIN TAB 3 MG 3 MG: 3 TAB at 21:34

## 2022-09-25 RX ADMIN — NICOTINE POLACRILEX 4 MG: 4 GUM, CHEWING BUCCAL at 19:45

## 2022-09-25 RX ADMIN — NICOTINE POLACRILEX 4 MG: 4 GUM, CHEWING BUCCAL at 16:37

## 2022-09-25 RX ADMIN — NICOTINE POLACRILEX 4 MG: 4 GUM, CHEWING BUCCAL at 21:34

## 2022-09-25 RX ADMIN — DIVALPROEX SODIUM 1000 MG: 500 TABLET, DELAYED RELEASE ORAL at 07:58

## 2022-09-25 RX ADMIN — NICOTINE POLACRILEX 4 MG: 4 GUM, CHEWING BUCCAL at 14:46

## 2022-09-25 RX ADMIN — NICOTINE 1 PATCH: 14 PATCH, EXTENDED RELEASE TRANSDERMAL at 07:59

## 2022-09-25 RX ADMIN — OLANZAPINE 10 MG: 10 TABLET, FILM COATED ORAL at 07:58

## 2022-09-25 RX ADMIN — NICOTINE POLACRILEX 4 MG: 4 GUM, CHEWING BUCCAL at 12:12

## 2022-09-25 RX ADMIN — NICOTINE POLACRILEX 4 MG: 4 GUM, CHEWING BUCCAL at 09:21

## 2022-09-25 RX ADMIN — DIVALPROEX SODIUM 1000 MG: 500 TABLET, DELAYED RELEASE ORAL at 19:15

## 2022-09-25 RX ADMIN — PRAZOSIN HYDROCHLORIDE 1 MG: 1 CAPSULE ORAL at 19:15

## 2022-09-25 ASSESSMENT — ACTIVITIES OF DAILY LIVING (ADL)
HYGIENE/GROOMING: INDEPENDENT
ADLS_ACUITY_SCORE: 29
DRESS: INDEPENDENT
ADLS_ACUITY_SCORE: 29
DRESS: INDEPENDENT
HYGIENE/GROOMING: INDEPENDENT
ADLS_ACUITY_SCORE: 29
ADLS_ACUITY_SCORE: 29
ORAL_HYGIENE: INDEPENDENT
ADLS_ACUITY_SCORE: 29
LAUNDRY: WITH SUPERVISION
ADLS_ACUITY_SCORE: 29
LAUNDRY: WITH SUPERVISION
ORAL_HYGIENE: INDEPENDENT
ADLS_ACUITY_SCORE: 29
ADLS_ACUITY_SCORE: 29

## 2022-09-25 NOTE — PLAN OF CARE
Patient was visible and engaging, socially appropriate, denies all mental health symptoms, stated he is looking forward to placement, he appeared bright, mood was congruent with affect, hygiene and nutrition are adequate, appears well hydrated, denies pain and discomfort, patient is complaint and adherent to treatment regimen.   Problem: Plan of Care - These are the overarching goals to be used throughout the patient stay.    Goal: Optimal Comfort and Wellbeing  Outcome: Ongoing, Progressing  Intervention: Provide Person-Centered Care  Recent Flowsheet Documentation  Taken 9/25/2022 1836 by Kelly Buitrago RN  Trust Relationship/Rapport:    emotional support provided    empathic listening provided    reassurance provided    thoughts/feelings acknowledged     Problem: Violence Risk or Actual  Goal: Anger and Impulse Control  Outcome: Ongoing, Progressing     Problem: Suicidal Behavior  Goal: Suicidal Behavior is Absent or Managed  Outcome: Ongoing, Progressing  Flowsheets (Taken 9/25/2022 1839)  Mutually Determined Action Steps (Suicidal Behavior Absent/Managed): sets future-oriented goal     Problem: Sleep Disturbance  Goal: Adequate Sleep/Rest  Outcome: Ongoing, Progressing     Problem: Behavioral Health Plan of Care  Goal: Optimal Comfort and Wellbeing  Outcome: Ongoing, Progressing  Intervention: Provide Person-Centered Care  Recent Flowsheet Documentation  Taken 9/25/2022 1836 by Kelly Buitrago RN  Trust Relationship/Rapport:    emotional support provided    empathic listening provided    reassurance provided    thoughts/feelings acknowledged   Goal Outcome Evaluation:    Plan of Care Reviewed With: patient

## 2022-09-25 NOTE — PLAN OF CARE
"  Presentation of the pt was not much different from the morning. He continued to isolate in his room , mostly sleeping. He came out for a couple of hours to watch TV. Pleasant upon approach, pt continued to deny all mental health symptoms. PRN Nicotine gums requested. He also requested to have his medications early. Pt appetite is good. Hydration was adequate.  Pt did say that felt more tired today, saying  he has low energy. Pt thinks that one of his medication - Prazosin -makes him tired in the morning. Encouraged pt to speak with the team about this.     Problem: Plan of Care - These are the overarching goals to be used throughout the patient stay.    Goal: Patient-Specific Goal (Individualized)  Description: You can add care plan individualizations to a care plan. Examples of Individualization might be:  \"Parent requests to be called daily at 9am for status\", \"I have a hard time hearing out of my right ear\", or \"Do not touch me to wake me up as it startles me\".  Outcome: Ongoing, Progressing     Problem: Suicidal Behavior  Goal: Suicidal Behavior is Absent or Managed  Outcome: Ongoing, Progressing     Problem: Sleep Disturbance  Goal: Adequate Sleep/Rest  Outcome: Ongoing, Progressing     Problem: Behavioral Health Plan of Care  Goal: Patient-Specific Goal (Individualization)  Outcome: Ongoing, Progressing  Flowsheets (Taken 9/24/2022 1313)  Patient Personal Strengths:   expressive of emotions   expressive of needs   Goal Outcome Evaluation:    Plan of Care Reviewed With: patient                 "

## 2022-09-25 NOTE — PLAN OF CARE
Pt appears to have slept for  7 hours. No PRNs given or requested ; no concerns noted noted this shift. Will continue to monitor and offer support.    Problem: Sleep Disturbance  Goal: Adequate Sleep/Rest  Outcome: Ongoing, Progressing   Goal Outcome Evaluation:

## 2022-09-25 NOTE — PLAN OF CARE
"Pt denies SI. Pt reports feels tired.  Believes is his medications.  Pt eats well, drink coffee, requests nicotine gum.   Problem: Plan of Care - These are the overarching goals to be used throughout the patient stay.    Goal: Plan of Care Review/Shift Note  Description: The Plan of Care Review/Shift note should be completed every shift.  The Outcome Evaluation is a brief statement about your assessment that the patient is improving, declining, or no change.  This information will be displayed automatically on your shift note.  Outcome: Ongoing, Not Progressing  Goal: Patient-Specific Goal (Individualized)  Description: You can add care plan individualizations to a care plan. Examples of Individualization might be:  \"Parent requests to be called daily at 9am for status\", \"I have a hard time hearing out of my right ear\", or \"Do not touch me to wake me up as it startles me\".  Outcome: Ongoing, Not Progressing  Goal: Absence of Hospital-Acquired Illness or Injury  Outcome: Ongoing, Not Progressing  Goal: Optimal Comfort and Wellbeing  Outcome: Ongoing, Not Progressing  Goal: Readiness for Transition of Care  Outcome: Ongoing, Not Progressing   Goal Outcome Evaluation:                      "

## 2022-09-26 PROCEDURE — 250N000013 HC RX MED GY IP 250 OP 250 PS 637: Performed by: PSYCHIATRY & NEUROLOGY

## 2022-09-26 PROCEDURE — G0177 OPPS/PHP; TRAIN & EDUC SERV: HCPCS

## 2022-09-26 PROCEDURE — 250N000013 HC RX MED GY IP 250 OP 250 PS 637: Performed by: EMERGENCY MEDICINE

## 2022-09-26 PROCEDURE — 250N000013 HC RX MED GY IP 250 OP 250 PS 637

## 2022-09-26 PROCEDURE — 99232 SBSQ HOSP IP/OBS MODERATE 35: CPT

## 2022-09-26 PROCEDURE — 124N000002 HC R&B MH UMMC

## 2022-09-26 RX ORDER — PRAZOSIN HYDROCHLORIDE 1 MG/1
1 CAPSULE ORAL
Status: DISCONTINUED | OUTPATIENT
Start: 2022-09-26 | End: 2022-11-14 | Stop reason: HOSPADM

## 2022-09-26 RX ADMIN — MELATONIN TAB 3 MG 3 MG: 3 TAB at 22:29

## 2022-09-26 RX ADMIN — OLANZAPINE 10 MG: 10 TABLET, FILM COATED ORAL at 07:52

## 2022-09-26 RX ADMIN — OLANZAPINE 20 MG: 10 TABLET, FILM COATED ORAL at 20:06

## 2022-09-26 RX ADMIN — NICOTINE POLACRILEX 4 MG: 4 GUM, CHEWING BUCCAL at 17:49

## 2022-09-26 RX ADMIN — HYDROCHLOROTHIAZIDE 12.5 MG: 12.5 TABLET ORAL at 07:52

## 2022-09-26 RX ADMIN — NICOTINE POLACRILEX 4 MG: 4 GUM, CHEWING BUCCAL at 11:08

## 2022-09-26 RX ADMIN — HYDROXYZINE HYDROCHLORIDE 25 MG: 25 TABLET, FILM COATED ORAL at 22:29

## 2022-09-26 RX ADMIN — NICOTINE POLACRILEX 4 MG: 4 GUM, CHEWING BUCCAL at 21:32

## 2022-09-26 RX ADMIN — DIVALPROEX SODIUM 1000 MG: 500 TABLET, DELAYED RELEASE ORAL at 20:06

## 2022-09-26 RX ADMIN — NICOTINE POLACRILEX 4 MG: 4 GUM, CHEWING BUCCAL at 16:20

## 2022-09-26 RX ADMIN — DIVALPROEX SODIUM 1000 MG: 500 TABLET, DELAYED RELEASE ORAL at 07:52

## 2022-09-26 RX ADMIN — NICOTINE POLACRILEX 4 MG: 4 GUM, CHEWING BUCCAL at 09:27

## 2022-09-26 RX ADMIN — BUPROPION HYDROCHLORIDE 300 MG: 150 TABLET, EXTENDED RELEASE ORAL at 07:52

## 2022-09-26 RX ADMIN — NICOTINE 1 PATCH: 14 PATCH, EXTENDED RELEASE TRANSDERMAL at 07:52

## 2022-09-26 RX ADMIN — NICOTINE POLACRILEX 4 MG: 4 GUM, CHEWING BUCCAL at 12:45

## 2022-09-26 RX ADMIN — NICOTINE POLACRILEX 4 MG: 4 GUM, CHEWING BUCCAL at 07:12

## 2022-09-26 RX ADMIN — NICOTINE POLACRILEX 4 MG: 4 GUM, CHEWING BUCCAL at 20:06

## 2022-09-26 ASSESSMENT — ACTIVITIES OF DAILY LIVING (ADL)
ADLS_ACUITY_SCORE: 29
ORAL_HYGIENE: INDEPENDENT
ADLS_ACUITY_SCORE: 29
DRESS: INDEPENDENT
HYGIENE/GROOMING: INDEPENDENT
ADLS_ACUITY_SCORE: 29
ORAL_HYGIENE: INDEPENDENT
ADLS_ACUITY_SCORE: 29
DRESS: INDEPENDENT
LAUNDRY: WITH SUPERVISION
ADLS_ACUITY_SCORE: 29
ADLS_ACUITY_SCORE: 29
HYGIENE/GROOMING: INDEPENDENT;SHOWER
ADLS_ACUITY_SCORE: 29

## 2022-09-26 NOTE — PLAN OF CARE
[facility] Occupational Therapy Group Intervention Note     09/26/22 1312   Group Therapy Session   Group Attendance attended group session   Time Session Began 1115   Time Session Ended 1200   Total Time patient participated (minutes) 45   Total # Attendees 8   Group Type task skill;life skill   Group Topic Covered coping skills/lifestyle management;structured socialization   Group Session Detail Clinic - coping skill exploration, creative expression within personally meaningful activities, and observation of social, cognitive, and kinesthetic performance skills   Patient Response/Contribution cooperative with task;organized;offered helpful suggestions to peers   Patient Response Detail IND to initiate and follow-through with a familiar task; demonstrated adequate attention, sequencing, and organization. Pleasant and appropriate. Helpful to peers as he was supportive of their projects.      Madeline Rosenberg OT on 9/26/2022 at 1:12 PM

## 2022-09-26 NOTE — PLAN OF CARE
[facility] Occupational Therapy Group Intervention Note     09/26/22 1329   Group Therapy Session   Group Attendance attended group session   Time Session Began 1015   Time Session Ended 1100   Total Time patient participated (minutes) 45   Total # Attendees 4   Group Type task skill;psychoeducation   Group Topic Covered coping skills/lifestyle management;self-care activities   Group Session Detail Wellness and coping skill based game. Education was provided on the sensory system, mind-body connection, and the use of movement and sensory strategies for self regulation.    Patient Response/Contribution cooperative with task;organized   Patient Response Detail Congruent affect. IND to participate in guided movements and stretches. Verbally impulsive, yet, socially pleasant and engaged.      Madeline Rosenberg OT on 9/26/2022 at 1:30 PM
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
## 2022-09-26 NOTE — PLAN OF CARE
Pt presented as polite, cooperative and engaged in milieu activities throughout the day. His mood/affect is intermittently tense and he does present with rapid speech at times but he has not had any behavioral outbursts or issues with aggression. He has kept himself busy today by making phone calls and seeking staff assistance with sending faxes regarding his court hearings and college plans. He discussed some of his goals for the future with writer which include focusing more on improving his physical health and going to school to get a liberal arts degree. No safety concerns at this time.     Problem: Plan of Care - These are the overarching goals to be used throughout the patient stay.    Goal: Plan of Care Review/Shift Note  Description: The Plan of Care Review/Shift note should be completed every shift.  The Outcome Evaluation is a brief statement about your assessment that the patient is improving, declining, or no change.  This information will be displayed automatically on your shift note.  Recent Flowsheet Documentation  Taken 9/26/2022 1303 by Jannette Us  Plan of Care Reviewed With: patient

## 2022-09-26 NOTE — PLAN OF CARE
Assessment/Intervention/Current Symtoms and Care Coordination  Met with team. Reviewed patient's chart and progress notes.     - Writer checked in with patient. Patient requested contact information for [facility] court. Writer provided patient with court house phone number for [facility].     - Writer requested court laptop for extending Civil Commitment hearing on 10/4/2022 at 10:30 AM.         Discharge Plan or Goal  Pending stabilization & development of a safe discharge plan.  [facility]      Barriers to Discharge  Patient requires further psychiatric stabilization due to current symptomology. Door to door transfer. PD revoked.      Referral Status   None today        Legal Status  Judicial Court Hold

## 2022-09-27 LAB — SARS-COV-2 RNA RESP QL NAA+PROBE: NEGATIVE

## 2022-09-27 PROCEDURE — 250N000013 HC RX MED GY IP 250 OP 250 PS 637

## 2022-09-27 PROCEDURE — 99232 SBSQ HOSP IP/OBS MODERATE 35: CPT

## 2022-09-27 PROCEDURE — U0003 INFECTIOUS AGENT DETECTION BY NUCLEIC ACID (DNA OR RNA); SEVERE ACUTE RESPIRATORY SYNDROME CORONAVIRUS 2 (SARS-COV-2) (CORONAVIRUS DISEASE [COVID-19]), AMPLIFIED PROBE TECHNIQUE, MAKING USE OF HIGH THROUGHPUT TECHNOLOGIES AS DESCRIBED BY CMS-2020-01-R: HCPCS

## 2022-09-27 PROCEDURE — H2032 ACTIVITY THERAPY, PER 15 MIN: HCPCS

## 2022-09-27 PROCEDURE — G0177 OPPS/PHP; TRAIN & EDUC SERV: HCPCS

## 2022-09-27 PROCEDURE — 124N000002 HC R&B MH UMMC

## 2022-09-27 PROCEDURE — 250N000013 HC RX MED GY IP 250 OP 250 PS 637: Performed by: PSYCHIATRY & NEUROLOGY

## 2022-09-27 PROCEDURE — 250N000013 HC RX MED GY IP 250 OP 250 PS 637: Performed by: EMERGENCY MEDICINE

## 2022-09-27 RX ADMIN — NICOTINE POLACRILEX 4 MG: 4 GUM, CHEWING BUCCAL at 08:39

## 2022-09-27 RX ADMIN — BUPROPION HYDROCHLORIDE 300 MG: 150 TABLET, EXTENDED RELEASE ORAL at 08:36

## 2022-09-27 RX ADMIN — NICOTINE POLACRILEX 4 MG: 4 GUM, CHEWING BUCCAL at 22:06

## 2022-09-27 RX ADMIN — NICOTINE POLACRILEX 4 MG: 4 GUM, CHEWING BUCCAL at 17:31

## 2022-09-27 RX ADMIN — Medication 15 ML: at 10:21

## 2022-09-27 RX ADMIN — DIVALPROEX SODIUM 1000 MG: 500 TABLET, DELAYED RELEASE ORAL at 19:32

## 2022-09-27 RX ADMIN — NICOTINE POLACRILEX 4 MG: 4 GUM, CHEWING BUCCAL at 10:21

## 2022-09-27 RX ADMIN — HYDROCHLOROTHIAZIDE 12.5 MG: 12.5 TABLET ORAL at 08:36

## 2022-09-27 RX ADMIN — NICOTINE POLACRILEX 4 MG: 4 GUM, CHEWING BUCCAL at 11:28

## 2022-09-27 RX ADMIN — OLANZAPINE 10 MG: 10 TABLET, FILM COATED ORAL at 08:35

## 2022-09-27 RX ADMIN — NICOTINE POLACRILEX 4 MG: 4 GUM, CHEWING BUCCAL at 19:32

## 2022-09-27 RX ADMIN — HYDROXYZINE HYDROCHLORIDE 25 MG: 25 TABLET, FILM COATED ORAL at 19:33

## 2022-09-27 RX ADMIN — DIVALPROEX SODIUM 1000 MG: 500 TABLET, DELAYED RELEASE ORAL at 08:35

## 2022-09-27 RX ADMIN — OLANZAPINE 20 MG: 10 TABLET, FILM COATED ORAL at 19:32

## 2022-09-27 RX ADMIN — NICOTINE 1 PATCH: 14 PATCH, EXTENDED RELEASE TRANSDERMAL at 08:36

## 2022-09-27 ASSESSMENT — ACTIVITIES OF DAILY LIVING (ADL)
ADLS_ACUITY_SCORE: 29
HYGIENE/GROOMING: INDEPENDENT
ORAL_HYGIENE: INDEPENDENT
ADLS_ACUITY_SCORE: 29
DRESS: INDEPENDENT
ADLS_ACUITY_SCORE: 29
DRESS: INDEPENDENT
LAUNDRY: WITH SUPERVISION
HYGIENE/GROOMING: INDEPENDENT
LAUNDRY: WITH SUPERVISION
ORAL_HYGIENE: INDEPENDENT
ADLS_ACUITY_SCORE: 29

## 2022-09-27 NOTE — PLAN OF CARE
Assessment/Intervention/Current Symtoms and Care Coordination  Met with team. Reviewed patient's chart and progress notes.     - Writer checked in with patient. He informed this writer that his CM, Jose Durham may come to see him next month since she has not met with him recently. Writer informed patient that writer will confirm with CM if she is planning on visiting patient while he is hospitalized.         Discharge Plan or Goal  Pending stabilization & development of a safe discharge plan.  [facility]      Barriers to Discharge  Patient requires further psychiatric stabilization due to current symptomology. Door to door transfer. PD revoked.      Referral Status   None today        Legal Status  Judicial Court Hold
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
## 2022-09-27 NOTE — PLAN OF CARE
"No PRNs given or requested.  Pt refused to let this writer do the Covid swab test - noted to be irritated when woken up for the swab-  stated, \" I'll do it in morning when I'm fully awake\" . Denied pain, denied any mental health symptoms at this time. Pt appears to have slept for 6.75 hours. Will continue to monitor and offer support.    Problem: Sleep Disturbance  Goal: Adequate Sleep/Rest  Outcome: Ongoing, Progressing   Goal Outcome Evaluation:                      "

## 2022-09-27 NOTE — PLAN OF CARE
Occupational Therapy Group Note:     09/27/22 1057   Group Therapy Session   Group Attendance attended group session   Time Session Began 1020   Time Session Ended 1115   Total Time patient participated (minutes) 50   Total # Attendees 6-8   Group Type expressive therapy   Group Topic Covered coping skills/lifestyle management   Group Session Detail OT clinic   Patient Response/Contribution cooperative with task;organized   Patient Response Detail Pt actively participated in occupational therapy clinic to facilitate coping skill exploration, creative expression within personally meaningful activities, and clinical observation of social, cognitive, and kinesthetic performance skills. Pt response: Independent to initiate, gather materials, sequence, and adjust to workspace demands as needed. Demonstrated good focus, planning, and attention to detail for selected creative expression task. Able to ask for assistance as needed, and socialized with peers and staff. Intermittently and appropriately requested feedback in planning out the details of his task, and verbalized excitement with how his project turned out. Pleasant and engaged throughout this group.

## 2022-09-27 NOTE — PLAN OF CARE
Pt presented as overall calm and pleasant today. He has been compliant with his medications and has been intermittently social with select peers. He appears less tense than previous days and has been patient when waiting for requests/needs to be met. No behavioral or safety concerns today.     Problem: Behavioral Health Plan of Care  Goal: Plan of Care Review  Recent Flowsheet Documentation  Taken 9/27/2022 1057 by Jannette Us  Plan of Care Reviewed With: patient  Patient Agreement with Plan of Care: agrees

## 2022-09-27 NOTE — PLAN OF CARE
Problem: Violence Risk or Actual  Goal: Anger and Impulse Control  Outcome: Ongoing, Progressing  Intervention: Minimize Safety Risk  Recent Flowsheet Documentation  Taken 9/26/2022 1621 by Leandro Merlos RN  Sensory Stimulation Regulation: quiet environment promoted  Intervention: Promote Self-Control  Recent Flowsheet Documentation  Taken 9/26/2022 1621 by Leandro Merlos RN  Supportive Measures:   positive reinforcement provided   relaxation techniques promoted     Problem: Behavioral Health Plan of Care  Goal: Optimized Coping Skills in Response to Life Stressors  Outcome: Ongoing, Progressing  Intervention: Promote Effective Coping Strategies  Recent Flowsheet Documentation  Taken 9/26/2022 1621 by Leandro Merlos RN  Supportive Measures:   positive reinforcement provided   relaxation techniques promoted  No violent or adverse behavior noted. Patient is calm, pleasant, cooperative and polite on approach.  He is out in the milieu most of the evening socializing with peers, playing bingo,  and watching football. Denies SI, HI, SIB, and other psych symptoms.  He is alert and oriented x4 and appetite is good as evidence by patient eating 100% of dinner and HS snacks.  Patient is medication compliant and no psychosis present. Will continue to monitor.

## 2022-09-28 PROCEDURE — 124N000002 HC R&B MH UMMC

## 2022-09-28 PROCEDURE — 250N000013 HC RX MED GY IP 250 OP 250 PS 637

## 2022-09-28 PROCEDURE — 250N000013 HC RX MED GY IP 250 OP 250 PS 637: Performed by: PSYCHIATRY & NEUROLOGY

## 2022-09-28 PROCEDURE — 99232 SBSQ HOSP IP/OBS MODERATE 35: CPT

## 2022-09-28 PROCEDURE — G0177 OPPS/PHP; TRAIN & EDUC SERV: HCPCS

## 2022-09-28 PROCEDURE — 250N000013 HC RX MED GY IP 250 OP 250 PS 637: Performed by: EMERGENCY MEDICINE

## 2022-09-28 RX ADMIN — NICOTINE POLACRILEX 4 MG: 4 GUM, CHEWING BUCCAL at 20:51

## 2022-09-28 RX ADMIN — MELATONIN TAB 3 MG 3 MG: 3 TAB at 21:34

## 2022-09-28 RX ADMIN — HYDROCHLOROTHIAZIDE 12.5 MG: 12.5 TABLET ORAL at 08:00

## 2022-09-28 RX ADMIN — NICOTINE POLACRILEX 4 MG: 4 GUM, CHEWING BUCCAL at 09:56

## 2022-09-28 RX ADMIN — DIVALPROEX SODIUM 1000 MG: 500 TABLET, DELAYED RELEASE ORAL at 08:00

## 2022-09-28 RX ADMIN — NICOTINE POLACRILEX 4 MG: 4 GUM, CHEWING BUCCAL at 18:50

## 2022-09-28 RX ADMIN — NICOTINE POLACRILEX 4 MG: 4 GUM, CHEWING BUCCAL at 08:00

## 2022-09-28 RX ADMIN — NICOTINE 1 PATCH: 14 PATCH, EXTENDED RELEASE TRANSDERMAL at 08:00

## 2022-09-28 RX ADMIN — OLANZAPINE 20 MG: 10 TABLET, FILM COATED ORAL at 19:26

## 2022-09-28 RX ADMIN — NICOTINE POLACRILEX 4 MG: 4 GUM, CHEWING BUCCAL at 16:09

## 2022-09-28 RX ADMIN — NICOTINE POLACRILEX 4 MG: 4 GUM, CHEWING BUCCAL at 17:15

## 2022-09-28 RX ADMIN — DIVALPROEX SODIUM 1000 MG: 500 TABLET, DELAYED RELEASE ORAL at 19:26

## 2022-09-28 RX ADMIN — OLANZAPINE 10 MG: 10 TABLET, FILM COATED ORAL at 08:00

## 2022-09-28 RX ADMIN — NICOTINE POLACRILEX 4 MG: 4 GUM, CHEWING BUCCAL at 11:19

## 2022-09-28 RX ADMIN — BUPROPION HYDROCHLORIDE 300 MG: 150 TABLET, EXTENDED RELEASE ORAL at 08:00

## 2022-09-28 ASSESSMENT — ACTIVITIES OF DAILY LIVING (ADL)
DRESS: INDEPENDENT
ADLS_ACUITY_SCORE: 29
LAUNDRY: WITH SUPERVISION
ADLS_ACUITY_SCORE: 29
ADLS_ACUITY_SCORE: 29
ORAL_HYGIENE: INDEPENDENT
ADLS_ACUITY_SCORE: 29
HYGIENE/GROOMING: INDEPENDENT
ADLS_ACUITY_SCORE: 29
ADLS_ACUITY_SCORE: 29
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
## 2022-09-28 NOTE — PLAN OF CARE
Pt appears to have slept for 7 hours. No PRNs given or requested.  No concerns noted this shift. Will continue to monitor an offer support.     Problem: Sleep Disturbance  Goal: Adequate Sleep/Rest  Outcome: Ongoing, Progressing   Goal Outcome Evaluation:

## 2022-09-28 NOTE — PROGRESS NOTES
09/27/22 1700   Group Therapy Session   Group Attendance attended group session   Time Session Began 2000   Time Session Ended 2100   Total Time patient participated (minutes) 60   Total # Attendees 6   Group Type expressive therapy   Group Topic Covered emotions/expression   Patient Response/Contribution cooperative with task   AT directive was to create a personal intention collage using collage and mixed media art materials.  Goals of directive: to choose a personal intention  (or personal goal) for the day and/or week and to create a collage based on intention, to identify personal strengths and goals, emotional expression, future-focused directive.  Pt was an engaged participant, focused on task for the full duration of group. Pt chose several personal intentions from a handout of example intentions including: love, happiness, courage, relationships. Pt talked about the importance of family in his life and added imagery to symbolize a family tree. Pts mood was calm, pleasant participant.

## 2022-09-28 NOTE — PROGRESS NOTES
Pt was engaged in dance/movement therapy (D/MT) with movement metaphors and vitalizing dance.  Pt acknowledged his own progress and verbalized goals for continuing treatment.       09/28/22 1015   Expressive Therapy   Therapy Type dance/movement   Minutes of Treatment 50

## 2022-09-28 NOTE — PLAN OF CARE
Assessment/Intervention/Current Symtoms and Care Coordination  Met with team. Reviewed patient's chart and progress notes.      - Writer checked in with patient. No behavioral concerns noted.    - Writer received a call from [facility] restricted recipient coordinator with Elías Rubio at [phone]. Writer provided update on aftercare disposition. Writer will contact [facility] coordinator when discharge date is known.     - CM will visit patient on October 7th at 11:30 AM.        Discharge Plan or Goal  Pending stabilization & development of a safe discharge plan.  [facility] [facility]      Barriers to Discharge  Patient requires further psychiatric stabilization due to current symptomology. Door to door transfer. PD revoked.      Referral Status   None today        Legal Status  Judicial Court Hold

## 2022-09-28 NOTE — PLAN OF CARE
[facility] Occupational Therapy Group Intervention Note     09/28/22 1244   Group Therapy Session   Group Attendance attended group session   Time Session Began 1115   Time Session Ended 1215   Total Time patient participated (minutes) 60   Total # Attendees 3   Group Type task skill;recreation;life skill   Group Topic Covered coping skills/lifestyle management;leisure exploration/use of leisure time;structured socialization;cognitive activities   Group Session Detail Leisure exploration and participation group offered for increased intrinsic motivation to engage in social, non-obligatory occupations via a group game. Structured group was used to promote positive milieu interaction and collaboration.    Patient Response/Contribution cooperative with task;organized;listened actively;offered helpful suggestions to peers   Patient Response Detail Full participation with a bright affect. Social with peers; provided encouragement within group game. Reportedly enjoyed this activity and it's challenge to cognition.       Madeline Rosenberg OT on 9/28/2022 at 12:45 PM

## 2022-09-28 NOTE — PLAN OF CARE
Problem: Behavioral Health Plan of Care  Goal: Plan of Care Review  Outcome: Ongoing, Progressing  Flowsheets (Taken 9/28/2022 1005)  Plan of Care Reviewed With: patient  Overall Patient Progress: no change  Patient Agreement with Plan of Care: agrees  Goal: Adheres to Safety Considerations for Self and Others  Outcome: Ongoing, Progressing  Flowsheets (Taken 9/28/2022 1005)  Adheres to Safety Considerations for Self and Others: making progress toward outcome  Goal: Develops/Participates in Therapeutic [place] to Support Successful Transition  Outcome: Ongoing, Progressing  Flowsheets (Taken 9/28/2022 1005)  Develops/Participates in Therapeutic [place] to Support Successful Transition: making progress toward outcome   Goal Outcome Evaluation:    Plan of Care Reviewed With: patient     Overall Patient Progress: no change  Patient alert and oriented x 4.Able to verbalize needs.Patient has been visible in the milieu,sociable with peers as well as staff.Pt presents with full range affect.Mood is calm.Participated in groups.No behaviors noted,no safety issues/concerns.Nicotine gum given x 2 this morning.medication compliant.No PRN.  Temp: 97.8  F (36.6  C) Temp src: Oral BP: 128/84 Pulse: 65   Resp: 18 SpO2: 98 % O2 Device: None (Room air)

## 2022-09-28 NOTE — PLAN OF CARE
Pt took a nap before dinner. Pt came out for dinner and ate 100% and after dinner pt remained in the lounge watching tv with peers. Pt presented with full range affect. Pt denies suicidal ideation and hallucinations. Pt denies anxiety and hallucinations. Denied pain or discomfort. No behaviors noted. Compliant with medications. Will monitor.    Problem: Plan of Care - These are the overarching goals to be used throughout the patient stay.    Goal: Readiness for Transition of Care  Outcome: Ongoing, Progressing     Problem: Fall Injury Risk  Goal: Absence of Fall and Fall-Related Injury  Outcome: Ongoing, Progressing     Problem: Suicidal Behavior  Goal: Suicidal Behavior is Absent or Managed  Outcome: Ongoing, Progressing   Goal Outcome Evaluation:

## 2022-09-29 PROCEDURE — 99232 SBSQ HOSP IP/OBS MODERATE 35: CPT

## 2022-09-29 PROCEDURE — 250N000013 HC RX MED GY IP 250 OP 250 PS 637: Performed by: PSYCHIATRY & NEUROLOGY

## 2022-09-29 PROCEDURE — 250N000013 HC RX MED GY IP 250 OP 250 PS 637

## 2022-09-29 PROCEDURE — G0177 OPPS/PHP; TRAIN & EDUC SERV: HCPCS

## 2022-09-29 PROCEDURE — 250N000013 HC RX MED GY IP 250 OP 250 PS 637: Performed by: EMERGENCY MEDICINE

## 2022-09-29 PROCEDURE — 124N000002 HC R&B MH UMMC

## 2022-09-29 RX ADMIN — DIVALPROEX SODIUM 1000 MG: 500 TABLET, DELAYED RELEASE ORAL at 19:50

## 2022-09-29 RX ADMIN — NICOTINE POLACRILEX 4 MG: 4 GUM, CHEWING BUCCAL at 18:50

## 2022-09-29 RX ADMIN — NICOTINE POLACRILEX 4 MG: 4 GUM, CHEWING BUCCAL at 14:15

## 2022-09-29 RX ADMIN — NICOTINE POLACRILEX 4 MG: 4 GUM, CHEWING BUCCAL at 16:09

## 2022-09-29 RX ADMIN — NICOTINE POLACRILEX 4 MG: 4 GUM, CHEWING BUCCAL at 19:51

## 2022-09-29 RX ADMIN — HYDROCHLOROTHIAZIDE 12.5 MG: 12.5 TABLET ORAL at 07:51

## 2022-09-29 RX ADMIN — NICOTINE POLACRILEX 4 MG: 4 GUM, CHEWING BUCCAL at 10:14

## 2022-09-29 RX ADMIN — BUPROPION HYDROCHLORIDE 300 MG: 150 TABLET, EXTENDED RELEASE ORAL at 07:51

## 2022-09-29 RX ADMIN — NICOTINE POLACRILEX 4 MG: 4 GUM, CHEWING BUCCAL at 20:58

## 2022-09-29 RX ADMIN — OLANZAPINE 20 MG: 10 TABLET, FILM COATED ORAL at 19:50

## 2022-09-29 RX ADMIN — NICOTINE POLACRILEX 4 MG: 4 GUM, CHEWING BUCCAL at 07:51

## 2022-09-29 RX ADMIN — NICOTINE 1 PATCH: 14 PATCH, EXTENDED RELEASE TRANSDERMAL at 07:51

## 2022-09-29 RX ADMIN — DIVALPROEX SODIUM 1000 MG: 500 TABLET, DELAYED RELEASE ORAL at 07:51

## 2022-09-29 RX ADMIN — OLANZAPINE 10 MG: 10 TABLET, FILM COATED ORAL at 07:51

## 2022-09-29 RX ADMIN — NICOTINE POLACRILEX 4 MG: 4 GUM, CHEWING BUCCAL at 13:05

## 2022-09-29 ASSESSMENT — ACTIVITIES OF DAILY LIVING (ADL)
ADLS_ACUITY_SCORE: 29
ADLS_ACUITY_SCORE: 29
ORAL_HYGIENE: INDEPENDENT
ADLS_ACUITY_SCORE: 29
LAUNDRY: WITH SUPERVISION
HYGIENE/GROOMING: HANDWASHING;SHOWER;INDEPENDENT
DRESS: SCRUBS (BEHAVIORAL HEALTH);INDEPENDENT
ADLS_ACUITY_SCORE: 29

## 2022-09-29 NOTE — PLAN OF CARE
[facility] Occupational Therapy Group Intervention Note     09/29/22 1409   Group Therapy Session   Group Attendance attended group session   Time Session Began 1315   Time Session Ended 1400   Total Time patient participated (minutes) 45   Total # Attendees 3   Group Type psychoeducation   Group Topic Covered coping skills/lifestyle management;relapse prevention;relaxation techniques   Group Session Detail Guided discussion and utilization of calming / grounding activities for self regulation. Education was provided on various types of sensory modalities and use within daily routine. Followed by visit with canine therapist.    Patient Response/Contribution cooperative with task;discussed personal experience with topic;listened actively;offered helpful suggestions to peers   Patient Response Detail Pt was receptive to information and use of modalities including engagement with canine therapist. Brightened affect; engaged in discussion. Loosely able to apply concepts to self. Expressed looking forward to discharge plans.      Madeline Rosenberg OT on 9/29/2022 at 2:09 PM

## 2022-09-29 NOTE — PLAN OF CARE
Pt was visible in the milieu. Pt was in the lounge watching tv and socializing with peers. Pt presents with full range affect. Denies suicidal ideations and hallucinations. No anxiety or depression. Pt took prn melatonin for sleep at HS. Ate supper 100%. Compliant with medications.    Problem: Fall Injury Risk  Goal: Absence of Fall and Fall-Related Injury  Outcome: Ongoing, Progressing     Problem: Violence Risk or Actual  Goal: Anger and Impulse Control  Outcome: Ongoing, Progressing     Problem: Suicidal Behavior  Goal: Suicidal Behavior is Absent or Managed  Outcome: Ongoing, Progressing   Goal Outcome Evaluation:

## 2022-09-29 NOTE — PLAN OF CARE
Pt appeared to sleep for 7 hours overnight. No PRN medications were administered, and no concerns were noted.    Problem: Sleep Disturbance  Goal: Adequate Sleep/Rest  Outcome: Ongoing, Progressing

## 2022-09-29 NOTE — PROGRESS NOTES
"   09/28/22 2100   Group Therapy Session   Time Session Began 2000   Time Session Ended 2100   Total Time patient participated (minutes) 55   Total # Attendees 4-5   Group Type expressive therapy   Group Topic Covered cognitive activities;coping skills/lifestyle management;community integration   Group Session Detail Music Bingo: Doubleheader   Patient Response/Contribution cooperative with task   Patient Response Detail Participated in Music Therapy intervention of [employer] today.  Goals of session were focusing, memory recall, positive distraction, emotional containment and social cohesion.  Pt response was highly energetic, engaged and cooperative.     When he won [employer], and was given the option to choose a song for his prize, he chose a song that \"is about the worst day of my life-because it reminds me I never want to go back\" , which peers thanked him for sharing and found inspirational.     He had a \"bouncy\" energy to him and tended to overdisclose at times, but did accept redirection and was considerate of others in group.        "

## 2022-09-29 NOTE — PLAN OF CARE
[facility] Occupational Therapy Group Intervention Note     09/29/22 1306   Group Therapy Session   Group Attendance attended group session   Time Session Began 1115   Time Session Ended 1200   Total Time patient participated (minutes) 45   Total # Attendees 5   Group Type task skill;life skill   Group Topic Covered coping skills/lifestyle management;structured socialization   Group Session Detail clinic - coping skill exploration, creative expression within personally meaningful activities, and observation of social, cognitive, and kinesthetic performance skills   Patient Response/Contribution cooperative with task;listened actively;offered helpful suggestions to peers   Patient Response Detail Bright affect. Demonstrated congruent performance and social engagement as previous dates. Expressed pride in completed canvas art; accepting of positive feedback from peers.      Madeline Rosenberg OT on 9/29/2022 at 1:07 PM
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
## 2022-09-29 NOTE — PLAN OF CARE
Problem: Plan of Care - These are the overarching goals to be used throughout the patient stay.    Goal: Plan of Care Review/Shift Note  Description: The Plan of Care Review/Shift note should be completed every shift.  The Outcome Evaluation is a brief statement about your assessment that the patient is improving, declining, or no change.  This information will be displayed automatically on your shift note.  Outcome: Ongoing, Progressing  Flowsheets (Taken 9/29/2022 5087)  Plan of Care Reviewed With: patient  Overall Patient Progress: improving     Problem: Sleep Disturbance  Goal: Adequate Sleep/Rest  Outcome: Ongoing, Progressing   Goal Outcome Evaluation:    Plan of Care Reviewed With: patient     Overall Patient Progress: improving  Patient has been visible in the milieu.Attended groups.Sociable with peers.Compliant with medication.No PRN given this shift.Denies suicidal thoughts,no behaviors.Pt  sociable with peers.Mood is calm.Affect is bright.Denies Hallucinations.  Temp: 98  F (36.7  C) Temp src: Oral BP: 131/80 Pulse: 73   Resp: 18 SpO2: 97 % O2 Device: None (Room air)

## 2022-09-30 PROCEDURE — 250N000013 HC RX MED GY IP 250 OP 250 PS 637: Performed by: PSYCHIATRY & NEUROLOGY

## 2022-09-30 PROCEDURE — 124N000002 HC R&B MH UMMC

## 2022-09-30 PROCEDURE — 250N000013 HC RX MED GY IP 250 OP 250 PS 637

## 2022-09-30 PROCEDURE — 99232 SBSQ HOSP IP/OBS MODERATE 35: CPT

## 2022-09-30 PROCEDURE — 250N000013 HC RX MED GY IP 250 OP 250 PS 637: Performed by: EMERGENCY MEDICINE

## 2022-09-30 RX ADMIN — BUPROPION HYDROCHLORIDE 300 MG: 150 TABLET, EXTENDED RELEASE ORAL at 08:13

## 2022-09-30 RX ADMIN — OLANZAPINE 20 MG: 10 TABLET, FILM COATED ORAL at 19:51

## 2022-09-30 RX ADMIN — HYDROCHLOROTHIAZIDE 12.5 MG: 12.5 TABLET ORAL at 08:13

## 2022-09-30 RX ADMIN — NICOTINE POLACRILEX 4 MG: 4 GUM, CHEWING BUCCAL at 16:20

## 2022-09-30 RX ADMIN — DIVALPROEX SODIUM 1000 MG: 500 TABLET, DELAYED RELEASE ORAL at 08:12

## 2022-09-30 RX ADMIN — NICOTINE 1 PATCH: 14 PATCH, EXTENDED RELEASE TRANSDERMAL at 08:11

## 2022-09-30 RX ADMIN — NICOTINE POLACRILEX 4 MG: 4 GUM, CHEWING BUCCAL at 21:23

## 2022-09-30 RX ADMIN — DIVALPROEX SODIUM 1000 MG: 500 TABLET, DELAYED RELEASE ORAL at 19:51

## 2022-09-30 RX ADMIN — OLANZAPINE 10 MG: 10 TABLET, FILM COATED ORAL at 08:13

## 2022-09-30 RX ADMIN — NICOTINE POLACRILEX 4 MG: 4 GUM, CHEWING BUCCAL at 08:13

## 2022-09-30 RX ADMIN — NICOTINE POLACRILEX 4 MG: 4 GUM, CHEWING BUCCAL at 19:03

## 2022-09-30 RX ADMIN — NICOTINE POLACRILEX 4 MG: 4 GUM, CHEWING BUCCAL at 10:01

## 2022-09-30 RX ADMIN — NICOTINE POLACRILEX 4 MG: 4 GUM, CHEWING BUCCAL at 17:59

## 2022-09-30 ASSESSMENT — ACTIVITIES OF DAILY LIVING (ADL)
ADLS_ACUITY_SCORE: 29
DRESS: INDEPENDENT;STREET CLOTHES
ADLS_ACUITY_SCORE: 29
HYGIENE/GROOMING: INDEPENDENT
ADLS_ACUITY_SCORE: 29
LAUNDRY: WITH SUPERVISION
ORAL_HYGIENE: INDEPENDENT
ORAL_HYGIENE: INDEPENDENT
HYGIENE/GROOMING: INDEPENDENT
ADLS_ACUITY_SCORE: 29
DRESS: INDEPENDENT;SCRUBS (BEHAVIORAL HEALTH);STREET CLOTHES
ADLS_ACUITY_SCORE: 29

## 2022-09-30 NOTE — PLAN OF CARE
Problem: Plan of Care - These are the overarching goals to be used throughout the patient stay.    Goal: Plan of Care Review/Shift Note  Description: The Plan of Care Review/Shift note should be completed every shift.  The Outcome Evaluation is a brief statement about your assessment that the patient is improving, declining, or no change.  This information will be displayed automatically on your shift note.  Recent Flowsheet Documentation  Taken 9/30/2022 1630 by Angelo Cruz RN  Plan of Care Reviewed With: patient     Problem: Plan of Care - These are the overarching goals to be used throughout the patient stay.    Goal: Absence of Hospital-Acquired Illness or Injury  Intervention: Prevent Skin Injury  Recent Flowsheet Documentation  Taken 9/30/2022 1630 by Angelo Cruz RN  Body Position: position changed independently     Problem: Fall Injury Risk  Goal: Absence of Fall and Fall-Related Injury  Outcome: Ongoing, Progressing     Problem: Suicidal Behavior  Goal: Suicidal Behavior is Absent or Managed  Outcome: Ongoing, Progressing  Flowsheets (Taken 9/30/2022 1849)  Mutually Determined Action Steps (Suicidal Behavior Absent/Managed): other (see comments)  Individualized Action Step (Suicidal Behavior Absent/Managed): No Si     Problem: Depression  Goal: Improved Mood  Outcome: Ongoing, Progressing     Problem: Anxiety  Goal: Anxiety Reduction or Resolution  Outcome: Ongoing, Progressing   Goal Outcome Evaluation:    Plan of Care Reviewed With: patient      Patient denied any anxiety or depression, denied SI/HI, contracted for safety. Pleasant, cooperative and compliant with medication. He denied pain or discomfort. Spent most of his time in milieu watching tv, he was social and interactive with peers and staff members.      PharmD Consult received for:  1.) Admit medication history/reconciliation: Completed. Please see pharmacy medication reconciliation note completed by Maylin Mccloud on 5/18    2.) Renally adjust all medications:  Estimated Creatinine Clearance: 10.6 mL/min (A) (based on SCr of 6.9 mg/dL (H)).   ESRD on HD, received HD 5/19  Medications reviewed, no dose adjustments needed    Re-timed medications as per consult: Carvedilol 0800 Hydralazine 0800 Nifedipine 0800 Calcitrol 0800 Hydralazine 1200 Carvedilol 2000 Hydralazine 2000 Nifedipine 2000 Asprin 2000 statin 2000   Sevelamer TID after meals      Pharmacy will sign off, please re-consult as needed.    Thank you for the consult,  Gabi Castellon, PharmD, [facility]  Internal Medicine Clinical Pharmacy Specialist   Ext [phone]    **Note: Consults are reviewed Monday-Friday 7:00am-3:30pm. The above recommendations are only suggested. The recommendations should be considered in conjunction with all patient factors.**

## 2022-09-30 NOTE — PLAN OF CARE
Patient appeared to be sleeping most of the night, awake at 02:30 and requested for snack and returned to his room. Patient slept for 6.75 hours this shift, no PRN given. Continue plan of care  Problem: Sleep Disturbance  Goal: Adequate Sleep/Rest  Outcome: Ongoing, Progressing   Goal Outcome Evaluation:

## 2022-09-30 NOTE — PLAN OF CARE
Assessment/Intervention/Current Symtoms and Care Coordination  Met with team. Reviewed patient's chart and progress notes.      - Writer checked in with patient. No behavioral concerns noted.           Discharge Plan or Goal  Pending stabilization & development of a safe discharge plan.  Considerations include: [facility] placement      Barriers to Discharge  Patient requires further psychiatric stabilization due to current symptomology. Door to door transfer. PD revoked.      Referral Status   None today        Legal Status  Judicial Court Hold

## 2022-09-30 NOTE — PLAN OF CARE
Problem: Plan of Care - These are the overarching goals to be used throughout the patient stay.    Goal: Plan of Care Review/Shift Note  Description: The Plan of Care Review/Shift note should be completed every shift.  The Outcome Evaluation is a brief statement about your assessment that the patient is improving, declining, or no change.  This information will be displayed automatically on your shift note.  9/29/2022 2026 by Lyubov Mckeon, RN  Outcome: Ongoing, Progressing  9/29/2022 1803 by Lyubov Mckeon RN  Outcome: Ongoing, Progressing  Flowsheets (Taken 9/29/2022 1734)  Plan of Care Reviewed With: patient     Problem: Suicidal Behavior  Goal: Suicidal Behavior is Absent or Managed  9/29/2022 1803 by Lyubov Mckeon RN  Outcome: Ongoing, Progressing   Goal Outcome Evaluation:    Plan of Care Reviewed With: patient        Goal Outcome Evaluation:     Plan of Care Reviewed With: patient     Patient is visible in milieu majority of the shift. Pt is sociable and interacts with peers and staff. Presented with full range affect,  Pleasant on approach. Good intake of foods and fluids. Hygiene is appropriate. Pt  cooperative, medication compliant. PRN's Nicotine gum was given hourly. Pt denies pain and all mental health symptoms. Pt contracts for safety. Pt has an order for soft care mattress and consult for OT to see him on Monday 10/3.    NURSING ASSESSMENT  Pain: denies  Anxiety: denies  Depression: denies  SI: denies  SIB: denies  HI: denies  AVH: denies, did not appear to be responding to internal stimuli, did not demonstrate delusional thinking.  Mood/Affect: full range affect  Medication: compliant, no side effects reported   ADLs: independent   Visits: none  Vitals: WNL   Medical:
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
## 2022-09-30 NOTE — PLAN OF CARE
Problem: Plan of Care - These are the overarching goals to be used throughout the patient stay.    Goal: Absence of Hospital-Acquired Illness or Injury  Outcome: Ongoing, Progressing  Intervention: Identify and Manage Fall Risk  Recent Flowsheet Documentation  Taken 9/30/2022 0805 by Geovanni Chen, RN  Safety Promotion/Fall Prevention:    activity supervised    clutter free environment maintained    nonskid shoes/slippers when out of bed  Intervention: Prevent Skin Injury  Recent Flowsheet Documentation  Taken 9/30/2022 0805 by Geovanni Chen RN  Body Position: position changed independently  Skin Protection: protective footwear used   Goal Outcome Evaluation:    Plan of Care Reviewed With: patient      Pt alert and oriented x4, able to make needs known and cooperated with medications and no medications side effects observed or reported. Normal breathing pattern and pt denies nor expressed shortness of breath. Pt present in the milieu, socialized with peers and attended and participated in group activities with brighter mood. Speech loud and maintained eye contact during assessment.  Pt denies all mental health psych symptoms and contracted for safety. No outburst behavior observed or reported and safety checks completed per protocol.

## 2022-10-01 PROCEDURE — 250N000013 HC RX MED GY IP 250 OP 250 PS 637: Performed by: PSYCHIATRY & NEUROLOGY

## 2022-10-01 PROCEDURE — 250N000013 HC RX MED GY IP 250 OP 250 PS 637: Performed by: EMERGENCY MEDICINE

## 2022-10-01 PROCEDURE — 250N000013 HC RX MED GY IP 250 OP 250 PS 637

## 2022-10-01 PROCEDURE — 124N000002 HC R&B MH UMMC

## 2022-10-01 RX ADMIN — MELATONIN TAB 3 MG 3 MG: 3 TAB at 19:41

## 2022-10-01 RX ADMIN — NICOTINE POLACRILEX 4 MG: 4 GUM, CHEWING BUCCAL at 11:38

## 2022-10-01 RX ADMIN — NICOTINE POLACRILEX 4 MG: 4 GUM, CHEWING BUCCAL at 16:10

## 2022-10-01 RX ADMIN — DIVALPROEX SODIUM 1000 MG: 500 TABLET, DELAYED RELEASE ORAL at 08:16

## 2022-10-01 RX ADMIN — NICOTINE POLACRILEX 4 MG: 4 GUM, CHEWING BUCCAL at 19:42

## 2022-10-01 RX ADMIN — NICOTINE 1 PATCH: 14 PATCH, EXTENDED RELEASE TRANSDERMAL at 08:17

## 2022-10-01 RX ADMIN — NICOTINE POLACRILEX 4 MG: 4 GUM, CHEWING BUCCAL at 12:29

## 2022-10-01 RX ADMIN — NICOTINE POLACRILEX 4 MG: 4 GUM, CHEWING BUCCAL at 18:36

## 2022-10-01 RX ADMIN — DIVALPROEX SODIUM 1000 MG: 500 TABLET, DELAYED RELEASE ORAL at 19:41

## 2022-10-01 RX ADMIN — NICOTINE POLACRILEX 4 MG: 4 GUM, CHEWING BUCCAL at 09:43

## 2022-10-01 RX ADMIN — OLANZAPINE 10 MG: 10 TABLET, FILM COATED ORAL at 08:17

## 2022-10-01 RX ADMIN — OLANZAPINE 20 MG: 10 TABLET, FILM COATED ORAL at 19:41

## 2022-10-01 RX ADMIN — NICOTINE POLACRILEX 4 MG: 4 GUM, CHEWING BUCCAL at 17:13

## 2022-10-01 RX ADMIN — BUPROPION HYDROCHLORIDE 300 MG: 150 TABLET, EXTENDED RELEASE ORAL at 08:17

## 2022-10-01 RX ADMIN — HYDROCHLOROTHIAZIDE 12.5 MG: 12.5 TABLET ORAL at 08:17

## 2022-10-01 RX ADMIN — NICOTINE POLACRILEX 4 MG: 4 GUM, CHEWING BUCCAL at 08:17

## 2022-10-01 ASSESSMENT — ACTIVITIES OF DAILY LIVING (ADL)
ADLS_ACUITY_SCORE: 29
ADLS_ACUITY_SCORE: 29
ORAL_HYGIENE: INDEPENDENT
ADLS_ACUITY_SCORE: 29
LAUNDRY: WITH SUPERVISION
ADLS_ACUITY_SCORE: 29
ADLS_ACUITY_SCORE: 29
DRESS: INDEPENDENT
HYGIENE/GROOMING: INDEPENDENT
ADLS_ACUITY_SCORE: 29

## 2022-10-01 NOTE — PLAN OF CARE
Problem: Plan of Care - These are the overarching goals to be used throughout the patient stay.    Goal: Plan of Care Review/Shift Note  Description: The Plan of Care Review/Shift note should be completed every shift.  The Outcome Evaluation is a brief statement about your assessment that the patient is improving, declining, or no change.  This information will be displayed automatically on your shift note.  Outcome: Ongoing, Progressing  Flowsheets (Taken 10/1/2022 0920)  Plan of Care Reviewed With: patient  Overall Patient Progress: improving  Goal: Optimal Comfort and Wellbeing  Outcome: Ongoing, Progressing     Problem: Violence Risk or Actual  Goal: Anger and Impulse Control  Outcome: Ongoing, Progressing     Problem: Sleep Disturbance  Goal: Adequate Sleep/Rest  Outcome: Ongoing, Progressing     Problem: Behavioral Health Plan of Care  Goal: Optimal Comfort and Wellbeing  Outcome: Ongoing, Progressing   Goal Outcome Evaluation:    Plan of Care Reviewed With: patient     Overall Patient Progress: improving  Patient has been visible in the milieu.Sociable with with peers.Able to make his needs known.Pt had a shower this morning.Adequate intake.Appears neat and well groomed.Medication compliant.No side effect noted.Pt denies depression,anxiety and all psych symptoms.Safety checks every 15 minutes completed.NO PRN given or requested.  Temp: 97.4  F (36.3  C) Temp src: Oral BP: 120/86 Pulse: 75   Resp: 16 SpO2: 96 % O2 Device: None (Room air)

## 2022-10-01 NOTE — PLAN OF CARE
Patient presented with full range affect, denies all mental health symptoms, is looking forward to discharging and turning his life around, adapting healthy living tips, coping skills and financial literacy, and impulse control, denies pain and discomfort, was calm and cooperative with staff, social with others, medication compliant, hygiene and nutrition were adequate, reported good food intake, hydration is adequate, denies anxiety but endorsed mild depressive symptoms but managed with medications, reported your mother is bringing soda and cookies for the next place he is discharging to.   Problem: Violence Risk or Actual  Goal: Anger and Impulse Control  Outcome: Ongoing, Progressing  Intervention: Minimize Safety Risk  Recent Flowsheet Documentation  Taken 10/1/2022 1640 by Kelly Buitrago RN  Behavior Management: impulse control promoted     Problem: Suicidal Behavior  Goal: Suicidal Behavior is Absent or Managed  Outcome: Ongoing, Progressing  Flowsheets (Taken 10/1/2022 1643)  Individualized Action Step (Suicidal Behavior Absent/Managed): denies suicidal ideations     Problem: Depression  Goal: Improved Mood  Outcome: Ongoing, Progressing     Problem: Anxiety  Goal: Anxiety Reduction or Resolution  Outcome: Ongoing, Progressing     Problem: Behavior Regulation Impairment (Psychotic Signs/Symptoms)  Goal: Improved Behavioral Control (Psychotic Signs/Symptoms)  Outcome: Ongoing, Progressing  Flowsheets (Taken 10/1/2022 1643)  Mutually Determined Action Steps (Improved Behavioral Control):    verbalizes personal treatment goal    identifies future-oriented goal    verbalizes gratifying activity    identifies symptoms triggers  Individualized Action Step (Improved Behavioral Control): watching tv, listening to music, walk, pace the hallway, socializing with others and maditating.   Goal Outcome Evaluation:    Plan of Care Reviewed With: patient

## 2022-10-01 NOTE — PLAN OF CARE
Patient slept for 6.5 hours, no request for PRN medication this shift, up once and requested for juice and returned to room, continue plan of care.  Problem: Sleep Disturbance  Goal: Adequate Sleep/Rest  Outcome: Ongoing, Progressing   Goal Outcome Evaluation:

## 2022-10-02 PROCEDURE — 250N000013 HC RX MED GY IP 250 OP 250 PS 637: Performed by: PSYCHIATRY & NEUROLOGY

## 2022-10-02 PROCEDURE — 90853 GROUP PSYCHOTHERAPY: CPT

## 2022-10-02 PROCEDURE — 250N000013 HC RX MED GY IP 250 OP 250 PS 637: Performed by: EMERGENCY MEDICINE

## 2022-10-02 PROCEDURE — 124N000002 HC R&B MH UMMC

## 2022-10-02 PROCEDURE — 250N000013 HC RX MED GY IP 250 OP 250 PS 637

## 2022-10-02 RX ADMIN — BUPROPION HYDROCHLORIDE 300 MG: 150 TABLET, EXTENDED RELEASE ORAL at 07:35

## 2022-10-02 RX ADMIN — NICOTINE POLACRILEX 4 MG: 4 GUM, CHEWING BUCCAL at 11:32

## 2022-10-02 RX ADMIN — NICOTINE POLACRILEX 4 MG: 4 GUM, CHEWING BUCCAL at 16:25

## 2022-10-02 RX ADMIN — DIVALPROEX SODIUM 1000 MG: 500 TABLET, DELAYED RELEASE ORAL at 19:03

## 2022-10-02 RX ADMIN — OLANZAPINE 20 MG: 10 TABLET, FILM COATED ORAL at 19:29

## 2022-10-02 RX ADMIN — NICOTINE POLACRILEX 4 MG: 4 GUM, CHEWING BUCCAL at 10:13

## 2022-10-02 RX ADMIN — NICOTINE POLACRILEX 4 MG: 4 GUM, CHEWING BUCCAL at 07:36

## 2022-10-02 RX ADMIN — HYDROCHLOROTHIAZIDE 12.5 MG: 12.5 TABLET ORAL at 07:36

## 2022-10-02 RX ADMIN — NICOTINE POLACRILEX 4 MG: 4 GUM, CHEWING BUCCAL at 18:13

## 2022-10-02 RX ADMIN — OLANZAPINE 10 MG: 10 TABLET, FILM COATED ORAL at 07:36

## 2022-10-02 RX ADMIN — NICOTINE POLACRILEX 4 MG: 4 GUM, CHEWING BUCCAL at 09:03

## 2022-10-02 RX ADMIN — NICOTINE 1 PATCH: 14 PATCH, EXTENDED RELEASE TRANSDERMAL at 07:35

## 2022-10-02 RX ADMIN — DIVALPROEX SODIUM 1000 MG: 500 TABLET, DELAYED RELEASE ORAL at 07:35

## 2022-10-02 RX ADMIN — NICOTINE POLACRILEX 4 MG: 4 GUM, CHEWING BUCCAL at 19:03

## 2022-10-02 RX ADMIN — NICOTINE POLACRILEX 4 MG: 4 GUM, CHEWING BUCCAL at 14:31

## 2022-10-02 ASSESSMENT — ACTIVITIES OF DAILY LIVING (ADL)
ADLS_ACUITY_SCORE: 29
ADLS_ACUITY_SCORE: 29
ORAL_HYGIENE: INDEPENDENT
ADLS_ACUITY_SCORE: 29
DRESS: INDEPENDENT
ADLS_ACUITY_SCORE: 29
LAUNDRY: WITH SUPERVISION
ADLS_ACUITY_SCORE: 29
ADLS_ACUITY_SCORE: 29
HYGIENE/GROOMING: INDEPENDENT
ADLS_ACUITY_SCORE: 29
ADLS_ACUITY_SCORE: 29

## 2022-10-02 NOTE — PLAN OF CARE
Patient appeared to have slept for 7 hrs, no request for PRN and no safety concern noted, continue plan of care.  Problem: Sleep Disturbance  Goal: Adequate Sleep/Rest  Outcome: Ongoing, Progressing   Goal Outcome Evaluation:

## 2022-10-02 NOTE — PLAN OF CARE
Problem: Plan of Care - These are the overarching goals to be used throughout the patient stay.    Goal: Plan of Care Review/Shift Note  Description: The Plan of Care Review/Shift note should be completed every shift.  The Outcome Evaluation is a brief statement about your assessment that the patient is improving, declining, or no change.  This information will be displayed automatically on your shift note.  Outcome: Ongoing, Progressing  Flowsheets (Taken 10/2/2022 3120)  Plan of Care Reviewed With: patient  Overall Patient Progress: improving     Problem: Violence Risk or Actual  Goal: Anger and Impulse Control  Outcome: Ongoing, Progressing   Goal Outcome Evaluation:    Plan of Care Reviewed With: patient     Overall Patient Progress: improving  Patient has been visible in the milieu.Sociable with peers as well as staff.medication compliant.NO PRN given this shift.No side effect noted.No behaviors noted.Full range affect.Mood is calm.Pleasant,watched vikings.Denies all psych symptoms.  Denies anxiety and depression.  Temp: 97  F (36.1  C) Temp src: Oral BP: 130/88 Pulse: 69   Resp: 16 SpO2: 97 % O2 Device: None (Room air)

## 2022-10-03 PROCEDURE — 124N000002 HC R&B MH UMMC

## 2022-10-03 PROCEDURE — 250N000013 HC RX MED GY IP 250 OP 250 PS 637: Performed by: PSYCHIATRY & NEUROLOGY

## 2022-10-03 PROCEDURE — 90837 PSYTX W PT 60 MINUTES: CPT

## 2022-10-03 PROCEDURE — 99232 SBSQ HOSP IP/OBS MODERATE 35: CPT

## 2022-10-03 PROCEDURE — G0177 OPPS/PHP; TRAIN & EDUC SERV: HCPCS

## 2022-10-03 PROCEDURE — 250N000013 HC RX MED GY IP 250 OP 250 PS 637: Performed by: EMERGENCY MEDICINE

## 2022-10-03 PROCEDURE — 250N000013 HC RX MED GY IP 250 OP 250 PS 637

## 2022-10-03 RX ADMIN — NICOTINE POLACRILEX 4 MG: 4 GUM, CHEWING BUCCAL at 09:30

## 2022-10-03 RX ADMIN — NICOTINE POLACRILEX 4 MG: 4 GUM, CHEWING BUCCAL at 15:17

## 2022-10-03 RX ADMIN — NICOTINE POLACRILEX 4 MG: 4 GUM, CHEWING BUCCAL at 07:53

## 2022-10-03 RX ADMIN — NICOTINE POLACRILEX 4 MG: 4 GUM, CHEWING BUCCAL at 19:22

## 2022-10-03 RX ADMIN — OLANZAPINE 20 MG: 10 TABLET, FILM COATED ORAL at 19:22

## 2022-10-03 RX ADMIN — NICOTINE POLACRILEX 4 MG: 4 GUM, CHEWING BUCCAL at 11:08

## 2022-10-03 RX ADMIN — DIVALPROEX SODIUM 1000 MG: 500 TABLET, DELAYED RELEASE ORAL at 19:22

## 2022-10-03 RX ADMIN — OLANZAPINE 10 MG: 10 TABLET, FILM COATED ORAL at 07:52

## 2022-10-03 RX ADMIN — MELATONIN TAB 3 MG 3 MG: 3 TAB at 19:22

## 2022-10-03 RX ADMIN — NICOTINE POLACRILEX 4 MG: 4 GUM, CHEWING BUCCAL at 16:50

## 2022-10-03 RX ADMIN — BUPROPION HYDROCHLORIDE 300 MG: 150 TABLET, EXTENDED RELEASE ORAL at 07:53

## 2022-10-03 RX ADMIN — HYDROCHLOROTHIAZIDE 12.5 MG: 12.5 TABLET ORAL at 07:52

## 2022-10-03 RX ADMIN — NICOTINE POLACRILEX 4 MG: 4 GUM, CHEWING BUCCAL at 18:35

## 2022-10-03 RX ADMIN — DIVALPROEX SODIUM 1000 MG: 500 TABLET, DELAYED RELEASE ORAL at 07:52

## 2022-10-03 RX ADMIN — NICOTINE 1 PATCH: 14 PATCH, EXTENDED RELEASE TRANSDERMAL at 07:53

## 2022-10-03 ASSESSMENT — ACTIVITIES OF DAILY LIVING (ADL)
ADLS_ACUITY_SCORE: 29

## 2022-10-03 NOTE — PLAN OF CARE
Patient was visible socially appropriate with staff and peers, presented with full range affect, reported improved mood and emotional stability, denied SI/SIB/ Hallucinations, denied pain and discomfort, appears adequately groomed and took a shower this evening, intake and hydration were adequate, patient is adherent to treatment plan, no other concerns noted.   Problem: Plan of Care - These are the overarching goals to be used throughout the patient stay.    Goal: Optimal Comfort and Wellbeing  Outcome: Ongoing, Progressing  Intervention: Provide Person-Centered Care  Recent Flowsheet Documentation  Taken 10/3/2022 1838 by Kelly Buitrago RN  Trust Relationship/Rapport:   emotional support provided   empathic listening provided     Problem: Violence Risk or Actual  Goal: Anger and Impulse Control  Outcome: Ongoing, Progressing     Problem: Suicidal Behavior  Goal: Suicidal Behavior is Absent or Managed  Outcome: Ongoing, Progressing  Flowsheets (Taken 10/3/2022 1842)  Mutually Determined Action Steps (Suicidal Behavior Absent/Managed):   sets future-oriented goal   shares suicidal thoughts   verbalizes safety check rationale  Individualized Action Step (Suicidal Behavior Absent/Managed): patient denies suicidal and self harm ideations.     Problem: Behavioral Health Plan of Care  Goal: Optimal Comfort and Wellbeing  Outcome: Ongoing, Progressing  Intervention: Provide Person-Centered Care  Recent Flowsheet Documentation  Taken 10/3/2022 1838 by Kelly Buitrago RN  Trust Relationship/Rapport:   emotional support provided   empathic listening provided     Problem: Anxiety  Goal: Anxiety Reduction or Resolution  Outcome: Ongoing, Progressing     Problem: Decreased Participation and Engagement (Psychotic Signs/Symptoms)  Goal: Increased Participation and Engagement (Psychotic Signs/Symptoms)  Outcome: Ongoing, Progressing  Flowsheets (Taken 10/3/2022 1842)  Mutually Determined Action Steps (Increased Participation  and Engagement):   verbalizes gratifying activity   verbalizes personal treatment goal   identifies future-oriented goal   Goal Outcome Evaluation:    Plan of Care Reviewed With: patient
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
## 2022-10-03 NOTE — PLAN OF CARE
Problem: Depression  Goal: Improved Mood  Outcome: Ongoing, Progressing  Intervention: Monitor and Manage Depressive Symptoms  Recent Flowsheet Documentation  Taken 10/2/2022 1700 by Leandro Merlos RN  Complementary Therapy: essential oils utilized  Family/Support System Care:    involvement promoted    presence promoted     Problem: Anxiety  Goal: Anxiety Reduction or Resolution  Outcome: Ongoing, Progressing  Intervention: Promote Anxiety Reduction  Recent Flowsheet Documentation  Taken 10/2/2022 1700 by Leandro Merlos RN  Complementary Therapy: essential oils utilized  Family/Support System Care:    involvement promoted    presence promoted     Patient is sociable with peers  and staff. He verbalizes needs freely and independently and is compliant with ADLs and medications. He is alert and oriented x4 and with calm and pleasant affect.  He denies SI, HI, SIB,and other psych symptoms .No  psychotic or abnormal behavior noted so far. Vitals are stable(see below)/79 (BP Location: Right arm, Patient Position: Sitting, Cuff Size: Adult Large)   Pulse 94   Temp 98.2  F (36.8  C) (Oral)   Resp 16   Wt 109.8 kg (242 lb 1.6 oz)   SpO2 99%   BMI 33.71 kg/m  ]  and appetite is good: 100% dinner and HS snacks eaten.  Will continue to monitor, encourage, and provide care.

## 2022-10-03 NOTE — PLAN OF CARE
Patient slept for 7 hrs this shift, no request for PRN, no safety concern noted. Continue Q 15 safety checks.  Problem: Sleep Disturbance  Goal: Adequate Sleep/Rest  Outcome: Ongoing, Progressing   Goal Outcome Evaluation:

## 2022-10-03 NOTE — PLAN OF CARE
Problem: Plan of Care - These are the overarching goals to be used throughout the patient stay.    Goal: Plan of Care Review/Shift Note  Description: The Plan of Care Review/Shift note should be completed every shift.  The Outcome Evaluation is a brief statement about your assessment that the patient is improving, declining, or no change.  This information will be displayed automatically on your shift note.  Outcome: Ongoing, Progressing  Flowsheets (Taken 10/3/2022 1344)  Plan of Care Reviewed With: patient  Overall Patient Progress: improving     Problem: Sleep Disturbance  Goal: Adequate Sleep/Rest  Outcome: Ongoing, Progressing     Problem: Behavioral Health Plan of Care  Goal: Adheres to Safety Considerations for Self and Others  Outcome: Ongoing, Progressing  Flowsheets (Taken 10/3/2022 1348)  Adheres to Safety Considerations for Self and Others: making progress toward outcome   Goal Outcome Evaluation:    Plan of Care Reviewed With: patient     Overall Patient Progress: improving  Patient has been visible in the milieu.Sociable with peers.Patient went to group and painted a nice American flag.  Patient seemed jovial showing off his art to staff and peers.  Patient is medication compliant,no side effect noted.Mood is calm.Presents with full range affect.denies pain,anxiety,depression & all psych symptoms.  Temp: 97.9  F (36.6  C) Temp src: Oral BP: 130/79 Pulse: 94   Resp: 16 SpO2: 100 % O2 Device: None (Room air)

## 2022-10-03 NOTE — PLAN OF CARE
Assessment/Intervention/Current Symtoms and Care Coordination  Met with team. Reviewed patient's chart and progress notes.      - Writer checked in with patient. No behavioral concerns noted. Patient has recommitment court hearing scheduled for 10/4 at 10:30 AM.           Discharge Plan or Goal  Pending stabilization & development of a safe discharge plan.  Considerations include: [facility] placement      Barriers to Discharge  Patient requires further psychiatric stabilization due to current symptomology. Door to door transfer. PD revoked.      Referral Status   None today        Legal Status  Judicial Court Hold

## 2022-10-03 NOTE — PROGRESS NOTES
"   10/02/22 2200   Group Therapy Session   Group Attendance attended group session   Time Session Began 2000   Time Session Ended 2100   Total Time patient participated (minutes) 40   Total # Attendees 3   Group Type psychotherapeutic   Group Topic Covered emotions/expression   Group Session Detail collage/ process   Patient Response/Contribution cooperative with task;discussed personal experience with topic;left the group on several occasions   Pt described gratitude for \" the first group of the weekend.\" He said he felt \"calm, at ease, content, ok with \"and accepting of the commitment process. He was grateful mom came to visit and bring him some items he asked for from [place]. He also was grateful for [employer] games on the unit today and special snack. He did a simple meaningful collage. He was struggling with the cloth mask and seeming to have cold symptoms. Writer suggested he get the blue paper mask as it might be lighter and easier to breathe. He said he was doing that and going to the restroom but he did not return to group. He was pleasant and cooperative when he was in group.  "

## 2022-10-03 NOTE — PLAN OF CARE
[facility] Occupational Therapy Group Intervention Note     10/03/22 1340   Group Therapy Session   Group Attendance attended group session   Time Session Began 1015   Time Session Ended 1100   Total Time patient participated (minutes) 45   Total # Attendees 5   Group Type task skill;psychoeducation   Group Topic Covered coping skills/lifestyle management;relapse prevention;self-care activities   Group Session Detail Education was provided on various ways to take care of one's emotional, physical, social, mental, and occupational self as protective factors.     Patient Response/Contribution cooperative with task;offered helpful suggestions to peers;listened actively;organized   Patient Response Detail Able to identify >2 self-care strategies within daily routine. IND to engage in hands-on activity to follow as he created a weekly plan for activity follow-through. Provided supportive words of encouragement to peers.      Madeline Rosenberg OT on 10/3/2022 at 1:40 PM

## 2022-10-03 NOTE — PLAN OF CARE
[facility] Occupational Therapy Group Intervention Note     10/03/22 1351   Group Therapy Session   Group Attendance attended group session   Time Session Began 1115   Time Session Ended 1200   Total Time patient participated (minutes) 45   Total # Attendees 5   Group Type task skill;life skill   Group Topic Covered coping skills/lifestyle management;structured socialization   Group Session Detail Clinic - coping skill exploration, creative expression within personally meaningful activities, and observation of social, cognitive, and kinesthetic performance skills   Patient Response/Contribution cooperative with task;organized;offered helpful suggestions to peers;listened actively   Patient Response Detail Congruent affect. IND to engage in a novel task. Pleasant and polite among peers.      Madeline Rosenberg OT on 10/3/2022 at 1:52 PM

## 2022-10-04 PROCEDURE — 99232 SBSQ HOSP IP/OBS MODERATE 35: CPT

## 2022-10-04 PROCEDURE — 250N000013 HC RX MED GY IP 250 OP 250 PS 637: Performed by: EMERGENCY MEDICINE

## 2022-10-04 PROCEDURE — 250N000013 HC RX MED GY IP 250 OP 250 PS 637: Performed by: PSYCHIATRY & NEUROLOGY

## 2022-10-04 PROCEDURE — 250N000013 HC RX MED GY IP 250 OP 250 PS 637

## 2022-10-04 PROCEDURE — G0177 OPPS/PHP; TRAIN & EDUC SERV: HCPCS

## 2022-10-04 PROCEDURE — H2032 ACTIVITY THERAPY, PER 15 MIN: HCPCS

## 2022-10-04 PROCEDURE — 124N000002 HC R&B MH UMMC

## 2022-10-04 RX ORDER — ATOMOXETINE 10 MG/1
10 CAPSULE ORAL DAILY
Status: DISCONTINUED | OUTPATIENT
Start: 2022-10-04 | End: 2022-10-12

## 2022-10-04 RX ADMIN — OLANZAPINE 10 MG: 10 TABLET, FILM COATED ORAL at 07:45

## 2022-10-04 RX ADMIN — DIVALPROEX SODIUM 1000 MG: 500 TABLET, DELAYED RELEASE ORAL at 19:51

## 2022-10-04 RX ADMIN — NICOTINE POLACRILEX 4 MG: 4 GUM, CHEWING BUCCAL at 10:01

## 2022-10-04 RX ADMIN — NICOTINE POLACRILEX 4 MG: 4 GUM, CHEWING BUCCAL at 07:46

## 2022-10-04 RX ADMIN — NICOTINE POLACRILEX 4 MG: 4 GUM, CHEWING BUCCAL at 17:48

## 2022-10-04 RX ADMIN — BUPROPION HYDROCHLORIDE 300 MG: 150 TABLET, EXTENDED RELEASE ORAL at 07:45

## 2022-10-04 RX ADMIN — HYDROCHLOROTHIAZIDE 12.5 MG: 12.5 TABLET ORAL at 07:45

## 2022-10-04 RX ADMIN — ATOMOXETINE 10 MG: 10 CAPSULE ORAL at 09:34

## 2022-10-04 RX ADMIN — DIVALPROEX SODIUM 1000 MG: 500 TABLET, DELAYED RELEASE ORAL at 07:45

## 2022-10-04 RX ADMIN — NICOTINE POLACRILEX 4 MG: 4 GUM, CHEWING BUCCAL at 19:51

## 2022-10-04 RX ADMIN — NICOTINE POLACRILEX 4 MG: 4 GUM, CHEWING BUCCAL at 16:23

## 2022-10-04 RX ADMIN — NICOTINE POLACRILEX 4 MG: 4 GUM, CHEWING BUCCAL at 08:42

## 2022-10-04 RX ADMIN — OLANZAPINE 20 MG: 10 TABLET, FILM COATED ORAL at 19:51

## 2022-10-04 RX ADMIN — NICOTINE 1 PATCH: 14 PATCH, EXTENDED RELEASE TRANSDERMAL at 07:46

## 2022-10-04 ASSESSMENT — ACTIVITIES OF DAILY LIVING (ADL)
ADLS_ACUITY_SCORE: 29
ORAL_HYGIENE: INDEPENDENT
ADLS_ACUITY_SCORE: 29
ORAL_HYGIENE: INDEPENDENT
HYGIENE/GROOMING: INDEPENDENT;SHOWER
ADLS_ACUITY_SCORE: 29
LAUNDRY: WITH SUPERVISION
DRESS: INDEPENDENT
ADLS_ACUITY_SCORE: 29
ADLS_ACUITY_SCORE: 29
LAUNDRY: WITH SUPERVISION
DRESS: INDEPENDENT
ADLS_ACUITY_SCORE: 29
HYGIENE/GROOMING: INDEPENDENT
ADLS_ACUITY_SCORE: 29

## 2022-10-04 NOTE — PLAN OF CARE
[facility] Occupational Therapy Group Intervention Note     10/04/22 1334   Group Therapy Session   Group Attendance attended group session   Time Session Began 1015   Time Session Ended 1100   Total Time patient participated (minutes) 30  (no charge)   Total # Attendees 3   Group Type task skill;recreation   Group Topic Covered coping skills/lifestyle management;leisure exploration/use of leisure time   Group Session Detail OT: Leisure exploration offered for increased intrinsic motivation to engage in social non-obligatory occupations, challenge new learning, sequencing, problem solving, and retention via a cognitive group game.    Patient Response/Contribution cooperative with task;organized;discussed personal experience with topic.     Congruent affect. Able to learning and follow novel game instructions. Fully engaged and appropriate in discussion.     Madeline Rosenberg, OT on 10/4/2022 at 1:37 PM

## 2022-10-04 NOTE — PLAN OF CARE
"                                                          10/03/2022     1900   Group Therapy Session   Group Attendance attended group session   Time Session Began 18:00   Time Session Ended 18:30   Total Time patient participated (minutes) .5   Total # Attendees 1   Group Type Psychotherapy   Group Topic Covered Healthy strategies for addressing guilt and shame   Group Session Detail This patient reported that he wanted to explore the topic \"guilt and shame\" during this session. He reported that he is struggling with guilt due to numerous mistakes he has made in his life. He reported that these mistakes are standing in his way of making progress. Writer utilized psychodynamic approach, particularly thought exploration and meaning in engaging the patient in discussing his concept of remorse and how such consideration points to his 'positive sides' which he can expand carefully. Provided detailed psycho-education on the benefits of individual psychotherapy as an intervention that can equip him with the tools to address his longstanding needs around guilt and shame. Reviewed specific strategies to engage the change process using Prochaska's model of change (introductory).   Patient Response/Contribution Pt participated fully during this session. Asked questions and expanded on feedback. Reported that he is currently committed as mentally ill and awaiting placement.   Patient Response Detail Pt actively participated in the session.          "

## 2022-10-04 NOTE — PROGRESS NOTES
Pt was appreciative of the opportunity to engage in individual psychotherapy.       10/03/22 1500   Individual Counseling   Length of Session 60   Topic body-based psychotherapy (forgiveness/ self-compassion & impulse control   Patient Response engaged, grateful, forward-thinking, open

## 2022-10-04 NOTE — PLAN OF CARE
"Pt denies SI, denies pain.  Pt social with peers Pts affect bright.  Well groomed.    Problem: Plan of Care - These are the overarching goals to be used throughout the patient stay.    Goal: Plan of Care Review/Shift Note  Description: The Plan of Care Review/Shift note should be completed every shift.  The Outcome Evaluation is a brief statement about your assessment that the patient is improving, declining, or no change.  This information will be displayed automatically on your shift note.  Outcome: Ongoing, Not Progressing  Goal: Patient-Specific Goal (Individualized)  Description: You can add care plan individualizations to a care plan. Examples of Individualization might be:  \"Parent requests to be called daily at 9am for status\", \"I have a hard time hearing out of my right ear\", or \"Do not touch me to wake me up as it startles me\".  Outcome: Ongoing, Not Progressing  Goal: Absence of Hospital-Acquired Illness or Injury  Outcome: Ongoing, Not Progressing  Goal: Optimal Comfort and Wellbeing  Outcome: Ongoing, Not Progressing  Goal: Readiness for Transition of Care  Outcome: Ongoing, Not Progressing     Problem: Behavioral Health Plan of Care  Goal: Optimal Comfort and Wellbeing  Outcome: Ongoing, Not Progressing  Goal: Plan of Care Review  Outcome: Ongoing, Not Progressing  Goal: Patient-Specific Goal (Individualization)  Outcome: Ongoing, Not Progressing  Goal: Adheres to Safety Considerations for Self and Others  Outcome: Ongoing, Not Progressing  Intervention: Develop and Maintain Individualized Safety Plan  Recent Flowsheet Documentation  Taken 10/4/2022 1400 by Nikole Rockwell, RN  Safety Measures:   environmental rounds completed   safety rounds completed  Goal: Absence of New-Onset Illness or Injury  Outcome: Ongoing, Not Progressing  Intervention: Identify and Manage Fall Risk  Recent Flowsheet Documentation  Taken 10/4/2022 1400 by Nikole Rockwell, RN  Safety Measures:   environmental rounds completed   " safety rounds completed  Goal: Optimized Coping Skills in Response to Life Stressors  Outcome: Ongoing, Not Progressing  Goal: Develops/Participates in Therapeutic [place] to Support Successful Transition  Outcome: Ongoing, Not Progressing  Goal: Team Discussion  Outcome: Ongoing, Not Progressing   Goal Outcome Evaluation:

## 2022-10-04 NOTE — PLAN OF CARE
Patient woke up once this shift. Patient requested snacks-provided. Patient no complains of pain and discomfort. No behavioral issues or any safety concern this shift. Patient continuous on q 15 minutes safety checks. Will continue to monitor the patient and provide therapeutic intervention as needed. Will continue with current plan of care. Patient had 6.5 total hours of sleep this shift.   Problem: Sleep Disturbance  Goal: Adequate Sleep/Rest  Outcome: Ongoing, Progressing
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
## 2022-10-04 NOTE — PLAN OF CARE
[facility] Occupational Therapy Group Intervention Note     10/04/22 1338   Group Therapy Session   Group Attendance attended group session   Time Session Began 1115   Time Session Ended 1200   Total Time patient participated (minutes) 45   Total # Attendees 4   Group Type task skill;life skill   Group Topic Covered emotions/expression;leisure exploration/use of leisure time;coping skills/lifestyle management   Group Session Detail OT clinic - coping skill exploration, creative expression within personally meaningful activities, and observation of social, cognitive, and kinesthetic performance skills   Patient Response/Contribution cooperative with task;organized;discussed personal experience with topic;listened actively   Patient Response Detail IND to engage in a creative expression project. Motivated to include detail and display finished project in his new place of residence.        Madeline Rosenberg, EDMAR on 10/4/2022 at 1:39 PM

## 2022-10-04 NOTE — PLAN OF CARE
Assessment/Intervention/Current Symtoms and Care Coordination  Met with team. Reviewed patient's chart and progress notes.      - Writer checked in with patient. No behavioral concerns noted. Patient attended recommitment court hearing on 10/4 at 10:30 AM.     - Writer provided patient's treatment progress to his commitment Jose DEL CID.           Discharge Plan or Goal  Pending stabilization & development of a safe discharge plan.  Considerations include: [facility] placement      Barriers to Discharge  Patient requires further psychiatric stabilization due to current symptomology. Door to door transfer. PD revoked.      Referral Status   None today        Legal Status  Judicial Court Hold

## 2022-10-05 PROCEDURE — 250N000011 HC RX IP 250 OP 636

## 2022-10-05 PROCEDURE — 90686 IIV4 VACC NO PRSV 0.5 ML IM: CPT

## 2022-10-05 PROCEDURE — 250N000013 HC RX MED GY IP 250 OP 250 PS 637: Performed by: PSYCHIATRY & NEUROLOGY

## 2022-10-05 PROCEDURE — 250N000013 HC RX MED GY IP 250 OP 250 PS 637: Performed by: EMERGENCY MEDICINE

## 2022-10-05 PROCEDURE — 250N000013 HC RX MED GY IP 250 OP 250 PS 637

## 2022-10-05 PROCEDURE — G0008 ADMIN INFLUENZA VIRUS VAC: HCPCS

## 2022-10-05 PROCEDURE — 99232 SBSQ HOSP IP/OBS MODERATE 35: CPT

## 2022-10-05 PROCEDURE — 124N000002 HC R&B MH UMMC

## 2022-10-05 RX ADMIN — NICOTINE 1 PATCH: 14 PATCH, EXTENDED RELEASE TRANSDERMAL at 07:54

## 2022-10-05 RX ADMIN — DIVALPROEX SODIUM 1000 MG: 500 TABLET, DELAYED RELEASE ORAL at 19:13

## 2022-10-05 RX ADMIN — HYDROCHLOROTHIAZIDE 12.5 MG: 12.5 TABLET ORAL at 07:54

## 2022-10-05 RX ADMIN — OLANZAPINE 10 MG: 10 TABLET, FILM COATED ORAL at 07:53

## 2022-10-05 RX ADMIN — NICOTINE POLACRILEX 4 MG: 4 GUM, CHEWING BUCCAL at 19:13

## 2022-10-05 RX ADMIN — DIVALPROEX SODIUM 1000 MG: 500 TABLET, DELAYED RELEASE ORAL at 07:54

## 2022-10-05 RX ADMIN — OLANZAPINE 20 MG: 10 TABLET, FILM COATED ORAL at 19:13

## 2022-10-05 RX ADMIN — INFLUENZA A VIRUS A/VICTORIA/2570/2019 IVR-215 (H1N1) ANTIGEN (FORMALDEHYDE INACTIVATED), INFLUENZA A VIRUS A/DARWIN/9/2021 SAN-010 (H3N2) ANTIGEN (FORMALDEHYDE INACTIVATED), INFLUENZA B VIRUS B/PHUKET/3073/2013 ANTIGEN (FORMALDEHYDE INACTIVATED), AND INFLUENZA B VIRUS B/MICHIGAN/01/2021 ANTIGEN (FORMALDEHYDE INACTIVATED) 0.5 ML: 15; 15; 15; 15 INJECTION, SUSPENSION INTRAMUSCULAR at 11:12

## 2022-10-05 RX ADMIN — NICOTINE POLACRILEX 4 MG: 4 GUM, CHEWING BUCCAL at 09:28

## 2022-10-05 RX ADMIN — ATOMOXETINE 10 MG: 10 CAPSULE ORAL at 07:53

## 2022-10-05 RX ADMIN — NICOTINE POLACRILEX 4 MG: 4 GUM, CHEWING BUCCAL at 07:54

## 2022-10-05 RX ADMIN — NICOTINE POLACRILEX 4 MG: 4 GUM, CHEWING BUCCAL at 16:39

## 2022-10-05 RX ADMIN — BUPROPION HYDROCHLORIDE 300 MG: 150 TABLET, EXTENDED RELEASE ORAL at 07:53

## 2022-10-05 ASSESSMENT — ACTIVITIES OF DAILY LIVING (ADL)
ADLS_ACUITY_SCORE: 29
DRESS: INDEPENDENT
ADLS_ACUITY_SCORE: 29
LAUNDRY: WITH SUPERVISION
ADLS_ACUITY_SCORE: 29
HYGIENE/GROOMING: INDEPENDENT
ADLS_ACUITY_SCORE: 29
ORAL_HYGIENE: INDEPENDENT
ADLS_ACUITY_SCORE: 29
DRESS: INDEPENDENT
ADLS_ACUITY_SCORE: 29
ORAL_HYGIENE: INDEPENDENT
HYGIENE/GROOMING: INDEPENDENT

## 2022-10-05 NOTE — PLAN OF CARE
Pt declined 1:1 but complied with quick check in. He said he plans on going to [facility] for MH only, not dual dx tx. He feels ready to discharge, and would rather stay here than go to [facility]. He states he will likely have a bed next week. He denies all mental health sx, and denies SI. He minimized meth usage PTA. He was calm, cooperative and participated well in milieu. He appeared to enjoy watching Hip Hop music awards on TV. No other concerns noted. Will continue to monitor.    Problem: Plan of Care - These are the overarching goals to be used throughout the patient stay.    Goal: Optimal Comfort and Wellbeing  Outcome: Ongoing, Progressing     Problem: Behavioral Health Plan of Care  Goal: Optimal Comfort and Wellbeing  Outcome: Ongoing, Progressing   Goal Outcome Evaluation:

## 2022-10-05 NOTE — PROGRESS NOTES
10/04/22 2100   Group Therapy Session   Time Session Began 2000   Time Session Ended 2100   Total Time patient participated (minutes) 45   Total # Attendees 5   Group Type expressive therapy   Group Topic Covered cognitive activities;structured socialization;leisure exploration/use of leisure time   Group Session Detail Music Bingo   Patient Response/Contribution cooperative with task   Patient Response Detail Participated in Music Therapy intervention of Music [employer] today.  Goals of session were focusing, memory recall, positive distraction, emotional containment and social cohesion.  Pt response was engaged and cooperative.  Etienne is extremely energetic and spontaneous, socially engaged with peers and helping others with their bingo cards.  Some impulsivity, but redirectable.

## 2022-10-05 NOTE — PROGRESS NOTES
"Pt participated in group dance/movement therapy (D/MT) with a theme of practicing body integration to support regulation, co-regulation with peers (supportive others in the environment,) behavior choices vs impulse and looking for another option when the initial impulse may not align with the best self.  Pt was able to join with this therapist, peers and a nursing student to explore options of space, time and direction.  For example, pt twice had the impulse to leave the group, but then was able to choose a period of time to stay, and then was able to stay for the entire session.  This \"first step after\" the initial impulse is the therapeutic practice.  Pt movement was self-regulated despite naming is feeling as \"impatient\" and \"frustrated\" due to not knowing when and if he will be discharged from the hospital.  Pt joined organized and fluid group movement and was able to smile and even giggle at times throughout the session.  He expressed some concern about needing to switch providers within the hospital.  He accepted reassurance about this.         10/05/22 1015   Expressive Therapy   Therapy Type dance/movement   Minutes of Treatment 55     "

## 2022-10-05 NOTE — PLAN OF CARE
Assessment/Intervention/Current Symptoms and Care Coordination: Met with team. Reviewed patient's chart and progress notes.     - Writer checked in with patient. Patient is compliant with medication, social with peers and has no behavioral issues. He may be impulsive at time but he is redirectable.     - Writer called Angelina Rae at [facility] ([phone]). Writer left  requesting a call back with update on referral status.     Discharge Plan or Goal:  Pending stabilization & development of a safe discharge plan.  Considerations include: [facility] placement          Barriers to Discharge:  Patient requires further psychiatric stabilization due to current symptomology. Door to door transfer. PD revoked.          Referral Status:   None today         Legal Status:    Judicial Court Hold

## 2022-10-05 NOTE — PLAN OF CARE
Pt has been intermittently visible in the milieu today. He has attended groups and been social with select patients. His speech appears pressured at times and his concentration is poor. He has been pleasant and appropriate in his interactions with staff. Received flu shot today. No behavioral or safety concerns today.    Problem: Behavioral Health Plan of Care  Goal: Plan of Care Review  Recent Flowsheet Documentation  Taken 10/5/2022 1045 by Jannette Us  Plan of Care Reviewed With: patient  Patient Agreement with Plan of Care: agrees
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
## 2022-10-05 NOTE — PLAN OF CARE
Patient no complains of pain and discomfort. No behavioral issues or any safety concern this shift. Patient continuous on q 15 minutes safety checks. Will continue to monitor the patient and provide therapeutic intervention as needed. Will continue with current plan of care. Patient had 6.75 total hour of sleep this shift.  Problem: Sleep Disturbance  Goal: Adequate Sleep/Rest  Outcome: Ongoing, Progressing

## 2022-10-06 PROCEDURE — 250N000013 HC RX MED GY IP 250 OP 250 PS 637

## 2022-10-06 PROCEDURE — 250N000013 HC RX MED GY IP 250 OP 250 PS 637: Performed by: PSYCHIATRY & NEUROLOGY

## 2022-10-06 PROCEDURE — 99232 SBSQ HOSP IP/OBS MODERATE 35: CPT

## 2022-10-06 PROCEDURE — 124N000002 HC R&B MH UMMC

## 2022-10-06 PROCEDURE — 250N000013 HC RX MED GY IP 250 OP 250 PS 637: Performed by: EMERGENCY MEDICINE

## 2022-10-06 RX ADMIN — OLANZAPINE 10 MG: 10 TABLET, FILM COATED ORAL at 07:56

## 2022-10-06 RX ADMIN — NICOTINE POLACRILEX 4 MG: 4 GUM, CHEWING BUCCAL at 19:36

## 2022-10-06 RX ADMIN — NICOTINE 1 PATCH: 14 PATCH, EXTENDED RELEASE TRANSDERMAL at 07:56

## 2022-10-06 RX ADMIN — ATOMOXETINE 10 MG: 10 CAPSULE ORAL at 07:56

## 2022-10-06 RX ADMIN — HYDROCHLOROTHIAZIDE 12.5 MG: 12.5 TABLET ORAL at 07:55

## 2022-10-06 RX ADMIN — NICOTINE POLACRILEX 4 MG: 4 GUM, CHEWING BUCCAL at 15:24

## 2022-10-06 RX ADMIN — DIVALPROEX SODIUM 1000 MG: 500 TABLET, DELAYED RELEASE ORAL at 07:55

## 2022-10-06 RX ADMIN — NICOTINE POLACRILEX 4 MG: 4 GUM, CHEWING BUCCAL at 16:57

## 2022-10-06 RX ADMIN — DIVALPROEX SODIUM 1000 MG: 500 TABLET, DELAYED RELEASE ORAL at 19:23

## 2022-10-06 RX ADMIN — NICOTINE POLACRILEX 4 MG: 4 GUM, CHEWING BUCCAL at 07:56

## 2022-10-06 RX ADMIN — BUPROPION HYDROCHLORIDE 300 MG: 150 TABLET, EXTENDED RELEASE ORAL at 07:56

## 2022-10-06 RX ADMIN — OLANZAPINE 20 MG: 10 TABLET, FILM COATED ORAL at 19:23

## 2022-10-06 RX ADMIN — NICOTINE POLACRILEX 4 MG: 4 GUM, CHEWING BUCCAL at 18:27

## 2022-10-06 ASSESSMENT — ACTIVITIES OF DAILY LIVING (ADL)
ADLS_ACUITY_SCORE: 29
HYGIENE/GROOMING: INDEPENDENT
ADLS_ACUITY_SCORE: 29
ORAL_HYGIENE: INDEPENDENT
DRESS: INDEPENDENT
ADLS_ACUITY_SCORE: 29
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
## 2022-10-06 NOTE — PLAN OF CARE
Problem: Plan of Care - These are the overarching goals to be used throughout the patient stay.    Goal: Plan of Care Review/Shift Note  Description: The Plan of Care Review/Shift note should be completed every shift.  The Outcome Evaluation is a brief statement about your assessment that the patient is improving, declining, or no change.  This information will be displayed automatically on your shift note.  Recent Flowsheet Documentation  Taken 10/5/2022 1700 by Angelo Cruz RN  Plan of Care Reviewed With: patient     Problem: Plan of Care - These are the overarching goals to be used throughout the patient stay.    Goal: Absence of Hospital-Acquired Illness or Injury  Intervention: Prevent Skin Injury  Recent Flowsheet Documentation  Taken 10/5/2022 1700 by Angelo Cruz RN  Body Position: position changed independently     Problem: Fall Injury Risk  Goal: Absence of Fall and Fall-Related Injury  Outcome: Ongoing, Progressing     Problem: Violence Risk or Actual  Goal: Anger and Impulse Control  Outcome: Ongoing, Progressing     Problem: Suicidal Behavior  Goal: Suicidal Behavior is Absent or Managed  Outcome: Ongoing, Progressing     Problem: Behavioral Health Plan of Care  Goal: Optimal Comfort and Wellbeing  Intervention: Provide Person-Centered Care  Recent Flowsheet Documentation  Taken 10/5/2022 1700 by Angelo Cruz RN  Trust Relationship/Rapport:   care explained   choices provided   emotional support provided   empathic listening provided   questions answered   questions encouraged   reassurance provided   thoughts/feelings acknowledged   Goal Outcome Evaluation:    Plan of Care Reviewed With: patient     Alert and oriented x4, able to communicate needs. Pleasant, cooperative and compliant with medications. Patient was visible in milieu, social and interactive, mostly watching tv. Expressed his frustration about discharge planning but understands that he needs to be patient. Patient  denied any pain or discomfort. He denied anxiety or depression, no SI/HI, contracted for safety.

## 2022-10-06 NOTE — PLAN OF CARE
Patient no complains of pain and discomfort. Patient appears calm, had snacks, no behavioral issues or any safety concern this shift. Patient continuous on q 15 minutes safety checks. Will continue to monitor the patient and provide therapeutic intervention as needed. Will continue with current plan of care. Patient had 6.25 total hour of sleep this shift.  Problem: Sleep Disturbance (Psychotic Signs/Symptoms)  Goal: Improved Sleep (Psychotic Signs/Symptoms)  Outcome: Ongoing, Progressing
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
## 2022-10-06 NOTE — PLAN OF CARE
"  Problem: Plan of Care - These are the overarching goals to be used throughout the patient stay.    Goal: Absence of Hospital-Acquired Illness or Injury  Intervention: Identify and Manage Fall Risk  Recent Flowsheet Documentation  Taken 10/6/2022 0900 by Beverly Iqbal RN  Safety Promotion/Fall Prevention:   clutter free environment maintained   safety round/check completed  Intervention: Prevent and Manage VTE (Venous Thromboembolism) Risk  Recent Flowsheet Documentation  Taken 10/6/2022 0900 by Beverly Iqbal RN  Activity Management: up ad alondra     Problem: Fall Injury Risk  Goal: Absence of Fall and Fall-Related Injury  Intervention: Identify and Manage Contributors  Recent Flowsheet Documentation  Taken 10/6/2022 0900 by Beverly Iqbal RN  Medication Review/Management: medications reviewed  Intervention: Promote Injury-Free Environment  Recent Flowsheet Documentation  Taken 10/6/2022 0900 by Beverly Iqbal RN  Safety Promotion/Fall Prevention:   clutter free environment maintained   safety round/check completed   Goal Outcome Evaluation:  Patient has been visible in the milieu.Denies pain & all psych symptoms.Sociable with peers.Denies depression and anxiety.Safety checks every 15 minutes completed.  Vital signs:  Temp: 98.5  F (36.9  C) Temp src: Oral BP: 120/72 Pulse: 70   Resp: 16 SpO2: 97 % O2 Device: None (Room air)     Weight: 110.7 kg (244 lb)  Estimated body mass index is 33.97 kg/m  as calculated from the following:    Height as of 9/9/21: 1.805 m (5' 11.06\").    Weight as of this encounter: 110.7 kg (244 lb).                         "

## 2022-10-07 PROCEDURE — 250N000013 HC RX MED GY IP 250 OP 250 PS 637: Performed by: PSYCHIATRY & NEUROLOGY

## 2022-10-07 PROCEDURE — H2032 ACTIVITY THERAPY, PER 15 MIN: HCPCS

## 2022-10-07 PROCEDURE — 250N000013 HC RX MED GY IP 250 OP 250 PS 637

## 2022-10-07 PROCEDURE — 250N000013 HC RX MED GY IP 250 OP 250 PS 637: Performed by: EMERGENCY MEDICINE

## 2022-10-07 PROCEDURE — 124N000002 HC R&B MH UMMC

## 2022-10-07 PROCEDURE — 99232 SBSQ HOSP IP/OBS MODERATE 35: CPT

## 2022-10-07 RX ADMIN — OLANZAPINE 20 MG: 10 TABLET, FILM COATED ORAL at 19:26

## 2022-10-07 RX ADMIN — ATOMOXETINE 10 MG: 10 CAPSULE ORAL at 08:03

## 2022-10-07 RX ADMIN — DIVALPROEX SODIUM 1000 MG: 500 TABLET, DELAYED RELEASE ORAL at 08:04

## 2022-10-07 RX ADMIN — NICOTINE POLACRILEX 4 MG: 4 GUM, CHEWING BUCCAL at 18:38

## 2022-10-07 RX ADMIN — OLANZAPINE 10 MG: 10 TABLET, FILM COATED ORAL at 08:04

## 2022-10-07 RX ADMIN — HYDROCHLOROTHIAZIDE 12.5 MG: 12.5 TABLET ORAL at 08:04

## 2022-10-07 RX ADMIN — NICOTINE POLACRILEX 4 MG: 4 GUM, CHEWING BUCCAL at 08:04

## 2022-10-07 RX ADMIN — NICOTINE POLACRILEX 4 MG: 4 GUM, CHEWING BUCCAL at 13:06

## 2022-10-07 RX ADMIN — MELATONIN TAB 3 MG 3 MG: 3 TAB at 19:27

## 2022-10-07 RX ADMIN — HYDROXYZINE HYDROCHLORIDE 25 MG: 25 TABLET, FILM COATED ORAL at 19:27

## 2022-10-07 RX ADMIN — NICOTINE POLACRILEX 4 MG: 4 GUM, CHEWING BUCCAL at 11:31

## 2022-10-07 RX ADMIN — BUPROPION HYDROCHLORIDE 300 MG: 150 TABLET, EXTENDED RELEASE ORAL at 08:03

## 2022-10-07 RX ADMIN — NICOTINE 1 PATCH: 14 PATCH, EXTENDED RELEASE TRANSDERMAL at 08:04

## 2022-10-07 RX ADMIN — NICOTINE POLACRILEX 4 MG: 4 GUM, CHEWING BUCCAL at 16:18

## 2022-10-07 RX ADMIN — DIVALPROEX SODIUM 1000 MG: 500 TABLET, DELAYED RELEASE ORAL at 19:26

## 2022-10-07 RX ADMIN — NICOTINE POLACRILEX 4 MG: 4 GUM, CHEWING BUCCAL at 19:27

## 2022-10-07 ASSESSMENT — ACTIVITIES OF DAILY LIVING (ADL)
ADLS_ACUITY_SCORE: 29
DRESS: INDEPENDENT
ADLS_ACUITY_SCORE: 29
HYGIENE/GROOMING: INDEPENDENT
LAUNDRY: UNABLE TO COMPLETE
ORAL_HYGIENE: INDEPENDENT
ADLS_ACUITY_SCORE: 29

## 2022-10-07 NOTE — PROGRESS NOTES
10/07/22 1147   Group Therapy Session   Group Attendance attended group session   Time Session Began 1115   Time Session Ended 1200   Total Time patient participated (minutes) 45   Total # Attendees 4   Group Type expressive therapy   Group Topic Covered emotions/expression   Patient Response/Contribution cooperative with task   Pt attended one AT group today. Pt is currently working on a collage/mixed media vision board. Goals of directives: Safe place- trauma containment, emotional expression, identifying five items that represent the five senses, grounding exercise.  Vision Board collage- to identify future goals, coping skills, interests and skills, emotional expression  Pt was an engaged participant, focused on task for the full duration of group. Pt finished collage and briefly shared with author and group. Pt chose images that symbolize pts optimism for his future and personal strength. Pt also painted around the images in colors that symbolize hope for pt.  Pts mood was calm, pleasant participant.

## 2022-10-07 NOTE — PLAN OF CARE
Problem: Sleep Disturbance (Psychotic Signs/Symptoms)  Goal: Improved Sleep (Psychotic Signs/Symptoms)  Outcome: Ongoing, Progressing   Goal Outcome Evaluation:  Patient appears to have slept for 6 hours. Awake for brief periods x2, requested snacks x1. Endorsed no acute concerns. No requests for prn's. No behavioral concerns noted.

## 2022-10-07 NOTE — PLAN OF CARE
"    Pt is visible in the milieu. Medication compliant. Nutrition and hydration adequate. Able to attend meeting and groups. Pleasant upon approach.   Denies all mental health symptoms. Expresses that he wants to know the result if he is going to be admitted to the facility he was referred to.     No behavioral concerns this shift.     PRNs this shift: Nicotine gums    Problem: Plan of Care - These are the overarching goals to be used throughout the patient stay.    Goal: Patient-Specific Goal (Individualized)  Description: You can add care plan individualizations to a care plan. Examples of Individualization might be:  \"Parent requests to be called daily at 9am for status\", \"I have a hard time hearing out of my right ear\", or \"Do not touch me to wake me up as it startles me\".  Outcome: Ongoing, Progressing     Problem: Violence Risk or Actual  Goal: Anger and Impulse Control  Intervention: Minimize Safety Risk  Recent Flowsheet Documentation  Taken 10/7/2022 1147 by Brigitte Gonzalez RN  Sensory Stimulation Regulation: music on     Problem: Suicidal Behavior  Goal: Suicidal Behavior is Absent or Managed  Outcome: Ongoing, Progressing     Problem: Sleep Disturbance  Goal: Adequate Sleep/Rest  Outcome: Ongoing, Progressing     Problem: Behavioral Health Plan of Care  Goal: Plan of Care Review  Outcome: Ongoing, Progressing  Flowsheets (Taken 10/7/2022 1147)  Plan of Care Reviewed With: patient  Patient Agreement with Plan of Care: agrees     Problem: Depression  Goal: Improved Mood  Outcome: Ongoing, Progressing     Problem: Anxiety  Goal: Anxiety Reduction or Resolution  Outcome: Ongoing, Progressing   Goal Outcome Evaluation:    Plan of Care Reviewed With: patient                 "

## 2022-10-07 NOTE — PLAN OF CARE
Goal Outcome Evaluation:          Problem: Plan of Care - These are the overarching goals to be used throughout the patient stay.    Goal: Plan of Care Review/Shift Note  Description: The Plan of Care Review/Shift note should be completed every shift.  The Outcome Evaluation is a brief statement about your assessment that the patient is improving, declining, or no change.  This information will be displayed automatically on your shift note.  Outcome: Ongoing, Progressing     Problem: Suicidal Behavior  Goal: Suicidal Behavior is Absent or Managed  Outcome: Ongoing, Progressing           Pt presented with flat affect. Pt hyper verbal, cooperative and med complaint. Visible in milieu majority of the shift. Pt engages and socializing with peers. Pt attended partial evening group therapy. Pt denies pain, SI/HI, depression, anxiety, hallucinations and other mental health symptoms. Pt is monae for safety. No medical or safety concerns at this time. Pt had nicotine gum PRN. Hygiene is appropriate to situation. Good intake of foods and fluids.
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
## 2022-10-08 PROCEDURE — 250N000013 HC RX MED GY IP 250 OP 250 PS 637: Performed by: PSYCHIATRY & NEUROLOGY

## 2022-10-08 PROCEDURE — 124N000002 HC R&B MH UMMC

## 2022-10-08 PROCEDURE — 250N000013 HC RX MED GY IP 250 OP 250 PS 637

## 2022-10-08 PROCEDURE — 250N000013 HC RX MED GY IP 250 OP 250 PS 637: Performed by: EMERGENCY MEDICINE

## 2022-10-08 RX ADMIN — OLANZAPINE 20 MG: 10 TABLET, FILM COATED ORAL at 19:19

## 2022-10-08 RX ADMIN — BUPROPION HYDROCHLORIDE 300 MG: 150 TABLET, EXTENDED RELEASE ORAL at 07:51

## 2022-10-08 RX ADMIN — DIVALPROEX SODIUM 1000 MG: 500 TABLET, DELAYED RELEASE ORAL at 19:19

## 2022-10-08 RX ADMIN — NICOTINE POLACRILEX 4 MG: 4 GUM, CHEWING BUCCAL at 18:42

## 2022-10-08 RX ADMIN — NICOTINE 1 PATCH: 14 PATCH, EXTENDED RELEASE TRANSDERMAL at 07:52

## 2022-10-08 RX ADMIN — NICOTINE POLACRILEX 4 MG: 4 GUM, CHEWING BUCCAL at 07:51

## 2022-10-08 RX ADMIN — NICOTINE POLACRILEX 4 MG: 4 GUM, CHEWING BUCCAL at 17:19

## 2022-10-08 RX ADMIN — OLANZAPINE 10 MG: 10 TABLET, FILM COATED ORAL at 07:51

## 2022-10-08 RX ADMIN — HYDROCHLOROTHIAZIDE 12.5 MG: 12.5 TABLET ORAL at 07:51

## 2022-10-08 RX ADMIN — NICOTINE POLACRILEX 4 MG: 4 GUM, CHEWING BUCCAL at 10:36

## 2022-10-08 RX ADMIN — DIVALPROEX SODIUM 1000 MG: 500 TABLET, DELAYED RELEASE ORAL at 07:51

## 2022-10-08 RX ADMIN — ATOMOXETINE 10 MG: 10 CAPSULE ORAL at 07:51

## 2022-10-08 ASSESSMENT — ACTIVITIES OF DAILY LIVING (ADL)
ADLS_ACUITY_SCORE: 29
HYGIENE/GROOMING: INDEPENDENT
ADLS_ACUITY_SCORE: 29
ADLS_ACUITY_SCORE: 29
DRESS: INDEPENDENT
ADLS_ACUITY_SCORE: 29
LAUNDRY: WITH SUPERVISION
ADLS_ACUITY_SCORE: 29
ORAL_HYGIENE: INDEPENDENT
ADLS_ACUITY_SCORE: 29

## 2022-10-08 NOTE — PLAN OF CARE
Patient was out visible socially interactive with select peers, appropriate with staff, able to verbalize need, reported frustration regarding his discharge planing, denies SI/SIB/Hallucinations, endorsed 4/10 anxiety, he requested to take his scheduled HS medications early, hygiene and nutrition were adequate, denies pain and discomfort.   Problem: Plan of Care - These are the overarching goals to be used throughout the patient stay.    Goal: Optimal Comfort and Wellbeing  Outcome: Ongoing, Progressing     Problem: Violence Risk or Actual  Goal: Anger and Impulse Control  Outcome: Ongoing, Progressing     Problem: Suicidal Behavior  Goal: Suicidal Behavior is Absent or Managed  Outcome: Ongoing, Progressing  Flowsheets (Taken 10/7/2022 1904)  Mutually Determined Action Steps (Suicidal Behavior Absent/Managed):   identifies protective factors   sets future-oriented goal   shares suicidal thoughts   verbalizes safety check rationale  Individualized Action Step (Suicidal Behavior Absent/Managed): denies SI/SIB     Problem: Behavioral Health Plan of Care  Goal: Optimal Comfort and Wellbeing  Outcome: Ongoing, Progressing   Goal Outcome Evaluation:    Plan of Care Reviewed With: patient

## 2022-10-08 NOTE — PLAN OF CARE
Pt appears to have slept for 6.25 hours. No PRNs given or requested. No concerns noted this shift. Will continue to monitor and offer support.    Problem: Sleep Disturbance  Goal: Adequate Sleep/Rest  Outcome: Ongoing, Progressing   Goal Outcome Evaluation:

## 2022-10-08 NOTE — PLAN OF CARE
"Pt denies SI, denies hallucinations.  Pt stated learned he has to be here for another month. Pt social with peers, ate meals, played with WII.  Pt did not attend afternoon group.   Problem: Plan of Care - These are the overarching goals to be used throughout the patient stay.    Goal: Plan of Care Review/Shift Note  Description: The Plan of Care Review/Shift note should be completed every shift.  The Outcome Evaluation is a brief statement about your assessment that the patient is improving, declining, or no change.  This information will be displayed automatically on your shift note.  Outcome: Ongoing, Not Progressing  Goal: Patient-Specific Goal (Individualized)  Description: You can add care plan individualizations to a care plan. Examples of Individualization might be:  \"Parent requests to be called daily at 9am for status\", \"I have a hard time hearing out of my right ear\", or \"Do not touch me to wake me up as it startles me\".  Outcome: Ongoing, Not Progressing  Goal: Absence of Hospital-Acquired Illness or Injury  Outcome: Ongoing, Not Progressing  Goal: Optimal Comfort and Wellbeing  Outcome: Ongoing, Not Progressing  Goal: Readiness for Transition of Care  Outcome: Ongoing, Not Progressing   Goal Outcome Evaluation:                      "

## 2022-10-08 NOTE — PLAN OF CARE
Patient was visible socially appropriate with staff and peers, presented with full range affect, reported improved mood and emotional stability, denied SI/SIB/ Hallucinations, denied pain and discomfort, appears adequately groomed and took a shower this evening, intake and hydration were adequate, patient is adherent to treatment plan, no other concerns noted.     Problem: Plan of Care - These are the overarching goals to be used throughout the patient stay.    Goal: Optimal Comfort and Wellbeing  Outcome: Ongoing, Progressing  Intervention: Provide Person-Centered Care  Recent Flowsheet Documentation  Taken 10/8/2022 1850 by Kelly Buitrago RN  Trust Relationship/Rapport:    thoughts/feelings acknowledged    choices provided    reassurance provided     Problem: Violence Risk or Actual  Goal: Anger and Impulse Control  Outcome: Ongoing, Progressing     Problem: Behavioral Health Plan of Care  Goal: Optimal Comfort and Wellbeing  Outcome: Ongoing, Progressing  Intervention: Provide Person-Centered Care  Recent Flowsheet Documentation  Taken 10/8/2022 1850 by Kelly Buitrago RN  Trust Relationship/Rapport:    thoughts/feelings acknowledged    choices provided    reassurance provided     Problem: Depression  Goal: Improved Mood  Outcome: Ongoing, Progressing   Goal Outcome Evaluation:    Plan of Care Reviewed With: patient

## 2022-10-08 NOTE — PROGRESS NOTES
10/08/22 1552   Group Therapy Session   Group Attendance attended group session   Time Session Began 1315   Time Session Ended 1400   Total Time patient participated (minutes) 20   Total # Attendees 6   Group Type life skill   Group Topic Covered balanced lifestyle;self-care activities;emotions/expression;structured socialization;coping skills/lifestyle management;relapse prevention   Group Session Detail Positive affirmations education and practice utilizing them with making a positive affirmations calendar for creative expression, reframing negative thoughts to positive, building self esteem, coping, reality based activity, mood stabilization, sharing thoughts/feelings, relapse prevention, self expression and an opportunity for socialization   Patient Response Detail Pt joined group late as he was sleeping and asked a peer to wake him for group.  Pt was pleasant, engaged and social while pleasant.  Pt made a coping calendar per his request and voiced that he was pleased with the outcome of his work.  Pt left group early due to finishing his project early.

## 2022-10-09 PROCEDURE — 250N000013 HC RX MED GY IP 250 OP 250 PS 637: Performed by: PSYCHIATRY & NEUROLOGY

## 2022-10-09 PROCEDURE — 250N000013 HC RX MED GY IP 250 OP 250 PS 637

## 2022-10-09 PROCEDURE — 124N000002 HC R&B MH UMMC

## 2022-10-09 PROCEDURE — 250N000013 HC RX MED GY IP 250 OP 250 PS 637: Performed by: EMERGENCY MEDICINE

## 2022-10-09 RX ADMIN — NICOTINE POLACRILEX 4 MG: 4 GUM, CHEWING BUCCAL at 13:27

## 2022-10-09 RX ADMIN — NICOTINE POLACRILEX 4 MG: 4 GUM, CHEWING BUCCAL at 17:59

## 2022-10-09 RX ADMIN — OLANZAPINE 10 MG: 10 TABLET, FILM COATED ORAL at 07:51

## 2022-10-09 RX ADMIN — HYDROCHLOROTHIAZIDE 12.5 MG: 12.5 TABLET ORAL at 07:51

## 2022-10-09 RX ADMIN — DIVALPROEX SODIUM 1000 MG: 500 TABLET, DELAYED RELEASE ORAL at 07:50

## 2022-10-09 RX ADMIN — NICOTINE POLACRILEX 4 MG: 4 GUM, CHEWING BUCCAL at 10:50

## 2022-10-09 RX ADMIN — NICOTINE POLACRILEX 4 MG: 4 GUM, CHEWING BUCCAL at 15:03

## 2022-10-09 RX ADMIN — ATOMOXETINE 10 MG: 10 CAPSULE ORAL at 07:51

## 2022-10-09 RX ADMIN — NICOTINE 1 PATCH: 14 PATCH, EXTENDED RELEASE TRANSDERMAL at 07:52

## 2022-10-09 RX ADMIN — NICOTINE POLACRILEX 4 MG: 4 GUM, CHEWING BUCCAL at 07:51

## 2022-10-09 RX ADMIN — OLANZAPINE 20 MG: 10 TABLET, FILM COATED ORAL at 20:30

## 2022-10-09 RX ADMIN — NICOTINE POLACRILEX 4 MG: 4 GUM, CHEWING BUCCAL at 20:30

## 2022-10-09 RX ADMIN — BUPROPION HYDROCHLORIDE 300 MG: 150 TABLET, EXTENDED RELEASE ORAL at 07:51

## 2022-10-09 RX ADMIN — DIVALPROEX SODIUM 1000 MG: 500 TABLET, DELAYED RELEASE ORAL at 20:30

## 2022-10-09 ASSESSMENT — ACTIVITIES OF DAILY LIVING (ADL)
ADLS_ACUITY_SCORE: 29
ORAL_HYGIENE: INDEPENDENT
HYGIENE/GROOMING: INDEPENDENT
ADLS_ACUITY_SCORE: 29
LAUNDRY: WITH SUPERVISION
ADLS_ACUITY_SCORE: 29
DRESS: INDEPENDENT
ADLS_ACUITY_SCORE: 29

## 2022-10-09 NOTE — PLAN OF CARE
Pt appears to have slept for  6.50 hours. No PRNs given or requested. No concerns noted this shift. Will continue to monitor and offer support.    Problem: Sleep Disturbance  Goal: Adequate Sleep/Rest  Outcome: Progressing   Goal Outcome Evaluation:

## 2022-10-10 PROCEDURE — 250N000013 HC RX MED GY IP 250 OP 250 PS 637: Performed by: EMERGENCY MEDICINE

## 2022-10-10 PROCEDURE — 250N000013 HC RX MED GY IP 250 OP 250 PS 637: Performed by: PSYCHIATRY & NEUROLOGY

## 2022-10-10 PROCEDURE — 250N000013 HC RX MED GY IP 250 OP 250 PS 637

## 2022-10-10 PROCEDURE — 124N000002 HC R&B MH UMMC

## 2022-10-10 PROCEDURE — 99233 SBSQ HOSP IP/OBS HIGH 50: CPT | Performed by: PSYCHIATRY & NEUROLOGY

## 2022-10-10 PROCEDURE — G0177 OPPS/PHP; TRAIN & EDUC SERV: HCPCS

## 2022-10-10 RX ADMIN — NICOTINE POLACRILEX 4 MG: 4 GUM, CHEWING BUCCAL at 08:08

## 2022-10-10 RX ADMIN — NICOTINE POLACRILEX 4 MG: 4 GUM, CHEWING BUCCAL at 09:14

## 2022-10-10 RX ADMIN — ATOMOXETINE 10 MG: 10 CAPSULE ORAL at 08:07

## 2022-10-10 RX ADMIN — NICOTINE POLACRILEX 4 MG: 4 GUM, CHEWING BUCCAL at 10:51

## 2022-10-10 RX ADMIN — NICOTINE POLACRILEX 4 MG: 4 GUM, CHEWING BUCCAL at 16:06

## 2022-10-10 RX ADMIN — BUPROPION HYDROCHLORIDE 300 MG: 150 TABLET, EXTENDED RELEASE ORAL at 08:07

## 2022-10-10 RX ADMIN — NICOTINE POLACRILEX 4 MG: 4 GUM, CHEWING BUCCAL at 18:10

## 2022-10-10 RX ADMIN — DIVALPROEX SODIUM 1000 MG: 500 TABLET, DELAYED RELEASE ORAL at 19:24

## 2022-10-10 RX ADMIN — OLANZAPINE 20 MG: 10 TABLET, FILM COATED ORAL at 19:24

## 2022-10-10 RX ADMIN — OLANZAPINE 10 MG: 10 TABLET, FILM COATED ORAL at 08:07

## 2022-10-10 RX ADMIN — DIVALPROEX SODIUM 1000 MG: 500 TABLET, DELAYED RELEASE ORAL at 08:07

## 2022-10-10 RX ADMIN — NICOTINE POLACRILEX 4 MG: 4 GUM, CHEWING BUCCAL at 19:26

## 2022-10-10 RX ADMIN — NICOTINE POLACRILEX 4 MG: 4 GUM, CHEWING BUCCAL at 12:16

## 2022-10-10 RX ADMIN — HYDROCHLOROTHIAZIDE 12.5 MG: 12.5 TABLET ORAL at 08:07

## 2022-10-10 RX ADMIN — NICOTINE 1 PATCH: 14 PATCH, EXTENDED RELEASE TRANSDERMAL at 08:07

## 2022-10-10 ASSESSMENT — ACTIVITIES OF DAILY LIVING (ADL)
ADLS_ACUITY_SCORE: 29
HYGIENE/GROOMING: INDEPENDENT
ORAL_HYGIENE: INDEPENDENT
ADLS_ACUITY_SCORE: 29
HYGIENE/GROOMING: INDEPENDENT
ADLS_ACUITY_SCORE: 29
DRESS: INDEPENDENT
ADLS_ACUITY_SCORE: 29
ORAL_HYGIENE: INDEPENDENT
ADLS_ACUITY_SCORE: 29
DRESS: INDEPENDENT
LAUNDRY: WITH SUPERVISION
ADLS_ACUITY_SCORE: 29
ADLS_ACUITY_SCORE: 29

## 2022-10-10 NOTE — PLAN OF CARE
No PRNs given or requested. Pt appears to have slept for  6.25 hours. No concerns noted this this shift. Will continue to monitor and offer support.     Problem: Sleep Disturbance  Goal: Adequate Sleep/Rest  Outcome: Progressing   Goal Outcome Evaluation:

## 2022-10-10 NOTE — PLAN OF CARE
"[facility] Occupational Therapy Group Intervention Note     10/10/22 1327   Group Therapy Session   Group Attendance attended group session   Time Session Began 1015   Time Session Ended 1100   Total Time (minutes) 45   Total # Attendees 2   Group Type task skill   Group Topic Covered coping skills/lifestyle management;self-care activities;relaxation techniques   Group Session Detail Chair Yoga. Purpose: Occupation-based coping skill exploration group through mindful movement to facilitate stress management, awakening and/or relaxation. Education was provided on the use of stretching, exercise, and meditation practice as a coping tool within daily/weekly routine.   Patient Response/Contribution cooperative with task   Patient Participation Detail Reported previous experience with movement/yoga. Independently followed and engaged in 50% of group d/t restlessness. Verbalized feeling \"good\" at the end of group and interest in additional mind-body interventions.      Madeline Rosenberg OT on 10/10/2022 at 1:28 PM      "

## 2022-10-10 NOTE — PLAN OF CARE
Assessment/Intervention/Current Symptoms and Care Coordination: Met with team. Reviewed patient's chart and progress notes.      - Writer received a call from Angelina at [facility] ([phone]) faxed progress notes and recent MAR to [facility] at fax [phone].           Discharge Plan or Goal:  Pending stabilization & development of a safe discharge plan.  Considerations include: [facility] placement            Barriers to Discharge:  Patient requires further psychiatric stabilization due to current symptomology. Door to door transfer. PD revoked.            Referral Status:   None today           Legal Status:    Committed as MI

## 2022-10-10 NOTE — PLAN OF CARE
Patient was visible, social and appropriate on interaction, patient denied suicidal and self harm ideation, insight appears to have improved since admission, reported happy feelings and calm mood, presented with full range affect, denies pain and discomfort, hygiene and intake were adequate.  Problem: Anxiety  Goal: Anxiety Reduction or Resolution  Outcome: Progressing     Problem: Behavior Regulation Impairment (Psychotic Signs/Symptoms)  Goal: Improved Behavioral Control (Psychotic Signs/Symptoms)  Outcome: Progressing  Flowsheets (Taken 10/9/2022 2226)  Mutually Determined Action Steps (Improved Behavioral Control):   verbalizes gratifying activity   identifies future-oriented goal     Problem: Suicidal Behavior  Goal: Suicidal Behavior is Absent or Managed  Outcome: Adequate for Care Transition  Flowsheets (Taken 10/9/2022 2226)  Mutually Determined Action Steps (Suicidal Behavior Absent/Managed):   sets future-oriented goal   shares suicidal thoughts   verbalizes safety check rationale   Goal Outcome Evaluation:    Plan of Care Reviewed With: patient

## 2022-10-10 NOTE — PLAN OF CARE
Pt had a good shift, he attended a majority of groups and was visible in the milieu, social with peers. Pt has also been exercising in the hallway. He presents with a full range affect and is pleasant upon approach. Pt denies MH sx upon assessment. Pt is med compliant without issue, no PRNs given aside from nicotine gum. He can be impatient at times but is understanding of redirection. He told writer about his daughter attending her first homecoming dance and that he was hoping his mom could visit this weekend to show him pictures. No other concerns at this time.    Problem: Depression  Goal: Improved Mood  Outcome: Progressing   Goal Outcome Evaluation:    Plan of Care Reviewed With: patient

## 2022-10-10 NOTE — PLAN OF CARE
[facility] Occupational Therapy Group Intervention Note     10/10/22 1333   Group Therapy Session   Group Attendance attended group session   Time Session Began 1115   Time Session Ended 1200   Total Time (minutes) 45   Total # Attendees 3   Group Type task skill   Group Topic Covered structured socialization;relaxation techniques;coping skills/lifestyle management   Group Session Detail clinic - coping skill exploration, creative expression within personally meaningful activities, and observation of social, cognitive, and kinesthetic performance skills   Patient Response/Contribution cooperative with task;organized;offered helpful suggestions to peers   Patient Participation Detail IND with a familiar task. Able to attend for ~40mins including all sequenced steps and clean-up. Social with peers.      Madeline Rosenberg OT on 10/10/2022 at 1:34 PM

## 2022-10-10 NOTE — PROGRESS NOTES
"[facility], [place]   Psychiatric Progress Note        Interim History:   The patient's care was discussed with the treatment team during the daily team meeting and/or staff's chart notes were reviewed.  Staff report patient is doing well on the unit. Handling the news of his delay in discharge well. Commitment renewed.     The patient reports that he is doing well. Says that he \"has to wait another 30 days.\" Reports being \"content.\" Reports sleeping and eating well. Denies SI or HI. Denies any psychosis. Reports that he plans to start exercising soon.          Medications:       atomoxetine  10 mg Oral Daily     buPROPion  300 mg Oral QAM     divalproex sodium delayed-release  1,000 mg Oral BID     hydrochlorothiazide  12.5 mg Oral QAM     nicotine  1 patch Transdermal Daily     nicotine   Transdermal Q8H     OLANZapine  10 mg Oral Daily     OLANZapine  20 mg Oral At Bedtime          Allergies:     Allergies   Allergen Reactions     Penicillins           Labs:   No results found for this or any previous visit (from the past 24 hour(s)).       Psychiatric Examination:     /81 (BP Location: Right arm, Patient Position: Sitting, Cuff Size: Adult Large)   Pulse 76   Temp 98  F (36.7  C) (Oral)   Resp 18   Wt 110.7 kg (244 lb)   SpO2 96%   BMI 33.97 kg/m    Weight is 244 lbs 0 oz  Body mass index is 33.97 kg/m .  Orthostatic Vitals       Most Recent      Sitting Orthostatic /81 10/10 0900    Sitting Orthostatic Pulse (bpm) 72 10/10 0900    Standing Orthostatic /79 10/10 0900    Standing Orthostatic Pulse (bpm) 83 10/10 0900            Appearance: awake, alert and adequately groomed  Attitude:  cooperative  Eye Contact:  good  Mood:  good  Affect:  mood congruent  Speech:  clear, coherent  Psychomotor Behavior:  no evidence of tardive dyskinesia, dystonia, or tics  Thought Process:  goal oriented  Associations:  no loose associations  Thought Content:  no evidence of " suicidal ideation or homicidal ideation and no evidence of psychotic thought  Insight:  fair  Judgement:  fair  Oriented to:  time, person, and place  Attention Span and Concentration:  intact  Recent and Remote Memory:  intact         Precautions:     Behavioral Orders   Procedures     Code 1 - Restrict to Unit     Occupational Therapy on the Unit     Not sure if this is the right order but Jannette would come on Monday to see patient     Order Specific Question:   Reason for Consult     Answer:   Eval of thought process, functional skills and behavior     Order Specific Question:   Course of Action:     Answer:   Eval & Treat as indicated     Order Specific Question:   Treatment Prescription:     Answer:   Patient would like 1:1 therapy and some assignment sheets to work on with long admission     Routine Programming     As clinically indicated     Status 15     Every 15 minutes.          Diagnoses:     Mood disorder, unspecified (Substance induced vs 2/2 Bipolar I Disorder)  Amphetamine Use Disorder, moderate  Unspecified personality disorder          Plan:      Orders Placed This Encounter      Routine Programming      Code 1 - Restrict to Unit      Status 15     Continue Prazosin 1 mg PRN HS. Initiated on 9/16 for trauma based nightmares.   Continue taking Strattera (Atomoxetine) 10 mg initiated 10/4/22 to help with focus ad attention  Continue Wellbutrin  mg daily  Continue Depakote DR 1,000 mg BID. Level checked on 9/14 and is within therapeutic range at 89.   Continue Zyprexa 10 mg daily and 20 mg QHS  Continue hydroxyzine 25 mg q4h prn for acute anxiety  Continue melatonin 3mg at bedtime prn for sleep disturbances  Continue Zyprexa 10 mg TID prn for severe agitation       Legal Status: court hold. PD has been revoked. Fully committed with Aguiar in place for Risperidone, Invega, Zyprexa, Abilify.        Disposition Plan   Reason for ongoing admission: is unable to care for self due to severe psychosis  or julianna  Discharge location: [facility]  Discharge Medications: not ordered  Follow-up Appointments: not scheduled  Legal Status: full commitment    Entered by: Jesus Yañez MD on October 10, 2022 at 11:23 AM

## 2022-10-11 PROCEDURE — 250N000013 HC RX MED GY IP 250 OP 250 PS 637: Performed by: EMERGENCY MEDICINE

## 2022-10-11 PROCEDURE — 250N000013 HC RX MED GY IP 250 OP 250 PS 637

## 2022-10-11 PROCEDURE — 250N000013 HC RX MED GY IP 250 OP 250 PS 637: Performed by: PSYCHIATRY & NEUROLOGY

## 2022-10-11 PROCEDURE — 99233 SBSQ HOSP IP/OBS HIGH 50: CPT | Performed by: PSYCHIATRY & NEUROLOGY

## 2022-10-11 PROCEDURE — G0177 OPPS/PHP; TRAIN & EDUC SERV: HCPCS

## 2022-10-11 PROCEDURE — 124N000002 HC R&B MH UMMC

## 2022-10-11 RX ADMIN — DIVALPROEX SODIUM 1000 MG: 500 TABLET, DELAYED RELEASE ORAL at 19:21

## 2022-10-11 RX ADMIN — DIVALPROEX SODIUM 1000 MG: 500 TABLET, DELAYED RELEASE ORAL at 07:58

## 2022-10-11 RX ADMIN — BUPROPION HYDROCHLORIDE 300 MG: 150 TABLET, EXTENDED RELEASE ORAL at 07:58

## 2022-10-11 RX ADMIN — NICOTINE 1 PATCH: 14 PATCH, EXTENDED RELEASE TRANSDERMAL at 07:58

## 2022-10-11 RX ADMIN — OLANZAPINE 20 MG: 10 TABLET, FILM COATED ORAL at 19:21

## 2022-10-11 RX ADMIN — HYDROCHLOROTHIAZIDE 12.5 MG: 12.5 TABLET ORAL at 07:58

## 2022-10-11 RX ADMIN — OLANZAPINE 10 MG: 10 TABLET, FILM COATED ORAL at 07:58

## 2022-10-11 RX ADMIN — ATOMOXETINE 10 MG: 10 CAPSULE ORAL at 07:58

## 2022-10-11 RX ADMIN — NICOTINE POLACRILEX 4 MG: 4 GUM, CHEWING BUCCAL at 16:35

## 2022-10-11 RX ADMIN — NICOTINE POLACRILEX 4 MG: 4 GUM, CHEWING BUCCAL at 07:58

## 2022-10-11 RX ADMIN — HYDROXYZINE HYDROCHLORIDE 25 MG: 25 TABLET, FILM COATED ORAL at 21:17

## 2022-10-11 RX ADMIN — NICOTINE POLACRILEX 4 MG: 4 GUM, CHEWING BUCCAL at 21:17

## 2022-10-11 RX ADMIN — PRAZOSIN HYDROCHLORIDE 1 MG: 1 CAPSULE ORAL at 01:10

## 2022-10-11 RX ADMIN — HYDROXYZINE HYDROCHLORIDE 25 MG: 25 TABLET, FILM COATED ORAL at 01:11

## 2022-10-11 RX ADMIN — NICOTINE POLACRILEX 4 MG: 4 GUM, CHEWING BUCCAL at 13:16

## 2022-10-11 RX ADMIN — NICOTINE POLACRILEX 4 MG: 4 GUM, CHEWING BUCCAL at 09:43

## 2022-10-11 RX ADMIN — NICOTINE POLACRILEX 4 MG: 4 GUM, CHEWING BUCCAL at 18:43

## 2022-10-11 RX ADMIN — NICOTINE POLACRILEX 4 MG: 4 GUM, CHEWING BUCCAL at 11:50

## 2022-10-11 ASSESSMENT — ACTIVITIES OF DAILY LIVING (ADL)
ADLS_ACUITY_SCORE: 29
DRESS: INDEPENDENT
ADLS_ACUITY_SCORE: 29
DRESS: INDEPENDENT
ORAL_HYGIENE: INDEPENDENT
ADLS_ACUITY_SCORE: 29
ORAL_HYGIENE: INDEPENDENT
HYGIENE/GROOMING: INDEPENDENT
HYGIENE/GROOMING: INDEPENDENT
ADLS_ACUITY_SCORE: 29
LAUNDRY: WITH SUPERVISION
LAUNDRY: WITH SUPERVISION

## 2022-10-11 NOTE — PLAN OF CARE
[facility] Occupational Therapy Group Intervention Note     10/11/22 1407   Group Therapy Session   Group Attendance attended group session   Time Session Began 1315   Time Session Ended 1400   Total Time (minutes) 45   Total # Attendees 7   Group Type recreation;task skill   Group Topic Covered cognitive activities;leisure exploration/use of leisure time;structured socialization   Group Session Detail IRIS-RFID game with prompted questions to facilitate healthy peer interaction and insight development r/t recovery.    Patient Response/Contribution cooperative with task;discussed personal experience with topic;offered helpful suggestions to peers;listened actively;organized   Patient Participation Detail I to participate; motivating to peers. Demonstrated good sustained attention, visual scanning, and following directions. Shared personal experiences and 'favorites'.      Madeline Rosenberg OT on 10/11/2022 at 2:08 PM

## 2022-10-11 NOTE — PLAN OF CARE
[facility] Occupational Therapy Group Intervention Note     10/11/22 1210   Group Therapy Session   Group Attendance attended group session   Time Session Began 1015   Time Session Ended 1100   Total Time (minutes) 45   Total # Attendees 4   Group Type psychoeducation   Group Topic Covered coping skills/lifestyle management;emotions/expression   Group Session Detail Topic group for mental health management: 'Tree of Life' focusing on insight development via metaphors: identification of various past, present, and future aspects of life that do/don't serve them throughout recovery.    Patient Response/Contribution cooperative with task;discussed personal experience with topic;listened actively;offered helpful suggestions to peers   Patient Participation Detail Pleasant and appropriate throughout group. Expressed great insight r/t the influences of past experiences on current emotions and situation. Identified personal values, skills, goals, and coping skills.       Madeline Rosenberg OT on 10/11/2022 at 12:11 PM

## 2022-10-11 NOTE — PLAN OF CARE
Problem: Sleep Disturbance  Goal: Adequate Sleep/Rest  Outcome: Progressing         Pt appeared to have slept for 6.25 hours. Pt did not complain of pain or discomfort. No PRN medication was given. 15 minutes safety checks were place. Staff will continue to offer support to pt.

## 2022-10-11 NOTE — PLAN OF CARE
Assessment/Intervention/Current Symptoms and Care Coordination: Met with team. Reviewed patient's chart and progress notes.      - Writer received a call from Angelina at [facility] ([phone]) stating that patient has been approved for admission at their [facility] facility but there are no beds available currently. Next bed may become available in 30 to 45 days.     - Writer checked in patient. Patient was pleased to hear about his approval to [facility]. Writer encouraged patient to continue with positive behavior and treatment adherence.           Discharge Plan or Goal:  Pending stabilization & development of a safe discharge plan.  Considerations include: [facility] placement            Barriers to Discharge:  Patient requires further psychiatric stabilization due to current symptomology. Door to door transfer. PD revoked.            Referral Status:   None today           Legal Status:    Committed as MI

## 2022-10-11 NOTE — PLAN OF CARE
Pt had an uneventful shift, he is visible in the milieu, social with peers, and attends a majority of groups. He presents with a bright affect and is cooperative with care. Pt denies all mental health symptoms upon assessment. He is med compliant without issue, no PRNs given aside from nicotine gum. No other concerns at this time.    Problem: Decreased Participation and Engagement (Psychotic Signs/Symptoms)  Goal: Increased Participation and Engagement (Psychotic Signs/Symptoms)  Outcome: Progressing   Goal Outcome Evaluation:    Plan of Care Reviewed With: patient

## 2022-10-11 NOTE — PLAN OF CARE
[facility] Occupational Therapy Group Intervention Note     10/11/22 1221   Group Therapy Session   Group Attendance attended group session   Time Session Began 1115   Time Session Ended 1200   Total Time (minutes) 45   Total # Attendees 5   Group Type task skill;life skill   Group Topic Covered coping skills/lifestyle management;relapse prevention   Group Session Detail clinic - coping skill exploration, creative expression within personally meaningful activities, and observation of social, cognitive, and kinesthetic performance skills   Patient Response/Contribution cooperative with task;offered helpful suggestions to peers;listened actively;organized   Patient Participation Detail IND to engage in a previously started project. Demonstrated congruent performance and social skills as previous dates. Expressed motivation for the future as he requested to update resume and laminate his certificates in next session.      Madeline Rosenberg OT on 10/11/2022 at 12:22 PM

## 2022-10-11 NOTE — PROGRESS NOTES
"[facility], [place]   Psychiatric Progress Note        Interim History:   The patient's care was discussed with the treatment team during the daily team meeting and/or staff's chart notes were reviewed.  Staff report patient is doing well. Charge RN in agreement with patient being able to have one soda per day.     The patient reports that he is doing well. Mood is good. Says that he had some \"night terrors\" last night but took his Minipress and was able to sleep well the rest of the night. Eating well. Denies SI or HI. Denies AH or VH. Did have some soda brought in when he thought he would be discharging and asks if he could have one per day.          Medications:       atomoxetine  10 mg Oral Daily     buPROPion  300 mg Oral QAM     divalproex sodium delayed-release  1,000 mg Oral BID     hydrochlorothiazide  12.5 mg Oral QAM     nicotine  1 patch Transdermal Daily     nicotine   Transdermal Q8H     OLANZapine  10 mg Oral Daily     OLANZapine  20 mg Oral At Bedtime          Allergies:     Allergies   Allergen Reactions     Penicillins           Labs:   No results found for this or any previous visit (from the past 24 hour(s)).       Psychiatric Examination:     /76 (BP Location: Right arm, Patient Position: Sitting, Cuff Size: Adult Large)   Pulse 71   Temp 97.7  F (36.5  C) (Oral)   Resp 16   Wt 111.1 kg (245 lb)   SpO2 97%   BMI 34.11 kg/m    Weight is 245 lbs 0 oz  Body mass index is 34.11 kg/m .  Orthostatic Vitals       Most Recent      Sitting Orthostatic /81 10/10 0900    Sitting Orthostatic Pulse (bpm) 72 10/10 0900    Standing Orthostatic /79 10/10 0900    Standing Orthostatic Pulse (bpm) 83 10/10 0900            Appearance: awake, alert and adequately groomed  Attitude:  cooperative  Eye Contact:  good  Mood:  good  Affect:  mood congruent  Speech:  clear, coherent  Psychomotor Behavior:  no evidence of tardive dyskinesia, dystonia, or tics  Thought " Process:  goal oriented  Associations:  no loose associations  Thought Content:  no evidence of suicidal ideation or homicidal ideation and no evidence of psychotic thought  Insight:  fair  Judgement:  fair  Oriented to:  time, person, and place  Attention Span and Concentration:  intact  Recent and Remote Memory:  intact         Precautions:     Behavioral Orders   Procedures     Code 1 - Restrict to Unit     Occupational Therapy on the Unit     Not sure if this is the right order but Jannette would come on Monday to see patient     Order Specific Question:   Reason for Consult     Answer:   Eval of thought process, functional skills and behavior     Order Specific Question:   Course of Action:     Answer:   Eval & Treat as indicated     Order Specific Question:   Treatment Prescription:     Answer:   Patient would like 1:1 therapy and some assignment sheets to work on with long admission     Routine Programming     As clinically indicated     Status 15     Every 15 minutes.          Diagnoses:     Mood disorder, unspecified (Substance induced vs 2/2 Bipolar I Disorder)  Amphetamine Use Disorder, moderate  Unspecified personality disorder          Plan:      Orders Placed This Encounter      Routine Programming      Code 1 - Restrict to Unit      Status 15     Continue Prazosin 1 mg PRN HS. Initiated on 9/16 for trauma based nightmares.   Continue taking Strattera (Atomoxetine) 10 mg initiated 10/4/22 to help with focus ad attention  Continue Wellbutrin  mg daily  Continue Depakote DR 1,000 mg BID. Level checked on 9/14 and is within therapeutic range at 89.   Continue Zyprexa 10 mg daily and 20 mg QHS  Continue hydroxyzine 25 mg q4h prn for acute anxiety  Continue melatonin 3mg at bedtime prn for sleep disturbances  Continue Zyprexa 10 mg TID prn for severe agitation       Legal Status: court hold. PD has been revoked. Fully committed with Aguiar in place for Risperidone, Invega, Zyprexa,  Abilify.        Disposition Plan   Reason for ongoing admission: is unable to care for self due to severe psychosis or julianna  Discharge location: [facility]  Discharge Medications: not ordered  Follow-up Appointments: not scheduled  Legal Status: full commitment    Entered by: Jesus Yañez MD on October 11, 2022 at 5:41 PM

## 2022-10-11 NOTE — PLAN OF CARE
Problem: Depression  Goal: Improved Mood  Outcome: Progressing  Intervention: Monitor and Manage Depressive Symptoms  Recent Flowsheet Documentation  Taken 10/10/2022 1800 by Leandro Merlos RN  Supportive Measures:    self-care encouraged    self-reflection promoted  Complementary Therapy: essential oils utilized  Family/Support System Care:    presence promoted    self-care encouraged     Problem: Anxiety  Goal: Anxiety Reduction or Resolution  Outcome: Progressing  Intervention: Promote Anxiety Reduction  Recent Flowsheet Documentation  Taken 10/10/2022 1800 by Leandro Merlos RN  Supportive Measures:    self-care encouraged    self-reflection promoted  Complementary Therapy: essential oils utilized  Family/Support System Care:    presence promoted    self-care encouraged     Problem: Behavior Regulation Impairment (Psychotic Signs/Symptoms)  Goal: Improved Behavioral Control (Psychotic Signs/Symptoms)  Outcome: Progressing    Patient is visible, sociable and  interacts appropriately with staff and peers. His mood/affect is calm,bright, and pleasant, and  patient is alert and oriented x4. Patient eats will and is compliant with medications and ADLs. He denies SI, HI, SIB, pain, and other psych symptoms. Vitals are stable and patient communicates respectfully. Staff however will continue to encourage and monitor for any abnormalities in behavior.

## 2022-10-12 PROCEDURE — 250N000013 HC RX MED GY IP 250 OP 250 PS 637: Performed by: PSYCHIATRY & NEUROLOGY

## 2022-10-12 PROCEDURE — G0177 OPPS/PHP; TRAIN & EDUC SERV: HCPCS

## 2022-10-12 PROCEDURE — 124N000002 HC R&B MH UMMC

## 2022-10-12 PROCEDURE — 99232 SBSQ HOSP IP/OBS MODERATE 35: CPT | Mod: 25 | Performed by: PSYCHIATRY & NEUROLOGY

## 2022-10-12 PROCEDURE — 250N000013 HC RX MED GY IP 250 OP 250 PS 637: Performed by: EMERGENCY MEDICINE

## 2022-10-12 PROCEDURE — 250N000013 HC RX MED GY IP 250 OP 250 PS 637

## 2022-10-12 RX ORDER — ATOMOXETINE 25 MG/1
25 CAPSULE ORAL DAILY
Status: DISCONTINUED | OUTPATIENT
Start: 2022-10-13 | End: 2022-11-07

## 2022-10-12 RX ADMIN — NICOTINE POLACRILEX 4 MG: 4 GUM, CHEWING BUCCAL at 14:28

## 2022-10-12 RX ADMIN — NICOTINE 1 PATCH: 14 PATCH, EXTENDED RELEASE TRANSDERMAL at 08:11

## 2022-10-12 RX ADMIN — OLANZAPINE 20 MG: 10 TABLET, FILM COATED ORAL at 19:21

## 2022-10-12 RX ADMIN — NICOTINE POLACRILEX 4 MG: 4 GUM, CHEWING BUCCAL at 08:10

## 2022-10-12 RX ADMIN — OLANZAPINE 10 MG: 10 TABLET, FILM COATED ORAL at 08:10

## 2022-10-12 RX ADMIN — BUPROPION HYDROCHLORIDE 300 MG: 150 TABLET, EXTENDED RELEASE ORAL at 08:10

## 2022-10-12 RX ADMIN — HYDROCHLOROTHIAZIDE 12.5 MG: 12.5 TABLET ORAL at 08:10

## 2022-10-12 RX ADMIN — NICOTINE POLACRILEX 4 MG: 4 GUM, CHEWING BUCCAL at 10:10

## 2022-10-12 RX ADMIN — NICOTINE POLACRILEX 4 MG: 4 GUM, CHEWING BUCCAL at 19:21

## 2022-10-12 RX ADMIN — NICOTINE POLACRILEX 4 MG: 4 GUM, CHEWING BUCCAL at 18:20

## 2022-10-12 RX ADMIN — NICOTINE POLACRILEX 4 MG: 4 GUM, CHEWING BUCCAL at 16:57

## 2022-10-12 RX ADMIN — DIVALPROEX SODIUM 1000 MG: 500 TABLET, DELAYED RELEASE ORAL at 08:09

## 2022-10-12 RX ADMIN — ATOMOXETINE 10 MG: 10 CAPSULE ORAL at 08:10

## 2022-10-12 RX ADMIN — NICOTINE POLACRILEX 4 MG: 4 GUM, CHEWING BUCCAL at 11:57

## 2022-10-12 RX ADMIN — DIVALPROEX SODIUM 1000 MG: 500 TABLET, DELAYED RELEASE ORAL at 19:21

## 2022-10-12 ASSESSMENT — ACTIVITIES OF DAILY LIVING (ADL)
LAUNDRY: WITH SUPERVISION
HYGIENE/GROOMING: INDEPENDENT
ADLS_ACUITY_SCORE: 29
DRESS: INDEPENDENT
HYGIENE/GROOMING: INDEPENDENT
LAUNDRY: WITH SUPERVISION
DRESS: INDEPENDENT;SCRUBS (BEHAVIORAL HEALTH)
ADLS_ACUITY_SCORE: 29
ORAL_HYGIENE: INDEPENDENT
ADLS_ACUITY_SCORE: 29
ORAL_HYGIENE: INDEPENDENT
ADLS_ACUITY_SCORE: 29

## 2022-10-12 NOTE — PLAN OF CARE
10/11/22 2134   Group Therapy Session   Group Attendance attended group session   Time Session Began 1950   Time Session Ended 2100   Total Time (minutes) 70   Total # Attendees 6   Group Type recreation;task skill   Group Topic Covered cognitive activities;leisure exploration/use of leisure time;coping skills/lifestyle management;structured socialization   Group Session Detail OT Leisure Group for social engagement, following directions, healthy distraction, category/naming, symptom management, focus, turn taking, and team building   Patient Response/Contribution cooperative with task;listened actively;organized   Patient Participation Detail pt actively engaged in group activities. pt appeared motivated by competition. pt endorsed positive response to activities. pt was independent with task and was readily able to provide appropriate responses

## 2022-10-12 NOTE — PLAN OF CARE
[facility] Occupational Therapy Group Intervention Note     10/12/22 1444   Group Therapy Session   Group Attendance attended group session   Group Type psychoeducation;task skill   Group Topic Covered coping skills/lifestyle management;leisure exploration/use of leisure time;structured socialization;emotions/expression   Group Session Detail 1: Mental health management group focused on goal setting for hopefulness and self-development related to meaningful occupations. Education was provided on SMART goals for increased follow-through and success.    2: clinic - coping skill exploration, creative expression within personally meaningful activities, and observation of social, cognitive, and kinesthetic performance skills   Patient Response/Contribution cooperative with task;discussed personal experience with topic;offered helpful suggestions to peers;listened actively;organized   Patient Participation Detail 1: Demonstrated fair insight as he connected overall values/dreams to a smaller, specific goals r/t symptom management and learning from experiences.    2: Motivated to engage in an extensive art project. Demonstrated excellent focus for ~50 minutes.      Madeline Rosenberg OT on 10/12/2022 at 2:45 PM

## 2022-10-12 NOTE — PLAN OF CARE
10/12/22 1412   Group Therapy Session   Group Attendance attended group session   Time Session Began 1015   Time Session Ended 1100   Total Time (minutes) 45   Total # Attendees 6   Group Type Occupational Therapy   Group Topic Covered balanced lifestyle;emotions/expression;relaxation techniques;self-care activities;structured socialization   Group Session Detail Wellness group through gentle movement/stretching and socialization   Patient Participation Detail Intervention: Wellness Group with 5 peers.    Patient Response: Pt participated in a group focused on wellness through gentle movement/stretching and socialization. Pt was an active participant in both the movement ans conversation for the duration of the group. Pt exhibited some restlessness and fidgeting in chair, however demonstrated focus and attention to responses others were offering by keeping eye contact with the person speaking, asking appropriate follow up answers and providing insightful responses to others' responses. Pt shared their goal for the upcoming future is to do well when they leave the hospital and try to stay off of commitment after this one. Shared that they would liked to work as a director of a nonprofit such as [employer] or other organizations that help people if they could choose any career.     Mood/Affect: Pleasant, restless     Plan: Patient encouraged to maintain attendance for continued ongoing support in working towards occupational therapy goals to support overall treatment/care.

## 2022-10-12 NOTE — PLAN OF CARE
"Goal Outcome Evaluation:    Plan of Care Reviewed With: patient        Etienne was up ad alondra, spent most of the shift in the milieu. Pt's mood was bright, his speech was mildly pressured at times.   Pt denied having suicidal ideation or self harm thoughts. Etienne said he was going to talk to the Doctor and request to have his Strattera dose increased \"I just don't think the dose is high enough\"     Etienne was med adherent, cooperative.      "

## 2022-10-12 NOTE — PLAN OF CARE
10/12/22 1056   Individualization/Patient Specific Goals   Patient Personal Strengths expressive of emotions;expressive of needs   Patient Vulnerabilities legal concerns;lacks insight into illness;poor impulse control;housing insecurity;history of unsuccessful treatment;food insecurity;substance abuse/addiction;adverse childhood experience(s);limited social skills   Anxieties, Fears or Concerns Lenght of stay, financial concerns, and legal concerns   Individualized Care Needs Will be encouraged to be medication compliant and attend groups   Patient/Family-Specific Goals (Include Timeframe) Attend Tx at [facility]   Interprofessional Rounds   Summary Patient's progress and aftercare disposition   Behavioral Team Discussion   Participants Dr. Rivers, Evangelist RN, Chayo Fried cTC   Progress Continuing to assess   Anticipated length of stay No anticipated discharge date   Continued Stay Criteria/Rationale PD revoked, on judicial hold. Needs door to door transfer to [facility]. Needs clinical stabilization and medication management   Medical/Physical No medical concerns noted   Precautions See below   Plan Psychiatry Team will meet with patient daily to assess psychiatric needs and to discuss medication options/side effects; during hospitalization patient will be encouraged to attend therapy groups and to participate in unit programming. CTC will coordinate disposition and aftercare plan.   Rationale for change in precautions or plan No change   Safety Plan See below   Anticipated Discharge Disposition psychiatric hospital   Mutuality/Individual Preferences   Patient-Specific Goals (Include Timeframe) Discharge to [facility]   PRECAUTIONS AND SAFETY    Behavioral Orders   Procedures     Code 1 - Restrict to Unit     Occupational Therapy on the Unit     Not sure if this is the right order but Jannette would come on Monday to see patient     Order Specific Question:   Reason for Consult     Answer:   Eval of thought process, functional  skills and behavior     Order Specific Question:   Course of Action:     Answer:   Eval & Treat as indicated     Order Specific Question:   Treatment Prescription:     Answer:   Patient would like 1:1 therapy and some assignment sheets to work on with long admission     Routine Programming     As clinically indicated     Status 15     Every 15 minutes.       Safety  Safety WDL: WDL  Patient Location: lounge, dining room, patient room, own  Observed Behavior: calm  Observed Behavior (Comment): calm/group participation  Safety Measures: safety rounds completed  Diversional Activity: television, art work  Suicidality: Status 15  Seizure precautions: clutter free environment  Assault: status 15, private room
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
## 2022-10-12 NOTE — PLAN OF CARE
Problem: Sleep Disturbance  Goal: Adequate Sleep/Rest  Outcome: Progressing    Pt appeared to have slept for 7 hours. At 0440, pt was awake and walked down to the nursing station and requested for snacks ( yogurt), which was given to him. He went back to his room at 0500 after eating. Pt denies pain or discomfort. No PRN medication was given. 15 minutes safety checks were in  place. Staff will continue to offer support to pt.                            Diagnosis: CNS Lymphoma    Regimen: MT-R  Cycle/Day: C6D2- last given     Dr. Rock is supervising clinician today.    ECO - No physically strenuous activity, but ambulatory and able to carry out light or sedentary work.    Nursing Assessment:        19 1335   Auditory/Ear   Tinnitus 0   Hearing Impaired None Present   Constitutional   Fatigue Grade 1   Fever None Present   Insomnia None Present   Chills None Present   Weight Gain None Present   Weight Loss None Present   Dermatology/Skin   Rash Acneiform None Present   Alopecia None Present   Bruising None Present   Skin Hyperpigmentation None Present   Injection Site Reaction None Present   Nail Discoloration None Present   Pruritus None Present   Urticaria None Present   Endocrine   Hot Flashes None Present   Gastrointestinal   Anorexia None Present   Constipation None Present   Dehydration None Present   Diarrhea None Present   Dry Mouth None Present   Dysphagia None Present   Mucositis Oral None Present   Nausea None Present   Vomiting None Present   Hemorrhage/Bleeding   Gastrointestinal Pain None Present   - Bladder None Present   Petechiae None Present   Hemorrhage pulmonary 0   Lymphatics   Edema Limbs Grade 1  (R lower leg)   Lymphedema None Present   Musculoskeletal   Generalized Muscle Weakness Grade 1   Neurology   Ataxia None Present   Confusion Grade 1   Dizziness None Present   Memory Impairment Grade 1  (on going)   Peripheral Sensory Neuropathy None Present   - Depression None Present   - Anxiety None Present   Numbness None Present   Tingling None Present   Ocular   Blurred Vision None Present   Diplopia None Present   Dry Eye None Present   Photophobia None Present   Watering Eyes Grade 1   Pain   Abdominal Pain None Present   Bone Pain None Present   Chest Wall Pain None Present   - Muscle None Present   - Joint None Present   - Head/headache None Present   Pain None Present   Pulmonary/Upper Respiratory   Bronchospasm None Present    Cough None Present   Dyspnea None Present   Hiccups None Present   Genitourinary   Urinary Incontinence None Present   Urinary Frequency None Present   Urinary Discoloration None Present

## 2022-10-12 NOTE — PLAN OF CARE
Problem: Depression  Goal: Improved Mood  Outcome: Progressing  Intervention: Monitor and Manage Depressive Symptoms  Recent Flowsheet Documentation  Taken 10/11/2022 1648 by Leandro Merlos RN  Supportive Measures:   self-care encouraged   self-reflection promoted  Complementary Therapy: essential oils utilized  Family/Support System Care:   presence promoted   self-care encouraged     Problem: Anxiety  Goal: Anxiety Reduction or Resolution  Outcome: Progressing  Intervention: Promote Anxiety Reduction  Recent Flowsheet Documentation  Taken 10/11/2022 1648 by Leandro Merlos RN  Supportive Measures:   self-care encouraged   self-reflection promoted  Complementary Therapy: essential oils utilized  Family/Support System Care:   presence promoted   self-care encouraged     Patient  was out in the milieu throughout the shift.  He interacted  appropriately with staff and peers, and with a calm, bright, and pleasant mood/affect   He remained alert and oriented throughout the shift and contracted for safety. He ate 100% dinner and HS snacks and was compliant with medications. He denied SI, HI, SIB, pain, and other psych symptoms and his vital were stable(see flow sheet).  He will  however, continue to be  encouraged and monitored by staff.

## 2022-10-13 PROBLEM — F90.0 ADHD (ATTENTION DEFICIT HYPERACTIVITY DISORDER), INATTENTIVE TYPE: Status: ACTIVE | Noted: 2022-10-13

## 2022-10-13 PROBLEM — F06.30 MOOD DISORDER DUE TO OLD HEAD INJURY: Status: ACTIVE | Noted: 2022-10-13

## 2022-10-13 PROBLEM — F15.20 AMPHETAMINE USE DISORDER, SEVERE (H): Status: ACTIVE | Noted: 2022-10-13

## 2022-10-13 PROBLEM — S09.90XS MOOD DISORDER DUE TO OLD HEAD INJURY: Status: ACTIVE | Noted: 2022-10-13

## 2022-10-13 LAB
ALBUMIN SERPL-MCNC: 4 G/DL (ref 3.4–5)
ALP SERPL-CCNC: 80 U/L (ref 40–150)
ALT SERPL W P-5'-P-CCNC: 26 U/L (ref 0–70)
AST SERPL W P-5'-P-CCNC: 16 U/L (ref 0–45)
BILIRUB DIRECT SERPL-MCNC: 0.1 MG/DL (ref 0–0.2)
BILIRUB SERPL-MCNC: 0.5 MG/DL (ref 0.2–1.3)
PROT SERPL-MCNC: 7.3 G/DL (ref 6.8–8.8)
VALPROATE SERPL-MCNC: 95 MG/L

## 2022-10-13 PROCEDURE — 250N000013 HC RX MED GY IP 250 OP 250 PS 637: Performed by: PSYCHIATRY & NEUROLOGY

## 2022-10-13 PROCEDURE — 99232 SBSQ HOSP IP/OBS MODERATE 35: CPT | Performed by: PSYCHIATRY & NEUROLOGY

## 2022-10-13 PROCEDURE — 80164 ASSAY DIPROPYLACETIC ACD TOT: CPT | Performed by: PSYCHIATRY & NEUROLOGY

## 2022-10-13 PROCEDURE — 36415 COLL VENOUS BLD VENIPUNCTURE: CPT | Performed by: PSYCHIATRY & NEUROLOGY

## 2022-10-13 PROCEDURE — 80076 HEPATIC FUNCTION PANEL: CPT | Performed by: PSYCHIATRY & NEUROLOGY

## 2022-10-13 PROCEDURE — G0177 OPPS/PHP; TRAIN & EDUC SERV: HCPCS

## 2022-10-13 PROCEDURE — 250N000013 HC RX MED GY IP 250 OP 250 PS 637

## 2022-10-13 PROCEDURE — 250N000013 HC RX MED GY IP 250 OP 250 PS 637: Performed by: EMERGENCY MEDICINE

## 2022-10-13 PROCEDURE — 124N000002 HC R&B MH UMMC

## 2022-10-13 RX ADMIN — OLANZAPINE 20 MG: 10 TABLET, FILM COATED ORAL at 19:24

## 2022-10-13 RX ADMIN — OLANZAPINE 10 MG: 10 TABLET, FILM COATED ORAL at 08:00

## 2022-10-13 RX ADMIN — HYDROCHLOROTHIAZIDE 12.5 MG: 12.5 TABLET ORAL at 08:00

## 2022-10-13 RX ADMIN — NICOTINE POLACRILEX 4 MG: 4 GUM, CHEWING BUCCAL at 15:02

## 2022-10-13 RX ADMIN — NICOTINE POLACRILEX 4 MG: 4 GUM, CHEWING BUCCAL at 08:00

## 2022-10-13 RX ADMIN — DIVALPROEX SODIUM 1000 MG: 500 TABLET, DELAYED RELEASE ORAL at 08:00

## 2022-10-13 RX ADMIN — ATOMOXETINE HYDROCHLORIDE 25 MG: 25 CAPSULE ORAL at 08:00

## 2022-10-13 RX ADMIN — NICOTINE POLACRILEX 4 MG: 4 GUM, CHEWING BUCCAL at 16:30

## 2022-10-13 RX ADMIN — NICOTINE 1 PATCH: 14 PATCH, EXTENDED RELEASE TRANSDERMAL at 08:03

## 2022-10-13 RX ADMIN — DIVALPROEX SODIUM 1000 MG: 500 TABLET, DELAYED RELEASE ORAL at 19:24

## 2022-10-13 RX ADMIN — HYDROXYZINE HYDROCHLORIDE 25 MG: 25 TABLET, FILM COATED ORAL at 06:27

## 2022-10-13 RX ADMIN — BUPROPION HYDROCHLORIDE 300 MG: 150 TABLET, EXTENDED RELEASE ORAL at 08:00

## 2022-10-13 RX ADMIN — NICOTINE POLACRILEX 4 MG: 4 GUM, CHEWING BUCCAL at 19:24

## 2022-10-13 RX ADMIN — NICOTINE POLACRILEX 4 MG: 4 GUM, CHEWING BUCCAL at 13:51

## 2022-10-13 ASSESSMENT — ACTIVITIES OF DAILY LIVING (ADL)
ADLS_ACUITY_SCORE: 29
HYGIENE/GROOMING: INDEPENDENT
LAUNDRY: WITH SUPERVISION
ADLS_ACUITY_SCORE: 29
ORAL_HYGIENE: INDEPENDENT
DRESS: INDEPENDENT
ADLS_ACUITY_SCORE: 29

## 2022-10-13 NOTE — PLAN OF CARE
Pt was visible in the milieu. Pt was out in the lounge watching tv and socializing with peers. Presented with full range affect. Pt states he is waiting for bed to open discharge. Pt states he know it might take a long time but is is willing to waiting because he is on the waiting list. Denies suicidal ideations, hallucinations, depression or anxiety. Ate supper 100% and was compliant with his medications.    Problem: Depression  Goal: Improved Mood  Outcome: Adequate for Care Transition     Problem: Anxiety  Goal: Anxiety Reduction or Resolution  Outcome: Adequate for Care Transition     Problem: Behavior Regulation Impairment (Psychotic Signs/Symptoms)  Goal: Improved Behavioral Control (Psychotic Signs/Symptoms)  Outcome: Adequate for Care Transition   Goal Outcome Evaluation:

## 2022-10-13 NOTE — PLAN OF CARE
Pt got his medications  early at the beginning off the shift. Stated that PRN given earlier on was effective. Pt is pleasant, cooperative and medications compliant. Social with peers. Adequate hygiene and nutrition. Pt denied SI/SIB, Hallucinations , depression and anxiety. Pt took a nap after breakfast and was out in the milieu afterward.  No behavioral concerns at this time.     Problem: Behavioral Health Plan of Care  Goal: Patient-Specific Goal (Individualization)  Outcome: Progressing   Goal Outcome Evaluation:

## 2022-10-13 NOTE — PLAN OF CARE
Assessment/Intervention/Current Symptoms and Care Coordination: Met with team. Reviewed patient's chart and progress notes.      - Patient received a copy of his extended commitment order on 10/12/22. Patient's commitment will end on 4/12/2023. Aguiar order ended on 10/12/2022. Per CM recommendation, if patient declines or refuses to take his Zyprexa, a new Aguiar order will need to be submitted to the [facility].          Discharge Plan or Goal:  Pending stabilization & development of a safe discharge plan.  Considerations include: [facility] placement            Barriers to Discharge:  Patient requires further psychiatric stabilization due to current symptomology. Door to door transfer. PD revoked.            Referral Status:   None today           Legal Status:    Committed as MI

## 2022-10-13 NOTE — PROGRESS NOTES
Behavioral Health  Note   Behavioral Health  Spirituality Group Note     Unit 20    Name: Etienne Dupree    YOB: 1990   MRN: [id number]    Age: 32 year old     Patient attended -led group, which included discussion of spirituality, coping with illness and building resilience.   Patient attended group for [facility] - spirituality groups are not billed.   patient demonstrated an appreciation of topic's application for their personal circumstances.     Ozzie [facility]  Staff    Page [phone]

## 2022-10-13 NOTE — PLAN OF CARE
Problem: Sleep Disturbance  Goal: Adequate Sleep/Rest  Outcome: Progressing      Pt appeared to have slept for 7  hours. At 0627, PRN hydroxyzine 25 mg was given for anxiety. PRN medication administration was effective, as no further complain of anxiety before the end of the shift.Pt denies pain or discomfort, and denies depression. 15 minutes safety checks were in place. Staff will continue to offer support to pt.

## 2022-10-14 PROCEDURE — 250N000013 HC RX MED GY IP 250 OP 250 PS 637: Performed by: PSYCHIATRY & NEUROLOGY

## 2022-10-14 PROCEDURE — 124N000002 HC R&B MH UMMC

## 2022-10-14 PROCEDURE — 250N000013 HC RX MED GY IP 250 OP 250 PS 637: Performed by: EMERGENCY MEDICINE

## 2022-10-14 PROCEDURE — H2032 ACTIVITY THERAPY, PER 15 MIN: HCPCS

## 2022-10-14 PROCEDURE — 99231 SBSQ HOSP IP/OBS SF/LOW 25: CPT | Performed by: PSYCHIATRY & NEUROLOGY

## 2022-10-14 PROCEDURE — 250N000013 HC RX MED GY IP 250 OP 250 PS 637

## 2022-10-14 RX ADMIN — NICOTINE POLACRILEX 4 MG: 4 GUM, CHEWING BUCCAL at 14:22

## 2022-10-14 RX ADMIN — OLANZAPINE 20 MG: 10 TABLET, FILM COATED ORAL at 19:22

## 2022-10-14 RX ADMIN — DIVALPROEX SODIUM 1000 MG: 500 TABLET, DELAYED RELEASE ORAL at 07:53

## 2022-10-14 RX ADMIN — DIVALPROEX SODIUM 1000 MG: 500 TABLET, DELAYED RELEASE ORAL at 19:22

## 2022-10-14 RX ADMIN — NICOTINE POLACRILEX 4 MG: 4 GUM, CHEWING BUCCAL at 13:19

## 2022-10-14 RX ADMIN — NICOTINE POLACRILEX 4 MG: 4 GUM, CHEWING BUCCAL at 07:53

## 2022-10-14 RX ADMIN — NICOTINE 1 PATCH: 14 PATCH, EXTENDED RELEASE TRANSDERMAL at 08:15

## 2022-10-14 RX ADMIN — Medication 15 ML: at 19:25

## 2022-10-14 RX ADMIN — NICOTINE POLACRILEX 4 MG: 4 GUM, CHEWING BUCCAL at 19:25

## 2022-10-14 RX ADMIN — BUPROPION HYDROCHLORIDE 300 MG: 150 TABLET, EXTENDED RELEASE ORAL at 07:53

## 2022-10-14 RX ADMIN — Medication 15 ML: at 11:56

## 2022-10-14 RX ADMIN — HYDROCHLOROTHIAZIDE 12.5 MG: 12.5 TABLET ORAL at 07:53

## 2022-10-14 RX ADMIN — OLANZAPINE 10 MG: 10 TABLET, FILM COATED ORAL at 07:53

## 2022-10-14 RX ADMIN — NICOTINE POLACRILEX 4 MG: 4 GUM, CHEWING BUCCAL at 11:55

## 2022-10-14 RX ADMIN — NICOTINE POLACRILEX 4 MG: 4 GUM, CHEWING BUCCAL at 21:20

## 2022-10-14 RX ADMIN — NICOTINE POLACRILEX 4 MG: 4 GUM, CHEWING BUCCAL at 18:16

## 2022-10-14 RX ADMIN — ATOMOXETINE HYDROCHLORIDE 25 MG: 25 CAPSULE ORAL at 07:54

## 2022-10-14 RX ADMIN — NICOTINE POLACRILEX 4 MG: 4 GUM, CHEWING BUCCAL at 16:12

## 2022-10-14 ASSESSMENT — ACTIVITIES OF DAILY LIVING (ADL)
ADLS_ACUITY_SCORE: 29
ADLS_ACUITY_SCORE: 29
HYGIENE/GROOMING: INDEPENDENT
ADLS_ACUITY_SCORE: 29
ORAL_HYGIENE: INDEPENDENT
ADLS_ACUITY_SCORE: 29
DRESS: INDEPENDENT
ADLS_ACUITY_SCORE: 29

## 2022-10-14 NOTE — PLAN OF CARE
"  Pt is pleasant and cooperative. Medication compliant, nutrition and hydration adequate. Pt denies SI/SIB, anxiety, depression or hallucinations.   His goal is to stay busy and attend groups. For medications side effect, he stated that  his medications make him \"tired\" but he feels \"more present and thoughts not wandering\" .   Pt does not have concerns at this time.       Problem: Plan of Care - These are the overarching goals to be used throughout the patient stay.    Goal: Patient-Specific Goal (Individualized)  Description: You can add care plan individualizations to a care plan. Examples of Individualization might be:  \"Parent requests to be called daily at 9am for status\", \"I have a hard time hearing out of my right ear\", or \"Do not touch me to wake me up as it startles me\".  Outcome: Progressing     Problem: Plan of Care - These are the overarching goals to be used throughout the patient stay.    Goal: Absence of Hospital-Acquired Illness or Injury  Outcome: Progressing     Problem: Plan of Care - These are the overarching goals to be used throughout the patient stay.    Goal: Optimal Comfort and Wellbeing  Outcome: Progressing     Problem: Behavioral Health Plan of Care  Goal: Optimal Comfort and Wellbeing  Outcome: Progressing     Problem: Behavioral Health Plan of Care  Goal: Patient-Specific Goal (Individualization)  Outcome: Progressing   Goal Outcome Evaluation:                        "

## 2022-10-14 NOTE — PROGRESS NOTES
10/14/22 1500   Group Therapy Session   Group Attendance attended group session   Time Session Began 1015   Time Session Ended 1115   Total Time (minutes) 60   Total # Attendees 4   Group Type expressive therapy   Group Topic Covered emotions/expression   Patient Response/Contribution cooperative with task     Art Therapy directive was focused on mindfulness and expressing a chosen personal intention on a wooden mandala using paint or media of pts choice.  Goals of directive: mindfulness skills, emotional expression, identifying personal strengths and goals.  Pt was an engaged participant, focused on task for the full duration of group.  Pt painted a clock on one side of the mandala and an eye on the other side of the mandala. Pt was very engaged in his own process of painting with watercolors. Pt has not yet shared/processed his artwork with author or group.  Pts mood was calm, pleasant participant.
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
## 2022-10-14 NOTE — PLAN OF CARE
Assessment/Intervention/Current Symptoms and Care Coordination: Met with team. Reviewed patient's chart and progress notes.      - Checked in with patient. Patient stated that he would like to attend virtual AA meetings. Writer encouraged patient to attend virtual AA meetings while he is in the hospital. Writer provided patient a list of online AA meetings with times and date as well as zoom pass codes.           Discharge Plan or Goal:  Pending stabilization & development of a safe discharge plan.  [facility]            Barriers to Discharge:  Patient requires further psychiatric stabilization due to current symptomology. Door to door transfer. PD revoked.            Referral Status:   None today           Legal Status:    Committed as MI through [facility] until 4/12/2023

## 2022-10-14 NOTE — PLAN OF CARE
Problem: Sleep Disturbance (Psychotic Signs/Symptoms)  Goal: Improved Sleep (Psychotic Signs/Symptoms)  Outcome: Progressing   Goal Outcome Evaluation:  Patient appears to have slept for 6.5 hours. Awake for brief period x1, requested snacks x1. Endorsed no acute concerns. No requests for prn's. No behavioral concerns noted.
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
## 2022-10-14 NOTE — PLAN OF CARE
Pt has been social with peers. Pt was visible in the milieu watching tv in the lounge this evening. Pt presents with full range affect. Denies SI/SIB/AH/VH/HI. Denies anxiety and depression. No c/o pain or discomfort. No prn medications administered this shift.    Problem: Violence Risk or Actual  Goal: Anger and Impulse Control  Outcome: Adequate for Care Transition     Problem: Cognitive Impairment (Psychotic Signs/Symptoms)  Goal: Optimal Cognitive Function (Psychotic Signs/Symptoms)  Outcome: Adequate for Care Transition     Problem: Psychomotor Impairment (Psychotic Signs/Symptoms)  Goal: Improved Psychomotor Symptoms (Psychotic Signs/Symptoms)  Intervention: Manage Psychomotor Movement  Recent Flowsheet Documentation  Taken 10/13/2022 1727 by Kana Tom RN  Activity (Behavioral Health): up ad alondra   Goal Outcome Evaluation:

## 2022-10-15 PROCEDURE — 250N000013 HC RX MED GY IP 250 OP 250 PS 637: Performed by: PSYCHIATRY & NEUROLOGY

## 2022-10-15 PROCEDURE — 250N000013 HC RX MED GY IP 250 OP 250 PS 637

## 2022-10-15 PROCEDURE — 250N000013 HC RX MED GY IP 250 OP 250 PS 637: Performed by: EMERGENCY MEDICINE

## 2022-10-15 PROCEDURE — 124N000002 HC R&B MH UMMC

## 2022-10-15 RX ADMIN — HYDROCHLOROTHIAZIDE 12.5 MG: 12.5 TABLET ORAL at 07:56

## 2022-10-15 RX ADMIN — OLANZAPINE 20 MG: 10 TABLET, FILM COATED ORAL at 19:30

## 2022-10-15 RX ADMIN — DIVALPROEX SODIUM 1000 MG: 500 TABLET, DELAYED RELEASE ORAL at 07:56

## 2022-10-15 RX ADMIN — OLANZAPINE 10 MG: 10 TABLET, FILM COATED ORAL at 07:56

## 2022-10-15 RX ADMIN — DIVALPROEX SODIUM 1000 MG: 500 TABLET, DELAYED RELEASE ORAL at 19:30

## 2022-10-15 RX ADMIN — NICOTINE POLACRILEX 4 MG: 4 GUM, CHEWING BUCCAL at 10:35

## 2022-10-15 RX ADMIN — BUPROPION HYDROCHLORIDE 300 MG: 150 TABLET, EXTENDED RELEASE ORAL at 07:56

## 2022-10-15 RX ADMIN — ATOMOXETINE HYDROCHLORIDE 25 MG: 25 CAPSULE ORAL at 07:56

## 2022-10-15 RX ADMIN — NICOTINE POLACRILEX 4 MG: 4 GUM, CHEWING BUCCAL at 16:43

## 2022-10-15 RX ADMIN — NICOTINE POLACRILEX 4 MG: 4 GUM, CHEWING BUCCAL at 18:42

## 2022-10-15 RX ADMIN — NICOTINE 1 PATCH: 14 PATCH, EXTENDED RELEASE TRANSDERMAL at 07:56

## 2022-10-15 RX ADMIN — NICOTINE POLACRILEX 4 MG: 4 GUM, CHEWING BUCCAL at 13:07

## 2022-10-15 RX ADMIN — NICOTINE POLACRILEX 4 MG: 4 GUM, CHEWING BUCCAL at 07:56

## 2022-10-15 ASSESSMENT — ACTIVITIES OF DAILY LIVING (ADL)
ADLS_ACUITY_SCORE: 29
ADLS_ACUITY_SCORE: 29
HYGIENE/GROOMING: INDEPENDENT
LAUNDRY: WITH SUPERVISION
ADLS_ACUITY_SCORE: 29
ADLS_ACUITY_SCORE: 29
HYGIENE/GROOMING: INDEPENDENT
ADLS_ACUITY_SCORE: 29
ORAL_HYGIENE: INDEPENDENT
ADLS_ACUITY_SCORE: 29
DRESS: INDEPENDENT
ADLS_ACUITY_SCORE: 29
ADLS_ACUITY_SCORE: 29
ORAL_HYGIENE: INDEPENDENT
ADLS_ACUITY_SCORE: 29
LAUNDRY: WITH SUPERVISION
ADLS_ACUITY_SCORE: 29
DRESS: INDEPENDENT

## 2022-10-15 NOTE — PLAN OF CARE
Pt has been intermittently visible in the milieu, he is social with peers. Pt was able to attend an NA meeting this morning and said that it went well; he is hoping to attend the mental health one this evening. He denies all mental health symptoms upon assessment. He presents with a full range affect. Pt is med compliant without issue, no PRNs given aside from nicotine gum. No other concerns at this time.    Problem: Social, Occupational or Functional Impairment (Psychotic Signs/Symptoms)  Goal: Enhanced Social, Occupational or Functional Skills (Psychotic Signs/Symptoms)  Outcome: Progressing  Intervention: Promote Social, Occupational and Functional Ability  Recent Flowsheet Documentation  Taken 10/15/2022 1100 by Carmelina Mccord RN     Goal Outcome Evaluation:    Plan of Care Reviewed With: patient

## 2022-10-15 NOTE — PLAN OF CARE
"  Problem: Sleep Disturbance  Goal: Adequate Sleep/Rest  Outcome: Progressing   Goal Outcome Evaluation:    Plan of Care Reviewed With: patient      Pt had a good shift. Was visible in the lounge watching TV with his peers. Was pleasant and medication compliant. Did attend the virtual AA meeting in the exam room. Presented with a full range affect and calm mood. Pt denied depression, anxiety as well as SI/HI. Pt denied physical discomfort, had a good appetite and reported his sleeping as \" fine.\"  Plan: Status 15s; Build trust with pt. Continue to build on strengths. Encourage compliance and healthy coping.                      "

## 2022-10-15 NOTE — PLAN OF CARE
Problem: Sleep Disturbance  Goal: Adequate Sleep/Rest  Outcome: Progressing   Goal Outcome Evaluation:  Patient appears to be sleeping/resting well during rounds, appears to have slept for 6.5 hours. No acute events during the night. No requests for prn's. Awake briefly but went back to sleep soon after. Will continue to monitor and offer support.

## 2022-10-16 PROCEDURE — 250N000013 HC RX MED GY IP 250 OP 250 PS 637: Performed by: PSYCHIATRY & NEUROLOGY

## 2022-10-16 PROCEDURE — 250N000013 HC RX MED GY IP 250 OP 250 PS 637: Performed by: EMERGENCY MEDICINE

## 2022-10-16 PROCEDURE — 250N000013 HC RX MED GY IP 250 OP 250 PS 637

## 2022-10-16 PROCEDURE — 124N000002 HC R&B MH UMMC

## 2022-10-16 RX ADMIN — ATOMOXETINE HYDROCHLORIDE 25 MG: 25 CAPSULE ORAL at 07:55

## 2022-10-16 RX ADMIN — NICOTINE POLACRILEX 4 MG: 4 GUM, CHEWING BUCCAL at 20:30

## 2022-10-16 RX ADMIN — NICOTINE POLACRILEX 4 MG: 4 GUM, CHEWING BUCCAL at 14:48

## 2022-10-16 RX ADMIN — DIVALPROEX SODIUM 1000 MG: 500 TABLET, DELAYED RELEASE ORAL at 19:30

## 2022-10-16 RX ADMIN — DIVALPROEX SODIUM 1000 MG: 500 TABLET, DELAYED RELEASE ORAL at 07:55

## 2022-10-16 RX ADMIN — BUPROPION HYDROCHLORIDE 300 MG: 150 TABLET, EXTENDED RELEASE ORAL at 07:55

## 2022-10-16 RX ADMIN — OLANZAPINE 10 MG: 10 TABLET, FILM COATED ORAL at 07:55

## 2022-10-16 RX ADMIN — NICOTINE POLACRILEX 4 MG: 4 GUM, CHEWING BUCCAL at 18:42

## 2022-10-16 RX ADMIN — NICOTINE POLACRILEX 4 MG: 4 GUM, CHEWING BUCCAL at 17:12

## 2022-10-16 RX ADMIN — HYDROCHLOROTHIAZIDE 12.5 MG: 12.5 TABLET ORAL at 07:55

## 2022-10-16 RX ADMIN — OLANZAPINE 20 MG: 10 TABLET, FILM COATED ORAL at 19:30

## 2022-10-16 RX ADMIN — Medication 15 ML: at 19:31

## 2022-10-16 RX ADMIN — NICOTINE POLACRILEX 4 MG: 4 GUM, CHEWING BUCCAL at 12:54

## 2022-10-16 RX ADMIN — NICOTINE POLACRILEX 4 MG: 4 GUM, CHEWING BUCCAL at 07:56

## 2022-10-16 RX ADMIN — NICOTINE 1 PATCH: 14 PATCH, EXTENDED RELEASE TRANSDERMAL at 07:56

## 2022-10-16 ASSESSMENT — ACTIVITIES OF DAILY LIVING (ADL)
ADLS_ACUITY_SCORE: 29
LAUNDRY: WITH SUPERVISION
ADLS_ACUITY_SCORE: 29
HYGIENE/GROOMING: INDEPENDENT
ADLS_ACUITY_SCORE: 29
DRESS: INDEPENDENT
ORAL_HYGIENE: INDEPENDENT
ADLS_ACUITY_SCORE: 29
ADLS_ACUITY_SCORE: 29

## 2022-10-16 NOTE — PLAN OF CARE
Problem: Sleep Disturbance  Goal: Adequate Sleep/Rest  Outcome: Progressing   Goal Outcome Evaluation:  Patient asleep at the beginning of the shift, awake briefly for snacks but has otherwise remained asleep. Appears to have slept for 6 hours. No complaints of pain. No requests for prn's . Will continue with POC.

## 2022-10-16 NOTE — PLAN OF CARE
Problem: Behavioral Health Plan of Care  Goal: Optimized Coping Skills in Response to Life Stressors  Outcome: Progressing  Goal: Develops/Participates in Therapeutic [place] to Support Successful Transition  Outcome: Progressing  Intervention: Foster Therapeutic [place]  Recent Flowsheet Documentation  Taken 10/15/2022 1700 by Leandro Merlos RN  Trust Relationship/Rapport:   care explained   choices provided   emotional support provided   empathic listening provided   questions answered   questions encouraged   reassurance provided   thoughts/feelings acknowledged      Patient remained pleasant, polite, and respectful through the shift He socialized and interacted just fine with staff and peers and was visible in the milieu area watching TV with peers. At approximately 1900 hrs, patient attended a virtual AA meeting in the interview room, which lasted for about an hour.  He denied all psych symptoms and contracted for safety. He ate 100% dinner & HS snack and was compliant with medication regimen. His vitals were stable and his mentation was alert and oriented. Patient however still needs support and encouragement to overcome his mental challenges. Will continue to monitor.

## 2022-10-16 NOTE — PLAN OF CARE
Problem: Decreased Participation and Engagement (Psychotic Signs/Symptoms)  Goal: Increased Participation and Engagement (Psychotic Signs/Symptoms)  Outcome: Progressing   Goal Outcome Evaluation:    Plan of Care Reviewed With: patient             Patient out and visible in the unit, bright and polite. Social with staff and peers. Continues to deny all mental health symptoms. Attended narcotic annonimous  meeting via zoom this shift. Reported it went well. Out in the lounge area watching movies with peers. No behavior issues was reported nor observed this shift. Staff will continue to monitor.

## 2022-10-16 NOTE — PLAN OF CARE
Pt has been slightly more isolative to his room today, stated that he wanted to spend today sleeping and did not want to attend any NA meetings during the day. He did watch a football game with his peers in the milieu later in the afternoon. Pt attended the group that was offered today. He denies all mental health symptoms upon assessment and is med compliant without issue. No other concerns at this time.    Problem: Mood Impairment (Psychotic Signs/Symptoms)  Goal: Improved Mood Symptoms (Psychotic Signs/Symptoms)  Outcome: Progressing   Goal Outcome Evaluation:    Plan of Care Reviewed With: patient

## 2022-10-17 PROCEDURE — 250N000013 HC RX MED GY IP 250 OP 250 PS 637

## 2022-10-17 PROCEDURE — 250N000013 HC RX MED GY IP 250 OP 250 PS 637: Performed by: PSYCHIATRY & NEUROLOGY

## 2022-10-17 PROCEDURE — 250N000013 HC RX MED GY IP 250 OP 250 PS 637: Performed by: EMERGENCY MEDICINE

## 2022-10-17 PROCEDURE — G0177 OPPS/PHP; TRAIN & EDUC SERV: HCPCS

## 2022-10-17 PROCEDURE — 124N000002 HC R&B MH UMMC

## 2022-10-17 PROCEDURE — 99231 SBSQ HOSP IP/OBS SF/LOW 25: CPT | Performed by: PSYCHIATRY & NEUROLOGY

## 2022-10-17 RX ADMIN — NICOTINE POLACRILEX 4 MG: 4 GUM, CHEWING BUCCAL at 07:52

## 2022-10-17 RX ADMIN — NICOTINE POLACRILEX 4 MG: 4 GUM, CHEWING BUCCAL at 19:29

## 2022-10-17 RX ADMIN — OLANZAPINE 20 MG: 10 TABLET, FILM COATED ORAL at 19:27

## 2022-10-17 RX ADMIN — NICOTINE POLACRILEX 4 MG: 4 GUM, CHEWING BUCCAL at 16:47

## 2022-10-17 RX ADMIN — ATOMOXETINE HYDROCHLORIDE 25 MG: 25 CAPSULE ORAL at 07:51

## 2022-10-17 RX ADMIN — NICOTINE POLACRILEX 4 MG: 4 GUM, CHEWING BUCCAL at 11:01

## 2022-10-17 RX ADMIN — NICOTINE POLACRILEX 4 MG: 4 GUM, CHEWING BUCCAL at 18:22

## 2022-10-17 RX ADMIN — NICOTINE 1 PATCH: 14 PATCH, EXTENDED RELEASE TRANSDERMAL at 07:54

## 2022-10-17 RX ADMIN — BUPROPION HYDROCHLORIDE 300 MG: 150 TABLET, EXTENDED RELEASE ORAL at 07:51

## 2022-10-17 RX ADMIN — DIVALPROEX SODIUM 1000 MG: 500 TABLET, DELAYED RELEASE ORAL at 07:50

## 2022-10-17 RX ADMIN — OLANZAPINE 10 MG: 10 TABLET, FILM COATED ORAL at 07:51

## 2022-10-17 RX ADMIN — HYDROCHLOROTHIAZIDE 12.5 MG: 12.5 TABLET ORAL at 07:51

## 2022-10-17 RX ADMIN — DIVALPROEX SODIUM 1000 MG: 500 TABLET, DELAYED RELEASE ORAL at 19:28

## 2022-10-17 ASSESSMENT — ACTIVITIES OF DAILY LIVING (ADL)
ADLS_ACUITY_SCORE: 29
HYGIENE/GROOMING: INDEPENDENT
LAUNDRY: WITH SUPERVISION
ADLS_ACUITY_SCORE: 29
ORAL_HYGIENE: INDEPENDENT
ADLS_ACUITY_SCORE: 29
HYGIENE/GROOMING: INDEPENDENT;HANDWASHING
ADLS_ACUITY_SCORE: 29
DRESS: INDEPENDENT
ADLS_ACUITY_SCORE: 29
ORAL_HYGIENE: INDEPENDENT
ADLS_ACUITY_SCORE: 29
ADLS_ACUITY_SCORE: 29

## 2022-10-17 NOTE — PLAN OF CARE
Problem: Sleep Disturbance  Goal: Adequate Sleep/Rest  Outcome: Progressing   Goal Outcome Evaluation:  Patient has an uneventful night. Endorsed no new concerns during encounters  No requests for prn's. Appears to have slept for  6.50 hours. No behavioral concerns noted.
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
## 2022-10-17 NOTE — PLAN OF CARE
Assessment/Intervention/Current Symptoms and Care Coordination: Met with team. Reviewed patient's chart and progress notes.      - Patient attended virtual AA meetings over the weekend. He is medication compliant, attends groups and is social with peers. No behavioral concerns noted.          Discharge Plan or Goal:  Pending stabilization & development of a safe discharge plan.  [facility]            Barriers to Discharge:  Patient requires further psychiatric stabilization due to current symptomology. Door to door transfer. PD revoked.            Referral Status:   None today           Legal Status:    Committed as MI through [facility] until 4/12/2023   Group Topic: BH Check-in/Symptom Rating     Date: 4/19/2022  Start Time: 1945  End Time: 2000  Facilitators: Cristela Heredia     Focus: Wrap-Up Group   Number in attendance: 6     Affect: Congruent   Attitude/Behavior: Cooperative      Goal for Tomorrow: Peer Relations  Personal Goal: To be more productive during school tomorrow.     Rates Mood: 7/10                        Feeling: Relieved  Day Overall: Pretty Good  One Positive thing about day: Getting to meet new peers that were admitted earlier today.  One thing to change about day: To do more schoolwork.     Visitors: Patient participated for in-person visitation as evidenced by her mother being present. Patient indicated that the visitation session in the beginning was difficult, however towards the end that it went well. Patient mentioned that they just talked for the entirety of it.     Patient Sylvia: Grateful for being hospitalized and being alive.     SI/HI/Safety Concerns? None observed or reported by patient.   Contracts for safety: Patient able to verbally contract for safety. Patient currently denies having any thoughts of suicidal/homicidal ideation. Patient also currently not experiencing any auditory/visual/tactile hallucinations. To continue quarterly 15 minute safety rounds.

## 2022-10-17 NOTE — PLAN OF CARE
"Pt denies SI.  Pt social with peers, hung out in lounge watching TV, attending groups.  No behavioral issues.  Problem: Plan of Care - These are the overarching goals to be used throughout the patient stay.    Goal: Plan of Care Review/Shift Note  Description: The Plan of Care Review/Shift note should be completed every shift.  The Outcome Evaluation is a brief statement about your assessment that the patient is improving, declining, or no change.  This information will be displayed automatically on your shift note.  Outcome: Not Progressing  Goal: Patient-Specific Goal (Individualized)  Description: You can add care plan individualizations to a care plan. Examples of Individualization might be:  \"Parent requests to be called daily at 9am for status\", \"I have a hard time hearing out of my right ear\", or \"Do not touch me to wake me up as it startles me\".  Outcome: Not Progressing  Goal: Absence of Hospital-Acquired Illness or Injury  Outcome: Not Progressing  Goal: Optimal Comfort and Wellbeing  Outcome: Not Progressing  Goal: Readiness for Transition of Care  Outcome: Not Progressing     Problem: Behavioral Health Plan of Care  Goal: Optimal Comfort and Wellbeing  Outcome: Not Progressing  Goal: Plan of Care Review  Outcome: Not Progressing  Goal: Patient-Specific Goal (Individualization)  Outcome: Not Progressing  Goal: Adheres to Safety Considerations for Self and Others  Outcome: Not Progressing  Intervention: Develop and Maintain Individualized Safety Plan  Recent Flowsheet Documentation  Taken 10/17/2022 1200 by Nikole Rockwell, RN  Safety Measures:   safety rounds completed   environmental rounds completed  Goal: Absence of New-Onset Illness or Injury  Outcome: Not Progressing  Intervention: Identify and Manage Fall Risk  Recent Flowsheet Documentation  Taken 10/17/2022 1200 by Nikole Rockwell RN  Safety Measures:   safety rounds completed   environmental rounds completed  Goal: Optimized Coping Skills in " Response to Life Stressors  Outcome: Not Progressing  Goal: Develops/Participates in Therapeutic [place] to Support Successful Transition  Outcome: Not Progressing  Goal: Team Discussion  Outcome: Not Progressing   Goal Outcome Evaluation:

## 2022-10-17 NOTE — PLAN OF CARE
[facility] Occupational Therapy Group Intervention Note     10/17/22 1312   Group Therapy Session   Group Attendance attended group session   Time Session Began 1115   Time Session Ended 1200   Total Time (minutes) 45   Total # Attendees 4   Group Type task skill;recreation;expressive therapy   Group Topic Covered coping skills/lifestyle management;cognitive activities;structured socialization   Group Session Detail clinic - coping skill exploration, creative expression within personally meaningful activities, and observation of social, cognitive, and kinesthetic performance skills   Patient Response/Contribution cooperative with task;discussed personal experience with topic;organized   Patient Participation Detail Calm and congruent affect. Motivated to laminate past course certificates for future job applications. Completed other various projects. Provided supportive words of encouragement and affirmations to peers.      Madeline Rosenberg OT on 10/17/2022 at 1:14 PM

## 2022-10-17 NOTE — PROGRESS NOTES
10/16/22 2200   Group Therapy Session   Group Attendance attended group session   Time Session Began 1330   Time Session Ended 1430   Total Time (minutes) 15   Total # Attendees 7   Group Type psychotherapeutic   Group Topic Covered cognitive activities;relaxation techniques   Group Session Detail Somatic breathing and movement   Patient Response/Contribution did not share thoughts verbally;left the group on several occasions   Refused check in and left early in group without explanation and did not return.

## 2022-10-18 PROCEDURE — 250N000013 HC RX MED GY IP 250 OP 250 PS 637: Performed by: PSYCHIATRY & NEUROLOGY

## 2022-10-18 PROCEDURE — 124N000002 HC R&B MH UMMC

## 2022-10-18 PROCEDURE — G0177 OPPS/PHP; TRAIN & EDUC SERV: HCPCS

## 2022-10-18 PROCEDURE — 250N000013 HC RX MED GY IP 250 OP 250 PS 637: Performed by: EMERGENCY MEDICINE

## 2022-10-18 PROCEDURE — 250N000013 HC RX MED GY IP 250 OP 250 PS 637

## 2022-10-18 RX ADMIN — OLANZAPINE 20 MG: 10 TABLET, FILM COATED ORAL at 19:19

## 2022-10-18 RX ADMIN — NICOTINE POLACRILEX 4 MG: 4 GUM, CHEWING BUCCAL at 19:19

## 2022-10-18 RX ADMIN — HYDROCHLOROTHIAZIDE 12.5 MG: 12.5 TABLET ORAL at 08:00

## 2022-10-18 RX ADMIN — OLANZAPINE 10 MG: 10 TABLET, FILM COATED ORAL at 08:00

## 2022-10-18 RX ADMIN — NICOTINE POLACRILEX 4 MG: 4 GUM, CHEWING BUCCAL at 08:11

## 2022-10-18 RX ADMIN — NICOTINE POLACRILEX 4 MG: 4 GUM, CHEWING BUCCAL at 16:08

## 2022-10-18 RX ADMIN — DIVALPROEX SODIUM 1000 MG: 500 TABLET, DELAYED RELEASE ORAL at 08:00

## 2022-10-18 RX ADMIN — ATOMOXETINE HYDROCHLORIDE 25 MG: 25 CAPSULE ORAL at 08:00

## 2022-10-18 RX ADMIN — NICOTINE 1 PATCH: 14 PATCH, EXTENDED RELEASE TRANSDERMAL at 08:00

## 2022-10-18 RX ADMIN — DIVALPROEX SODIUM 1000 MG: 500 TABLET, DELAYED RELEASE ORAL at 19:18

## 2022-10-18 RX ADMIN — BUPROPION HYDROCHLORIDE 300 MG: 150 TABLET, EXTENDED RELEASE ORAL at 08:01

## 2022-10-18 RX ADMIN — NICOTINE POLACRILEX 4 MG: 4 GUM, CHEWING BUCCAL at 10:35

## 2022-10-18 ASSESSMENT — ACTIVITIES OF DAILY LIVING (ADL)
ADLS_ACUITY_SCORE: 29
HYGIENE/GROOMING: INDEPENDENT;HANDWASHING
ADLS_ACUITY_SCORE: 29
ADLS_ACUITY_SCORE: 29
ORAL_HYGIENE: INDEPENDENT
HYGIENE/GROOMING: INDEPENDENT;SHOWER
ADLS_ACUITY_SCORE: 29
ADLS_ACUITY_SCORE: 29
DRESS: INDEPENDENT;STREET CLOTHES
DRESS: INDEPENDENT;SCRUBS (BEHAVIORAL HEALTH)
ADLS_ACUITY_SCORE: 29
ORAL_HYGIENE: INDEPENDENT
LAUNDRY: WITH SUPERVISION
ADLS_ACUITY_SCORE: 29

## 2022-10-18 NOTE — PLAN OF CARE
Problem: Mood Impairment (Psychotic Signs/Symptoms)  Goal: Improved Mood Symptoms (Psychotic Signs/Symptoms)  Intervention: Optimize Emotion and Mood  Recent Flowsheet Documentation  Taken 10/17/2022 1723 by Angelo Cruz RN  Diversional Activity:   music   television     Problem: Mood Impairment (Psychotic Signs/Symptoms)  Goal: Improved Mood Symptoms (Psychotic Signs/Symptoms)  Outcome: Progressing  Flowsheets (Taken 10/17/2022 2208)  Mutually Determined Action Steps (Improved Mood Symptoms):   verbalizes increased insight   acknowledges progress     Problem: Social, Occupational or Functional Impairment (Psychotic Signs/Symptoms)  Goal: Enhanced Social, Occupational or Functional Skills (Psychotic Signs/Symptoms)  Intervention: Promote Social, Occupational and Functional Ability  Recent Flowsheet Documentation  Taken 10/17/2022 1723 by Angelo Cruz RN  Trust Relationship/Rapport:   care explained   choices provided   emotional support provided   empathic listening provided   questions answered   questions encouraged   reassurance provided   thoughts/feelings acknowledged     Problem: Fall Injury Risk  Goal: Absence of Fall and Fall-Related Injury  Outcome: Progressing   Goal Outcome Evaluation:    Plan of Care Reviewed With: patient             Present in milieu, social and interactive with peers and staff members. Able to attend AA Zoom meeting at 1700. Pleasant, cooperative and compliant with medication. He denied any anxiety or depression, no SI/HI, contracted for safety, denied pain or discomfort.

## 2022-10-18 NOTE — PLAN OF CARE
Problem: Sleep Disturbance (Psychotic Signs/Symptoms)  Goal: Improved Sleep (Psychotic Signs/Symptoms)  Outcome: Progressing   Goal Outcome Evaluation:  Patient appears to have slept for 6.25 hours. No acute issues noted for the overnight. No requests for prn's. Will continue with current POC.

## 2022-10-18 NOTE — PLAN OF CARE
Assessment/Intervention/Current Symptoms and Care Coordination: Met with team. Reviewed patient's chart and progress notes.   - Patient attended remote zoom hearing today at 8:30 AM. Court hearing was related to pending criminal charges.   - Writer met with patient. Pt asked this writer if writer could fax his recent commitment papers to his . Writer faxed commitment papers to pt's  at [phone].         Discharge Plan or Goal:  Pending stabilization & development of a safe discharge plan.  Considerations include: [facility] placement            Barriers to Discharge:  Patient requires further psychiatric stabilization due to current symptomology. Door to door transfer. PD revoked.            Referral Status:   None today           Legal Status:    Committed as MI through [facility] until 4/12/2023
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
## 2022-10-19 PROCEDURE — 99232 SBSQ HOSP IP/OBS MODERATE 35: CPT | Performed by: PSYCHIATRY & NEUROLOGY

## 2022-10-19 PROCEDURE — 250N000013 HC RX MED GY IP 250 OP 250 PS 637

## 2022-10-19 PROCEDURE — 250N000013 HC RX MED GY IP 250 OP 250 PS 637: Performed by: PSYCHIATRY & NEUROLOGY

## 2022-10-19 PROCEDURE — 250N000013 HC RX MED GY IP 250 OP 250 PS 637: Performed by: EMERGENCY MEDICINE

## 2022-10-19 PROCEDURE — 90853 GROUP PSYCHOTHERAPY: CPT

## 2022-10-19 PROCEDURE — 124N000002 HC R&B MH UMMC

## 2022-10-19 RX ADMIN — HYDROCHLOROTHIAZIDE 12.5 MG: 12.5 TABLET ORAL at 07:51

## 2022-10-19 RX ADMIN — BUPROPION HYDROCHLORIDE 300 MG: 150 TABLET, EXTENDED RELEASE ORAL at 07:51

## 2022-10-19 RX ADMIN — NICOTINE POLACRILEX 4 MG: 4 GUM, CHEWING BUCCAL at 14:48

## 2022-10-19 RX ADMIN — HYDROXYZINE HYDROCHLORIDE 25 MG: 25 TABLET, FILM COATED ORAL at 20:09

## 2022-10-19 RX ADMIN — OLANZAPINE 10 MG: 10 TABLET, FILM COATED ORAL at 07:51

## 2022-10-19 RX ADMIN — DIVALPROEX SODIUM 1000 MG: 500 TABLET, DELAYED RELEASE ORAL at 20:09

## 2022-10-19 RX ADMIN — NICOTINE POLACRILEX 4 MG: 4 GUM, CHEWING BUCCAL at 07:51

## 2022-10-19 RX ADMIN — ATOMOXETINE HYDROCHLORIDE 25 MG: 25 CAPSULE ORAL at 07:51

## 2022-10-19 RX ADMIN — NICOTINE POLACRILEX 4 MG: 4 GUM, CHEWING BUCCAL at 16:32

## 2022-10-19 RX ADMIN — NICOTINE POLACRILEX 4 MG: 4 GUM, CHEWING BUCCAL at 21:21

## 2022-10-19 RX ADMIN — OLANZAPINE 20 MG: 10 TABLET, FILM COATED ORAL at 20:09

## 2022-10-19 RX ADMIN — NICOTINE 1 PATCH: 14 PATCH, EXTENDED RELEASE TRANSDERMAL at 07:52

## 2022-10-19 RX ADMIN — NICOTINE POLACRILEX 4 MG: 4 GUM, CHEWING BUCCAL at 18:18

## 2022-10-19 RX ADMIN — NICOTINE POLACRILEX 4 MG: 4 GUM, CHEWING BUCCAL at 19:58

## 2022-10-19 RX ADMIN — DIVALPROEX SODIUM 1000 MG: 500 TABLET, DELAYED RELEASE ORAL at 07:51

## 2022-10-19 ASSESSMENT — ACTIVITIES OF DAILY LIVING (ADL)
ORAL_HYGIENE: INDEPENDENT
ADLS_ACUITY_SCORE: 29
ADLS_ACUITY_SCORE: 29
HYGIENE/GROOMING: INDEPENDENT
ADLS_ACUITY_SCORE: 29
LAUNDRY: WITH SUPERVISION
ADLS_ACUITY_SCORE: 29
LAUNDRY: WITH SUPERVISION
ADLS_ACUITY_SCORE: 29
ADLS_ACUITY_SCORE: 29
DRESS: INDEPENDENT;STREET CLOTHES
ORAL_HYGIENE: INDEPENDENT
DRESS: INDEPENDENT
ADLS_ACUITY_SCORE: 29
HYGIENE/GROOMING: INDEPENDENT

## 2022-10-19 NOTE — PLAN OF CARE
Pt was visible in the milieu and social with peers and staff. Pt presents with full range affect. Pt Denied suicidal ideation and hallucinations. Denied anxiety and depression or any other mental health symptoms. Pt ate dinner 100% and was medication compliant.    Problem: Plan of Care - These are the overarching goals to be used throughout the patient stay.    Goal: Absence of Hospital-Acquired Illness or Injury  Outcome: Adequate for Care Transition     Problem: Behavioral Health Plan of Care  Goal: Adheres to Safety Considerations for Self and Others  Outcome: Adequate for Care Transition     Problem: Decreased Participation and Engagement (Psychotic Signs/Symptoms)  Goal: Increased Participation and Engagement (Psychotic Signs/Symptoms)  Outcome: Adequate for Care Transition   Goal Outcome Evaluation:

## 2022-10-19 NOTE — PLAN OF CARE
Problem: Plan of Care - These are the overarching goals to be used throughout the patient stay.    Goal: Absence of Hospital-Acquired Illness or Injury  Intervention: Prevent Skin Injury  Recent Flowsheet Documentation  Taken 10/18/2022 1655 by Angelo Cruz RN  Body Position: position changed independently     Problem: Plan of Care - These are the overarching goals to be used throughout the patient stay.    Goal: Optimal Comfort and Wellbeing  Intervention: Provide Person-Centered Care  Recent Flowsheet Documentation  Taken 10/18/2022 1655 by Angelo Cruz RN  Trust Relationship/Rapport:   care explained   choices provided   emotional support provided   empathic listening provided   questions answered   questions encouraged   reassurance provided   thoughts/feelings acknowledged   Goal Outcome Evaluation:    Plan of Care Reviewed With: patient             Patient was pleasant, cooperative, he was visible in milieu, social and interactive with peer and staff members. Patient was compliant with medications. He denied ant pain or discomfort, he denied any anxiety or depression, no SI/HI/SIB, contracted for safety.
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
## 2022-10-19 NOTE — PLAN OF CARE
10/19/22 1456   Group Therapy Session   Group Attendance attended group session   Time Session Began 0200   Time Session Ended 0245   Total Time (minutes) 45   Total # Attendees 4   Group Type psychotherapeutic   Group Topic Covered cognitive activities;structured socialization   Group Session Detail Sharing life experiences   Patient Response/Contribution cooperative with task;organized;discussed personal experience with topic;expressed understanding of topic;listened actively;offered helpful suggestions to peers

## 2022-10-19 NOTE — PLAN OF CARE
"Pt pleasant and cooperative. Pt medication compliant.  Pt eating meals well, in lounge watching movies, social with peers.  No bx issues  Problem: Plan of Care - These are the overarching goals to be used throughout the patient stay.    Goal: Plan of Care Review/Shift Note  Description: The Plan of Care Review/Shift note should be completed every shift.  The Outcome Evaluation is a brief statement about your assessment that the patient is improving, declining, or no change.  This information will be displayed automatically on your shift note.  Outcome: Not Progressing  Goal: Patient-Specific Goal (Individualized)  Description: You can add care plan individualizations to a care plan. Examples of Individualization might be:  \"Parent requests to be called daily at 9am for status\", \"I have a hard time hearing out of my right ear\", or \"Do not touch me to wake me up as it startles me\".  Outcome: Not Progressing  Goal: Absence of Hospital-Acquired Illness or Injury  Outcome: Not Progressing  Goal: Optimal Comfort and Wellbeing  Outcome: Not Progressing  Goal: Readiness for Transition of Care  Outcome: Not Progressing     Problem: Behavioral Health Plan of Care  Goal: Optimal Comfort and Wellbeing  Outcome: Not Progressing  Goal: Plan of Care Review  Outcome: Not Progressing  Goal: Patient-Specific Goal (Individualization)  Outcome: Not Progressing  Goal: Adheres to Safety Considerations for Self and Others  Outcome: Not Progressing  Intervention: Develop and Maintain Individualized Safety Plan  Recent Flowsheet Documentation  Taken 10/19/2022 1300 by Nikole Rockwell RN  Safety Measures:   environmental rounds completed   safety rounds completed  Goal: Absence of New-Onset Illness or Injury  Outcome: Not Progressing  Intervention: Identify and Manage Fall Risk  Recent Flowsheet Documentation  Taken 10/19/2022 1300 by Nikole Rockwell RN  Safety Measures:   environmental rounds completed   safety rounds completed  Goal: " Optimized Coping Skills in Response to Life Stressors  Outcome: Not Progressing  Goal: Develops/Participates in Therapeutic [place] to Support Successful Transition  Outcome: Not Progressing  Goal: Team Discussion  Outcome: Not Progressing   Goal Outcome Evaluation:

## 2022-10-19 NOTE — PLAN OF CARE
Problem: Sleep Disturbance (Psychotic Signs/Symptoms)  Goal: Improved Sleep (Psychotic Signs/Symptoms)  Outcome: Progressing   Goal Outcome Evaluation:  Patient had an eventful night. No complaints of pain or new concerns. Appears to have slept for 6.25 hours. No requests for prn's. Respirations calm and non-labored during routine rounds.
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
## 2022-10-20 PROCEDURE — 250N000013 HC RX MED GY IP 250 OP 250 PS 637: Performed by: PSYCHIATRY & NEUROLOGY

## 2022-10-20 PROCEDURE — 124N000002 HC R&B MH UMMC

## 2022-10-20 PROCEDURE — 250N000013 HC RX MED GY IP 250 OP 250 PS 637

## 2022-10-20 PROCEDURE — 250N000013 HC RX MED GY IP 250 OP 250 PS 637: Performed by: EMERGENCY MEDICINE

## 2022-10-20 RX ADMIN — OLANZAPINE 10 MG: 10 TABLET, FILM COATED ORAL at 07:47

## 2022-10-20 RX ADMIN — NICOTINE POLACRILEX 4 MG: 4 GUM, CHEWING BUCCAL at 16:11

## 2022-10-20 RX ADMIN — HYDROCHLOROTHIAZIDE 12.5 MG: 12.5 TABLET ORAL at 07:47

## 2022-10-20 RX ADMIN — NICOTINE POLACRILEX 4 MG: 4 GUM, CHEWING BUCCAL at 12:00

## 2022-10-20 RX ADMIN — OLANZAPINE 20 MG: 10 TABLET, FILM COATED ORAL at 19:48

## 2022-10-20 RX ADMIN — NICOTINE POLACRILEX 4 MG: 4 GUM, CHEWING BUCCAL at 07:46

## 2022-10-20 RX ADMIN — NICOTINE POLACRILEX 4 MG: 4 GUM, CHEWING BUCCAL at 09:28

## 2022-10-20 RX ADMIN — DIVALPROEX SODIUM 1000 MG: 500 TABLET, DELAYED RELEASE ORAL at 07:46

## 2022-10-20 RX ADMIN — CARBAMIDE PEROXIDE 6.5% 3 DROP: 6.5 LIQUID AURICULAR (OTIC) at 12:07

## 2022-10-20 RX ADMIN — DIVALPROEX SODIUM 1000 MG: 500 TABLET, DELAYED RELEASE ORAL at 19:49

## 2022-10-20 RX ADMIN — NICOTINE 1 PATCH: 14 PATCH, EXTENDED RELEASE TRANSDERMAL at 07:47

## 2022-10-20 RX ADMIN — NICOTINE POLACRILEX 4 MG: 4 GUM, CHEWING BUCCAL at 19:14

## 2022-10-20 RX ADMIN — ATOMOXETINE HYDROCHLORIDE 25 MG: 25 CAPSULE ORAL at 07:46

## 2022-10-20 RX ADMIN — BUPROPION HYDROCHLORIDE 300 MG: 150 TABLET, EXTENDED RELEASE ORAL at 07:46

## 2022-10-20 ASSESSMENT — ACTIVITIES OF DAILY LIVING (ADL)
ADLS_ACUITY_SCORE: 29
HYGIENE/GROOMING: INDEPENDENT
LAUNDRY: WITH SUPERVISION
DRESS: INDEPENDENT
ORAL_HYGIENE: INDEPENDENT
ADLS_ACUITY_SCORE: 29
DRESS: INDEPENDENT
ADLS_ACUITY_SCORE: 29
ORAL_HYGIENE: INDEPENDENT
ADLS_ACUITY_SCORE: 29
LAUNDRY: WITH SUPERVISION
ADLS_ACUITY_SCORE: 29
HYGIENE/GROOMING: INDEPENDENT

## 2022-10-20 NOTE — PLAN OF CARE
Assessment/Intervention/Current Symptoms and Care Coordination: Met with team. Reviewed patient's chart and progress notes.    -  Patient approached this writer today and asked if his CM has provided any update about [facility].     - Writer emailed patient's CM inquiring if there are any updates about potentially [facility] the [facility]. Writer received an email back from CM stating that she has not received a call back from [facility]. Writer notified patient.       Discharge Plan or Goal:  Pending stabilization & development of a safe discharge plan.  Considerations include: [facility] placement            Barriers to Discharge:  Patient requires further psychiatric stabilization due to current symptomology. Door to door transfer. PD revoked.            Referral Status:   None today           Legal Status:    Committed as MI through [facility] until 4/12/2023

## 2022-10-20 NOTE — PLAN OF CARE
Pt appears to have slept for 7 hours. No PRNs given or requested. No concerns noted this shift. Will continue to monitor and offer support.     Problem: Sleep Disturbance  Goal: Adequate Sleep/Rest  Outcome: Progressing   Goal Outcome Evaluation:

## 2022-10-20 NOTE — PLAN OF CARE
"Pt denies  SI.  Pt eating well, attends to ADLS.  Pt co of ear wax order for Debrox eye drops ordered. Pt started drops. Pt napping this afternoon.  No behavioral issues.    Problem: Plan of Care - These are the overarching goals to be used throughout the patient stay.    Goal: Plan of Care Review/Shift Note  Description: The Plan of Care Review/Shift note should be completed every shift.  The Outcome Evaluation is a brief statement about your assessment that the patient is improving, declining, or no change.  This information will be displayed automatically on your shift note.  Outcome: Not Progressing  Goal: Patient-Specific Goal (Individualized)  Description: You can add care plan individualizations to a care plan. Examples of Individualization might be:  \"Parent requests to be called daily at 9am for status\", \"I have a hard time hearing out of my right ear\", or \"Do not touch me to wake me up as it startles me\".  Outcome: Not Progressing  Goal: Optimal Comfort and Wellbeing  Outcome: Not Progressing  Goal: Readiness for Transition of Care  Outcome: Not Progressing   Goal Outcome Evaluation:                        "

## 2022-10-20 NOTE — PLAN OF CARE
[facility] Occupational Therapy Group Intervention Note     10/20/22 1432   Group Therapy Session   Group Attendance attended group session   Group Type task skill;life skill   Group Topic Covered coping skills/lifestyle management;relapse prevention   Group Session Detail Clinic - coping skill exploration, creative expression within personally meaningful activities, and observation of social, cognitive, and kinesthetic performance skills   Patient Response/Contribution cooperative with task;organized;listened actively;offered helpful suggestions to peers   Patient Participation Detail Congruent affect. Pleasant and social. IND to initiate a detailed beading project. No significant change notes.      Madeline Rosenberg OT on 10/20/2022 at 2:32 PM

## 2022-10-20 NOTE — PLAN OF CARE
[facility] Occupational Therapy Progress Note     10/20/22 1015   Clinical Impression  Pt is pleasant and engaged throughout a variety of OT group interventions. Engages in complex hands-on projects, insightful discussions, and coping skill exploration activities. Pt expresses motivation towards short-term and long-term goals. Socially appropriate - polite and encouraging - towards peers.    Affect Appropriate to situation   Orientation Oriented to person, place and time   Appearance and ADLs Neatly groomed   Attention to Internal Stimuli No observed signs   Interaction Skills Interacts appropriately with staff;Interacts appropriately with peers   Ability to Communicate Needs Independent   Verbal Content Articulate;Clear;Appropriate to topic   Ability to Maintain Boundaries Maintains appropriate physical boundaries;Maintains appropriate verbal boundaries   Participation Initiates participation;Independently participates   Concentration Concentrates 50 minutes   Ability to Concentrate With structure   Follows and Comprehends Directions Independently follows multi-step directions   Memory Delayed and immediate recall intact   Organization Independently organizes all tasks   Decision Making Independent   Planning and Problem Solving Independently plans ahead   Ability to Apply and Learn Concepts Applies within group structure   Frustrations / Stress Tolerance Independently identifies and applies coping skills;Independently identifies sources of frustration/stress   Level of Insight Insightful into needs, issues, goals   Self Esteem Can identify positives;Takes risks with support and encouragement   Social Supports Identifies utilizing supports     Madeline Rosenberg OT on 10/20/2022 at 10:16 AM

## 2022-10-20 NOTE — PROGRESS NOTES
Behavioral Health  Note   Behavioral Health  Spirituality Group Note     Unit 20    Name: Etienne Dupree    YOB: 1990   MRN: [id number]    Age: 32 year old     Patient attended -led group, which included discussion of spirituality, coping with illness and building resilience.   Patient attended group for [facility] - spirituality groups are not billed.   patient demonstrated an appreciation of topic's application for their personal circumstances.     [facility]  Staff    Page [phone]

## 2022-10-21 PROCEDURE — 250N000013 HC RX MED GY IP 250 OP 250 PS 637: Performed by: EMERGENCY MEDICINE

## 2022-10-21 PROCEDURE — 99232 SBSQ HOSP IP/OBS MODERATE 35: CPT | Performed by: PSYCHIATRY & NEUROLOGY

## 2022-10-21 PROCEDURE — 250N000013 HC RX MED GY IP 250 OP 250 PS 637: Performed by: PSYCHIATRY & NEUROLOGY

## 2022-10-21 PROCEDURE — 124N000002 HC R&B MH UMMC

## 2022-10-21 PROCEDURE — 250N000013 HC RX MED GY IP 250 OP 250 PS 637

## 2022-10-21 RX ADMIN — NICOTINE POLACRILEX 4 MG: 4 GUM, CHEWING BUCCAL at 13:32

## 2022-10-21 RX ADMIN — NICOTINE POLACRILEX 4 MG: 4 GUM, CHEWING BUCCAL at 07:58

## 2022-10-21 RX ADMIN — ATOMOXETINE HYDROCHLORIDE 25 MG: 25 CAPSULE ORAL at 07:57

## 2022-10-21 RX ADMIN — NICOTINE POLACRILEX 4 MG: 4 GUM, CHEWING BUCCAL at 19:19

## 2022-10-21 RX ADMIN — HYDROCHLOROTHIAZIDE 12.5 MG: 12.5 TABLET ORAL at 07:57

## 2022-10-21 RX ADMIN — NICOTINE 1 PATCH: 14 PATCH, EXTENDED RELEASE TRANSDERMAL at 07:57

## 2022-10-21 RX ADMIN — NICOTINE POLACRILEX 4 MG: 4 GUM, CHEWING BUCCAL at 09:28

## 2022-10-21 RX ADMIN — OLANZAPINE 10 MG: 10 TABLET, FILM COATED ORAL at 07:57

## 2022-10-21 RX ADMIN — DIVALPROEX SODIUM 1000 MG: 500 TABLET, DELAYED RELEASE ORAL at 19:19

## 2022-10-21 RX ADMIN — DIVALPROEX SODIUM 1000 MG: 500 TABLET, DELAYED RELEASE ORAL at 07:55

## 2022-10-21 RX ADMIN — BUPROPION HYDROCHLORIDE 300 MG: 150 TABLET, EXTENDED RELEASE ORAL at 07:56

## 2022-10-21 RX ADMIN — NICOTINE POLACRILEX 4 MG: 4 GUM, CHEWING BUCCAL at 17:24

## 2022-10-21 RX ADMIN — OLANZAPINE 20 MG: 10 TABLET, FILM COATED ORAL at 19:19

## 2022-10-21 ASSESSMENT — ACTIVITIES OF DAILY LIVING (ADL)
ADLS_ACUITY_SCORE: 29
LAUNDRY: WITH SUPERVISION
HYGIENE/GROOMING: INDEPENDENT
ADLS_ACUITY_SCORE: 29
ORAL_HYGIENE: INDEPENDENT
DRESS: INDEPENDENT
ADLS_ACUITY_SCORE: 29
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
## 2022-10-21 NOTE — PLAN OF CARE
Problem: Sleep Disturbance (Psychotic Signs/Symptoms)  Goal: Improved Sleep (Psychotic Signs/Symptoms)  Outcome: Progressing   Goal Outcome Evaluation:  Patient appears to have slept for 6.75 hours. Endorsed no acute concerns. No requests for prn's. Respirations calm and non-labored during rounds.

## 2022-10-21 NOTE — PLAN OF CARE
Assessment/Intervention/Current Symptoms and Care Coordination: Met with team. Reviewed patient's chart and progress notes.     -  Patient is visible in the milieu, attends groups, and is medication compliant. No behavioral concerns notes.         Discharge Plan or Goal:  Pending stabilization & development of a safe discharge plan.  Considerations include[facility]            Barriers to Discharge:  Patient requires further psychiatric stabilization due to current symptomology. Door to door transfer. PD revoked.            Referral Status:   None today           Legal Status:    Committed as MI through [facility] until 4/12/2023

## 2022-10-21 NOTE — PLAN OF CARE
Problem: Plan of Care - These are the overarching goals to be used throughout the patient stay.    Goal: Plan of Care Review/Shift Note  Description: The Plan of Care Review/Shift note should be completed every shift.  The Outcome Evaluation is a brief statement about your assessment that the patient is improving, declining, or no change.  This information will be displayed automatically on your shift note.  Outcome: Adequate for Care Transition  Flowsheets (Taken 10/21/2022 9724)  Plan of Care Reviewed With: patient  Overall Patient Progress: improving   Goal Outcome Evaluation:      Plan of Care Reviewed With: patient    Overall Patient Progress: improvingOverall Patient Progress: improving     Patient has no complains,visible in the milieu.Sociable with peers.Denies pain/self harm and HI.Patient calm & pleasant.  Temp: 98.2  F (36.8  C) Temp src: Oral BP: 135/85 Pulse: 74   Resp: 16 SpO2: 97 % O2 Device: None (Room air)

## 2022-10-21 NOTE — PLAN OF CARE
Pt was visible in the milieu and social with staff and peers. Presents with full range affect. Pt watched tv in the lounge with peers this evening. Pt attended NA meeting through zoom. Pt attended therapy group tonight. Denies SI/SIB/AH/VH, anxiety or depression. Ate 100% dinner and is compliant with medications.     Problem: Mood Impairment (Psychotic Signs/Symptoms)  Goal: Improved Mood Symptoms (Psychotic Signs/Symptoms)  Outcome: Adequate for Care Transition   Goal Outcome Evaluation:

## 2022-10-22 PROCEDURE — 250N000013 HC RX MED GY IP 250 OP 250 PS 637

## 2022-10-22 PROCEDURE — 250N000013 HC RX MED GY IP 250 OP 250 PS 637: Performed by: PSYCHIATRY & NEUROLOGY

## 2022-10-22 PROCEDURE — 124N000002 HC R&B MH UMMC

## 2022-10-22 PROCEDURE — 250N000013 HC RX MED GY IP 250 OP 250 PS 637: Performed by: EMERGENCY MEDICINE

## 2022-10-22 RX ADMIN — BUPROPION HYDROCHLORIDE 300 MG: 150 TABLET, EXTENDED RELEASE ORAL at 07:55

## 2022-10-22 RX ADMIN — DIVALPROEX SODIUM 1000 MG: 500 TABLET, DELAYED RELEASE ORAL at 07:55

## 2022-10-22 RX ADMIN — HYDROCHLOROTHIAZIDE 12.5 MG: 12.5 TABLET ORAL at 07:55

## 2022-10-22 RX ADMIN — NICOTINE 1 PATCH: 14 PATCH, EXTENDED RELEASE TRANSDERMAL at 07:57

## 2022-10-22 RX ADMIN — OLANZAPINE 20 MG: 10 TABLET, FILM COATED ORAL at 19:24

## 2022-10-22 RX ADMIN — NICOTINE POLACRILEX 4 MG: 4 GUM, CHEWING BUCCAL at 15:55

## 2022-10-22 RX ADMIN — DIVALPROEX SODIUM 1000 MG: 500 TABLET, DELAYED RELEASE ORAL at 19:24

## 2022-10-22 RX ADMIN — OLANZAPINE 10 MG: 10 TABLET, FILM COATED ORAL at 07:55

## 2022-10-22 RX ADMIN — NICOTINE POLACRILEX 4 MG: 4 GUM, CHEWING BUCCAL at 10:54

## 2022-10-22 RX ADMIN — ATOMOXETINE HYDROCHLORIDE 25 MG: 25 CAPSULE ORAL at 07:55

## 2022-10-22 RX ADMIN — NICOTINE POLACRILEX 4 MG: 4 GUM, CHEWING BUCCAL at 19:26

## 2022-10-22 RX ADMIN — NICOTINE POLACRILEX 4 MG: 4 GUM, CHEWING BUCCAL at 17:50

## 2022-10-22 RX ADMIN — NICOTINE POLACRILEX 4 MG: 4 GUM, CHEWING BUCCAL at 07:55

## 2022-10-22 ASSESSMENT — ACTIVITIES OF DAILY LIVING (ADL)
ADLS_ACUITY_SCORE: 29
ORAL_HYGIENE: INDEPENDENT
ADLS_ACUITY_SCORE: 29
ADLS_ACUITY_SCORE: 29
LAUNDRY: WITH SUPERVISION
ADLS_ACUITY_SCORE: 29
HYGIENE/GROOMING: INDEPENDENT
ADLS_ACUITY_SCORE: 29
ADLS_ACUITY_SCORE: 29
HYGIENE/GROOMING: INDEPENDENT
ADLS_ACUITY_SCORE: 29
DRESS: INDEPENDENT
ADLS_ACUITY_SCORE: 29
LAUNDRY: WITH SUPERVISION
DRESS: INDEPENDENT;STREET CLOTHES
ORAL_HYGIENE: INDEPENDENT

## 2022-10-22 NOTE — PLAN OF CARE
Pt appears to have slept for 6.25 hours. No PRNs given or requested. No concerns noted this shift. Will continue to monitor and offer support.     Problem: Sleep Disturbance  Goal: Adequate Sleep/Rest  Outcome: Progressing   Goal Outcome Evaluation:

## 2022-10-22 NOTE — PLAN OF CARE
Pt too a nap this evening until 1700 hours. When pt was up he remained out in the lounge watching tv and socializing with peers the rest of the evening until bed time. Pt presents with full range affect. Denies all mental health symptoms. Appetite is good, ate 100% dinner and compliant with medications.    Problem: Psychomotor Impairment (Psychotic Signs/Symptoms)  Goal: Improved Psychomotor Symptoms (Psychotic Signs/Symptoms)  Outcome: Adequate for Care Transition  Intervention: Manage Psychomotor Movement  Recent Flowsheet Documentation  Taken 10/21/2022 1728 by Kana Tom RN  Activity (Behavioral Health): up ad alondra   Goal Outcome Evaluation:

## 2022-10-22 NOTE — PLAN OF CARE
"Pt up early this am watching TV.  Pt requested to go to AA/NA via zoom. Pt did go to meeting at 11am.  Pt talked about having to be here for another 20-30 days,  Pt interactive with pt and staff.  Pt Pt napped this afternoon. Pt attends to right ear:L's.  Problem: Plan of Care - These are the overarching goals to be used throughout the patient stay.    Goal: Patient-Specific Goal (Individualized)  Description: You can add care plan individualizations to a care plan. Examples of Individualization might be:  \"Parent requests to be called daily at 9am for status\", \"I have a hard time hearing out of my right ear\", or \"Do not touch me to wake me up as it startles me\".  Outcome: Not Progressing  Goal: Optimal Comfort and Wellbeing  Outcome: Not Progressing  Goal: Readiness for Transition of Care  Outcome: Not Progressing     Problem: Behavioral Health Plan of Care  Goal: Optimal Comfort and Wellbeing  Outcome: Not Progressing  Goal: Plan of Care Review  Outcome: Not Progressing  Goal: Patient-Specific Goal (Individualization)  Outcome: Not Progressing  Goal: Adheres to Safety Considerations for Self and Others  Intervention: Develop and Maintain Individualized Safety Plan  Recent Flowsheet Documentation  Taken 10/22/2022 1200 by Nikole Rockwell, RN  Safety Measures:   environmental rounds completed   safety rounds completed  Goal: Absence of New-Onset Illness or Injury  Outcome: Not Progressing  Intervention: Identify and Manage Fall Risk  Recent Flowsheet Documentation  Taken 10/22/2022 1200 by Nikole Rockwell, RN  Safety Measures:   environmental rounds completed   safety rounds completed  Goal: Optimized Coping Skills in Response to Life Stressors  Outcome: Not Progressing  Goal: Develops/Participates in Therapeutic [place] to Support Successful Transition  Outcome: Not Progressing  Goal: Team Discussion  Outcome: Not Progressing   Goal Outcome Evaluation:                        "

## 2022-10-23 PROCEDURE — 250N000013 HC RX MED GY IP 250 OP 250 PS 637: Performed by: PSYCHIATRY & NEUROLOGY

## 2022-10-23 PROCEDURE — 124N000002 HC R&B MH UMMC

## 2022-10-23 PROCEDURE — 250N000013 HC RX MED GY IP 250 OP 250 PS 637

## 2022-10-23 PROCEDURE — 250N000013 HC RX MED GY IP 250 OP 250 PS 637: Performed by: EMERGENCY MEDICINE

## 2022-10-23 RX ADMIN — NICOTINE POLACRILEX 4 MG: 4 GUM, CHEWING BUCCAL at 07:51

## 2022-10-23 RX ADMIN — ATOMOXETINE HYDROCHLORIDE 25 MG: 25 CAPSULE ORAL at 07:51

## 2022-10-23 RX ADMIN — NICOTINE 1 PATCH: 14 PATCH, EXTENDED RELEASE TRANSDERMAL at 07:54

## 2022-10-23 RX ADMIN — HYDROCHLOROTHIAZIDE 12.5 MG: 12.5 TABLET ORAL at 07:51

## 2022-10-23 RX ADMIN — BUPROPION HYDROCHLORIDE 300 MG: 150 TABLET, EXTENDED RELEASE ORAL at 07:51

## 2022-10-23 RX ADMIN — DIVALPROEX SODIUM 1000 MG: 500 TABLET, DELAYED RELEASE ORAL at 19:17

## 2022-10-23 RX ADMIN — NICOTINE POLACRILEX 4 MG: 4 GUM, CHEWING BUCCAL at 10:02

## 2022-10-23 RX ADMIN — NICOTINE POLACRILEX 4 MG: 4 GUM, CHEWING BUCCAL at 16:23

## 2022-10-23 RX ADMIN — NICOTINE POLACRILEX 4 MG: 4 GUM, CHEWING BUCCAL at 18:12

## 2022-10-23 RX ADMIN — NICOTINE POLACRILEX 4 MG: 4 GUM, CHEWING BUCCAL at 19:18

## 2022-10-23 RX ADMIN — OLANZAPINE 10 MG: 10 TABLET, FILM COATED ORAL at 07:51

## 2022-10-23 RX ADMIN — DIVALPROEX SODIUM 1000 MG: 500 TABLET, DELAYED RELEASE ORAL at 07:51

## 2022-10-23 RX ADMIN — NICOTINE POLACRILEX 4 MG: 4 GUM, CHEWING BUCCAL at 12:29

## 2022-10-23 RX ADMIN — OLANZAPINE 20 MG: 10 TABLET, FILM COATED ORAL at 19:17

## 2022-10-23 ASSESSMENT — ACTIVITIES OF DAILY LIVING (ADL)
ADLS_ACUITY_SCORE: 29
DRESS: INDEPENDENT
ADLS_ACUITY_SCORE: 29
HYGIENE/GROOMING: INDEPENDENT
ADLS_ACUITY_SCORE: 29
ADLS_ACUITY_SCORE: 29
LAUNDRY: WITH SUPERVISION
ORAL_HYGIENE: INDEPENDENT
ADLS_ACUITY_SCORE: 29

## 2022-10-23 NOTE — PLAN OF CARE
Pt presented  with a full range affect.  He said that he feels hopeful today. He is social with peers and staff, interactions appropriate. He plays WII with peers and can be heard laughing and enjoying the game. He also joined a game of [email or web address].  Nutrition and hydration is adequate. Took also shower this morning. Pt was able to attend and participate in community meeting today.  Pt is out in the lounge now watching baseball game.  Pt looked forward to his sister's visit today , and it went well.      PRNs : Elton wasserman    Problem: Behavioral Health Plan of Care  Goal: Plan of Care Review  Outcome: Progressing  Flowsheets (Taken 10/23/2022 1050)  Patient Agreement with Plan of Care: agrees     Problem: Behavioral Health Plan of Care  Goal: Patient-Specific Goal (Individualization)  Outcome: Progressing     Problem: Behavioral Health Plan of Care  Goal: Absence of New-Onset Illness or Injury  Outcome: Progressing  Intervention: Identify and Manage Fall Risk  Recent Flowsheet Documentation  Taken 10/23/2022 1050 by Brigitte Gonzalez RN  Safety Measures: safety rounds completed     Problem: Behavioral Health Plan of Care  Goal: Absence of New-Onset Illness or Injury  Intervention: Identify and Manage Fall Risk  Recent Flowsheet Documentation  Taken 10/23/2022 1050 by Brigitte Gonzalez, RN  Safety Measures: safety rounds completed   Goal Outcome Evaluation:    Plan of Care Reviewed With: patient

## 2022-10-23 NOTE — PLAN OF CARE
Pt appears to have slept for 6.75 hours. No PRNs given or requested. No concerns noted this shift. Will continue to monitor and offer support.     Problem: Sleep Disturbance  Goal: Adequate Sleep/Rest  Outcome: Progressing   Goal Outcome Evaluation:

## 2022-10-23 NOTE — PLAN OF CARE
Pt was visible in the milieu this shift. Pt was sitting in the lounge watching tv and socializing with peers. Presents with full range affect. Pt denies anxiety, depression and SI/SIB/AH/VH/HI. Pt was calm and pleasant. Pt had good appetite, ate supper 100%. Pt refused his ear drops to night but was compliant with rest of his medications.    Problem: Sensory Perception Impairment (Psychotic Signs/Symptoms)  Goal: Decreased Sensory Symptoms (Psychotic Signs/Symptoms)  Outcome: Adequate for Care Transition   Goal Outcome Evaluation:

## 2022-10-24 PROCEDURE — 250N000013 HC RX MED GY IP 250 OP 250 PS 637: Performed by: PSYCHIATRY & NEUROLOGY

## 2022-10-24 PROCEDURE — 250N000013 HC RX MED GY IP 250 OP 250 PS 637: Performed by: EMERGENCY MEDICINE

## 2022-10-24 PROCEDURE — G0177 OPPS/PHP; TRAIN & EDUC SERV: HCPCS

## 2022-10-24 PROCEDURE — 124N000002 HC R&B MH UMMC

## 2022-10-24 PROCEDURE — 250N000013 HC RX MED GY IP 250 OP 250 PS 637

## 2022-10-24 RX ADMIN — BUPROPION HYDROCHLORIDE 300 MG: 150 TABLET, EXTENDED RELEASE ORAL at 07:53

## 2022-10-24 RX ADMIN — NICOTINE POLACRILEX 4 MG: 4 GUM, CHEWING BUCCAL at 16:13

## 2022-10-24 RX ADMIN — NICOTINE POLACRILEX 4 MG: 4 GUM, CHEWING BUCCAL at 12:20

## 2022-10-24 RX ADMIN — NICOTINE POLACRILEX 4 MG: 4 GUM, CHEWING BUCCAL at 18:30

## 2022-10-24 RX ADMIN — HYDROCHLOROTHIAZIDE 12.5 MG: 12.5 TABLET ORAL at 07:53

## 2022-10-24 RX ADMIN — NICOTINE POLACRILEX 4 MG: 4 GUM, CHEWING BUCCAL at 07:53

## 2022-10-24 RX ADMIN — NICOTINE POLACRILEX 4 MG: 4 GUM, CHEWING BUCCAL at 10:02

## 2022-10-24 RX ADMIN — DIVALPROEX SODIUM 1000 MG: 500 TABLET, DELAYED RELEASE ORAL at 19:34

## 2022-10-24 RX ADMIN — OLANZAPINE 10 MG: 10 TABLET, FILM COATED ORAL at 07:53

## 2022-10-24 RX ADMIN — NICOTINE POLACRILEX 4 MG: 4 GUM, CHEWING BUCCAL at 11:04

## 2022-10-24 RX ADMIN — DIVALPROEX SODIUM 1000 MG: 500 TABLET, DELAYED RELEASE ORAL at 07:53

## 2022-10-24 RX ADMIN — NICOTINE POLACRILEX 4 MG: 4 GUM, CHEWING BUCCAL at 21:54

## 2022-10-24 RX ADMIN — ATOMOXETINE HYDROCHLORIDE 25 MG: 25 CAPSULE ORAL at 07:53

## 2022-10-24 RX ADMIN — OLANZAPINE 20 MG: 10 TABLET, FILM COATED ORAL at 19:34

## 2022-10-24 RX ADMIN — NICOTINE 1 PATCH: 14 PATCH, EXTENDED RELEASE TRANSDERMAL at 07:54

## 2022-10-24 RX ADMIN — NICOTINE POLACRILEX 4 MG: 4 GUM, CHEWING BUCCAL at 19:34

## 2022-10-24 ASSESSMENT — ACTIVITIES OF DAILY LIVING (ADL)
ADLS_ACUITY_SCORE: 29
ADLS_ACUITY_SCORE: 29
LAUNDRY: UNABLE TO COMPLETE
ORAL_HYGIENE: INDEPENDENT
HYGIENE/GROOMING: INDEPENDENT
ADLS_ACUITY_SCORE: 29
LAUNDRY: WITH SUPERVISION
HYGIENE/GROOMING: INDEPENDENT
DRESS: INDEPENDENT
ADLS_ACUITY_SCORE: 29
ADLS_ACUITY_SCORE: 29
ORAL_HYGIENE: INDEPENDENT
ADLS_ACUITY_SCORE: 29
DRESS: INDEPENDENT
ADLS_ACUITY_SCORE: 29
ADLS_ACUITY_SCORE: 29

## 2022-10-24 NOTE — PLAN OF CARE
"  Pt is visible in the milieu. Medication compliant. Nutrition and hygiene adequate.  Pt reported that he did 60 push ups this morning, and that he is pacing the halls for his \"work out\". Pt was able to join all groups this shift. Denies all mental health symptoms. No behavioral concerns at this time.     Problem: Plan of Care - These are the overarching goals to be used throughout the patient stay.    Goal: Optimal Comfort and Wellbeing  Outcome: Progressing  Intervention: Provide Person-Centered Care  Recent Flowsheet Documentation  Taken 10/24/2022 1200 by Brigitte Gonzalez RN  Trust Relationship/Rapport:   questions encouraged   thoughts/feelings acknowledged     Problem: Behavioral Health Plan of Care  Goal: Optimal Comfort and Wellbeing  Outcome: Progressing  Intervention: Provide Person-Centered Care  Recent Flowsheet Documentation  Taken 10/24/2022 1200 by Brigitte Gonzalez RN  Trust Relationship/Rapport:   questions encouraged   thoughts/feelings acknowledged     Problem: Behavioral Health Plan of Care  Goal: Patient-Specific Goal (Individualization)  Description: Assess pain level, treatment efficacy and patient response at regular intervals using a consistent pain scale.  Consider the presence and impact of preexisting chronic pain.  Encourage patient and caregiver involvement in pain assessment, interventions and safety measures.  Outcome: Progressing     Problem: Behavioral Health Plan of Care  Goal: Absence of New-Onset Illness or Injury  Outcome: Progressing     Problem: Behavioral Health Plan of Care  Goal: Optimized Coping Skills in Response to Life Stressors  Description: Assess skin risk on admission and at regular intervals throughout hospital stay.  Keep all areas of skin (especially folds) clean and dry.  Maintain adequate skin hydration.  Relieve and redistribute pressure and protect bony prominences; implement measures based on patient-specific risk factors.  Encourage weight " shift frequently; assist with reposition if unable to complete independently.  Use aids (e.g., slide boards, mechanical lift) if transfer assistance is required.  Outcome: Progressing     Problem: Behavioral Health Plan of Care  Goal: Develops/Participates in Therapeutic [place] to Support Successful Transition  Description: Maintain skin and mucous membrane integrity; promote hand, oral and pulmonary hygiene.  Optimize fluid balance, nutrition, sleep and glycemic control to maximize infection resistance.  Identify potential sources of infection early to prevent or mitigate progression of infection (e.g., wound, lines, devices).  Evaluate ongoing need for invasive devices; remove promptly when no longer indicated.  Outcome: Progressing  Intervention: Foster Therapeutic [place]  Recent Flowsheet Documentation  Taken 10/24/2022 1200 by Brigitte Gonzalez RN  Trust Relationship/Rapport:   questions encouraged   thoughts/feelings acknowledged   Goal Outcome Evaluation:    Plan of Care Reviewed With: patient
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
## 2022-10-24 NOTE — PLAN OF CARE
Pt was visible in the milieu and social with peers. Pt was watching sports with others in the lounge. Presented with full range affect. Pt denied SI/SIB/AH/VH/HI. Pt attended NA meeting via zoom. Pt is calm and polite. Took his HS medications early and went to bed. Pt ate dinner 100%    Problem: Sleep Disturbance (Psychotic Signs/Symptoms)  Goal: Improved Sleep (Psychotic Signs/Symptoms)  Outcome: Adequate for Care Transition     Problem: Psychomotor Impairment (Psychotic Signs/Symptoms)  Goal: Improved Psychomotor Symptoms (Psychotic Signs/Symptoms)  Intervention: Manage Psychomotor Movement  Recent Flowsheet Documentation  Taken 10/23/2022 1700 by Kana Tom, RN  Activity (Behavioral Health): up ad alondra   Goal Outcome Evaluation:

## 2022-10-24 NOTE — PLAN OF CARE
[facility] Occupational Therapy Group Intervention Note     10/24/22 1241   Group Therapy Session   Group Attendance attended group session   Group Type task skill;life skill;psychoeducation   Group Topic Covered coping skills/lifestyle management;relapse prevention;self-care activities   Group Session Detail 1) Chair Yoga. Purpose: Occupation-based coping skill exploration group through mindful movement to facilitate stress management, awakening and/or relaxation. Education was provided on the use of stretching, exercise, and meditation practice as a coping tool within daily/weekly routine.     2) Clinic - coping skill exploration, creative expression within personally meaningful activities, and observation of social, cognitive, and kinesthetic performance skills   Patient Response/Contribution cooperative with task;organized;listened actively;offered helpful suggestions to peers   Patient Participation Detail 1) Pleasant and polite. 50% engaged in chair yoga. Fully engaged in OT clinic.      Madeline Rosenberg OT on 10/24/2022 at 12:42 PM

## 2022-10-25 PROCEDURE — 250N000013 HC RX MED GY IP 250 OP 250 PS 637: Performed by: PSYCHIATRY & NEUROLOGY

## 2022-10-25 PROCEDURE — 124N000002 HC R&B MH UMMC

## 2022-10-25 PROCEDURE — G0177 OPPS/PHP; TRAIN & EDUC SERV: HCPCS

## 2022-10-25 PROCEDURE — 250N000013 HC RX MED GY IP 250 OP 250 PS 637: Performed by: EMERGENCY MEDICINE

## 2022-10-25 PROCEDURE — 250N000013 HC RX MED GY IP 250 OP 250 PS 637

## 2022-10-25 PROCEDURE — 99232 SBSQ HOSP IP/OBS MODERATE 35: CPT | Performed by: PSYCHIATRY & NEUROLOGY

## 2022-10-25 RX ADMIN — DIVALPROEX SODIUM 1000 MG: 500 TABLET, DELAYED RELEASE ORAL at 19:38

## 2022-10-25 RX ADMIN — NICOTINE POLACRILEX 4 MG: 4 GUM, CHEWING BUCCAL at 17:49

## 2022-10-25 RX ADMIN — NICOTINE POLACRILEX 4 MG: 4 GUM, CHEWING BUCCAL at 12:02

## 2022-10-25 RX ADMIN — ATOMOXETINE HYDROCHLORIDE 25 MG: 25 CAPSULE ORAL at 07:55

## 2022-10-25 RX ADMIN — NICOTINE POLACRILEX 4 MG: 4 GUM, CHEWING BUCCAL at 19:39

## 2022-10-25 RX ADMIN — DIVALPROEX SODIUM 1000 MG: 500 TABLET, DELAYED RELEASE ORAL at 07:56

## 2022-10-25 RX ADMIN — NICOTINE POLACRILEX 4 MG: 4 GUM, CHEWING BUCCAL at 16:31

## 2022-10-25 RX ADMIN — NICOTINE POLACRILEX 4 MG: 4 GUM, CHEWING BUCCAL at 07:55

## 2022-10-25 RX ADMIN — OLANZAPINE 10 MG: 10 TABLET, FILM COATED ORAL at 07:56

## 2022-10-25 RX ADMIN — HYDROCHLOROTHIAZIDE 12.5 MG: 12.5 TABLET ORAL at 07:55

## 2022-10-25 RX ADMIN — NICOTINE POLACRILEX 4 MG: 4 GUM, CHEWING BUCCAL at 09:24

## 2022-10-25 RX ADMIN — BUPROPION HYDROCHLORIDE 300 MG: 150 TABLET, EXTENDED RELEASE ORAL at 07:56

## 2022-10-25 RX ADMIN — NICOTINE 1 PATCH: 14 PATCH, EXTENDED RELEASE TRANSDERMAL at 07:56

## 2022-10-25 RX ADMIN — OLANZAPINE 20 MG: 10 TABLET, FILM COATED ORAL at 19:38

## 2022-10-25 ASSESSMENT — ACTIVITIES OF DAILY LIVING (ADL)
ADLS_ACUITY_SCORE: 29
LAUNDRY: WITH SUPERVISION
ADLS_ACUITY_SCORE: 29
ORAL_HYGIENE: INDEPENDENT
ADLS_ACUITY_SCORE: 29
DRESS: INDEPENDENT
HYGIENE/GROOMING: INDEPENDENT
ADLS_ACUITY_SCORE: 29

## 2022-10-25 NOTE — PLAN OF CARE
[facility] Occupational Therapy Group Intervention Note     10/25/22 1321   Group Therapy Session   Group Attendance attended group session   Time Session Began 1115   Time Session Ended 1200   Total Time (minutes) 45   Total # Attendees 6   Group Type task skill;life skill   Group Topic Covered coping skills/lifestyle management;relapse prevention;cognitive activities   Group Session Detail Clinic - coping skill exploration, creative expression within personally meaningful activities, and observation of social, cognitive, and kinesthetic performance skills   Patient Response/Contribution cooperative with task;organized;discussed personal experience with topic   Patient Participation Detail IND to complete a detailed beading task. Social with peers. Demonstrated no significant performance or social change as seen on previous dates.     Madeline Rosenberg OT on 10/25/2022 at 1:22 PM

## 2022-10-25 NOTE — PLAN OF CARE
Problem: Behavioral Health Plan of Care  Goal: Optimal Comfort and Wellbeing  Outcome: Progressing   Goal Outcome Evaluation:    Plan of Care Reviewed With: patient          Pt was good this shift. He was bright and pleasant this shift. He was out in the milieux most of the shift socializing and engaging with peers. Vitals stable. He was out for all meals with good food and fluids intake. He denied pain and all psych symptoms this shift. Contracted for safety. He was cooperative and med compliant. Prn Nicotine gum 4 mg was given x 4 this shift. No other concern or behavior noted at this time. Will continue to monitor and will assist if need arise.

## 2022-10-25 NOTE — PLAN OF CARE
Pt presents with a bright affect. Social with peers and staff,  pleasant and cooperative. Pt attends community meeting and groups. He denied all mental health symptoms. Reports no medication side effects.  Nutrition and hygiene adequate. No concerns at this time.       Problem: Behavioral Health Plan of Care  Goal: Patient-Specific Goal (Individualization)  Outcome: Progressing     Problem: Behavioral Health Plan of Care  Goal: Absence of New-Onset Illness or Injury  Outcome: Progressing     Problem: Behavioral Health Plan of Care  Goal: Optimized Coping Skills in Response to Life Stressors  Outcome: Progressing     Problem: Behavioral Health Plan of Care  Goal: Develops/Participates in Therapeutic [place] to Support Successful Transition  Outcome: Progressing  Intervention: Foster Therapeutic [place]  Recent Flowsheet Documentation  Taken 10/25/2022 1118 by Brigitte Gonzalez RN  Trust Relationship/Rapport: thoughts/feelings acknowledged     Problem: Social, Occupational or Functional Impairment (Psychotic Signs/Symptoms)  Goal: Enhanced Social, Occupational or Functional Skills (Psychotic Signs/Symptoms)  Outcome: Progressing  Flowsheets (Taken 10/25/2022 1124)  Mutually Determined Action Steps (Enhanced Social, Occupational or Functional Skills): participates in social skills training  Intervention: Promote Social, Occupational and Functional Ability  Recent Flowsheet Documentation  Taken 10/25/2022 1118 by Brigitte Gonzalez RN  Trust Relationship/Rapport: thoughts/feelings acknowledged   Goal Outcome Evaluation:    Plan of Care Reviewed With: patient

## 2022-10-25 NOTE — PLAN OF CARE
Assessment/Intervention/Current Symptoms and Care Coordination:   - Met with team. Reviewed patient's chart and progress notes.     -  Patient is visible in the milieu, attends groups, and is medication compliant. No behavioral concerns notes.      - Writer called Angelina,  at [facility] ([phone]). Angelina stated that patient is #3 on the wait list. Angelina also stated that there are some discharges coming up in November     Discharge Plan or Goal:  Pending stabilization & development of a safe discharge plan.  Considerations include: [facility] placement         Barriers to Discharge:  Patient requires further psychiatric stabilization due to current symptomology. Door to door transfer. PD revoked. Was recommitted.         Referral Status:         Legal Status:  Committed as MI through [facility] until 4/12/2023

## 2022-10-25 NOTE — PLAN OF CARE
[facility] Occupational Therapy Group Intervention Note     10/25/22 1259   Group Therapy Session   Group Attendance attended group session   Time Session Began 1015   Time Session Ended 1100   Total Time (minutes) 45   Total # Attendees 6   Group Type task skill;recreation   Group Topic Covered leisure exploration/use of leisure time;structured socialization;cognitive activities   Group Session Detail OT: Leisure exploration offered for increased intrinsic motivation to engage in social non-obligatory occupations, challenge new learning, sequencing, problem solving, and retention via a cognitive group game.    Patient Response/Contribution cooperative with task;offered helpful suggestions to peers;listened actively;organized   Patient Participation Detail Congruent-bright affect. Able to learn and follow novel game instructions. Fully engaged in discussion and provided verbal support to peers.       Madeline Rosenberg, OT on 10/25/2022 at 1:00 PM

## 2022-10-25 NOTE — PROGRESS NOTES
[facility], [place]   Psychiatric Progress Note        Interim History:   The patient's care was discussed with the treatment team during the daily team meeting and/or staff's chart notes were reviewed.  Staff report patient is doing well. Is third on the list for admission.     The patient reports that he is doing well. Mood is good. Sleeping and eating well. Denies SI or HI. Denies any psychosis. No concerns at this time.          Medications:       atomoxetine  25 mg Oral Daily     buPROPion  300 mg Oral QAM     carbamide peroxide  3 drop Both Ears BID     divalproex sodium delayed-release  1,000 mg Oral BID     hydrochlorothiazide  12.5 mg Oral QAM     nicotine  1 patch Transdermal Daily     nicotine   Transdermal Q8H     OLANZapine  10 mg Oral Daily     OLANZapine  20 mg Oral At Bedtime          Allergies:     Allergies   Allergen Reactions     Penicillins           Labs:   No results found for this or any previous visit (from the past 24 hour(s)).       Psychiatric Examination:     /74 (BP Location: Left arm, Patient Position: Sitting, Cuff Size: Adult Large)   Pulse 75   Temp 97.8  F (36.6  C)   Resp 16   Wt 114.3 kg (252 lb)   SpO2 98%   BMI 35.08 kg/m    Weight is 252 lbs 0 oz  Body mass index is 35.08 kg/m .  Orthostatic Vitals       Most Recent      Sitting Orthostatic /81 10/10 0900    Sitting Orthostatic Pulse (bpm) 72 10/10 0900    Standing Orthostatic /79 10/10 0900    Standing Orthostatic Pulse (bpm) 83 10/10 0900            Appearance: awake, alert and adequately groomed  Attitude:  cooperative  Eye Contact:  good  Mood:  good  Affect:  mood congruent  Speech:  clear, coherent  Psychomotor Behavior:  no evidence of tardive dyskinesia, dystonia, or tics  Thought Process:  goal oriented  Associations:  no loose associations  Thought Content:  no evidence of suicidal ideation or homicidal ideation and no evidence of psychotic thought  Insight:   fair  Judgement:  fair  Oriented to:  time, person, and place  Attention Span and Concentration:  intact  Recent and Remote Memory:  intact         Precautions:     Behavioral Orders   Procedures     Code 1 - Restrict to Unit     Occupational Therapy on the Unit     Not sure if this is the right order but Jannette would come on Monday to see patient     Order Specific Question:   Reason for Consult     Answer:   Eval of thought process, functional skills and behavior     Order Specific Question:   Course of Action:     Answer:   Eval & Treat as indicated     Order Specific Question:   Treatment Prescription:     Answer:   Patient would like 1:1 therapy and some assignment sheets to work on with long admission     Routine Programming     As clinically indicated     Status 15     Every 15 minutes.          Diagnoses:     Mood disorder, unspecified (Substance induced vs 2/2 Bipolar I Disorder)  Amphetamine Use Disorder, moderate  Unspecified personality disorder          Plan:      Orders Placed This Encounter      Routine Programming      Code 1 - Restrict to Unit      Status 15     Continue taking Strattera (Atomoxetine) 25mg  Continue Wellbutrin  mg daily  Continue Depakote DR 1,000 mg BID.   Continue Zyprexa 10 mg daily and 20 mg QHS  Continue hydroxyzine 25 mg q4h prn for acute anxiety  Continue melatonin 3mg at bedtime prn for sleep disturbances  Continue Zyprexa 10 mg TID prn for severe agitation       Legal Status: court hold. PD has been revoked. Fully committed with Aguiar in place for Risperidone, Invega, Zyprexa, Abilify.        Disposition Plan   Reason for ongoing admission: is unable to care for self due to severe psychosis or julianna  Discharge location: group home  Discharge Medications: not ordered  Follow-up Appointments: not scheduled  Legal Status: full commitment    Entered by: Jesus Yañez MD on October 25, 2022 at 12:36 PM

## 2022-10-26 PROCEDURE — 250N000013 HC RX MED GY IP 250 OP 250 PS 637: Performed by: PSYCHIATRY & NEUROLOGY

## 2022-10-26 PROCEDURE — 124N000002 HC R&B MH UMMC

## 2022-10-26 PROCEDURE — H2032 ACTIVITY THERAPY, PER 15 MIN: HCPCS

## 2022-10-26 PROCEDURE — G0177 OPPS/PHP; TRAIN & EDUC SERV: HCPCS

## 2022-10-26 PROCEDURE — 250N000013 HC RX MED GY IP 250 OP 250 PS 637

## 2022-10-26 PROCEDURE — 250N000013 HC RX MED GY IP 250 OP 250 PS 637: Performed by: EMERGENCY MEDICINE

## 2022-10-26 RX ADMIN — NICOTINE POLACRILEX 4 MG: 4 GUM, CHEWING BUCCAL at 08:57

## 2022-10-26 RX ADMIN — NICOTINE POLACRILEX 4 MG: 4 GUM, CHEWING BUCCAL at 20:23

## 2022-10-26 RX ADMIN — HYDROCHLOROTHIAZIDE 12.5 MG: 12.5 TABLET ORAL at 07:47

## 2022-10-26 RX ADMIN — NICOTINE POLACRILEX 4 MG: 4 GUM, CHEWING BUCCAL at 12:24

## 2022-10-26 RX ADMIN — DIVALPROEX SODIUM 1000 MG: 500 TABLET, DELAYED RELEASE ORAL at 19:31

## 2022-10-26 RX ADMIN — NICOTINE POLACRILEX 4 MG: 4 GUM, CHEWING BUCCAL at 10:20

## 2022-10-26 RX ADMIN — NICOTINE 1 PATCH: 14 PATCH, EXTENDED RELEASE TRANSDERMAL at 07:49

## 2022-10-26 RX ADMIN — DIVALPROEX SODIUM 1000 MG: 500 TABLET, DELAYED RELEASE ORAL at 07:47

## 2022-10-26 RX ADMIN — NICOTINE POLACRILEX 4 MG: 4 GUM, CHEWING BUCCAL at 07:48

## 2022-10-26 RX ADMIN — OLANZAPINE 10 MG: 10 TABLET, FILM COATED ORAL at 07:48

## 2022-10-26 RX ADMIN — NICOTINE POLACRILEX 4 MG: 4 GUM, CHEWING BUCCAL at 21:23

## 2022-10-26 RX ADMIN — NICOTINE POLACRILEX 4 MG: 4 GUM, CHEWING BUCCAL at 19:03

## 2022-10-26 RX ADMIN — ATOMOXETINE HYDROCHLORIDE 25 MG: 25 CAPSULE ORAL at 07:47

## 2022-10-26 RX ADMIN — OLANZAPINE 20 MG: 10 TABLET, FILM COATED ORAL at 19:31

## 2022-10-26 RX ADMIN — BUPROPION HYDROCHLORIDE 300 MG: 150 TABLET, EXTENDED RELEASE ORAL at 07:47

## 2022-10-26 ASSESSMENT — ACTIVITIES OF DAILY LIVING (ADL)
ADLS_ACUITY_SCORE: 29
HYGIENE/GROOMING: INDEPENDENT
ADLS_ACUITY_SCORE: 29
DRESS: INDEPENDENT
ADLS_ACUITY_SCORE: 29
ADLS_ACUITY_SCORE: 29
LAUNDRY: WITH SUPERVISION
ADLS_ACUITY_SCORE: 29
ADLS_ACUITY_SCORE: 29
ORAL_HYGIENE: INDEPENDENT
ADLS_ACUITY_SCORE: 29
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
## 2022-10-26 NOTE — PLAN OF CARE
BE[facility] Occupational Therapy Group Intervention Note     10/26/22 1245   Group Therapy Session   Group Attendance attended group session   Time Session Began 1115   Time Session Ended 1200   Total Time (minutes) 45   Total # Attendees 6   Group Type task skill;life skill   Group Topic Covered coping skills/lifestyle management;relapse prevention   Group Session Detail clinic - coping skill exploration, creative expression within personally meaningful activities, and observation of social, cognitive, and kinesthetic performance skills   Patient Response/Contribution cooperative with task;organized;listened actively;offered helpful suggestions to peers   Patient Participation Detail IND to engage in a detailed beading task. Demonstrated congruent performance and social skills as previous dates. Pleasant and helpful towards peers.      Madeline Rosenberg OT on 10/26/2022 at 12:45 PM
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
## 2022-10-26 NOTE — PROGRESS NOTES
"Pt was a bit restless and moved in and out of participation in dance/movement therapy (D/MT) starting with sensory-based interventions and progressing to image-based movements representing therapeutic processes.  For example, if we are \"juggling\" our fate and josé luis with free will, what gets \"dropped?\"  He was joyful and engaged when he was present in the group.       10/26/22 1030   Expressive Therapy   Therapy Type dance/movement   Minutes of Treatment 30     "

## 2022-10-26 NOTE — PLAN OF CARE
"  Problem: Plan of Care - These are the overarching goals to be used throughout the patient stay.    Goal: Patient-Specific Goal (Individualized)  Description: You can add care plan individualizations to a care plan. Examples of Individualization might be:  \"Parent requests to be called daily at 9am for status\", \"I have a hard time hearing out of my right ear\", or \"Do not touch me to wake me up as it startles me\".  Outcome: Adequate for Care Transition     Problem: Behavioral Health Plan of Care  Goal: Plan of Care Review  Outcome: Adequate for Care Transition  Flowsheets (Taken 10/26/2022 1413)  Plan of Care Reviewed With: patient  Overall Patient Progress: improving  Patient Agreement with Plan of Care: agrees   Goal Outcome Evaluation:      Plan of Care Reviewed With: patient    Overall Patient Progress: improvingOverall Patient Progress: improving     Patient has been visible in the milieu.Sociable with peers as well as staff.Denies pain and all psych symptoms.Independent with cares.Attended groups.Adequate PO intake,excellent appetite.Medication compliant.VSS.  Temp: 98.9  F (37.2  C) Temp src: Oral BP: 123/74 Pulse: 73   Resp: 16 SpO2: 97 % O2 Device: None (Room air)         "

## 2022-10-26 NOTE — PLAN OF CARE
Problem: Behavioral Health Plan of Care  Goal: Optimal Comfort and Wellbeing  Outcome: Progressing   Goal Outcome Evaluation:    Plan of Care Reviewed With: patient          Pt was sleeping beginning of the shift but was up at about 1630. He was bright and pleasant. He socialized and engaged with peers. Vitals stable. He was out for all meals with good food and fluids intake. He denied pain and all psych symptoms. Contracted for safety. He was cooperative and med compliant. Prn nicotine gum 4 mg was given x 3 this shift. No other concern or outburst behavior noted this shift. Will continue to monitor and will assist if need arise.

## 2022-10-27 PROCEDURE — 250N000013 HC RX MED GY IP 250 OP 250 PS 637: Performed by: PSYCHIATRY & NEUROLOGY

## 2022-10-27 PROCEDURE — G0177 OPPS/PHP; TRAIN & EDUC SERV: HCPCS

## 2022-10-27 PROCEDURE — 250N000013 HC RX MED GY IP 250 OP 250 PS 637

## 2022-10-27 PROCEDURE — 250N000013 HC RX MED GY IP 250 OP 250 PS 637: Performed by: EMERGENCY MEDICINE

## 2022-10-27 PROCEDURE — 124N000002 HC R&B MH UMMC

## 2022-10-27 RX ADMIN — NICOTINE 1 PATCH: 14 PATCH, EXTENDED RELEASE TRANSDERMAL at 07:55

## 2022-10-27 RX ADMIN — OLANZAPINE 10 MG: 10 TABLET, FILM COATED ORAL at 07:53

## 2022-10-27 RX ADMIN — NICOTINE POLACRILEX 4 MG: 4 GUM, CHEWING BUCCAL at 11:02

## 2022-10-27 RX ADMIN — NICOTINE POLACRILEX 4 MG: 4 GUM, CHEWING BUCCAL at 13:25

## 2022-10-27 RX ADMIN — NICOTINE POLACRILEX 4 MG: 4 GUM, CHEWING BUCCAL at 19:31

## 2022-10-27 RX ADMIN — DIVALPROEX SODIUM 1000 MG: 500 TABLET, DELAYED RELEASE ORAL at 19:30

## 2022-10-27 RX ADMIN — NICOTINE POLACRILEX 4 MG: 4 GUM, CHEWING BUCCAL at 07:53

## 2022-10-27 RX ADMIN — Medication 15 ML: at 11:02

## 2022-10-27 RX ADMIN — NICOTINE POLACRILEX 4 MG: 4 GUM, CHEWING BUCCAL at 09:58

## 2022-10-27 RX ADMIN — HYDROCHLOROTHIAZIDE 12.5 MG: 12.5 TABLET ORAL at 07:53

## 2022-10-27 RX ADMIN — NICOTINE POLACRILEX 4 MG: 4 GUM, CHEWING BUCCAL at 18:31

## 2022-10-27 RX ADMIN — ATOMOXETINE HYDROCHLORIDE 25 MG: 25 CAPSULE ORAL at 07:53

## 2022-10-27 RX ADMIN — OLANZAPINE 20 MG: 10 TABLET, FILM COATED ORAL at 19:30

## 2022-10-27 RX ADMIN — BUPROPION HYDROCHLORIDE 300 MG: 150 TABLET, EXTENDED RELEASE ORAL at 07:53

## 2022-10-27 RX ADMIN — DIVALPROEX SODIUM 1000 MG: 500 TABLET, DELAYED RELEASE ORAL at 07:52

## 2022-10-27 ASSESSMENT — ACTIVITIES OF DAILY LIVING (ADL)
ADLS_ACUITY_SCORE: 29
HYGIENE/GROOMING: INDEPENDENT
ADLS_ACUITY_SCORE: 29
DRESS: INDEPENDENT
LAUNDRY: WITH SUPERVISION
ADLS_ACUITY_SCORE: 29
ORAL_HYGIENE: INDEPENDENT

## 2022-10-27 NOTE — PLAN OF CARE
Problem: Behavioral Health Plan of Care  Goal: Optimized Coping Skills in Response to Life Stressors  Outcome: Progressing  Intervention: Promote Effective Coping Strategies  Recent Flowsheet Documentation  Taken 10/26/2022 1900 by Leandro Merlos RN  Supportive Measures:   relaxation techniques promoted   self-care encouraged   self-reflection promoted  Goal: Develops/Participates in Therapeutic [place] to Support Successful Transition  Outcome: Progressing  Intervention: Foster Therapeutic [place]  Recent Flowsheet Documentation  Taken 10/26/2022 1900 by Leandro Merlos RN  Trust Relationship/Rapport:   care explained   choices provided   emotional support provided   empathic listening provided   questions answered   questions encouraged   reassurance provided   thoughts/feelings acknowledged   Goal Outcome Evaluation:    Plan of Care Reviewed With: patient          No abnormal behavior  present. Patient interacts well with peers and staff. He attends and participates in groups and is alert and oriented x4.  He has no difficulties making his needs and concerns known and is cooperative and polite on approach.  He denies  pain and all psych symptoms and complies with medications.  His appetite is good  and patient states  he is ready for discharge to [facility]. Will continue to monitor and encourage.

## 2022-10-27 NOTE — PLAN OF CARE
Occupational Therapy Group Note:     10/27/22 1400   Group Therapy Session   Group Attendance attended group session   Time Session Began 1110   Time Session Ended 1210   Total Time (minutes) 60   Total # Attendees 4-6   Group Type expressive therapy   Group Topic Covered coping skills/lifestyle management;problem-solving   Group Session Detail OT clinic   Patient Response/Contribution cooperative with task;organized   Patient Participation Detail Pt actively participated in occupational therapy clinic to facilitate coping skill exploration, creative expression within personally meaningful activities, and clinical observation of social, cognitive, and kinesthetic performance skills. Pt response: Independent to initiate, gather materials, sequence, and adjust to workspace demands as needed. Demonstrated good focus, planning, problem solving, and attention to detail for selected multi-step, creative expression task. Able to ask for assistance as needed, and socialized with peers and staff. Expressed pride in his complex finished project, and accepted compliments on it. Pleasant and engaged.

## 2022-10-27 NOTE — PLAN OF CARE
Assessment/Intervention/Current Symptoms and Care Coordination:   - Reviewed patient's chart and progress notes.     -  Patient is visible in the milieu, attends groups, and is medication compliant. No behavioral concerns notes.       Discharge Plan or Goal:  Pending stabilization & development of a safe discharge plan.  Considerations include: [facility]          Barriers to Discharge:  Patient requires further psychiatric stabilization due to current symptomology. Door to door transfer. PD revoked. Was recommitted.            Referral Status:           Legal Status:  Committed as MI through [facility] until 4/12/2023

## 2022-10-27 NOTE — PROGRESS NOTES
Behavioral Health  Note   Behavioral Health  Spirituality Group Note     [facility] [address]    Name: Etienne Dupree    YOB: 1990   MRN: [id number]    Age: 32 year old     Patient attended -led group, which included discussion of spirituality, coping with illness and building resilience.   Patient attended group for [facility] - spirituality groups are not billed.   patient demonstrated an appreciation of topic's application for their personal circumstances.     Ozzie [facility]  Staff    Page [phone]

## 2022-10-27 NOTE — PLAN OF CARE
"  Problem: Behavioral Health Plan of Care  Goal: Patient-Specific Goal (Individualization)  Outcome: Progressing  Flowsheets (Taken 10/27/2022 1103)  Patient Personal Strengths:    insight into illness/situation    humor    good impulse control    positive attitude  Goal: Optimized Coping Skills in Response to Life Stressors  Outcome: Progressing  Flowsheets (Taken 10/27/2022 1103)  Optimized Coping Skills in Response to Life Stressors: making progress toward outcome  Goal: Develops/Participates in Therapeutic [place] to Support Successful Transition  Outcome: Progressing   Goal Outcome Evaluation:      Plan of Care Reviewed With: patient    Overall Patient Progress: improvingOverall Patient Progress: improving  Patient has been visible in the milieu.Pt is sociable with peers as well as staff,attended groups,watched TV with peers.pt is compliant with medication,no side effect noted.Appetite excellent.Denies pain.Denies SI/SIB/HI.No behaviors or suicidal thoughts noted.Safety checks every 15 minutes in place.VSS.  Vital signs:  Temp: 97.7  F (36.5  C) Temp src: Oral BP: 139/76 Pulse: 77   Resp: 16 SpO2: 97 % O2 Device: None (Room air)     Weight: 114.5 kg (252 lb 8 oz)  Estimated body mass index is 35.15 kg/m  as calculated from the following:    Height as of 9/9/21: 1.805 m (5' 11.06\").    Weight as of this encounter: 114.5 kg (252 lb 8 oz).               "

## 2022-10-27 NOTE — PLAN OF CARE
Patient appeared to be resting throughout the night, he slept for 6.5 hours this shift, no PRN given this shift, continue current plan of care  Problem: Sleep Disturbance  Goal: Adequate Sleep/Rest  Outcome: Progressing   Goal Outcome Evaluation:

## 2022-10-28 PROCEDURE — 250N000013 HC RX MED GY IP 250 OP 250 PS 637: Performed by: PSYCHIATRY & NEUROLOGY

## 2022-10-28 PROCEDURE — 250N000013 HC RX MED GY IP 250 OP 250 PS 637

## 2022-10-28 PROCEDURE — H2032 ACTIVITY THERAPY, PER 15 MIN: HCPCS

## 2022-10-28 PROCEDURE — 250N000013 HC RX MED GY IP 250 OP 250 PS 637: Performed by: EMERGENCY MEDICINE

## 2022-10-28 PROCEDURE — 124N000002 HC R&B MH UMMC

## 2022-10-28 RX ADMIN — NICOTINE POLACRILEX 4 MG: 4 GUM, CHEWING BUCCAL at 19:30

## 2022-10-28 RX ADMIN — OLANZAPINE 10 MG: 10 TABLET, FILM COATED ORAL at 07:45

## 2022-10-28 RX ADMIN — ATOMOXETINE HYDROCHLORIDE 25 MG: 25 CAPSULE ORAL at 07:45

## 2022-10-28 RX ADMIN — BUPROPION HYDROCHLORIDE 300 MG: 150 TABLET, EXTENDED RELEASE ORAL at 07:45

## 2022-10-28 RX ADMIN — OLANZAPINE 20 MG: 10 TABLET, FILM COATED ORAL at 19:28

## 2022-10-28 RX ADMIN — HYDROCHLOROTHIAZIDE 12.5 MG: 12.5 TABLET ORAL at 07:45

## 2022-10-28 RX ADMIN — DIVALPROEX SODIUM 1000 MG: 500 TABLET, DELAYED RELEASE ORAL at 19:29

## 2022-10-28 RX ADMIN — NICOTINE POLACRILEX 4 MG: 4 GUM, CHEWING BUCCAL at 11:10

## 2022-10-28 RX ADMIN — NICOTINE POLACRILEX 4 MG: 4 GUM, CHEWING BUCCAL at 07:46

## 2022-10-28 RX ADMIN — NICOTINE 1 PATCH: 14 PATCH, EXTENDED RELEASE TRANSDERMAL at 07:44

## 2022-10-28 RX ADMIN — CARBAMIDE PEROXIDE 6.5% 3 DROP: 6.5 LIQUID AURICULAR (OTIC) at 19:40

## 2022-10-28 RX ADMIN — NICOTINE POLACRILEX 4 MG: 4 GUM, CHEWING BUCCAL at 10:07

## 2022-10-28 RX ADMIN — DIVALPROEX SODIUM 1000 MG: 500 TABLET, DELAYED RELEASE ORAL at 07:45

## 2022-10-28 RX ADMIN — NICOTINE POLACRILEX 4 MG: 4 GUM, CHEWING BUCCAL at 17:50

## 2022-10-28 RX ADMIN — NICOTINE POLACRILEX 4 MG: 4 GUM, CHEWING BUCCAL at 16:11

## 2022-10-28 ASSESSMENT — ACTIVITIES OF DAILY LIVING (ADL)
ADLS_ACUITY_SCORE: 29
ORAL_HYGIENE: INDEPENDENT
ORAL_HYGIENE: INDEPENDENT
DRESS: INDEPENDENT
HYGIENE/GROOMING: INDEPENDENT
HYGIENE/GROOMING: INDEPENDENT
ADLS_ACUITY_SCORE: 29
DRESS: SCRUBS (BEHAVIORAL HEALTH);INDEPENDENT
LAUNDRY: WITH SUPERVISION
ADLS_ACUITY_SCORE: 29
LAUNDRY: WITH SUPERVISION

## 2022-10-28 NOTE — PROGRESS NOTES
"   10/28/22 1600   Group Therapy Session   Group Attendance attended group session   Time Session Began 1115   Time Session Ended 1200   Total Time (minutes) 45   Total # Attendees 5   Group Type expressive therapy   Group Topic Covered emotions/expression   Patient Response/Contribution cooperative with task     AT directive was to create response art for the DBT skill \"riding the wave.\"  Goals of directive: DBT skills-distress tolerance  Pt was an engaged participant, focused on task for the majority of group. Pt finished his drawing of himself riding in a car on top of a wave. Pt said that a car is the vehicle or vessel pt is the most comfortable in, therefore is what he would choose while experiencing various emotions. Pts mood was calm, pleasant participant.        "

## 2022-10-28 NOTE — PLAN OF CARE
Problem: Anxiety  Goal: Anxiety Reduction or Resolution  10/28/2022 1649 by Rosario Perez, RN  Outcome: Progressing    Problem: Depression  Goal: Improved Mood  Outcome: Progressing     Patient up and visible on the unit most part of the shift, presented with full range affect upon assessment, denied all psych symptoms, patient is monae for safety, safety checks in progress every 15 minutes, eating and drinking appropriately, able to make needs known , medication compliant, nicotine gum given as needed, patient is engaged selectively with select peers, no other known concerns at  time,  Will continue to offer therapeutic support as needed for the rest of the shift.
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
## 2022-10-28 NOTE — PLAN OF CARE
Patient slept for 6.25 hours this shift, no request for PRN, no safety concern noted. Awake at 0600 and requested for snack and returned to room resting. Continue plan of care.  Problem: Plan of Care - These are the overarching goals to be used throughout the patient stay.    Goal: Optimal Comfort and Wellbeing  Outcome: Progressing     Problem: Sleep Disturbance  Goal: Adequate Sleep/Rest  Outcome: Progressing     Problem: Behavioral Health Plan of Care  Goal: Optimal Comfort and Wellbeing  Outcome: Progressing   Goal Outcome Evaluation:

## 2022-10-28 NOTE — PROGRESS NOTES
10/28/22 1500   Group Therapy Session   Group Attendance attended group session   Time Session Began 1015   Time Session Ended 1115   Total Time (minutes) 60   Total # Attendees 5   Group Type expressive therapy   Group Topic Covered emotions/expression   Patient Response/Contribution cooperative with task     AT directive was to create a visual check-in drawing expressing current feelings, mood, goals etc., using imagery or lines, shapes and colors and art material of pts choice.  Goals of directive: emotional expression, emotional regulation, identifying personal strengths and goals.  Pt was an engaged participant, focused on task for the majority of group. Pt finished drawing and briefly shared with author and group. Pt created a detailed large smiley facing using various colors and patterns. Pt referred to his art as symbolizing his hopefulness for the future. Pt talked about how he is working on patience as paperwork goes through.  Pts mood was calm, pleasant participant.

## 2022-10-28 NOTE — PLAN OF CARE
Assessment/Intervention/Current Symptoms and Care Coordination:   - Reviewed patient's chart and progress notes.     -  Patient is visible in the milieu, attends groups, and is medication compliant. No behavioral concerns notes.     - Patient's CM will visit him on 11/14 at 11:00 AM.         Discharge Plan or Goal:  Pending stabilization & development of a safe discharge plan.  [facility]          Barriers to Discharge:  Patient requires further psychiatric stabilization due to current symptomology. Door to door transfer. PD revoked. Was recommitted.            Referral Status:           Legal Status:  Committed as MI through [facility] until 4/12/2023

## 2022-10-28 NOTE — PLAN OF CARE
Pt denies SI.  Pt asking to look at the placement he's going to on google maps.  Pt liked what he saw and bright.  Pt attended groups, social with peers, Requested PRN nicotine replacement.    Problem: Plan of Care - These are the overarching goals to be used throughout the patient stay.    Goal: Optimal Comfort and Wellbeing  Outcome: Not Progressing  Goal: Readiness for Transition of Care  Outcome: Not Progressing     Problem: Behavioral Health Plan of Care  Goal: Optimal Comfort and Wellbeing  Outcome: Not Progressing  Goal: Patient-Specific Goal (Individualization)  Outcome: Not Progressing  Goal: Adheres to Safety Considerations for Self and Others  Intervention: Develop and Maintain Individualized Safety Plan  Recent Flowsheet Documentation  Taken 10/28/2022 1200 by Nikole Rockwell RN  Safety Measures: safety rounds completed  Goal: Absence of New-Onset Illness or Injury  Outcome: Not Progressing  Intervention: Identify and Manage Fall Risk  Recent Flowsheet Documentation  Taken 10/28/2022 1200 by Nikole Rockwell, RN  Safety Measures: safety rounds completed  Goal: Optimized Coping Skills in Response to Life Stressors  Outcome: Not Progressing  Goal: Develops/Participates in Therapeutic [place] to Support Successful Transition  Outcome: Not Progressing  Goal: Team Discussion  Outcome: Not Progressing   Goal Outcome Evaluation:

## 2022-10-29 PROCEDURE — 250N000013 HC RX MED GY IP 250 OP 250 PS 637

## 2022-10-29 PROCEDURE — 250N000013 HC RX MED GY IP 250 OP 250 PS 637: Performed by: PSYCHIATRY & NEUROLOGY

## 2022-10-29 PROCEDURE — 250N000013 HC RX MED GY IP 250 OP 250 PS 637: Performed by: EMERGENCY MEDICINE

## 2022-10-29 PROCEDURE — 124N000002 HC R&B MH UMMC

## 2022-10-29 RX ADMIN — OLANZAPINE 10 MG: 10 TABLET, FILM COATED ORAL at 07:37

## 2022-10-29 RX ADMIN — HYDROCHLOROTHIAZIDE 12.5 MG: 12.5 TABLET ORAL at 07:38

## 2022-10-29 RX ADMIN — BUPROPION HYDROCHLORIDE 300 MG: 150 TABLET, EXTENDED RELEASE ORAL at 07:36

## 2022-10-29 RX ADMIN — NICOTINE POLACRILEX 4 MG: 4 GUM, CHEWING BUCCAL at 16:41

## 2022-10-29 RX ADMIN — NICOTINE POLACRILEX 4 MG: 4 GUM, CHEWING BUCCAL at 07:36

## 2022-10-29 RX ADMIN — DIVALPROEX SODIUM 1000 MG: 500 TABLET, DELAYED RELEASE ORAL at 07:36

## 2022-10-29 RX ADMIN — ATOMOXETINE HYDROCHLORIDE 25 MG: 25 CAPSULE ORAL at 07:36

## 2022-10-29 RX ADMIN — OLANZAPINE 20 MG: 10 TABLET, FILM COATED ORAL at 19:21

## 2022-10-29 RX ADMIN — NICOTINE POLACRILEX 4 MG: 4 GUM, CHEWING BUCCAL at 18:50

## 2022-10-29 RX ADMIN — NICOTINE 1 PATCH: 14 PATCH, EXTENDED RELEASE TRANSDERMAL at 07:37

## 2022-10-29 RX ADMIN — NICOTINE POLACRILEX 4 MG: 4 GUM, CHEWING BUCCAL at 12:08

## 2022-10-29 RX ADMIN — NICOTINE POLACRILEX 4 MG: 4 GUM, CHEWING BUCCAL at 14:52

## 2022-10-29 RX ADMIN — DIVALPROEX SODIUM 1000 MG: 500 TABLET, DELAYED RELEASE ORAL at 19:21

## 2022-10-29 ASSESSMENT — ACTIVITIES OF DAILY LIVING (ADL)
ADLS_ACUITY_SCORE: 29
HYGIENE/GROOMING: INDEPENDENT
ADLS_ACUITY_SCORE: 29
ORAL_HYGIENE: INDEPENDENT
LAUNDRY: WITH SUPERVISION
DRESS: STREET CLOTHES;SCRUBS (BEHAVIORAL HEALTH);INDEPENDENT

## 2022-10-29 NOTE — PLAN OF CARE
Problem: Suicidal Behavior  Goal: Suicidal Behavior is Absent or Managed  Outcome: Progressing    Problem: Anxiety  Goal: Anxiety Reduction or Resolution  Outcome: Progressing    Problem: Depression  Goal: Improved Mood  Outcome: Progressing    Patient up and visible on the unit most part of the shift, engaged selectively with peers, in  longue  watching TV/movies, presented with full range affect, denies all psych symptoms, patient is monae for safety, safety checks in progress, patient is eating and drinking well, able to communicate needs, medication compliant, no other known concerns at this time,will continue to monitor and offer therapeutic support as needed for the rest of the shift.  Blood pressure 132/81, pulse 72, temperature 98.1  F (36.7  C), temperature source Oral, resp. rate 17, weight 114.5 kg (252 lb 8 oz), SpO2 96 %.

## 2022-10-29 NOTE — PLAN OF CARE
Patient sleeping between rounds, awake once for snack, he denies anxiety, was able to returned to sleep and slept for 6.25 hours this shift, no PRN given this shift. Continue plan of care.  Problem: Sleep Disturbance  Goal: Adequate Sleep/Rest  Outcome: Progressing     Problem: Anxiety  Goal: Anxiety Reduction or Resolution  Outcome: Progressing   Goal Outcome Evaluation:

## 2022-10-29 NOTE — PLAN OF CARE
Problem: Plan of Care - These are the overarching goals to be used throughout the patient stay.    Goal: Optimal Comfort and Wellbeing  Outcome: Progressing     Problem: Behavioral Health Plan of Care  Goal: Optimal Comfort and Wellbeing  Outcome: Progressing   Goal Outcome Evaluation:  Patient doing well,no concerns,visible in the milieu.Sociable with peers and staff.Denies all psych symptoms.No PRN given.Nicotine gum x 2 per patient request.  Temp: 98.1  F (36.7  C) Temp src: Oral BP: 128/83 Pulse: 77   Resp: 16 SpO2: 98 % O2 Device: None (Room air)

## 2022-10-30 PROCEDURE — 250N000013 HC RX MED GY IP 250 OP 250 PS 637: Performed by: PSYCHIATRY & NEUROLOGY

## 2022-10-30 PROCEDURE — 124N000002 HC R&B MH UMMC

## 2022-10-30 PROCEDURE — 250N000013 HC RX MED GY IP 250 OP 250 PS 637: Performed by: EMERGENCY MEDICINE

## 2022-10-30 PROCEDURE — 250N000013 HC RX MED GY IP 250 OP 250 PS 637

## 2022-10-30 RX ADMIN — HYDROCHLOROTHIAZIDE 12.5 MG: 12.5 TABLET ORAL at 07:47

## 2022-10-30 RX ADMIN — DIVALPROEX SODIUM 1000 MG: 500 TABLET, DELAYED RELEASE ORAL at 07:46

## 2022-10-30 RX ADMIN — BUPROPION HYDROCHLORIDE 300 MG: 150 TABLET, EXTENDED RELEASE ORAL at 07:47

## 2022-10-30 RX ADMIN — OLANZAPINE 10 MG: 10 TABLET, FILM COATED ORAL at 07:47

## 2022-10-30 RX ADMIN — NICOTINE POLACRILEX 4 MG: 4 GUM, CHEWING BUCCAL at 20:36

## 2022-10-30 RX ADMIN — NICOTINE 1 PATCH: 14 PATCH, EXTENDED RELEASE TRANSDERMAL at 07:47

## 2022-10-30 RX ADMIN — NICOTINE POLACRILEX 4 MG: 4 GUM, CHEWING BUCCAL at 07:47

## 2022-10-30 RX ADMIN — NICOTINE POLACRILEX 4 MG: 4 GUM, CHEWING BUCCAL at 17:18

## 2022-10-30 RX ADMIN — DIVALPROEX SODIUM 1000 MG: 500 TABLET, DELAYED RELEASE ORAL at 19:24

## 2022-10-30 RX ADMIN — HYDROXYZINE HYDROCHLORIDE 25 MG: 25 TABLET, FILM COATED ORAL at 19:24

## 2022-10-30 RX ADMIN — OLANZAPINE 20 MG: 10 TABLET, FILM COATED ORAL at 19:24

## 2022-10-30 RX ADMIN — NICOTINE POLACRILEX 4 MG: 4 GUM, CHEWING BUCCAL at 10:19

## 2022-10-30 RX ADMIN — NICOTINE POLACRILEX 4 MG: 4 GUM, CHEWING BUCCAL at 19:24

## 2022-10-30 RX ADMIN — ATOMOXETINE HYDROCHLORIDE 25 MG: 25 CAPSULE ORAL at 07:47

## 2022-10-30 ASSESSMENT — ACTIVITIES OF DAILY LIVING (ADL)
ADLS_ACUITY_SCORE: 29
ORAL_HYGIENE: INDEPENDENT
ADLS_ACUITY_SCORE: 29
DRESS: INDEPENDENT
ADLS_ACUITY_SCORE: 29
ADLS_ACUITY_SCORE: 29
HYGIENE/GROOMING: INDEPENDENT
LAUNDRY: WITH SUPERVISION

## 2022-10-30 NOTE — PLAN OF CARE
Problem: Plan of Care - These are the overarching goals to be used throughout the patient stay.    Goal: Absence of Hospital-Acquired Illness or Injury  Intervention: Identify and Manage Fall Risk  Recent Flowsheet Documentation  Taken 10/30/2022 1000 by Beverly Iqbal RN  Safety Promotion/Fall Prevention: safety round/check completed     Problem: Fall Injury Risk  Goal: Absence of Fall and Fall-Related Injury  Intervention: Identify and Manage Contributors  Recent Flowsheet Documentation  Taken 10/30/2022 1000 by Beverly Iqbal RN  Medication Review/Management: medications reviewed  Intervention: Promote Injury-Free Environment  Recent Flowsheet Documentation  Taken 10/30/2022 1000 by Beverly Iqbal RN  Safety Promotion/Fall Prevention: safety round/check completed   Goal Outcome Evaluation:       Patient has been fine this shift.Denies all psych issues,no suicidal thoughts,no behaviors.Pt denies anxiety as well as depression.Sociable and pleasant.  Temp: 98  F (36.7  C) Temp src: Oral BP: 132/81 Pulse: 72   Resp: 16 SpO2: 98 % O2 Device: None (Room air)

## 2022-10-30 NOTE — PLAN OF CARE
Patient slept throughout the night, no request for PRN, out for snack once and returned to sleep. No safety concern noted, patient slept for 5.75 hours, continue plan of care.  Problem: Sleep Disturbance  Goal: Adequate Sleep/Rest  Outcome: Progressing     Problem: Anxiety  Goal: Anxiety Reduction or Resolution  Outcome: Progressing   Goal Outcome Evaluation:

## 2022-10-30 NOTE — PLAN OF CARE
Problem: Suicidal Behavior  Goal: Suicidal Behavior is Absent or Managed  Outcome: Progressing     Problem: Anxiety  Goal: Anxiety Reduction or Resolution  Outcome: Progressing     Problem: Depression  Goal: Improved Mood  Outcome: Progressing   Patient  up and visible on the unit most part of the shift, engaged selectively with peers, denies all psych symptoms, denies pain, patient is able to make needs known, monae for safety, safety checks in place every 15 minutes,patient is eating and drinking adequately, medication compliant, nicotine gum given per request, no other known safety concerns at this time, will continue to monitor and offer therapeutic support as needed for the rest of the shift.

## 2022-10-31 PROCEDURE — G0177 OPPS/PHP; TRAIN & EDUC SERV: HCPCS

## 2022-10-31 PROCEDURE — 250N000013 HC RX MED GY IP 250 OP 250 PS 637: Performed by: PSYCHIATRY & NEUROLOGY

## 2022-10-31 PROCEDURE — 250N000013 HC RX MED GY IP 250 OP 250 PS 637

## 2022-10-31 PROCEDURE — 250N000013 HC RX MED GY IP 250 OP 250 PS 637: Performed by: EMERGENCY MEDICINE

## 2022-10-31 PROCEDURE — 124N000002 HC R&B MH UMMC

## 2022-10-31 RX ADMIN — OLANZAPINE 10 MG: 10 TABLET, FILM COATED ORAL at 07:51

## 2022-10-31 RX ADMIN — NICOTINE POLACRILEX 4 MG: 4 GUM, CHEWING BUCCAL at 10:58

## 2022-10-31 RX ADMIN — OLANZAPINE 20 MG: 10 TABLET, FILM COATED ORAL at 20:25

## 2022-10-31 RX ADMIN — NICOTINE 1 PATCH: 14 PATCH, EXTENDED RELEASE TRANSDERMAL at 07:53

## 2022-10-31 RX ADMIN — HYDROCHLOROTHIAZIDE 12.5 MG: 12.5 TABLET ORAL at 07:51

## 2022-10-31 RX ADMIN — NICOTINE POLACRILEX 4 MG: 4 GUM, CHEWING BUCCAL at 21:01

## 2022-10-31 RX ADMIN — NICOTINE POLACRILEX 4 MG: 4 GUM, CHEWING BUCCAL at 13:17

## 2022-10-31 RX ADMIN — NICOTINE POLACRILEX 4 MG: 4 GUM, CHEWING BUCCAL at 12:02

## 2022-10-31 RX ADMIN — DIVALPROEX SODIUM 1000 MG: 500 TABLET, DELAYED RELEASE ORAL at 20:26

## 2022-10-31 RX ADMIN — ATOMOXETINE HYDROCHLORIDE 25 MG: 25 CAPSULE ORAL at 07:51

## 2022-10-31 RX ADMIN — NICOTINE POLACRILEX 4 MG: 4 GUM, CHEWING BUCCAL at 07:51

## 2022-10-31 RX ADMIN — NICOTINE POLACRILEX 4 MG: 4 GUM, CHEWING BUCCAL at 18:46

## 2022-10-31 RX ADMIN — BUPROPION HYDROCHLORIDE 300 MG: 150 TABLET, EXTENDED RELEASE ORAL at 07:51

## 2022-10-31 RX ADMIN — DIVALPROEX SODIUM 1000 MG: 500 TABLET, DELAYED RELEASE ORAL at 07:51

## 2022-10-31 RX ADMIN — NICOTINE POLACRILEX 4 MG: 4 GUM, CHEWING BUCCAL at 16:29

## 2022-10-31 ASSESSMENT — ACTIVITIES OF DAILY LIVING (ADL)
ADLS_ACUITY_SCORE: 29
ADLS_ACUITY_SCORE: 29
HYGIENE/GROOMING: INDEPENDENT
ADLS_ACUITY_SCORE: 29
DRESS: INDEPENDENT
ADLS_ACUITY_SCORE: 29
ORAL_HYGIENE: INDEPENDENT
ADLS_ACUITY_SCORE: 29

## 2022-10-31 NOTE — PLAN OF CARE
[facility] Occupational Therapy Group Intervention Note     10/31/22 1212   Group Therapy Session   Group Attendance attended group session   Total Time (minutes) 85   Group Type task skill;life skill;psychoeducation   Group Topic Covered coping skills/lifestyle management;relapse prevention;self-care activities;emotions/expression   Group Session Detail 1) Mental health management group with a focus on coping through movement to facilitate relaxation and stress management via floor yoga. P    2) Clinic - coping skill exploration, creative expression within personally meaningful activities, and observation of social, cognitive, and kinesthetic performance skills   Patient Response/Contribution cooperative with task;organized;discussed personal experience with topic;listened actively   Patient Participation Detail 1) Followed and engaged in ~75% of yoga poses; appeared distracted 3x however IND to refocus. Verbalized feeling challenged by this group however experienced beneficial response to practice.     2) Demonstrated congruent performance and social skills while engaged in a multi-day familiar task.      Madeline Rosenberg OT on 10/31/2022 at 12:12 PM
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
## 2022-10-31 NOTE — PLAN OF CARE
Problem: Plan of Care - These are the overarching goals to be used throughout the patient stay.    Goal: Absence of Hospital-Acquired Illness or Injury  Intervention: Prevent Skin Injury  Recent Flowsheet Documentation  Taken 10/31/2022 1701 by Angelo Cruz RN  Body Position: position changed independently     Problem: Fall Injury Risk  Goal: Absence of Fall and Fall-Related Injury  Outcome: Progressing     Problem: Suicidal Behavior  Goal: Suicidal Behavior is Absent or Managed  Recent Flowsheet Documentation  Taken 10/31/2022 1841 by Angelo Cruz RN  Mutually Determined Action Steps (Suicidal Behavior Absent/Managed): sets future-oriented goal     Problem: Behavioral Health Plan of Care  Goal: Plan of Care Review  Recent Flowsheet Documentation  Taken 10/31/2022 1701 by Angelo Cruz RN  Patient Agreement with Plan of Care: agrees     Problem: Depression  Goal: Improved Mood  Outcome: Progressing   Goal Outcome Evaluation:    Plan of Care Reviewed With: patient        Pleasant, cooperative and compliant with medications. Patient was in milieu watching tv most of the shift, he was able to interact with peers and staff member. He denied pain or discomfort, no depression or anxiety, denied SI/HI, contracted for safety.

## 2022-10-31 NOTE — PLAN OF CARE
Problem: Plan of Care - These are the overarching goals to be used throughout the patient stay.    Goal: Optimal Comfort and Wellbeing  Outcome: Progressing  Goal: Readiness for Transition of Care  Outcome: Progressing     Problem: Behavioral Health Plan of Care  Goal: Optimal Comfort and Wellbeing  Outcome: Progressing  Goal: Optimized Coping Skills in Response to Life Stressors  Outcome: Progressing  Flowsheets (Taken 10/31/2022 1420)  Optimized Coping Skills in Response to Life Stressors: making progress toward outcome   Goal Outcome Evaluation:  Patient has been visible in the milieu,engaged with peers.Participated in groups.Denies all psych issues.No suicidal or safety concerns.Compliant with medication.  Temp: 98.7  F (37.1  C) Temp src: Oral BP: 139/83 Pulse: 75   Resp: 19 SpO2: 96 % O2 Device: None (Room air)

## 2022-10-31 NOTE — PLAN OF CARE
Assessment/Intervention/Current Symptoms and Care Coordination:   Attend Team Meeting  Review Chart  Patient's CM will visit him on 11/14 at 11:00 AM.      Discharge Plan or Goal:  Pending stabilization & development of a safe discharge plan.  Considerations include: [facility] placement  or [facility]      Barriers to Discharge:  Patient requires further psychiatric stabilization due to current symptomology. Door to door transfer. PD revoked. Was recommitted.   Pending available bed.     Referral Status:   In place     Legal Status:    Committed as MI through [facility] until 4/12/2023

## 2022-11-01 PROCEDURE — 124N000002 HC R&B MH UMMC

## 2022-11-01 PROCEDURE — 250N000013 HC RX MED GY IP 250 OP 250 PS 637: Performed by: PSYCHIATRY & NEUROLOGY

## 2022-11-01 PROCEDURE — 250N000013 HC RX MED GY IP 250 OP 250 PS 637

## 2022-11-01 PROCEDURE — 250N000013 HC RX MED GY IP 250 OP 250 PS 637: Performed by: EMERGENCY MEDICINE

## 2022-11-01 RX ADMIN — OLANZAPINE 20 MG: 10 TABLET, FILM COATED ORAL at 19:26

## 2022-11-01 RX ADMIN — NICOTINE POLACRILEX 4 MG: 4 GUM, CHEWING BUCCAL at 18:25

## 2022-11-01 RX ADMIN — DIVALPROEX SODIUM 1000 MG: 500 TABLET, DELAYED RELEASE ORAL at 07:48

## 2022-11-01 RX ADMIN — DIVALPROEX SODIUM 1000 MG: 500 TABLET, DELAYED RELEASE ORAL at 19:26

## 2022-11-01 RX ADMIN — NICOTINE POLACRILEX 4 MG: 4 GUM, CHEWING BUCCAL at 07:51

## 2022-11-01 RX ADMIN — HYDROCHLOROTHIAZIDE 12.5 MG: 12.5 TABLET ORAL at 07:48

## 2022-11-01 RX ADMIN — NICOTINE 1 PATCH: 14 PATCH, EXTENDED RELEASE TRANSDERMAL at 07:48

## 2022-11-01 RX ADMIN — BUPROPION HYDROCHLORIDE 300 MG: 150 TABLET, EXTENDED RELEASE ORAL at 07:48

## 2022-11-01 RX ADMIN — NICOTINE POLACRILEX 4 MG: 4 GUM, CHEWING BUCCAL at 19:27

## 2022-11-01 RX ADMIN — NICOTINE POLACRILEX 4 MG: 4 GUM, CHEWING BUCCAL at 09:29

## 2022-11-01 RX ADMIN — NICOTINE POLACRILEX 4 MG: 4 GUM, CHEWING BUCCAL at 13:08

## 2022-11-01 RX ADMIN — ATOMOXETINE HYDROCHLORIDE 25 MG: 25 CAPSULE ORAL at 07:48

## 2022-11-01 ASSESSMENT — ACTIVITIES OF DAILY LIVING (ADL)
ADLS_ACUITY_SCORE: 29
DRESS: INDEPENDENT
ADLS_ACUITY_SCORE: 29
ADLS_ACUITY_SCORE: 29
LAUNDRY: UNABLE TO COMPLETE
ADLS_ACUITY_SCORE: 29
DRESS: INDEPENDENT
ORAL_HYGIENE: INDEPENDENT
ADLS_ACUITY_SCORE: 29
HYGIENE/GROOMING: INDEPENDENT
ORAL_HYGIENE: INDEPENDENT
HYGIENE/GROOMING: INDEPENDENT
ADLS_ACUITY_SCORE: 29
ADLS_ACUITY_SCORE: 29
LAUNDRY: UNABLE TO COMPLETE

## 2022-11-01 NOTE — PLAN OF CARE
Assessment/Intervention/Current Symptoms and Care Coordination:   - Reviewed patient's chart and progress notes.     -  Patient is visible in the milieu, attends groups, and is medication compliant. No behavioral concerns notes.      - Patient's CM will visit him on 11/14 at 11:00 AM.     - Writer checked in with patient. Reports frustration with length of stay and knowing that [facility] may not have a bed until the end of November. Reports he spoke with CM about sending referrals to IRTS facilities.     - Writer received an email from pt's CM, Jose Durham, requesting progress notes and inquiring on patient's progress. Jose also asked if treatment team at the hospital will supports IRTS placement.          Discharge Plan or Goal:  Pending stabilization & development of a safe discharge plan.  Considerations include: [facility]          Barriers to Discharge:  Patient requires further psychiatric stabilization due to current symptomology. Door to door transfer. PD revoked. Was recommitted.            Referral Status:           Legal Status:  Committed as MI through [facility] until 4/12/2023

## 2022-11-01 NOTE — PLAN OF CARE
Problem: Anxiety  Goal: Anxiety Reduction or Resolution  Outcome: Progressing     Problem: Depression  Goal: Improved Mood  Outcome: Progressing     Patient up and visible on the unit most part of the shift, presented with full range affect, pleasant, mood is calm, denies all psych symptoms, denies pain or discomfort, patient is monae for safety, safety checks in progress every 15 minutes, engaged selectively with peers, medication compliant with no stated or observed side effects,patient offers no other known concerns at this time, will continue to monitor and offer therapeutic support as needed for the rest of the shift.

## 2022-11-01 NOTE — PLAN OF CARE
Patient appears sleeping, resting in bed during rounds. Patient woke up early this morning, had snacks and went back to sleep after. Patient no complaints of pain and discomfort. No mental health symptoms noted. No behavioral issues or any safety concerns currently. Will continue to monitor the patient and provide therapeutic intervention as needed. Will continue with current plan of care. Notify MD with any concerns. The patient had 6.75 total hours of sleep this shift.       Problem: Sleep Disturbance  Goal: Adequate Sleep/Rest  Outcome: Progressing

## 2022-11-01 NOTE — PLAN OF CARE
Pt was busy in the morning. He made lots of phone call for this financial concerns, coordinated with his  for his discharge placement. Pt remains hopeful.  Pt denied all mental health symptoms. Pleasant upon approach, he is medication compliant and frequently requested for nicotine gums. Pt is looking forward to being discharged. He had talked about future oriented goals.  Pt wanted to live close to his daughter after discharge.    Problem: Plan of Care - These are the overarching goals to be used throughout the patient stay.    Goal: Optimal Comfort and Wellbeing  Outcome: Progressing  Intervention: Provide Person-Centered Care  Recent Flowsheet Documentation  Taken 11/1/2022 1340 by Brigitte Gonzalez, RN  Trust Relationship/Rapport:    choices provided    thoughts/feelings acknowledged     Problem: Plan of Care - These are the overarching goals to be used throughout the patient stay.    Goal: Optimal Comfort and Wellbeing  Intervention: Provide Person-Centered Care  Recent Flowsheet Documentation  Taken 11/1/2022 1340 by Brigitte Gonzalez, RN  Trust Relationship/Rapport:    choices provided    thoughts/feelings acknowledged     Problem: Behavioral Health Plan of Care  Goal: Optimal Comfort and Wellbeing  Outcome: Progressing  Intervention: Provide Person-Centered Care  Recent Flowsheet Documentation  Taken 11/1/2022 1340 by Brigitte Gonzalez, RN  Trust Relationship/Rapport:    choices provided    thoughts/feelings acknowledged     Problem: Behavioral Health Plan of Care  Goal: Patient-Specific Goal (Individualization)  Outcome: Progressing     Problem: Behavioral Health Plan of Care  Goal: Absence of New-Onset Illness or Injury  Outcome: Progressing  Intervention: Identify and Manage Fall Risk  Recent Flowsheet Documentation  Taken 11/1/2022 1340 by Brigitte Gonzalez, RN  Safety Measures: safety rounds completed     Problem: Behavioral Health Plan of Care  Goal: Optimized  Coping Skills in Response to Life Stressors  Outcome: Progressing   Goal Outcome Evaluation:    Plan of Care Reviewed With: patient

## 2022-11-02 PROCEDURE — 250N000013 HC RX MED GY IP 250 OP 250 PS 637: Performed by: PSYCHIATRY & NEUROLOGY

## 2022-11-02 PROCEDURE — 250N000013 HC RX MED GY IP 250 OP 250 PS 637

## 2022-11-02 PROCEDURE — 250N000013 HC RX MED GY IP 250 OP 250 PS 637: Performed by: EMERGENCY MEDICINE

## 2022-11-02 PROCEDURE — 124N000002 HC R&B MH UMMC

## 2022-11-02 PROCEDURE — H2032 ACTIVITY THERAPY, PER 15 MIN: HCPCS

## 2022-11-02 RX ADMIN — HYDROCHLOROTHIAZIDE 12.5 MG: 12.5 TABLET ORAL at 07:41

## 2022-11-02 RX ADMIN — NICOTINE POLACRILEX 4 MG: 4 GUM, CHEWING BUCCAL at 07:44

## 2022-11-02 RX ADMIN — CARBAMIDE PEROXIDE 6.5% 3 DROP: 6.5 LIQUID AURICULAR (OTIC) at 20:03

## 2022-11-02 RX ADMIN — NICOTINE POLACRILEX 4 MG: 4 GUM, CHEWING BUCCAL at 19:04

## 2022-11-02 RX ADMIN — NICOTINE 1 PATCH: 14 PATCH, EXTENDED RELEASE TRANSDERMAL at 07:44

## 2022-11-02 RX ADMIN — NICOTINE POLACRILEX 4 MG: 4 GUM, CHEWING BUCCAL at 14:01

## 2022-11-02 RX ADMIN — NICOTINE POLACRILEX 4 MG: 4 GUM, CHEWING BUCCAL at 16:56

## 2022-11-02 RX ADMIN — NICOTINE POLACRILEX 4 MG: 4 GUM, CHEWING BUCCAL at 20:00

## 2022-11-02 RX ADMIN — NICOTINE POLACRILEX 4 MG: 4 GUM, CHEWING BUCCAL at 21:03

## 2022-11-02 RX ADMIN — BUPROPION HYDROCHLORIDE 300 MG: 150 TABLET, EXTENDED RELEASE ORAL at 07:41

## 2022-11-02 RX ADMIN — NICOTINE POLACRILEX 4 MG: 4 GUM, CHEWING BUCCAL at 09:27

## 2022-11-02 RX ADMIN — DIVALPROEX SODIUM 1000 MG: 500 TABLET, DELAYED RELEASE ORAL at 07:41

## 2022-11-02 RX ADMIN — ATOMOXETINE HYDROCHLORIDE 25 MG: 25 CAPSULE ORAL at 07:41

## 2022-11-02 RX ADMIN — DIVALPROEX SODIUM 1000 MG: 500 TABLET, DELAYED RELEASE ORAL at 20:00

## 2022-11-02 RX ADMIN — NICOTINE POLACRILEX 4 MG: 4 GUM, CHEWING BUCCAL at 11:11

## 2022-11-02 RX ADMIN — NICOTINE POLACRILEX 4 MG: 4 GUM, CHEWING BUCCAL at 21:02

## 2022-11-02 RX ADMIN — OLANZAPINE 20 MG: 10 TABLET, FILM COATED ORAL at 20:00

## 2022-11-02 ASSESSMENT — ACTIVITIES OF DAILY LIVING (ADL)
ADLS_ACUITY_SCORE: 29
DRESS: INDEPENDENT
ADLS_ACUITY_SCORE: 29
LAUNDRY: WITH SUPERVISION
ORAL_HYGIENE: INDEPENDENT
HYGIENE/GROOMING: INDEPENDENT
ADLS_ACUITY_SCORE: 29

## 2022-11-02 NOTE — PLAN OF CARE
Problem: Suicidal Behavior  Goal: Suicidal Behavior is Absent or Managed  Outcome: Progressing    Problem: Anxiety  Goal: Anxiety Reduction or Resolution  Outcome: Progressing     Problem: Depression  Goal: Improved Mood  Outcome: Progressing     Patient up and visible on the unit, most part of the shift, engaged and social with select peers, denies all psych symptoms, full range  affect upon assessment and approach, patient is monae for safety, safety checks in progress this shift, has good appetite, drinking well, medication compliant, nicotine gum given three times this shift,no other known safety concerns at this time, will continue to monitor and offer therapeutic support as support as needed for the remaining part of the shift.
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
## 2022-11-02 NOTE — PLAN OF CARE
Patient appears sleeping resting in bed during rounds. No complains of pain and discomfort. No behavioral issues or any safety concern this shift. Patient continuous on q 15 minutes safety checks. Will continue to monitor the patient and provide therapeutic intervention as needed. Will continue with current plan of care. Patient had 7 total hours of sleep this shift. Patient woke up @ 0645 this morning, headphones on and listening to music.  Problem: Sleep Disturbance  Goal: Adequate Sleep/Rest  Outcome: Progressing
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
## 2022-11-02 NOTE — PLAN OF CARE
Pleasant and cooperative, pt attended all meeting and groups this shift. Medication compliant. Social with peers. Pt denies all mental health symptoms. Denies medication side effect. Stated that there are no adverse effects of his medications being titrated.     Problem: Behavioral Health Plan of Care  Goal: Absence of New-Onset Illness or Injury  Outcome: Not Progressing     Problem: Plan of Care - These are the overarching goals to be used throughout the patient stay.    Goal: Optimal Comfort and Wellbeing  Outcome: Progressing  Intervention: Provide Person-Centered Care  Recent Flowsheet Documentation  Taken 11/2/2022 1130 by Brigitte Gonzalez RN  Trust Relationship/Rapport: thoughts/feelings acknowledged     Problem: Behavioral Health Plan of Care  Goal: Optimal Comfort and Wellbeing  Outcome: Progressing  Intervention: Provide Person-Centered Care  Recent Flowsheet Documentation  Taken 11/2/2022 1130 by Brigitte Gonzalez RN  Trust Relationship/Rapport: thoughts/feelings acknowledged  Goal: Patient-Specific Goal (Individualization)  Description: Assess pain level, treatment efficacy and patient response at regular intervals using a consistent pain scale.  Consider the presence and impact of preexisting chronic pain.  Encourage patient and caregiver involvement in pain assessment, interventions and safety measures.  Outcome: Progressing  Goal: Optimized Coping Skills in Response to Life Stressors  Description: Assess skin risk on admission and at regular intervals throughout hospital stay.  Keep all areas of skin (especially folds) clean and dry.  Maintain adequate skin hydration.  Relieve and redistribute pressure and protect bony prominences; implement measures based on patient-specific risk factors.  Encourage weight shift frequently; assist with reposition if unable to complete independently.  Use aids (e.g., slide boards, mechanical lift) if transfer assistance is required.  Outcome:  Progressing   Goal Outcome Evaluation:    Plan of Care Reviewed With: patient

## 2022-11-02 NOTE — PROGRESS NOTES
Pt moved in and out of the session without interrupting group processes.  Pt participated in dance/movement therapy practicing interventions of self-regulation and impulse control.  He was excited about the chance to prove himself in an IRTS.  He was respectful and able to follow all movement interventions.       11/02/22 1030   Expressive Therapy   Therapy Type dance/movement   Minutes of Treatment 30

## 2022-11-03 PROCEDURE — 250N000013 HC RX MED GY IP 250 OP 250 PS 637: Performed by: PSYCHIATRY & NEUROLOGY

## 2022-11-03 PROCEDURE — G0177 OPPS/PHP; TRAIN & EDUC SERV: HCPCS

## 2022-11-03 PROCEDURE — 250N000013 HC RX MED GY IP 250 OP 250 PS 637

## 2022-11-03 PROCEDURE — 124N000002 HC R&B MH UMMC

## 2022-11-03 PROCEDURE — 250N000013 HC RX MED GY IP 250 OP 250 PS 637: Performed by: EMERGENCY MEDICINE

## 2022-11-03 RX ADMIN — NICOTINE POLACRILEX 4 MG: 4 GUM, CHEWING BUCCAL at 09:58

## 2022-11-03 RX ADMIN — ATOMOXETINE HYDROCHLORIDE 25 MG: 25 CAPSULE ORAL at 07:50

## 2022-11-03 RX ADMIN — NICOTINE POLACRILEX 4 MG: 4 GUM, CHEWING BUCCAL at 07:54

## 2022-11-03 RX ADMIN — NICOTINE POLACRILEX 4 MG: 4 GUM, CHEWING BUCCAL at 19:00

## 2022-11-03 RX ADMIN — NICOTINE POLACRILEX 4 MG: 4 GUM, CHEWING BUCCAL at 20:20

## 2022-11-03 RX ADMIN — Medication 15 ML: at 20:22

## 2022-11-03 RX ADMIN — DIVALPROEX SODIUM 1000 MG: 500 TABLET, DELAYED RELEASE ORAL at 20:19

## 2022-11-03 RX ADMIN — HYDROCHLOROTHIAZIDE 12.5 MG: 12.5 TABLET ORAL at 07:50

## 2022-11-03 RX ADMIN — OLANZAPINE 20 MG: 10 TABLET, FILM COATED ORAL at 20:19

## 2022-11-03 RX ADMIN — BUPROPION HYDROCHLORIDE 300 MG: 150 TABLET, EXTENDED RELEASE ORAL at 07:50

## 2022-11-03 RX ADMIN — NICOTINE 1 PATCH: 14 PATCH, EXTENDED RELEASE TRANSDERMAL at 07:52

## 2022-11-03 RX ADMIN — NICOTINE POLACRILEX 4 MG: 4 GUM, CHEWING BUCCAL at 17:58

## 2022-11-03 RX ADMIN — DIVALPROEX SODIUM 1000 MG: 500 TABLET, DELAYED RELEASE ORAL at 07:50

## 2022-11-03 ASSESSMENT — ACTIVITIES OF DAILY LIVING (ADL)
ADLS_ACUITY_SCORE: 29
ADLS_ACUITY_SCORE: 29
DRESS: SCRUBS (BEHAVIORAL HEALTH);STREET CLOTHES
ADLS_ACUITY_SCORE: 29
ORAL_HYGIENE: INDEPENDENT
LAUNDRY: WITH SUPERVISION
ADLS_ACUITY_SCORE: 29
HYGIENE/GROOMING: INDEPENDENT
ADLS_ACUITY_SCORE: 29
ADLS_ACUITY_SCORE: 29

## 2022-11-03 NOTE — PLAN OF CARE
Patient sleeps most of the shift. Patient denies pain and discomfort. Patient had snacks. No complains of pain and discomfort. No behavioral issues or any safety concern this shift. Patient continuous on q 15 minutes safety checks. Will continue to monitor the patient and provide therapeutic intervention as needed. Will continue with current plan of care.   Problem: Sleep Disturbance  Goal: Adequate Sleep/Rest  Outcome: Progressing

## 2022-11-03 NOTE — PLAN OF CARE
Occupational Therapy Group Note:     11/03/22 1400   Group Therapy Session   Group Attendance attended group session   Time Session Began 1115   Time Session Ended 1215   Total Time (minutes) 60   Total # Attendees 3-5   Group Type task skill   Group Topic Covered coping skills/lifestyle management;problem-solving   Group Session Detail OT clinic   Patient Response/Contribution cooperative with task;organized   Patient Participation Detail Pt actively participated in occupational therapy clinic to facilitate coping skill exploration, creative expression within personally meaningful activities, and clinical observation of social, cognitive, and kinesthetic performance skills. Pt response: Independent to initiate, gather materials, sequence, and adjust to workspace demands as needed. Demonstrated excellent focus, planning, problem solving, and attention to detail for selected detailed, mult-step, creative expression task. Able to ask for assistance as needed, and occasionally socialized with peers and staff. Expressed excitement over completed project, and accepted compliments. Calm and pleasant throughout.

## 2022-11-03 NOTE — PLAN OF CARE
Problem: Suicidal Behavior  Goal: Suicidal Behavior is Absent or Managed  Outcome: Progressing  Intervention: Provide Immediate and Ongoing Protective Physical Environment      Problem: Anxiety  Goal: Anxiety Reduction or Resolution  Outcome: Progressing  Intervention: Promote Anxiety Reduction  Patient up and visible on the unit most part of the shift, selectively engaged with peers, presented with full range affect, pleasant, denies all psych symptoms, denies pain , patient is monae for safety, safety checks in progress this shift, able to make needs known, eating and drinking adequately, medication compliant , no other known behavioral or safety concerns this shift, writer will continue to monitor and offer therapeutic  support as needed for the rest of the shift.

## 2022-11-03 NOTE — PLAN OF CARE
Assessment/Intervention/Current Symptoms and Care Coordination:   - Reviewed patient's chart and progress notes.     -  Patient is visible in the milieu, attends groups, and is medication compliant. No behavioral concerns notes.      - Writer received a call from [facility]. Patient will be interview today at 10:00 AM. [facility] has an opening at their [facility]S in [place]. Patient will not be admitted to the program.     - Writer provided patient with additional ROIs for [facility], [facility], and [facility].       Discharge Plan or Goal:  Pending stabilization & development of a safe discharge plan.  Considerations include: [facility] and [facility] placement          Barriers to Discharge:  Patient requires further psychiatric stabilization due to current symptomology. Door to door transfer. PD revoked. Was recommitted.            Referral Status:            Legal Status:  Committed as MI through [facility] until 4/12/2023

## 2022-11-03 NOTE — PLAN OF CARE
Problem: Sleep Disturbance  Goal: Adequate Sleep/Rest  Outcome: Adequate for Care Transition     Problem: Depression  Goal: Improved Mood  Outcome: Adequate for Care Transition     Problem: Sleep Disturbance  Goal: Adequate Sleep/Rest  Outcome: Adequate for Care Transition     Problem: Depression  Goal: Improved Mood  Outcome: Adequate for Care Transition   Goal Outcome Evaluation:       Patient has been visible in the milieu.No issues with him.No PRN given.Patient is sociable with peers.Eats and drinks okay.Denies mental health issues.  Temp: 98.7  F (37.1  C) Temp src: Oral BP: 136/85 Pulse: 86   Resp: 18 SpO2: 98 % O2 Device: None (Room air)

## 2022-11-04 PROCEDURE — 250N000013 HC RX MED GY IP 250 OP 250 PS 637: Performed by: PSYCHIATRY & NEUROLOGY

## 2022-11-04 PROCEDURE — G0177 OPPS/PHP; TRAIN & EDUC SERV: HCPCS

## 2022-11-04 PROCEDURE — 250N000013 HC RX MED GY IP 250 OP 250 PS 637: Performed by: EMERGENCY MEDICINE

## 2022-11-04 PROCEDURE — 250N000013 HC RX MED GY IP 250 OP 250 PS 637

## 2022-11-04 PROCEDURE — 124N000002 HC R&B MH UMMC

## 2022-11-04 RX ORDER — OLANZAPINE 10 MG/1
10 TABLET ORAL AT BEDTIME
Status: DISCONTINUED | OUTPATIENT
Start: 2022-11-04 | End: 2022-11-07

## 2022-11-04 RX ADMIN — BUPROPION HYDROCHLORIDE 300 MG: 150 TABLET, EXTENDED RELEASE ORAL at 07:52

## 2022-11-04 RX ADMIN — NICOTINE POLACRILEX 4 MG: 4 GUM, CHEWING BUCCAL at 17:12

## 2022-11-04 RX ADMIN — HYDROCHLOROTHIAZIDE 12.5 MG: 12.5 TABLET ORAL at 07:52

## 2022-11-04 RX ADMIN — DIVALPROEX SODIUM 1000 MG: 500 TABLET, DELAYED RELEASE ORAL at 07:52

## 2022-11-04 RX ADMIN — NICOTINE 1 PATCH: 14 PATCH, EXTENDED RELEASE TRANSDERMAL at 07:52

## 2022-11-04 RX ADMIN — DIVALPROEX SODIUM 1000 MG: 500 TABLET, DELAYED RELEASE ORAL at 20:02

## 2022-11-04 RX ADMIN — OLANZAPINE 10 MG: 10 TABLET, FILM COATED ORAL at 20:02

## 2022-11-04 RX ADMIN — NICOTINE POLACRILEX 4 MG: 4 GUM, CHEWING BUCCAL at 19:04

## 2022-11-04 RX ADMIN — NICOTINE POLACRILEX 4 MG: 4 GUM, CHEWING BUCCAL at 07:52

## 2022-11-04 RX ADMIN — NICOTINE POLACRILEX 4 MG: 4 GUM, CHEWING BUCCAL at 20:02

## 2022-11-04 RX ADMIN — Medication 15 ML: at 20:03

## 2022-11-04 RX ADMIN — NICOTINE POLACRILEX 4 MG: 4 GUM, CHEWING BUCCAL at 12:44

## 2022-11-04 RX ADMIN — NICOTINE POLACRILEX 4 MG: 4 GUM, CHEWING BUCCAL at 09:58

## 2022-11-04 RX ADMIN — ATOMOXETINE HYDROCHLORIDE 25 MG: 25 CAPSULE ORAL at 07:52

## 2022-11-04 ASSESSMENT — ACTIVITIES OF DAILY LIVING (ADL)
ADLS_ACUITY_SCORE: 29
DRESS: INDEPENDENT
HYGIENE/GROOMING: INDEPENDENT
ADLS_ACUITY_SCORE: 29
LAUNDRY: WITH SUPERVISION
ADLS_ACUITY_SCORE: 29
ORAL_HYGIENE: INDEPENDENT
ADLS_ACUITY_SCORE: 29

## 2022-11-04 NOTE — PLAN OF CARE
Occupational Therapy Group Note:     11/04/22 1000   Group Therapy Session   Group Attendance attended group session   Time Session Began 1015   Time Session Ended 1145   Total Time (minutes) 45   Total # Attendees 4   Group Type task skill   Group Topic Covered coping skills/lifestyle management;problem-solving   Group Session Detail OT clinic   Patient Response/Contribution cooperative with task;organized   Patient Participation Detail Pt actively participated in occupational therapy clinic to facilitate coping skill exploration, creative expression within personally meaningful activities, and clinical observation of social, cognitive, and kinesthetic performance skills. Pt response: Independent to initiate, gather materials, sequence, and adjust to workspace demands as needed. Demonstrated excellent focus, planning, and problem solving for selected multi-step, creative expression task. Able to ask for assistance as needed, and occasionally socialized with peers and staff. Attentive to task for a full x45 minutes prior to being called out of group for a phone call. Enthusiastically showed writer his finished project. Calm, pleasant, and engaged.

## 2022-11-04 NOTE — PLAN OF CARE
Pt appears to have slept for 6.75 hours. No concerns noted this shift. No PRNs given or requested. Will continue to monitor and offer support.       Problem: Sleep Disturbance  Goal: Adequate Sleep/Rest  Outcome: Progressing   Goal Outcome Evaluation:

## 2022-11-04 NOTE — PLAN OF CARE
Assessment/Intervention/Current Symptoms and Care Coordination:   - Reviewed patient's chart and progress notes.     -  Patient is visible in the milieu, attends groups, and is medication compliant. No behavioral concerns notes.      -  Writer sent additional records to [facility].   -Patient was declined from [facility], [facility], and [facility]. Currently being reviewed by Desir[facility]CO[facility] and [facility].     - Writer scheduled MH appointment with [facility] for January 2023.     - Writer assisted patient to attend Zoom meeting with [facility] court examiner for Rule 20 evaluation.      Discharge Plan or Goal:  Pending stabilization & development of a safe discharge plan.  Considerations include: [facility] placement and IRTS placement          Barriers to Discharge:  Patient requires further psychiatric stabilization due to current symptomology. Door to door transfer. PD revoked. Was recommitted as MI/CD           Referral Status: IRTS: [facility], [facility], [facility], [facility], [facility], [facility], [facility].            Legal Status:  Committed as MI through [facility] until 4/12/2023

## 2022-11-04 NOTE — PLAN OF CARE
Problem: Suicidal Behavior  Goal: Suicidal Behavior is Absent or Managed  Outcome: Progressing  Flowsheets (Taken 11/4/2022 1151)  Mutually Determined Action Steps (Suicidal Behavior Absent/Managed): sets future-oriented goal     Problem: Anxiety  Goal: Anxiety Reduction or Resolution  Outcome: Progressing   Goal Outcome Evaluation:       Patient has been visible in the milieu,ambulated in the halls with headphones in place.Watched TV/movies with peers.Denies pain/discomfort.Talked to his  today.Attended & participated in groups.Medicatin compliant,no side effect noted.No PRN given this shift.Pleasant.Safety checks in place.  Temp: 98.4  F (36.9  C) Temp src: Oral BP: 123/77 Pulse: 78   Resp: 16 SpO2: 97 % O2 Device: None (Room air)
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
## 2022-11-05 PROCEDURE — 124N000002 HC R&B MH UMMC

## 2022-11-05 PROCEDURE — 250N000013 HC RX MED GY IP 250 OP 250 PS 637: Performed by: EMERGENCY MEDICINE

## 2022-11-05 PROCEDURE — 250N000013 HC RX MED GY IP 250 OP 250 PS 637: Performed by: PSYCHIATRY & NEUROLOGY

## 2022-11-05 PROCEDURE — 250N000013 HC RX MED GY IP 250 OP 250 PS 637

## 2022-11-05 RX ADMIN — DIVALPROEX SODIUM 1000 MG: 500 TABLET, DELAYED RELEASE ORAL at 19:12

## 2022-11-05 RX ADMIN — HYDROCHLOROTHIAZIDE 12.5 MG: 12.5 TABLET ORAL at 07:55

## 2022-11-05 RX ADMIN — NICOTINE POLACRILEX 4 MG: 4 GUM, CHEWING BUCCAL at 19:13

## 2022-11-05 RX ADMIN — NICOTINE POLACRILEX 4 MG: 4 GUM, CHEWING BUCCAL at 14:17

## 2022-11-05 RX ADMIN — DIVALPROEX SODIUM 1000 MG: 500 TABLET, DELAYED RELEASE ORAL at 07:55

## 2022-11-05 RX ADMIN — ATOMOXETINE HYDROCHLORIDE 25 MG: 25 CAPSULE ORAL at 07:55

## 2022-11-05 RX ADMIN — NICOTINE POLACRILEX 4 MG: 4 GUM, CHEWING BUCCAL at 16:32

## 2022-11-05 RX ADMIN — NICOTINE POLACRILEX 4 MG: 4 GUM, CHEWING BUCCAL at 12:25

## 2022-11-05 RX ADMIN — NICOTINE POLACRILEX 4 MG: 4 GUM, CHEWING BUCCAL at 08:40

## 2022-11-05 RX ADMIN — NICOTINE POLACRILEX 4 MG: 4 GUM, CHEWING BUCCAL at 17:45

## 2022-11-05 RX ADMIN — NICOTINE POLACRILEX 4 MG: 4 GUM, CHEWING BUCCAL at 10:46

## 2022-11-05 RX ADMIN — NICOTINE 1 PATCH: 14 PATCH, EXTENDED RELEASE TRANSDERMAL at 08:40

## 2022-11-05 RX ADMIN — BUPROPION HYDROCHLORIDE 300 MG: 150 TABLET, EXTENDED RELEASE ORAL at 07:55

## 2022-11-05 RX ADMIN — OLANZAPINE 10 MG: 10 TABLET, FILM COATED ORAL at 19:12

## 2022-11-05 RX ADMIN — NICOTINE POLACRILEX 4 MG: 4 GUM, CHEWING BUCCAL at 21:41

## 2022-11-05 ASSESSMENT — ACTIVITIES OF DAILY LIVING (ADL)
ADLS_ACUITY_SCORE: 29
ORAL_HYGIENE: INDEPENDENT
LAUNDRY: WITH SUPERVISION
ADLS_ACUITY_SCORE: 29
LAUNDRY: WITH SUPERVISION
ADLS_ACUITY_SCORE: 29
HYGIENE/GROOMING: INDEPENDENT
ADLS_ACUITY_SCORE: 29
HYGIENE/GROOMING: INDEPENDENT
DRESS: INDEPENDENT
ADLS_ACUITY_SCORE: 29
ADLS_ACUITY_SCORE: 29
ORAL_HYGIENE: INDEPENDENT
ADLS_ACUITY_SCORE: 29
ADLS_ACUITY_SCORE: 29
DRESS: INDEPENDENT

## 2022-11-05 NOTE — PLAN OF CARE
Patient presented with full range affect, denied all mental health symptoms, attended the NA virtual meeting and he reported it to be positively influential, patient denies pain and discomfort, hygiene, nutrition and hydration were adequate, he is excited and is anticipating transition, no other concerns reported.   Problem: Social, Occupational or Functional Impairment (Psychotic Signs/Symptoms)  Goal: Enhanced Social, Occupational or Functional Skills (Psychotic Signs/Symptoms)  Outcome: Progressing  Flowsheets (Taken 11/4/2022 2009)  Mutually Determined Action Steps (Enhanced Social, Occupational or Functional Skills):    identifies personal strengths    identifies support resources    participates in social skills training  Intervention: Promote Social, Occupational and Functional Ability  Recent Flowsheet Documentation  Taken 11/4/2022 2006 by Kelly Buitrago RN  Trust Relationship/Rapport: thoughts/feelings acknowledged     Problem: Anxiety  Goal: Anxiety Reduction or Resolution  Outcome: Progressing  Intervention: Promote Anxiety Reduction  Recent Flowsheet Documentation  Taken 11/4/2022 2006 by Kelly Buitrago RN  Family/Support System Care:    self-care encouraged    support provided     Problem: Anxiety  Goal: Anxiety Reduction or Resolution  Intervention: Promote Anxiety Reduction  Recent Flowsheet Documentation  Taken 11/4/2022 2006 by Kelly Buitrago RN  Family/Support System Care:    self-care encouraged    support provided     Problem: Cognitive Impairment (Psychotic Signs/Symptoms)  Goal: Optimal Cognitive Function (Psychotic Signs/Symptoms)  Intervention: Support and Promote Cognitive Ability  Recent Flowsheet Documentation  Taken 11/4/2022 2006 by Kelly Buitrago RN  Trust Relationship/Rapport: thoughts/feelings acknowledged   Goal Outcome Evaluation:    Plan of Care Reviewed With: patient

## 2022-11-05 NOTE — PLAN OF CARE
Pt has been eating well, watched TV, interactive with others patients.  Medication complaints.  Pts mother came to visit. Pt has been pleasant and cooperative.    Problem: Plan of Care - These are the overarching goals to be used throughout the patient stay.    Goal: Optimal Comfort and Wellbeing  11/5/2022 1511 by Nikole Rockwell RN  Outcome: Not Progressing  11/5/2022 1442 by Nikole Rockwell RN  Outcome: Not Progressing  Goal: Readiness for Transition of Care  11/5/2022 1511 by Nikole Rcokwell RN  Outcome: Not Progressing  11/5/2022 1442 by Nikoel Rockwell RN  Outcome: Not Progressing     Problem: Behavioral Health Plan of Care  Goal: Optimal Comfort and Wellbeing  11/5/2022 1511 by Nikole Rockwell RN  Outcome: Not Progressing  11/5/2022 1442 by Nikole Rockwell RN  Outcome: Not Progressing  Goal: Patient-Specific Goal (Individualization)  11/5/2022 1511 by Nikole Rockwell RN  Outcome: Not Progressing  11/5/2022 1442 by Nikole Rockwell RN  Outcome: Not Progressing  Goal: Adheres to Safety Considerations for Self and Others  Intervention: Develop and Maintain Individualized Safety Plan  Recent Flowsheet Documentation  Taken 11/5/2022 1300 by Nikole Rockwell RN  Safety Measures:   safety rounds completed   environmental rounds completed  Goal: Absence of New-Onset Illness or Injury  11/5/2022 1511 by Nikole Rockwell RN  Outcome: Not Progressing  11/5/2022 1442 by Nikole Rockwell RN  Outcome: Not Progressing  Intervention: Identify and Manage Fall Risk  Recent Flowsheet Documentation  Taken 11/5/2022 1300 by Nikole Rockwell RN  Safety Measures:   safety rounds completed   environmental rounds completed  Goal: Optimized Coping Skills in Response to Life Stressors  11/5/2022 1511 by Nikole Rockwell RN  Outcome: Not Progressing  11/5/2022 1442 by Nikole Rockwell RN  Outcome: Not Progressing  Goal: Team Discussion  11/5/2022 1511 by Nikole Rockwell RN  Outcome: Not Progressing  11/5/2022 1442 by Nikole Rockwell RN  Outcome: Not  Progressing   Goal Outcome Evaluation:

## 2022-11-05 NOTE — PLAN OF CARE
Pt appears to have slept for 6 hours. No PRNs given or requested. No concerns noted this shift. Will continue to monitor and offer support.     Problem: Sleep Disturbance  Goal: Adequate Sleep/Rest  Outcome: Progressing   Goal Outcome Evaluation:

## 2022-11-06 PROCEDURE — 250N000013 HC RX MED GY IP 250 OP 250 PS 637: Performed by: EMERGENCY MEDICINE

## 2022-11-06 PROCEDURE — 124N000002 HC R&B MH UMMC

## 2022-11-06 PROCEDURE — 250N000013 HC RX MED GY IP 250 OP 250 PS 637: Performed by: PSYCHIATRY & NEUROLOGY

## 2022-11-06 PROCEDURE — 250N000013 HC RX MED GY IP 250 OP 250 PS 637

## 2022-11-06 PROCEDURE — G0177 OPPS/PHP; TRAIN & EDUC SERV: HCPCS

## 2022-11-06 RX ADMIN — HYDROCHLOROTHIAZIDE 12.5 MG: 12.5 TABLET ORAL at 07:37

## 2022-11-06 RX ADMIN — NICOTINE POLACRILEX 4 MG: 4 GUM, CHEWING BUCCAL at 07:41

## 2022-11-06 RX ADMIN — NICOTINE POLACRILEX 4 MG: 4 GUM, CHEWING BUCCAL at 10:26

## 2022-11-06 RX ADMIN — NICOTINE POLACRILEX 4 MG: 4 GUM, CHEWING BUCCAL at 14:31

## 2022-11-06 RX ADMIN — BUPROPION HYDROCHLORIDE 300 MG: 150 TABLET, EXTENDED RELEASE ORAL at 07:37

## 2022-11-06 RX ADMIN — OLANZAPINE 10 MG: 10 TABLET, FILM COATED ORAL at 19:33

## 2022-11-06 RX ADMIN — NICOTINE POLACRILEX 4 MG: 4 GUM, CHEWING BUCCAL at 16:17

## 2022-11-06 RX ADMIN — NICOTINE POLACRILEX 4 MG: 4 GUM, CHEWING BUCCAL at 18:25

## 2022-11-06 RX ADMIN — ATOMOXETINE HYDROCHLORIDE 25 MG: 25 CAPSULE ORAL at 07:37

## 2022-11-06 RX ADMIN — DIVALPROEX SODIUM 1000 MG: 500 TABLET, DELAYED RELEASE ORAL at 07:37

## 2022-11-06 RX ADMIN — DIVALPROEX SODIUM 1000 MG: 500 TABLET, DELAYED RELEASE ORAL at 19:33

## 2022-11-06 RX ADMIN — NICOTINE 1 PATCH: 14 PATCH, EXTENDED RELEASE TRANSDERMAL at 07:36

## 2022-11-06 RX ADMIN — NICOTINE POLACRILEX 4 MG: 4 GUM, CHEWING BUCCAL at 19:32

## 2022-11-06 ASSESSMENT — ACTIVITIES OF DAILY LIVING (ADL)
ADLS_ACUITY_SCORE: 29
ADLS_ACUITY_SCORE: 29
ORAL_HYGIENE: INDEPENDENT
LAUNDRY: WITH SUPERVISION
ADLS_ACUITY_SCORE: 29
ADLS_ACUITY_SCORE: 29
HYGIENE/GROOMING: INDEPENDENT
ADLS_ACUITY_SCORE: 29
ADLS_ACUITY_SCORE: 29
DRESS: INDEPENDENT
ADLS_ACUITY_SCORE: 29

## 2022-11-06 NOTE — PLAN OF CARE
Patient was out visible, social and bright, appropriately engaging with staff and peers, affect was congruent with mood, he verbalized overall satisfaction with care plan and treatment regimen, denies all mental gato symptoms, denies pain and discomfort, he periodically requested nicotine gum, was compliant with medication regimen, hygiene and intake were adequate, patient reported satisfaction with progress.   Problem: Plan of Care - These are the overarching goals to be used throughout the patient stay.    Goal: Optimal Comfort and Wellbeing  Outcome: Progressing  Intervention: Provide Person-Centered Care  Recent Flowsheet Documentation  Taken 11/5/2022 2028 by Kelly Buitrago RN  Trust Relationship/Rapport:   choices provided   questions encouraged     Problem: Behavioral Health Plan of Care  Goal: Optimal Comfort and Wellbeing  Outcome: Progressing  Intervention: Provide Person-Centered Care  Recent Flowsheet Documentation  Taken 11/5/2022 2028 by Kelly Buitrago RN  Trust Relationship/Rapport:   choices provided   questions encouraged     Problem: Social, Occupational or Functional Impairment (Psychotic Signs/Symptoms)  Goal: Enhanced Social, Occupational or Functional Skills (Psychotic Signs/Symptoms)  Outcome: Progressing  Flowsheets (Taken 11/5/2022 2032)  Mutually Determined Action Steps (Enhanced Social, Occupational or Functional Skills):   participates in social skills training   identifies personal strengths   identifies support resources  Intervention: Promote Social, Occupational and Functional Ability  Recent Flowsheet Documentation  Taken 11/5/2022 2028 by Kelly Buitrago RN  Trust Relationship/Rapport:   choices provided   questions encouraged     Problem: Anxiety  Goal: Anxiety Reduction or Resolution  Outcome: Progressing  Intervention: Promote Anxiety Reduction  Recent Flowsheet Documentation  Taken 11/5/2022 2028 by Kelly Buitrago RN  Family/Support System Care:   self-care encouraged    support provided   Goal Outcome Evaluation:    Plan of Care Reviewed With: patient

## 2022-11-06 NOTE — PLAN OF CARE
Pt denies SI, pt social with peers and staff.  Pt affect bright, watching football on TV.  Pt medication compliant.  Pt ate all meals.  Attends to ADLS without prompts.  Problem: Plan of Care - These are the overarching goals to be used throughout the patient stay.    Goal: Optimal Comfort and Wellbeing  Outcome: Not Progressing  Goal: Readiness for Transition of Care  Outcome: Not Progressing     Problem: Behavioral Health Plan of Care  Goal: Optimal Comfort and Wellbeing  Outcome: Not Progressing  Goal: Patient-Specific Goal (Individualization)  Outcome: Not Progressing  Goal: Absence of New-Onset Illness or Injury  Outcome: Not Progressing  Goal: Optimized Coping Skills in Response to Life Stressors  Outcome: Not Progressing  Goal: Team Discussion  Outcome: Not Progressing   Goal Outcome Evaluation:

## 2022-11-06 NOTE — PLAN OF CARE
Patient continues to present with full range affect, able to verbalize need, initiates activities independently, appears well groomed, ate dinner well, denies all mental health symptoms, actively patient is  stable under normal conditions, will continue to monitor condition.   Problem: Social, Occupational or Functional Impairment (Psychotic Signs/Symptoms)  Goal: Enhanced Social, Occupational or Functional Skills (Psychotic Signs/Symptoms)  Outcome: Progressing  Flowsheets (Taken 11/6/2022 1745)  Mutually Determined Action Steps (Enhanced Social, Occupational or Functional Skills):   identifies support resources   identifies personal strengths   participates in social skills training  Intervention: Promote Social, Occupational and Functional Ability  Recent Flowsheet Documentation  Taken 11/6/2022 1734 by Kelly Buitrago RN  Trust Relationship/Rapport:   choices provided   questions encouraged     Problem: Anxiety  Goal: Anxiety Reduction or Resolution  Outcome: Met  Intervention: Promote Anxiety Reduction  Recent Flowsheet Documentation  Taken 11/6/2022 1734 by Kelly Buitrago RN  Family/Support System Care:   self-care encouraged   support provided   Goal Outcome Evaluation:    Plan of Care Reviewed With: patient

## 2022-11-06 NOTE — PLAN OF CARE
11/06/22 1500   Group Therapy Session   Group Attendance attended group session   Time Session Began 1330   Time Session Ended 1415   Total Time (minutes) 45   Total # Attendees 7   Group Type expressive therapy   Group Topic Covered problem-solving;coping skills/lifestyle management   Group Session Detail OT Clinic   Patient Response/Contribution cooperative with task;organized   Patient Participation Detail See Note   Pt actively participated in occupational therapy clinic to facilitate coping skill exploration, creative expression within personally meaningful activities, and clinical observation of social, cognitive, and kinesthetic performance skills. Pt response: Independent to initiate, gather materials, sequence, and adjust to workspace demands as needed. Demonstrated good focus, planning, and problem solving for selected beading task. Able to ask for assistance as needed, and was appropriately social with peers and staff. Pt will continue to be encourage to attend groups for ongoing assessment and to address goals identified on plan of care.

## 2022-11-07 PROCEDURE — 250N000013 HC RX MED GY IP 250 OP 250 PS 637: Performed by: PSYCHIATRY & NEUROLOGY

## 2022-11-07 PROCEDURE — 99232 SBSQ HOSP IP/OBS MODERATE 35: CPT | Performed by: PSYCHIATRY & NEUROLOGY

## 2022-11-07 PROCEDURE — 250N000013 HC RX MED GY IP 250 OP 250 PS 637: Performed by: EMERGENCY MEDICINE

## 2022-11-07 PROCEDURE — G0177 OPPS/PHP; TRAIN & EDUC SERV: HCPCS

## 2022-11-07 PROCEDURE — 124N000002 HC R&B MH UMMC

## 2022-11-07 PROCEDURE — 250N000013 HC RX MED GY IP 250 OP 250 PS 637

## 2022-11-07 RX ORDER — OLANZAPINE 5 MG/1
5 TABLET ORAL AT BEDTIME
Status: DISCONTINUED | OUTPATIENT
Start: 2022-11-07 | End: 2022-11-09

## 2022-11-07 RX ORDER — ATOMOXETINE 40 MG/1
40 CAPSULE ORAL DAILY
Status: DISCONTINUED | OUTPATIENT
Start: 2022-11-08 | End: 2022-11-14 | Stop reason: HOSPADM

## 2022-11-07 RX ADMIN — NICOTINE POLACRILEX 4 MG: 4 GUM, CHEWING BUCCAL at 10:12

## 2022-11-07 RX ADMIN — NICOTINE POLACRILEX 4 MG: 4 GUM, CHEWING BUCCAL at 09:03

## 2022-11-07 RX ADMIN — DIVALPROEX SODIUM 1000 MG: 500 TABLET, DELAYED RELEASE ORAL at 07:39

## 2022-11-07 RX ADMIN — ATOMOXETINE HYDROCHLORIDE 25 MG: 25 CAPSULE ORAL at 07:40

## 2022-11-07 RX ADMIN — Medication 15 ML: at 07:43

## 2022-11-07 RX ADMIN — NICOTINE POLACRILEX 4 MG: 4 GUM, CHEWING BUCCAL at 15:30

## 2022-11-07 RX ADMIN — NICOTINE POLACRILEX 4 MG: 4 GUM, CHEWING BUCCAL at 18:43

## 2022-11-07 RX ADMIN — NICOTINE POLACRILEX 4 MG: 4 GUM, CHEWING BUCCAL at 21:04

## 2022-11-07 RX ADMIN — OLANZAPINE 5 MG: 5 TABLET, FILM COATED ORAL at 19:38

## 2022-11-07 RX ADMIN — NICOTINE POLACRILEX 4 MG: 4 GUM, CHEWING BUCCAL at 07:39

## 2022-11-07 RX ADMIN — BUPROPION HYDROCHLORIDE 300 MG: 150 TABLET, EXTENDED RELEASE ORAL at 07:40

## 2022-11-07 RX ADMIN — NICOTINE POLACRILEX 4 MG: 4 GUM, CHEWING BUCCAL at 17:02

## 2022-11-07 RX ADMIN — NICOTINE POLACRILEX 4 MG: 4 GUM, CHEWING BUCCAL at 19:39

## 2022-11-07 RX ADMIN — DIVALPROEX SODIUM 1000 MG: 500 TABLET, DELAYED RELEASE ORAL at 19:38

## 2022-11-07 RX ADMIN — NICOTINE 1 PATCH: 14 PATCH, EXTENDED RELEASE TRANSDERMAL at 07:39

## 2022-11-07 RX ADMIN — HYDROCHLOROTHIAZIDE 12.5 MG: 12.5 TABLET ORAL at 07:41

## 2022-11-07 ASSESSMENT — ACTIVITIES OF DAILY LIVING (ADL)
ADLS_ACUITY_SCORE: 29
ORAL_HYGIENE: INDEPENDENT
ADLS_ACUITY_SCORE: 29
ADLS_ACUITY_SCORE: 29
HYGIENE/GROOMING: INDEPENDENT
DRESS: INDEPENDENT
ADLS_ACUITY_SCORE: 29
LAUNDRY: WITH SUPERVISION
ADLS_ACUITY_SCORE: 29

## 2022-11-07 NOTE — PROGRESS NOTES
"[facility], [place]   Psychiatric Progress Note        Interim History:   The patient's care was discussed with the treatment team during the daily team meeting and/or staff's chart notes were reviewed.  Staff report patient is doing well. Had an interview with [employer]     The patient reports that he is doing well. Mood is good. Sleeping and eating well. Denies SI or HI. Denies any psychosis. Says that he got some good news and that he is \"going to an IRTS in the next week or two.\" Says that he has been losing weight intentionally with the Zyprexa taper. Would like to continue this. Open to Abilify but would like to try without a neuroleptic \"because I've never been psychotic.\" Strattera is helping and asks about an increase.          Medications:       [START ON 11/8/2022] atomoxetine  40 mg Oral Daily     buPROPion  300 mg Oral QAM     carbamide peroxide  3 drop Both Ears BID     divalproex sodium delayed-release  1,000 mg Oral BID     hydrochlorothiazide  12.5 mg Oral QAM     nicotine  1 patch Transdermal Daily     nicotine   Transdermal Q8H     OLANZapine  5 mg Oral At Bedtime          Allergies:     Allergies   Allergen Reactions     Penicillins           Labs:   No results found for this or any previous visit (from the past 24 hour(s)).       Psychiatric Examination:     /83   Pulse 77   Temp 97.7  F (36.5  C) (Oral)   Resp 18   Wt 116.1 kg (256 lb)   SpO2 97%   BMI 35.64 kg/m    Weight is 256 lbs 0 oz  Body mass index is 35.64 kg/m .  Orthostatic Vitals       Most Recent      Sitting Orthostatic /81 10/10 0900    Sitting Orthostatic Pulse (bpm) 72 10/10 0900    Standing Orthostatic /79 10/10 0900    Standing Orthostatic Pulse (bpm) 83 10/10 0900            Appearance: awake, alert and adequately groomed  Attitude:  cooperative  Eye Contact:  good  Mood:  good  Affect:  mood congruent  Speech:  clear, coherent  Psychomotor Behavior:  no evidence of " tardive dyskinesia, dystonia, or tics  Thought Process:  goal oriented  Associations:  no loose associations  Thought Content:  no evidence of suicidal ideation or homicidal ideation and no evidence of psychotic thought  Insight:  fair  Judgement:  fair  Oriented to:  time, person, and place  Attention Span and Concentration:  intact  Recent and Remote Memory:  intact         Precautions:     Behavioral Orders   Procedures     Code 1 - Restrict to Unit     Occupational Therapy on the Unit     Not sure if this is the right order but Jannette would come on Monday to see patient     Order Specific Question:   Reason for Consult     Answer:   Eval of thought process, functional skills and behavior     Order Specific Question:   Course of Action:     Answer:   Eval & Treat as indicated     Order Specific Question:   Treatment Prescription:     Answer:   Patient would like 1:1 therapy and some assignment sheets to work on with long admission     Routine Programming     As clinically indicated     Status 15     Every 15 minutes.          Diagnoses:     Mood disorder, unspecified (Substance induced vs 2/2 Bipolar I Disorder)  Amphetamine Use Disorder, moderate  Unspecified personality disorder          Plan:      Orders Placed This Encounter      Routine Programming      Code 1 - Restrict to Unit      Status 15     Increase Strattera (Atomoxetine) to 40mg  Continue Wellbutrin  mg daily  Continue Depakote DR 1,000 mg BID.   Decrease Zyprexa to 5mg at bedtime - May consider Abilify   Continue hydroxyzine 25 mg q4h prn for acute anxiety  Continue melatonin 3mg at bedtime prn for sleep disturbances  Continue Zyprexa 10 mg TID prn for severe agitation       Legal Status: court hold. PD has been revoked. Fully committed with [facility] in place for Risperidone, Invega, Zyprexa, Abilify.        Disposition Plan   Reason for ongoing admission: is unable to care for self due to severe psychosis or julianna  Discharge location: group  home  Discharge Medications: not ordered  Follow-up Appointments: not scheduled  Legal Status: full commitment    Entered by: Jesus Yañez MD on November 7, 2022 at 10:36 AM

## 2022-11-07 NOTE — PLAN OF CARE
Problem: Depression  Goal: Improved Mood  Outcome: Progressing    Problem: Anxiety  Goal: Anxiety Reduction or Resolution  Outcome: Progressing    Patient up and visible on the unit most part of the shift with headphone on listening to music and watching TV, presents with full range affect on approach, pleasant, denies all psych symptoms, denies pain or discomfort, patient is monae for safety, safety checks in place this shift, medication compliant, no PRN given except for nicotine gum, patient is eating and drinking adequately, selectively engaged with peers. No other known concerns at this time, will continue to monitor and offer therapeutic support as needed for the rest of the shift.

## 2022-11-07 NOTE — PLAN OF CARE
Occupational Therapy Group Note:     11/07/22 1400   Group Therapy Session   Group Attendance attended group session   Time Session Began 1015   Time Session Ended 1115   Total Time (minutes) 55   Total # Attendees 2   Group Type task skill   Group Topic Covered coping skills/lifestyle management;problem-solving   Group Session Detail OT clinic   Patient Response/Contribution cooperative with task;organized   Patient Participation Detail Pt actively participated in occupational therapy clinic to facilitate coping skill exploration, creative expression within personally meaningful activities, and clinical observation of social, cognitive, and kinesthetic performance skills. Pt response: Independent to initiate, gather materials, sequence, and adjust to workspace demands as needed. Demonstrated excellent focus, planning, and problem solving for selected multi-step, creative expression task. Able to ask for assistance as needed, and was occasionally social with peers and staff. Organized and efficient in his task approach, and commented that he noticed he has been becoming increasingly efficient with the task. Shared that he gave one of his finished projects to his mom over the weekend. Calm and pleasant throughout.

## 2022-11-07 NOTE — PLAN OF CARE
Assessment/Intervention/Current Symptoms and Care Coordination  Met with patient at his request regarding referral to [facility].  He had called them this morning and was told they were waiting for more information.  [facility] also contacted [facility] and was able to clarify situation for patient.  The admission staff are coordinating with [facility] Care Coordinator to work out Restricted Recipient issues.  They will contact us when they have an answer.  [facility] also received message from [facility], indicating they have declined patient for admission, indicating he is not a good fit given their current milieu.      Discharge Plan or Goal:  Pending stabilization & development of a safe discharge plan.  Considerations include: [facility] placement and IRTS placement       Barriers to Discharge:  Patient requires further psychiatric stabilization due to current symptomology. Door to door transfer. PD revoked. Was recommitted as MI/CD     [facility]

## 2022-11-07 NOTE — PLAN OF CARE
Patient presented with full range affect, mood is bright on approach, he denies all mental health sign and symptoms, was anxious for staff to talk to facility where he might be discharging  and kept on informing multiple staff to call stating that the facility was awaiting a call from the nurse on Unit so he can discharge. SW was informed and she was able to address patient concerns. Patient seeing exercising on Unit after group activity, appeared well groom with adequate intake of food and fluids, interacting with peers and participating in group activities. PRN Nicotine gum given per patient request, continue plan of care.   Problem: Plan of Care - These are the overarching goals to be used throughout the patient stay.    Goal: Optimal Comfort and Wellbeing  Outcome: Progressing     Problem: Behavioral Health Plan of Care  Goal: Optimal Comfort and Wellbeing  Outcome: Progressing     Problem: Social, Occupational or Functional Impairment (Psychotic Signs/Symptoms)  Goal: Enhanced Social, Occupational or Functional Skills (Psychotic Signs/Symptoms)  Outcome: Progressing   Goal Outcome Evaluation:

## 2022-11-08 PROCEDURE — 124N000002 HC R&B MH UMMC

## 2022-11-08 PROCEDURE — 250N000013 HC RX MED GY IP 250 OP 250 PS 637: Performed by: PSYCHIATRY & NEUROLOGY

## 2022-11-08 PROCEDURE — G0177 OPPS/PHP; TRAIN & EDUC SERV: HCPCS

## 2022-11-08 PROCEDURE — 250N000013 HC RX MED GY IP 250 OP 250 PS 637: Performed by: EMERGENCY MEDICINE

## 2022-11-08 PROCEDURE — 250N000013 HC RX MED GY IP 250 OP 250 PS 637

## 2022-11-08 RX ADMIN — DIVALPROEX SODIUM 1000 MG: 500 TABLET, DELAYED RELEASE ORAL at 07:40

## 2022-11-08 RX ADMIN — HYDROCHLOROTHIAZIDE 12.5 MG: 12.5 TABLET ORAL at 07:40

## 2022-11-08 RX ADMIN — ATOMOXETINE 40 MG: 40 CAPSULE ORAL at 07:39

## 2022-11-08 RX ADMIN — NICOTINE POLACRILEX 4 MG: 4 GUM, CHEWING BUCCAL at 21:36

## 2022-11-08 RX ADMIN — NICOTINE 1 PATCH: 14 PATCH, EXTENDED RELEASE TRANSDERMAL at 07:39

## 2022-11-08 RX ADMIN — NICOTINE POLACRILEX 4 MG: 4 GUM, CHEWING BUCCAL at 20:11

## 2022-11-08 RX ADMIN — NICOTINE POLACRILEX 4 MG: 4 GUM, CHEWING BUCCAL at 15:24

## 2022-11-08 RX ADMIN — DIVALPROEX SODIUM 1000 MG: 500 TABLET, DELAYED RELEASE ORAL at 20:11

## 2022-11-08 RX ADMIN — NICOTINE POLACRILEX 4 MG: 4 GUM, CHEWING BUCCAL at 07:40

## 2022-11-08 RX ADMIN — NICOTINE POLACRILEX 4 MG: 4 GUM, CHEWING BUCCAL at 13:21

## 2022-11-08 RX ADMIN — OLANZAPINE 5 MG: 5 TABLET, FILM COATED ORAL at 20:11

## 2022-11-08 RX ADMIN — NICOTINE POLACRILEX 4 MG: 4 GUM, CHEWING BUCCAL at 16:57

## 2022-11-08 RX ADMIN — BUPROPION HYDROCHLORIDE 300 MG: 150 TABLET, EXTENDED RELEASE ORAL at 07:40

## 2022-11-08 RX ADMIN — NICOTINE POLACRILEX 4 MG: 4 GUM, CHEWING BUCCAL at 11:34

## 2022-11-08 RX ADMIN — CARBAMIDE PEROXIDE 6.5% 3 DROP: 6.5 LIQUID AURICULAR (OTIC) at 20:12

## 2022-11-08 ASSESSMENT — ACTIVITIES OF DAILY LIVING (ADL)
ADLS_ACUITY_SCORE: 29
HYGIENE/GROOMING: INDEPENDENT
ADLS_ACUITY_SCORE: 29
ORAL_HYGIENE: INDEPENDENT
ADLS_ACUITY_SCORE: 29
ADLS_ACUITY_SCORE: 29
DRESS: INDEPENDENT
ADLS_ACUITY_SCORE: 29

## 2022-11-08 NOTE — PLAN OF CARE
Patient appeared bright on approach, is visible in Milieu most of the shift and is interactive with peers, he denies anxiety and depression, continue to express concern about his pending discharge plan and is talking it over with peers and staffs frequently throughout the shift. Patient verbalized feeling excited that since the changes were made in his medications, he has dropped down 3 lbs, he is seeing occasionally exercising in room and walking in the waters. Patient denies pain, requested for PRN Nicotine gum x 3, denies SI, SIB, HI and hallucination.   Problem: Plan of Care - These are the overarching goals to be used throughout the patient stay.    Goal: Optimal Comfort and Wellbeing  Outcome: Progressing     Problem: Behavioral Health Plan of Care  Goal: Optimal Comfort and Wellbeing  Outcome: Progressing   Goal Outcome Evaluation:

## 2022-11-08 NOTE — PLAN OF CARE
Occupational Therapy Group Note:     11/08/22 0800   Group Therapy Session   Group Attendance attended group session   Time Session Began 1015   Time Session Ended 1115   Total Time (minutes) 60   Total # Attendees 3-5   Group Type task skill   Group Topic Covered coping skills/lifestyle management;problem-solving   Group Session Detail OT clinic   Patient Response/Contribution cooperative with task;organized   Patient Participation Detail Pt actively participated in occupational therapy clinic to facilitate coping skill exploration, creative expression within personally meaningful activities, and clinical observation of social, cognitive, and kinesthetic performance skills. Pt response: Independent to initiate, gather materials, sequence, and adjust to workspace demands as needed. Demonstrated excellent focus, planning, and problem solving for selected multi-step, creative expression task. Able to ask for assistance as needed, and intermittently socialized with peers and staff. Pleasant and engaged.

## 2022-11-08 NOTE — PLAN OF CARE
Occupational Therapy Group Note:     11/08/22 1400   Group Therapy Session   Group Attendance attended group session   Time Session Began 1115   Time Session Ended 1215   Total Time (minutes) 60   Total # Attendees 3   Group Type task skill;recreation   Group Topic Covered cognitive activities;problem-solving;leisure exploration/use of leisure time   Group Session Detail strategic group game   Patient Response/Contribution cooperative with task;organized;offered helpful suggestions to peers   Patient Participation Detail Pt actively participated in a structured occupational therapy group with a focus on social and leisure engagement via a cognitive task. Pt was able to follow multi-step directions and varying parameters throughout the task. Pt demonstrated good planning, task organization, and strategic thinking. Attentive and engaged throughout full duration of group. Politely offered strategic suggestions to peers throughout.

## 2022-11-08 NOTE — PLAN OF CARE
Assessment/Intervention/Current Symptoms and Care Coordination  Coverage CTC:  Received VM from [facility] declining patient. .    Received call from [facility] stating that they have accepted patient- will fax over their admission paperwork. CTC left VM to coordinate admission/transfer date    Discharge Plan or Goal:  Pending stabilization & development of a safe discharge plan.  Considerations include: [facility] placement and I[facility] placement       Barriers to Discharge:  PD Revoked  Medications continue to be adjusted per MD  Placement -. Door to door transfer     Legal Status  Commitment/[facility]   Patient will need PD

## 2022-11-09 PROCEDURE — 90853 GROUP PSYCHOTHERAPY: CPT

## 2022-11-09 PROCEDURE — 250N000013 HC RX MED GY IP 250 OP 250 PS 637: Performed by: PSYCHIATRY & NEUROLOGY

## 2022-11-09 PROCEDURE — G0177 OPPS/PHP; TRAIN & EDUC SERV: HCPCS

## 2022-11-09 PROCEDURE — 250N000013 HC RX MED GY IP 250 OP 250 PS 637

## 2022-11-09 PROCEDURE — 124N000002 HC R&B MH UMMC

## 2022-11-09 PROCEDURE — 250N000013 HC RX MED GY IP 250 OP 250 PS 637: Performed by: EMERGENCY MEDICINE

## 2022-11-09 PROCEDURE — H2032 ACTIVITY THERAPY, PER 15 MIN: HCPCS

## 2022-11-09 PROCEDURE — 99232 SBSQ HOSP IP/OBS MODERATE 35: CPT | Performed by: PSYCHIATRY & NEUROLOGY

## 2022-11-09 RX ADMIN — NICOTINE POLACRILEX 4 MG: 4 GUM, CHEWING BUCCAL at 17:18

## 2022-11-09 RX ADMIN — DIVALPROEX SODIUM 1000 MG: 500 TABLET, DELAYED RELEASE ORAL at 20:38

## 2022-11-09 RX ADMIN — BUPROPION HYDROCHLORIDE 300 MG: 150 TABLET, EXTENDED RELEASE ORAL at 07:49

## 2022-11-09 RX ADMIN — NICOTINE POLACRILEX 4 MG: 4 GUM, CHEWING BUCCAL at 07:50

## 2022-11-09 RX ADMIN — NICOTINE POLACRILEX 4 MG: 4 GUM, CHEWING BUCCAL at 15:03

## 2022-11-09 RX ADMIN — NICOTINE POLACRILEX 4 MG: 4 GUM, CHEWING BUCCAL at 12:41

## 2022-11-09 RX ADMIN — NICOTINE POLACRILEX 4 MG: 4 GUM, CHEWING BUCCAL at 19:22

## 2022-11-09 RX ADMIN — ATOMOXETINE 40 MG: 40 CAPSULE ORAL at 07:50

## 2022-11-09 RX ADMIN — NICOTINE 1 PATCH: 14 PATCH, EXTENDED RELEASE TRANSDERMAL at 07:53

## 2022-11-09 RX ADMIN — DIVALPROEX SODIUM 1000 MG: 500 TABLET, DELAYED RELEASE ORAL at 07:49

## 2022-11-09 RX ADMIN — HYDROCHLOROTHIAZIDE 12.5 MG: 12.5 TABLET ORAL at 07:50

## 2022-11-09 RX ADMIN — NICOTINE POLACRILEX 4 MG: 4 GUM, CHEWING BUCCAL at 20:38

## 2022-11-09 ASSESSMENT — ACTIVITIES OF DAILY LIVING (ADL)
ADLS_ACUITY_SCORE: 29
ORAL_HYGIENE: INDEPENDENT
HYGIENE/GROOMING: HANDWASHING;INDEPENDENT
ADLS_ACUITY_SCORE: 29
DRESS: INDEPENDENT
ADLS_ACUITY_SCORE: 29
ADLS_ACUITY_SCORE: 29

## 2022-11-09 NOTE — PLAN OF CARE
Problem: Behavioral Health Plan of Care  Goal: Optimized Coping Skills in Response to Life Stressors  Outcome: Progressing  Flowsheets (Taken 11/9/2022 1122)  Optimized Coping Skills in Response to Life Stressors: making progress toward outcome     Problem: Social, Occupational or Functional Impairment (Psychotic Signs/Symptoms)  Goal: Enhanced Social, Occupational or Functional Skills (Psychotic Signs/Symptoms)  Outcome: Progressing  Flowsheets (Taken 11/9/2022 1357)  Mutually Determined Action Steps (Enhanced Social, Occupational or Functional Skills):   identifies support resources   participates in social skills training     Problem: Sleep Disturbance  Goal: Adequate Sleep/Rest  Outcome: Progressing     Problem: Suicidal Behavior  Goal: Suicidal Behavior is Absent or Managed  Outcome: Progressing  Flowsheets (Taken 11/9/2022 1122)  Mutually Determined Action Steps (Suicidal Behavior Absent/Managed): sets future-oriented goal     Problem: Anxiety  Goal: Anxiety Reduction or Resolution  Outcome: Progressing   Goal Outcome Evaluation:  Patient has been visible in the milieu,sociable with peers.Pt attended and participated in groups.Stopped @ desk one time when he wanted to use the computer.Pt is medication compliant.No suicidal thoughts or behaviors.  Patient able to request for Nicotine gum  whenever.NO PRN medication this shift.Denies all mental health issues.  Temp: 98.3  F (36.8  C) Temp src: Oral BP: 116/76 Pulse: 74     SpO2: 97 % O2 Device: None (Room air)

## 2022-11-09 NOTE — PLAN OF CARE
11/09/22 1034   Individualization/Patient Specific Goals   Patient Personal Strengths insight into illness/situation;humor;good impulse control;positive attitude   Patient Vulnerabilities legal concerns;lacks insight into illness;poor impulse control;housing insecurity;food insecurity;substance abuse/addiction;adverse childhood experience(s);limited social skills;history of unsuccessful treatment   Anxieties, Fears or Concerns Lenght of stay, financial concerns, and legal concerns   Individualized Care Needs Will be encouraged to be medication compliant and attend groups   Patient/Family-Specific Goals (Include Timeframe) Attend Tx at [facility]   Interprofessional Rounds   Summary Patient's progress and aftercare disposition   Participants CTC;nursing;advanced practice nurse   Behavioral Team Discussion   Participants Beverly Garcia RN, Chayo Fried CTC   Progress Continuing to assess   Anticipated length of stay No anticipated discharge date   Continued Stay Criteria/Rationale PD revoked, Committed with Agui[facility]. Needs door to door transfer to IRTS facility if accepted . Needs clinical stabilization and medication management   Medical/Physical No medical concerns noted   Precautions See below   Plan Will be discharged to IRTS facility if accepted. Psychiatry Team will meet with patient daily to assess psychiatric needs and to discuss medication options/side effects; during hospitalization patient will be encouraged to attend therapy groups and to participate in unit programming. CTC will coordinate disposition and aftercare plan.   Rationale for change in precautions or plan No change   Safety Plan See below   Anticipated Discharge Disposition [facility]   Mutuality/Individual Preferences   Patient-Specific Goals (Include Timeframe) [facility]   PRECAUTIONS AND SAFETY    Behavioral Orders   Procedures    Code 1 - Restrict to Unit    Occupational Therapy on the Unit     Not sure if this is the right order  but Jannette would come on Monday to see patient     Order Specific Question:   Reason for Consult     Answer:   Eval of thought process, functional skills and behavior     Order Specific Question:   Course of Action:     Answer:   Eval & Treat as indicated     Order Specific Question:   Treatment Prescription:     Answer:   Patient would like 1:1 therapy and some assignment sheets to work on with long admission    Routine Programming     As clinically indicated    Status 15     Every 15 minutes.       Safety  Safety WDL: WDL  Patient Location: patient room, own, lounge, living room  Observed Behavior: calm  Observed Behavior (Comment): attended virtual NA meeting  Safety Measures: safety rounds completed  Diversional Activity: television, music  Suicidality: Status 15  Seizure precautions: clutter free environment  Assault: status 15

## 2022-11-09 NOTE — PLAN OF CARE
Problem: Plan of Care - These are the overarching goals to be used throughout the patient stay.    Goal: Optimal Comfort and Wellbeing  Outcome: Progressing  Intervention: Provide Person-Centered Care  Recent Flowsheet Documentation  Taken 11/8/2022 1629 by Leandro Merlos RN  Trust Relationship/Rapport:    emotional support provided    empathic listening provided     Problem: Behavioral Health Plan of Care  Goal: Optimal Comfort and Wellbeing  Outcome: Progressing  Intervention: Provide Person-Centered Care  Recent Flowsheet Documentation  Taken 11/8/2022 1629 by Leandro Merlos RN  Trust Relationship/Rapport:    emotional support provided    empathic listening provided  Goal: Optimized Coping Skills in Response to Life Stressors  Outcome: Progressing  Intervention: Promote Effective Coping Strategies  Recent Flowsheet Documentation  Taken 11/8/2022 1629 by Leandro Merlos RN  Supportive Measures: decision-making supported     Problem: Anxiety  Goal: Anxiety Reduction or Resolution  Outcome: Progressing  Intervention: Promote Anxiety Reduction  Recent Flowsheet Documentation  Taken 11/8/2022 1629 by Leandro Merlos RN  Supportive Measures: decision-making supported  Complementary Therapy: essential oils utilized  Family/Support System Care:    presence promoted    involvement promoted   Goal Outcome Evaluation:    Plan of Care Reviewed With: patient           No abnormal behavior present or observed. Patient is visible in the milieu and interacts okay with peers and staff. Denies all psych symptoms and contracts for safety.Appetite is good and mood/affect is pleasant and bright.  No psychosis present and patient is alert and oriented x4. Will continue to monitor and provide care.

## 2022-11-09 NOTE — PLAN OF CARE
Patient appeared to be resting most of the night, up at 0200 and requested for snack and returned to room, he slept for 6.75 hours this shift, safety checks completed, continue plan of care.  Problem: Sleep Disturbance  Goal: Adequate Sleep/Rest  Outcome: Progressing   Goal Outcome Evaluation:

## 2022-11-09 NOTE — PLAN OF CARE
11/09/22 1304   Group Therapy Session   Group Attendance attended group session   Time Session Began 1115   Time Session Ended 1215   Total Time (minutes) 60   Total # Attendees 6   Group Type recreation;task skill   Group Topic Covered cognitive activities;coping skills/lifestyle management;problem-solving;leisure exploration/use of leisure time;structured socialization   Group Session Detail OT Clinic group for social engagement, problem solving, self expression, symptom management, healthy leisure exploration, focus, healthy distraction, and creativity   Patient Response/Contribution able to recall/repeat info presented;cooperative with task;listened actively;offered helpful suggestions to peers;organized   Patient Participation Detail pt actively engaged in familiar beading task. pt was polite and pleasant during interactions with staff and peers. pt was independent with beading project. pt readily assisted peers and offered helpful suggestions and positive feedback.

## 2022-11-09 NOTE — PROGRESS NOTES
"[facility] [place]   Psychiatric Progress Note        Interim History:   The patient's care was discussed with the treatment team during the daily team meeting and/or staff's chart notes were reviewed.  Staff report patient is doing well.  will come Monday.     The patient reports that he is doing well. Mood is good. Sleeping and eating well. Denies SI or HI. Denies any psychosis. Says that he got some good news but that his  \"has concerns.\" Says that she told him that the facility \"had some complaints from the state\" and that \"patients there don't make it to their appointments.\" Says that she said \"It's a hard no.\" He is processing this information well. Has lost 4# since tapering Zyprexa and would like to stop it.          Medications:       atomoxetine  40 mg Oral Daily     buPROPion  300 mg Oral QAM     carbamide peroxide  3 drop Both Ears BID     divalproex sodium delayed-release  1,000 mg Oral BID     hydrochlorothiazide  12.5 mg Oral QAM     nicotine  1 patch Transdermal Daily     nicotine   Transdermal Q8H          Allergies:     Allergies   Allergen Reactions     Penicillins           Labs:   No results found for this or any previous visit (from the past 24 hour(s)).       Psychiatric Examination:     /76 (BP Location: Left arm)   Pulse 74   Temp 98.3  F (36.8  C) (Oral)   Resp 16   Wt 115.5 kg (254 lb 9.6 oz)   SpO2 97%   BMI 35.45 kg/m    Weight is 254 lbs 9.6 oz  Body mass index is 35.45 kg/m .  Orthostatic Vitals       Most Recent      Sitting Orthostatic /81 10/10 0900    Sitting Orthostatic Pulse (bpm) 72 10/10 0900    Standing Orthostatic /79 10/10 0900    Standing Orthostatic Pulse (bpm) 83 10/10 0900            Appearance: awake, alert and adequately groomed  Attitude:  cooperative  Eye Contact:  good  Mood:  good  Affect:  mood congruent  Speech:  clear, coherent  Psychomotor Behavior:  no evidence of tardive dyskinesia, " dystonia, or tics  Thought Process:  goal oriented  Associations:  no loose associations  Thought Content:  no evidence of suicidal ideation or homicidal ideation and no evidence of psychotic thought  Insight:  fair  Judgement:  fair  Oriented to:  time, person, and place  Attention Span and Concentration:  intact  Recent and Remote Memory:  intact         Precautions:     Behavioral Orders   Procedures     Code 1 - Restrict to Unit     Occupational Therapy on the Unit     Not sure if this is the right order but Jannette would come on Monday to see patient     Order Specific Question:   Reason for Consult     Answer:   Eval of thought process, functional skills and behavior     Order Specific Question:   Course of Action:     Answer:   Eval & Treat as indicated     Order Specific Question:   Treatment Prescription:     Answer:   Patient would like 1:1 therapy and some assignment sheets to work on with long admission     Routine Programming     As clinically indicated     Status 15     Every 15 minutes.          Diagnoses:     Mood disorder, unspecified (Substance induced vs 2/2 Bipolar I Disorder)  Amphetamine Use Disorder, moderate  Unspecified personality disorder          Plan:      Orders Placed This Encounter      Routine Programming      Code 1 - Restrict to Unit      Status 15     Continue Strattera (Atomoxetine) 40mg daily  Continue Wellbutrin  mg daily  Continue Depakote DR 1,000 mg BID.   Stop Zyprexa - May consider Abilify if needed  Continue hydroxyzine 25 mg q4h prn for acute anxiety  Continue melatonin 3mg at bedtime prn for sleep disturbances  Continue Zyprexa 10 mg TID prn for severe agitation       Legal Status: court hold. PD has been revoked. Fully committed with [facility] in place for Risperidone, Invega, Zyprexa, Abilify.        Disposition Plan   Reason for ongoing admission: is unable to care for self due to severe psychosis or julianna  Discharge location: group home  Discharge Medications:  not ordered  Follow-up Appointments: not scheduled  Legal Status: full commitment    Entered by: Jesus Yañez MD on November 9, 2022 at 11:16 AM

## 2022-11-09 NOTE — PROGRESS NOTES
Pt was a leader in dance/movement therapy (D/MT) for vitalization and group belonging.  Interventions focused on connecting with self and others with increasing energy and pt remained regulated and engaged.  He did express some anger about placement issues, but this was short-lived with a smile on his face.         11/09/22 1015   Expressive Therapy   Therapy Type dance/movement   Minutes of Treatment 50

## 2022-11-09 NOTE — PLAN OF CARE
Problem: Behavioral Health Plan of Care  Goal: Optimized Coping Skills in Response to Life Stressors  Outcome: Progressing  Flowsheets (Taken 11/9/2022 1122)  Optimized Coping Skills in Response to Life Stressors: making progress toward outcome     Problem: Suicidal Behavior  Goal: Suicidal Behavior is Absent or Managed  Outcome: Progressing  Flowsheets (Taken 11/9/2022 1122)  Mutually Determined Action Steps (Suicidal Behavior Absent/Managed): sets future-oriented goal   Goal Outcome Evaluation:  Patient has been visible in the milieu.Sociable with peers.Able to verbalize needs.Participated in group therapy.Denies pain,anxiety and depression.No suicidal thoughts/safety issues noted.Adequately groomed and eating well.  Temp: 98.3  F (36.8  C) Temp src: Oral BP: 116/76 Pulse: 74     SpO2: 97 % O2 Device: None (Room air)

## 2022-11-09 NOTE — PLAN OF CARE
11/09/22 1534   Group Therapy Session   Group Attendance attended group session   Time Session Began 1325   Time Session Ended 1500   Total Time (minutes) 60   Total # Attendees 5   Group Type recreation;task skill   Group Topic Covered cognitive activities;coping skills/lifestyle management;problem-solving;leisure exploration/use of leisure time;structured socialization   Group Session Detail OT Wellness group for social engagement, cognition, turn taking, following directions, focus, symptom management, healthy leisure exploration, healthy distraction, frustration tolerance, healthy competition, and strategic planning   Patient Response/Contribution able to recall/repeat info presented;cooperative with task;expressed understanding of topic;listened actively;offered helpful suggestions to peers;organized   Patient Participation Detail pt arrived to group after phone interview. pt was polite and pleasant. pt was readily able to  and participate in tabletop dice activity. pt shared excitement about teaching peers another dice game. pt demonstrated good leadership and instructional skills while explaining game to peers and participating in activities

## 2022-11-10 ENCOUNTER — TELEPHONE (OUTPATIENT)
Dept: BEHAVIORAL HEALTH | Facility: CLINIC | Age: 32
End: 2022-11-10

## 2022-11-10 PROBLEM — F15.94: Status: ACTIVE | Noted: 2022-11-10

## 2022-11-10 PROCEDURE — 250N000013 HC RX MED GY IP 250 OP 250 PS 637: Performed by: EMERGENCY MEDICINE

## 2022-11-10 PROCEDURE — 250N000013 HC RX MED GY IP 250 OP 250 PS 637: Performed by: PSYCHIATRY & NEUROLOGY

## 2022-11-10 PROCEDURE — 250N000013 HC RX MED GY IP 250 OP 250 PS 637

## 2022-11-10 PROCEDURE — G0177 OPPS/PHP; TRAIN & EDUC SERV: HCPCS

## 2022-11-10 PROCEDURE — 124N000002 HC R&B MH UMMC

## 2022-11-10 PROCEDURE — 99233 SBSQ HOSP IP/OBS HIGH 50: CPT | Performed by: PSYCHIATRY & NEUROLOGY

## 2022-11-10 RX ADMIN — NICOTINE POLACRILEX 4 MG: 4 GUM, CHEWING BUCCAL at 07:56

## 2022-11-10 RX ADMIN — NICOTINE POLACRILEX 4 MG: 4 GUM, CHEWING BUCCAL at 16:14

## 2022-11-10 RX ADMIN — DIVALPROEX SODIUM 1000 MG: 500 TABLET, DELAYED RELEASE ORAL at 07:55

## 2022-11-10 RX ADMIN — NICOTINE POLACRILEX 4 MG: 4 GUM, CHEWING BUCCAL at 18:05

## 2022-11-10 RX ADMIN — DIVALPROEX SODIUM 1000 MG: 500 TABLET, DELAYED RELEASE ORAL at 19:13

## 2022-11-10 RX ADMIN — NICOTINE POLACRILEX 4 MG: 4 GUM, CHEWING BUCCAL at 14:32

## 2022-11-10 RX ADMIN — ATOMOXETINE 40 MG: 40 CAPSULE ORAL at 07:55

## 2022-11-10 RX ADMIN — BUPROPION HYDROCHLORIDE 300 MG: 150 TABLET, EXTENDED RELEASE ORAL at 07:56

## 2022-11-10 RX ADMIN — NICOTINE POLACRILEX 4 MG: 4 GUM, CHEWING BUCCAL at 12:22

## 2022-11-10 RX ADMIN — NICOTINE POLACRILEX 4 MG: 4 GUM, CHEWING BUCCAL at 10:17

## 2022-11-10 RX ADMIN — NICOTINE 1 PATCH: 14 PATCH, EXTENDED RELEASE TRANSDERMAL at 07:56

## 2022-11-10 RX ADMIN — HYDROCHLOROTHIAZIDE 12.5 MG: 12.5 TABLET ORAL at 07:55

## 2022-11-10 ASSESSMENT — ACTIVITIES OF DAILY LIVING (ADL)
ADLS_ACUITY_SCORE: 29
DRESS: INDEPENDENT
ORAL_HYGIENE: INDEPENDENT
ADLS_ACUITY_SCORE: 29
ADLS_ACUITY_SCORE: 29
HYGIENE/GROOMING: HANDWASHING;INDEPENDENT

## 2022-11-10 NOTE — TELEPHONE ENCOUNTER
Mental Health &Addiction (MH&A)Transition Clinic (TC):     Provides Patient Support While Waiting to Access Programmatic and Outpatient MH&A Care and Provides Select Crisis Assessment Services     NURSING Referral Review  _________________________________________    This RN has reviewed this Medication Management referral to the Transition Clinic and deemed the referral   [x] Appropriate.  Patient discharging from [facility] 11/14/22 to [facility] Admission Date/Time: 11/14/22 for IRTS  Address: [id number] [place], [place] [address] [address].  Phone: [phone]. Patient is under Commitment with [facility]  [] Inappropriate   []Consulting     Based on the following criteria:    Pt has a psychiatric provider (or pending plan) in place for future prescribing: Yes:   Provider(s)[facility]   Location Online   Date/Time 1/10/2023 at 10:00 AM   Psychiatry Provider: Joseph Bassett MSN,  PMHNP-BC  Phone: [phone]     Timeframe until pt's scheduled psychiatry appointment is less than 6 months: Yes: ~ 2 months.       Pt takes psychiatric medications: Yes:  atomoxetine (STRATTERA) capsule 40 mg, prazosin (MINIPRESS) capsule 1 mg, divalproex sodium delayed-release (DEPAKOTE) DR tablet 1,000 mg, melatonin tablet 3 mg, hydrOXYzine (ATARAX) tablet 25 mg, OLANZapine (zyPREXA) tablet 10 mg, OLANZapine (zyPREXA) tablet 10 mg, buPROPion (WELLBUTRIN XL) 24 hr tablet 300 mg.      Pt's goals seem to align with this temporary service: Yes: Transition Clinic to bridge psychiatric care and psychiatric medication management until next Level of Care.         Any additional pertinent information regarding this referral: Recent ED visit and subsequent IP admission 9/8/22 to current date.  Patient scheduled for discharge today. Hx of Bipolar Disorder and Amphetamine abuse. Unspecified personality disorder, Historical diagnosis of ADHD, TBI in 2016. He was at [facility]  program and was discharged 8/22.  He was at an IRTS facility and relapsed on meth this past weekend. He then became aggressive and was brought to [facility].  His  has begun the revocation process. Pt was brought in from [facility] but was living at the [facility] an IRT facility. MARIA EUGENIA Garcia [phone]. Has a Rule 20 and is under civil commitment with [employer].      Initial contact w/ patient/parent: TC RN spoke with IP  Chayo Egan.  This patient will be discharged to IRTS facility in [facility].  She requested that he be scheduled 11/29/22 at 10:30 a.m. for a New Person Video Visit with Gifty Infante.  His contact information for the visit is:  email  [email or web address] and/or phone [phone] (Mobile)       Additional Scheduling Instructions for Transition Clinic Coordinator:     TC Coordinators:  This is a medication management and therapy Referral.        Please schedule this patient with TC Provider Gifty Infante 11/29/22 at 10:30 a.m. for a New Person Video visit. Link information email  [email or web address] and/or phone [phone] (Mobile)            RN Signature  Jaspreet Robison RN on 11/10/2022 at 12:48 PM                Josi Velasquez Transition Clinic Rn Pool  Hello,     Pt currently pending admission -       Next Level of Care Patient Will Be Transitioned To: Out patient Clinic   Provider(s)[facility]   Location Online   Date/Time 1/10/2023 at 10:00 AM         Thank you!           Previous Messages     ----- Message -----   From: Chayo Thomas, BAYRON   Sent: 11/10/2022  10:29 AM CST   To: Transition Clinic   Subject: Transition Clinic Referral for therapy and m*     Transition Clinic Ref[place]   [place]         Please Check Type of Referral Requested:       __X__THERAPY: The Transition clinic is able to schedule patients without current medical insurance; these patient will be referred to our Social Work Care  Coordinator for Medical Insurance              Assistance. We are open for referral for psychotherapy. Patient is referred from:  Inpatient Mental Health Unit at [facility]       __X__MEDICATION:  Referrals for Medication are ONLY accepted from the following areas (select): Inpatient Mental Health Unit                                       Suboxone and Opioid Management Referrals are automatically denied. TC Psychiatry cannot see patient without active medical insurance.         Referring Provider Contact Name: Toni Lin; Phone Number: [phone]     Reason for Transition Clinic Referral: Next med mgmt and therapy appts are until January 2023     Next Level of Care Patient Will Be Transitioned To: Out patient Clinic   Provider(s)[facility]   Location Online   Date/Time 1/10/2023 at 10:00 AM                   Psychiatry Provider: Joseph Bassett MSN,  PMHNP-BC  Phone: [phone]    What Would Be Helpful from the Transition Clinic: Therapy and med mgmt to bridge until next appts      Needs: NO     Does Patient Have Access to Technology: Yes     Patient E-mail Address: [email or web address]     Current Patient Phone Number: [phone]     Clinician Gender Preference (if applicable): NO     Patient location preference: Bhakti Egan MA, [facility]

## 2022-11-10 NOTE — PLAN OF CARE
Assessment/Intervention/Current Symptoms and Care Coordination  Coverage CTC:    - Patient completed phone interview with [facility] on 11/10/2022. Patient was approved for services with admission date 11/14 time [facility].     - Writer notified patient's CM and [facility] restricted recipient coordinator about upcoming discharge.     - Writer completed referral to [facility] Transition [facility]. Appointment scheduled for 11/29 at 10:30 AM.      - Per  at [place], patient will need 30 day med supply with him at discharge. Will also need order for Zyprexa PRN.     - Writer called [facility] [employer] at [phone]. Writer scheduled transportation services will be provided by [employer] for 11/14 with a  time of 9:00 AM.        Discharge Plan or Goal:  Pending stabilization & development of a safe discharge plan.  Considerations include: [facility] placement and IRTS placement       Barriers to Discharge:  PD Revoked  Medications continue to be adjusted per MD  Placement -. Door to door transfer     Legal Status  Commitment/[facility]   Patient will need PD

## 2022-11-10 NOTE — TELEPHONE ENCOUNTER
"Per Jaspreet \"[12:51 PM] Jaspreet Robison.  Sending you a completed referral for a patient that is still IP until Monday.  Please schedule this patient 11/29/22 at 10:30 New Person VV Link email  [email or web address] and/or phone [phone] (Mobile) NO NEED TO CALL THE PATIENT.  MRN:  [id number]. \"      Josi Velasquez  11/10/22  1255    ----- Message from Jaspreet Robison RN sent at 11/10/2022 12:51 PM CST -----  Regarding: FW: Transition Clinic Referral for therapy and med management   Mental Health &Addiction (MH&A)Transition Clinic (TC):     Provides Patient Support While Waiting to Access Programmatic and Outpatient MH&A Care and Provides Select Crisis Assessment Services     NURSING Referral Review  _________________________________________    This RN has reviewed this Medication Management referral to the Transition Clinic and deemed the referral    Appropriate. x Patient discharging from [facility] 11/14/22 to [facility] Admission Date/Time: 11/14/22 for IRTS  Address: [address] [place] [address].  Phone: [phone]. Patient is under Commitment with [facility]   Inappropriate   Consulting     Based on the following criteria:    Pt has a psychiatric provider (or pending plan) in place for future prescribing: Yes:   Provider(s)[facility]   Location Online   Date/Time 1/10/2023 at 10:00 AM   Psychiatry Provider: Joseph Bassett MSN,  PMHNP-BC  Phone: [phone]     Timeframe until pt's scheduled psychiatry appointment is less than 6 months: Yes: ~ 2 months.       Pt takes psychiatric medications: Yes:  atomoxetine (STRATTERA) capsule 40 mg, prazosin (MINIPRESS) capsule 1 mg, divalproex sodium delayed-release (DEPAKOTE) DR tablet 1,000 mg, melatonin tablet 3 mg, hydrOXYzine (ATARAX) tablet 25 mg, OLANZapine (zyPREXA) tablet 10 mg, OLANZapine (zyPREXA) tablet 10 mg, buPROPion (WELLBUTRIN XL) 24 hr tablet 300 mg.      Pt's goals " seem to align with this temporary service: Yes: Transition Clinic to bridge psychiatric care and psychiatric medication management until next Level of Care.         Any additional pertinent information regarding this referral: Recent ED visit and subsequent IP admission 9/8/22 to current date.  Patient scheduled for discharge today. Hx of Bipolar Disorder and Amphetamine abuse. Unspecified personality disorder, Historical diagnosis of ADHD, TBI in 2016. He was at [facility] and was discharged 8/22.  He was at an IRTS facility and relapsed on meth this past weekend. He then became aggressive and was brought to prison.  His  has begun the revocation process. Pt was brought in from prison but was living at the [facility] IRT facility. MARIA EUGENIA Garcia [phone]. Has a Rule 20 and is under civil commitment with [employer].      Initial contact w/ patient/parent: TC RN spoke with IP  Chayo Egan.  This patient will be discharged to IRTS facility in [facility].  She requested that he be scheduled 11/29/22 at 10:30 a.m. for a New Person Video Visit with Gifty Infante.  His contact information for the visit is:  email  [email or web address] and/or phone [phone] (Mobile)       Additional Scheduling Instructions for Transition Clinic Coordinator:     TC Coordinators:  This is a medication management and therapy Referral.        Please schedule this patient with TC Provider Gifty Infante 11/29/22 at 10:30 a.m. for a New Person Video visit. Link information email  [email or web address] and/or phone [phone] (Mobile)            RN Signature  Jaspreet Robison RN on 11/10/2022 at 12:48 PM  ----- Message -----  From: Josi Velasquez  Sent: 11/10/2022  10:31 AM CST  To: Transition Clinic Rn Juan Carlos  Subject: FW: Transition Clinic Referral for therapy a#    Hello,    Pt currently pending admission -      Next Level of Care Patient Will Be Transitioned To: Out patient Clinic    Provider[facility]   Location Online   Date/Time 1/10/2023 at 10:00 AM        Thank you!  ----- Message -----  From: Chayo Thomas MSW  Sent: 11/10/2022  10:29 AM CST  To: Transition Clinic  Subject: Transition Clinic Referral for therapy and m#    Transition Clinic Ref[place]   [place]        Please Check Type of Referral Requested:       __X__THERAPY: The Transition clinic is able to schedule patients without current medical insurance; these patient will be referred to our Social Work Care Coordinator for Medical Insurance              Assistance. We are open for referral for psychotherapy. [facility]       __X__MEDICATION:  Referrals for Medication are ONLY accepted from the following areas (select): Inpatient Mental Health Unit                                       Suboxone and Opioid Management Referrals are automatically denied. TC Psychiatry cannot see patient without active medical insurance.         Referring Provider Contact Name: Toni Lin; Phone Number: [phone]    Reason for Transition Clinic Referral: Next med mgmt and therapy appts are until January 2023    Next Level of Care Patient Will Be Transitioned To: Out patient Clinic  Provider(s)[facility]   Location Online  Date/Time 1/10/2023 at 10:00 AM    What Would Be Helpful from the Transition Clinic: Therapy and med mgmt to bridge until next appts     Needs: NO    Does Patient Have Access to Technology: Yes    Patient E-mail Address: [email or web address]    Current Patient Phone Number: [phone]    Clinician Gender Preference (if applicable): NO    Patient location preference: Bhakti Egan MA, [facility]

## 2022-11-10 NOTE — TELEPHONE ENCOUNTER
Writer has fwd medication management referral only to TC RN pool as next level of care set and replied to referral source. Will wait to hear if referral is appropriate or inappropriate to call for therapy and psychiatry as patient is discharging now.    Josi Velasquez  11/10/22  1032    ----- Message from BAYRON Saeed sent at 11/10/2022 10:20 AM CST -----  Regarding: Transition Clinic Referral for therapy and med management  Transition Clinic Ref[place]   [place]        Please Check Type of Referral Requested:       __X__THERAPY: The Transition clinic is able to schedule patients without current medical insurance; these patient will be referred to our Social Work Care Coordinator for Medical Insurance              Assistance. We are open for referral for psychotherapy. Patient is referred from:  Inpatient Mental Health Unit at [facility]       __X__MEDICATION:  Referrals for Medication are ONLY accepted from the following areas (select): Inpatient Mental Health Unit                                       Suboxone and Opioid Management Referrals are automatically denied. TC Psychiatry cannot see patient without active medical insurance.         Referring Provider Contact Name: Toni Lin; Phone Number: [phone]    Reason for Transition Clinic Referral: Next med mgmt and therapy appts are until January 2023    Next Level of Care Patient Will Be Transitioned To: Out patient Clinic  Provider(s)[facility]   Location Online  Date/Time 1/10/2023 at 10:00 AM    What Would Be Helpful from the Transition Clinic: Therapy and med mgmt to bridge until next appts     Needs: NO    Does Patient Have Access to Technology: Yes    Patient E-mail Address: [email or web address]    Current Patient Phone Number: [phone]    Clinician Gender Preference (if applicable): NO    Patient location preference: Virtual    Chayo Egan MA,  LADC

## 2022-11-10 NOTE — PLAN OF CARE
Problem: Plan of Care - These are the overarching goals to be used throughout the patient stay.    Goal: Absence of Hospital-Acquired Illness or Injury  Intervention: Prevent Skin Injury  Recent Flowsheet Documentation  Taken 11/9/2022 1729 by Angelo Cruz RN  Body Position: position changed independently     Problem: Fall Injury Risk  Goal: Absence of Fall and Fall-Related Injury  Outcome: Progressing     Problem: Suicidal Behavior  Goal: Suicidal Behavior is Absent or Managed  Recent Flowsheet Documentation  Taken 11/9/2022 1900 by Angelo Cruz RN  Mutually Determined Action Steps (Suicidal Behavior Absent/Managed): sets future-oriented goal     Problem: Suicidal Behavior  Goal: Suicidal Behavior is Absent or Managed  Outcome: Progressing  Flowsheets (Taken 11/9/2022 1900)  Mutually Determined Action Steps (Suicidal Behavior Absent/Managed): sets future-oriented goal   Goal Outcome Evaluation:    Plan of Care Reviewed With: patient        Patient was visible in milieu, he was social and interactive with peers and staff members. Alert and oriented x4, able to communicate needs and feelings, excited about discharge planning interview done in the AM. He was pleasant, cooperative and compliant with medications. He denied any pain or discomfort. Denied anxiety or depression, no SI/HI, contracted for safety.

## 2022-11-10 NOTE — PLAN OF CARE
Occupational Therapy Group Note:     11/10/22 1400   Group Therapy Session   Group Attendance attended group session   Time Session Began 1315   Time Session Ended 1415   Total Time (minutes) 45   Total # Attendees 4   Group Type task skill;recreation   Group Topic Covered cognitive activities;problem-solving;leisure exploration/use of leisure time   Group Session Detail cognitive group game   Patient Response/Contribution cooperative with task;organized;offered helpful suggestions to peers   Patient Participation Detail Pt actively participated in a structured occupational therapy group with a focus on social and leisure engagement via a cognitive task. Pt was able to follow multi-step directions and varying parameters throughout the task. Pt demonstrated good planning, task organization, and strategic thinking. Independent in completing the cognitive component of the task. Attentive and engaged throughout full duration of group. Politely assisted a peer throughout. Pleasant in interactions, and excitedly reported his plan to discharge on Monday.

## 2022-11-10 NOTE — PLAN OF CARE
Problem: Plan of Care - These are the overarching goals to be used throughout the patient stay.    Goal: Optimal Comfort and Wellbeing  Outcome: Progressing     Problem: Fall Injury Risk  Goal: Absence of Fall and Fall-Related Injury  Outcome: Progressing     Problem: Behavioral Health Plan of Care  Goal: Optimal Comfort and Wellbeing  Outcome: Progressing  Goal: Patient-Specific Goal (Individualization)  Outcome: Progressing  Goal: Absence of New-Onset Illness or Injury  Outcome: Progressing  Goal: Optimized Coping Skills in Response to Life Stressors  Outcome: Progressing  Goal: Team Discussion  Outcome: Progressing     Problem: Social, Occupational or Functional Impairment (Psychotic Signs/Symptoms)  Goal: Enhanced Social, Occupational or Functional Skills (Psychotic Signs/Symptoms)  Outcome: Progressing     Problem: Sleep Disturbance  Goal: Adequate Sleep/Rest  Outcome: Progressing     Problem: Suicidal Behavior  Goal: Suicidal Behavior is Absent or Managed  Outcome: Progressing  Flowsheets (Taken 11/10/2022 1326)  Mutually Determined Action Steps (Suicidal Behavior Absent/Managed): sets future-oriented goal     Problem: Depression  Goal: Improved Mood  Outcome: Progressing     Problem: Anxiety  Goal: Anxiety Reduction or Resolution  Outcome: Progressing     Problem: Plan of Care - These are the overarching goals to be used throughout the patient stay.    Goal: Optimal Comfort and Wellbeing  Outcome: Progressing     Problem: Fall Injury Risk  Goal: Absence of Fall and Fall-Related Injury  Outcome: Progressing     Problem: Behavioral Health Plan of Care  Goal: Optimal Comfort and Wellbeing  Outcome: Progressing  Goal: Patient-Specific Goal (Individualization)  Outcome: Progressing  Goal: Absence of New-Onset Illness or Injury  Outcome: Progressing  Goal: Optimized Coping Skills in Response to Life Stressors  Outcome: Progressing  Goal: Team Discussion  Outcome: Progressing     Problem: Social, Occupational or  Functional Impairment (Psychotic Signs/Symptoms)  Goal: Enhanced Social, Occupational or Functional Skills (Psychotic Signs/Symptoms)  Outcome: Progressing     Problem: Sleep Disturbance  Goal: Adequate Sleep/Rest  Outcome: Progressing     Problem: Suicidal Behavior  Goal: Suicidal Behavior is Absent or Managed  Outcome: Progressing  Flowsheets (Taken 11/10/2022 8083)  Mutually Determined Action Steps (Suicidal Behavior Absent/Managed): sets future-oriented goal     Problem: Depression  Goal: Improved Mood  Outcome: Progressing     Problem: Anxiety  Goal: Anxiety Reduction or Resolution  Outcome: Progressing   Goal Outcome Evaluation:  Patient has been visible in the milieu.he has been interactive with peers and staff.patient will discharge on Monday to [facility].Denies all mental issues.Denies pain.Adequate food intake and hydration.Pleasant.  Temp: 98  F (36.7  C) Temp src: Oral Bp 137/87 Pulse: 76   Resp: 18 SpO2: 99 % O2 Device: None (Room air)

## 2022-11-10 NOTE — PLAN OF CARE
"  11/09/22 2133   Group Therapy Session   Group Attendance attended group session   Time Session Began 2025   Time Session Ended 2100   Total Time (minutes) 35   Total # Attendees 4   Group Type Psychotherapy   Group Topic Covered Cognitive Behavioral Therapy, narrative, and expressive modalities for insight orientation and self-motivation. The schema and self ideation, self construct and grit exploration in times of crisis and low motivation.   Group Session Detail Group was facilitated around self-identifying challenges and brainstorming around strategies to address specific symptoms related to depression, using cognitive behavioral approaches. Patients reviewed the activities of the day, discussed expectations for the next day, and identified specific goals they intend to achieve - by way of contributing to their own treatment. Strategies for addressing depression were discussed including - reflecting on what has worked in the past in coping with depression and activating such tools (music was identified in this regard), generating questions, and providing feedback and complements to one another around helpful/tried and effective ideas.   Patient Response/Contribution Pt participated in some part of the group. Reported that he was rejected by a placement today and was interviewed by another. Pt reported that he was having a \"polarized day.\" Reported that he is looking forward to having a positive outcome so as to have a great place to discharge to.   Patient Participation Detail Provided feedback and was thankful for the session.       "

## 2022-11-11 PROCEDURE — 99232 SBSQ HOSP IP/OBS MODERATE 35: CPT | Performed by: PSYCHIATRY & NEUROLOGY

## 2022-11-11 PROCEDURE — 250N000013 HC RX MED GY IP 250 OP 250 PS 637: Performed by: EMERGENCY MEDICINE

## 2022-11-11 PROCEDURE — 250N000013 HC RX MED GY IP 250 OP 250 PS 637

## 2022-11-11 PROCEDURE — 250N000013 HC RX MED GY IP 250 OP 250 PS 637: Performed by: PSYCHIATRY & NEUROLOGY

## 2022-11-11 PROCEDURE — 124N000002 HC R&B MH UMMC

## 2022-11-11 PROCEDURE — H2032 ACTIVITY THERAPY, PER 15 MIN: HCPCS

## 2022-11-11 RX ORDER — ATOMOXETINE 40 MG/1
40 CAPSULE ORAL DAILY
Qty: 30 CAPSULE | Refills: 0 | Status: SHIPPED | OUTPATIENT
Start: 2022-11-12 | End: 2022-11-29

## 2022-11-11 RX ORDER — HYDROCHLOROTHIAZIDE 12.5 MG/1
12.5 TABLET ORAL EVERY MORNING
Qty: 30 TABLET | Refills: 0 | Status: SHIPPED | OUTPATIENT
Start: 2022-11-11 | End: 2022-12-01

## 2022-11-11 RX ORDER — OLANZAPINE 10 MG/1
10 TABLET ORAL 3 TIMES DAILY PRN
Qty: 30 TABLET | Refills: 0 | Status: SHIPPED | OUTPATIENT
Start: 2022-11-11 | End: 2022-11-29 | Stop reason: DRUGHIGH

## 2022-11-11 RX ORDER — BUPROPION HYDROCHLORIDE 300 MG/1
300 TABLET ORAL EVERY MORNING
Qty: 30 TABLET | Refills: 0 | Status: SHIPPED | OUTPATIENT
Start: 2022-11-12 | End: 2022-11-29 | Stop reason: DRUGHIGH

## 2022-11-11 RX ORDER — DIVALPROEX SODIUM 500 MG/1
1000 TABLET, DELAYED RELEASE ORAL 2 TIMES DAILY
Qty: 60 TABLET | Refills: 0 | Status: SHIPPED | OUTPATIENT
Start: 2022-11-11 | End: 2022-11-29

## 2022-11-11 RX ADMIN — CARBAMIDE PEROXIDE 6.5% 3 DROP: 6.5 LIQUID AURICULAR (OTIC) at 07:57

## 2022-11-11 RX ADMIN — NICOTINE POLACRILEX 4 MG: 4 GUM, CHEWING BUCCAL at 22:17

## 2022-11-11 RX ADMIN — NICOTINE POLACRILEX 4 MG: 4 GUM, CHEWING BUCCAL at 16:02

## 2022-11-11 RX ADMIN — DIVALPROEX SODIUM 1000 MG: 500 TABLET, DELAYED RELEASE ORAL at 07:57

## 2022-11-11 RX ADMIN — NICOTINE 1 PATCH: 14 PATCH, EXTENDED RELEASE TRANSDERMAL at 07:55

## 2022-11-11 RX ADMIN — ATOMOXETINE 40 MG: 40 CAPSULE ORAL at 07:57

## 2022-11-11 RX ADMIN — NICOTINE POLACRILEX 4 MG: 4 GUM, CHEWING BUCCAL at 20:03

## 2022-11-11 RX ADMIN — NICOTINE POLACRILEX 4 MG: 4 GUM, CHEWING BUCCAL at 12:16

## 2022-11-11 RX ADMIN — NICOTINE POLACRILEX 4 MG: 4 GUM, CHEWING BUCCAL at 07:57

## 2022-11-11 RX ADMIN — HYDROCHLOROTHIAZIDE 12.5 MG: 12.5 TABLET ORAL at 07:57

## 2022-11-11 RX ADMIN — NICOTINE POLACRILEX 4 MG: 4 GUM, CHEWING BUCCAL at 18:15

## 2022-11-11 RX ADMIN — NICOTINE POLACRILEX 4 MG: 4 GUM, CHEWING BUCCAL at 10:00

## 2022-11-11 RX ADMIN — BUPROPION HYDROCHLORIDE 300 MG: 150 TABLET, EXTENDED RELEASE ORAL at 07:57

## 2022-11-11 RX ADMIN — DIVALPROEX SODIUM 1000 MG: 500 TABLET, DELAYED RELEASE ORAL at 20:02

## 2022-11-11 ASSESSMENT — ACTIVITIES OF DAILY LIVING (ADL)
LAUNDRY: WITH SUPERVISION
ADLS_ACUITY_SCORE: 29
ORAL_HYGIENE: INDEPENDENT
ADLS_ACUITY_SCORE: 29
HYGIENE/GROOMING: INDEPENDENT
ADLS_ACUITY_SCORE: 29
DRESS: INDEPENDENT

## 2022-11-11 NOTE — PLAN OF CARE
Pt had a good shift, he is social with peers and attends OT groups that are offered. He denies all mental health symptoms upon assessment. He is med compliant without issue, only PRNs received were nicotine lozenges. Pt talks frequently about looking forward to discharge on Monday and enjoys talking about details of the IRTS facility. No other concerns at this time, pt is stable and ready for care transition.    Problem: Social, Occupational or Functional Impairment (Psychotic Signs/Symptoms)  Goal: Enhanced Social, Occupational or Functional Skills (Psychotic Signs/Symptoms)  Outcome: Adequate for Care Transition  Intervention: Promote Social, Occupational and Functional Ability     Problem: Depression  Goal: Improved Mood  Outcome: Adequate for Care Transition   Goal Outcome Evaluation:    Plan of Care Reviewed With: patient

## 2022-11-11 NOTE — PROGRESS NOTES
11/11/22 1315   Group Therapy Session   Group Attendance attended group session   Time Session Began 1315   Time Session Ended 1415   Total Time (minutes) 60   Total # Attendees 4   Group Type expressive therapy   Group Topic Covered emotions/expression   Patient Response/Contribution cooperative with task     AT directive was to create a worry stone out of air dry tatyana as a grounding tool/meditation tool.  Goals of directive: mindfulness, emotional regulation, trauma containment  Pt was an engaged participant, focused on task for the full duration of group.  Pt needs another AT group to finish project.  Pts mood was calm, pleasant participant.

## 2022-11-11 NOTE — PLAN OF CARE
Problem: Depression  Goal: Improved Mood  Outcome: Progressing     Problem: Anxiety  Goal: Anxiety Reduction or Resolution  Outcome: Progressing     Problem: Decreased Participation and Engagement (Psychotic Signs/Symptoms)  Goal: Increased Participation and Engagement (Psychotic Signs/Symptoms)  Intervention: Facilitate Participation and Engagement  Recent Flowsheet Documentation  Taken 11/10/2022 1715 by Angelo Cruz RN  Diversional Activity: television     Problem: Behavioral Health Plan of Care  Goal: Absence of New-Onset Illness or Injury  Intervention: Identify and Manage Fall Risk  Recent Flowsheet Documentation  Taken 11/10/2022 1715 by Angelo Cruz RN  Safety Measures:    environmental rounds completed    safety rounds completed   Goal Outcome Evaluation:    Plan of Care Reviewed With: patient        Patient was in milieu most of the shift. He was social, interactive with peers and staff members. He denied any anxiety or depression, no SI/HI, contracted for safety. Pleasant, cooperative and compliant with medications. He denied any pain or discomfort.

## 2022-11-11 NOTE — PROGRESS NOTES
"   11/11/22 1600   Group Therapy Session   Group Attendance attended group session   Time Session Began 1015   Time Session Ended 1115   Total Time (minutes) 60   Total # Attendees 4   Group Type expressive therapy   Group Topic Covered emotions/expression   Patient Response/Contribution cooperative with task     Art Therapy directive was to create a \"tree of life\" drawing using the tree as metaphor for aspects of self.  Goals of directive: to create a personal self narrative, to identify personal strengths and goals, future focused directive.  Pt was engaged participant, focused on task for the full duration of group. Pt finished drawing and has not yet shared with author and group.   Pts mood was calm, pleasant participant.         "

## 2022-11-11 NOTE — PLAN OF CARE
Assessment/Intervention/Current Symptoms and Care Coordination  Met with team. Reviewed patient's chart and progress notes.     - Checked in with patient. Reviewed discharged plan and aftercare instructions.      - Writer sent Patient's intake paperwork [facility]. Standing Orders will be sent Monday 11/14, needs to be signed by attending provider.     - Writer called [facility] Coordinator, Blanca. She indicated that patient's primary care clinic needs to sign a referral form in order to lift prescriber provider restrictions. Saumya reports sending a referral from to [facility].     - Writer called [facility]. Writer was informed that the referral was not received. Writer provided with correct fax number [phone].     - Writer notified [facility] CM about delay and provided correct fax# for [facility] Nursing department.           Discharge Plan or Goal:  Needs pharmacy restrictions lifted.   [facility]  Will follow up with Outpatient Mental Health providers at [facility]      Barriers to Discharge:  PD Revoked. No barriers to discharged identified.        Legal Status  Commitment/[facility]   Patient will need PD

## 2022-11-11 NOTE — PROGRESS NOTES
[facility], [place]   Psychiatric Progress Note        Interim History:   The patient's care was discussed with the treatment team during the daily team meeting and staff's chart notes were reviewed.      Nursing reports that patient is doing well on unit. He is pleasant and cooperative. He is not showing psychosis or problems with his mood. He is medication compliant. He interacts with peers and staff and attends groups. He has good self care. He is looking forward to upcoming discharge.    According to  patient has been accepted to [facility] for Monday. Patient is on MI/CD commitment. He will leave at 9AM on Monday so he will need medications filled today.    On interview today patient is calm and cooperative. He denies suicidal or homicidal thoughts. He is denies depression. He is looking forward to discharge Monday. He has no side effects to medications    Review of Systems is otherwise negative including HEENT, CV, Respiratory, GI, , Musculoskeletal, Neurologic, Dermatologic, Endocrine, Immunological, Constitutional systems             Medications:       atomoxetine  40 mg Oral Daily     buPROPion  300 mg Oral QAM     carbamide peroxide  3 drop Both Ears BID     divalproex sodium delayed-release  1,000 mg Oral BID     hydrochlorothiazide  12.5 mg Oral QAM     nicotine  1 patch Transdermal Daily     nicotine   Transdermal Q8H          Allergies:     Allergies   Allergen Reactions     Penicillins           Labs:   No results found for this or any previous visit (from the past 24 hour(s)).       Psychiatric Examination:     /84 (BP Location: Right arm)   Pulse 80   Temp 98.1  F (36.7  C) (Oral)   Resp 16   Wt 115.8 kg (255 lb 3.2 oz)   SpO2 99%   BMI 35.53 kg/m    Weight is 255 lbs 3.2 oz  Body mass index is 35.53 kg/m .  Orthostatic Vitals       Most Recent      Sitting Orthostatic /81 10/10 0900    Sitting Orthostatic Pulse (bpm) 72 10/10 0900     Standing Orthostatic /79 10/10 0900    Standing Orthostatic Pulse (bpm) 83 10/10 0900            Appearance: awake, alert and adequately groomed  Attitude:  cooperative  Eye Contact:  good  Mood:  good  Affect:  mood congruent  Speech:  clear, coherent  Psychomotor Behavior:  no evidence of tardive dyskinesia, dystonia, or tics  Thought Process:  goal oriented  Associations:  no loose associations  Thought Content:  no evidence of suicidal ideation or homicidal ideation and no evidence of psychotic thought  Insight:  fair  Judgement:  fair  Oriented to:  time, person, and place  Attention Span and Concentration:  intact  Recent and Remote Memory:  intact  Minimal change in mental status in the past 24 hours           Precautions:     Behavioral Orders   Procedures     Code 1 - Restrict to Unit     Occupational Therapy on the Unit     Not sure if this is the right order but Jannette would come on Monday to see patient     Order Specific Question:   Reason for Consult     Answer:   Eval of thought process, functional skills and behavior     Order Specific Question:   Course of Action:     Answer:   Eval & Treat as indicated     Order Specific Question:   Treatment Prescription:     Answer:   Patient would like 1:1 therapy and some assignment sheets to work on with long admission     Routine Programming     As clinically indicated     Status 15     Every 15 minutes.          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     Amphetamine use disorder, severe (H)     ADHD (attention deficit hyperactivity disorder), inattentive type     Personality disorder (H)     Methamphetamine abuse (H)     Suicidal  behavior with attempted self-injury (H)     Borderline personality disorder (H)     Antisocial personality disorder (H)     Malingering     Other stimulant dependence with intoxication delirium (H)     Stress and adjustment reaction     Tobacco dependence     Overactive child     Essential (primary) hypertension            Plan:      Orders Placed This Encounter      Routine Programming      Code 1 - Restrict to Unit      Status 15     Continue Strattera (Atomoxetine) 40mg daily  Continue Wellbutrin  mg daily  Continue Depakote DR 1,000 mg BID.   Stop Zyprexa - May consider Abilify if needed. Will continue prn Zyprexa in community  Continue hydroxyzine 25 mg q4h prn for acute anxiety  Continue melatonin 3mg at bedtime prn for sleep disturbances  Continue Zyprexa 10 mg TID prn for severe agitation       Legal Status: court hold. PD has been revoked. Fully committed with [facility] in place for Risperidone, Invega, Zyprexa, Abilify.        Disposition Plan   Reason for ongoing admission: is unable to care for self due to severe psychosis or julianna  Discharge location: senior living anticipated Monday  Discharge Medications: not ordered  Follow-up Appointments: not scheduled  Legal Status: full commitment     Discharge is in place for Monday in 3 days      Patient seen, chart reviewed, case reviewed with  and with nursing.   Case reviewed in multi-disciplinary treatment team.  .  More than 25 minutes spent on this visit with more than 50% time spent on coordination of care with staff, reviewing medical record, psychoeducation, providing supportive therapy regarding coping with chronic mental illness, entering orders and preparing documentation for the visit, and preparing discharge medications and paperwork      Entered by: Toni Lin MD

## 2022-11-11 NOTE — PLAN OF CARE
Problem: Suicidal Behavior  Goal: Suicidal Behavior is Absent or Managed  Outcome: Progressing  Intervention: Provide Immediate and Ongoing Protective Physical Environment    Problem: Anxiety  Goal: Anxiety Reduction or Resolution  Outcome: Progressing  Intervention: Promote Anxiety Reduction  Patient up and visible on the unit most part of the shift, presents with full range affect, denies all psych symptoms, denies pain, patient is monae for safety, safety checks in progress this shift. Engaged selectively with peers, medication compliant, nicotine gum prn given, eating and drinking adequately, no other known safety concerns at this time, will continue to monitor and offer therapeutic support as needed for the rest of the shift.

## 2022-11-12 LAB — SARS-COV-2 RNA RESP QL NAA+PROBE: NEGATIVE

## 2022-11-12 PROCEDURE — U0005 INFEC AGEN DETEC AMPLI PROBE: HCPCS | Performed by: CLINICAL NURSE SPECIALIST

## 2022-11-12 PROCEDURE — 250N000013 HC RX MED GY IP 250 OP 250 PS 637

## 2022-11-12 PROCEDURE — 250N000013 HC RX MED GY IP 250 OP 250 PS 637: Performed by: PSYCHIATRY & NEUROLOGY

## 2022-11-12 PROCEDURE — 250N000013 HC RX MED GY IP 250 OP 250 PS 637: Performed by: EMERGENCY MEDICINE

## 2022-11-12 PROCEDURE — 124N000002 HC R&B MH UMMC

## 2022-11-12 RX ADMIN — HYDROXYZINE HYDROCHLORIDE 25 MG: 25 TABLET, FILM COATED ORAL at 19:19

## 2022-11-12 RX ADMIN — NICOTINE POLACRILEX 4 MG: 4 GUM, CHEWING BUCCAL at 10:04

## 2022-11-12 RX ADMIN — NICOTINE POLACRILEX 4 MG: 4 GUM, CHEWING BUCCAL at 12:29

## 2022-11-12 RX ADMIN — ATOMOXETINE 40 MG: 40 CAPSULE ORAL at 07:58

## 2022-11-12 RX ADMIN — NICOTINE POLACRILEX 4 MG: 4 GUM, CHEWING BUCCAL at 16:01

## 2022-11-12 RX ADMIN — MELATONIN TAB 3 MG 3 MG: 3 TAB at 19:21

## 2022-11-12 RX ADMIN — BUPROPION HYDROCHLORIDE 300 MG: 150 TABLET, EXTENDED RELEASE ORAL at 07:58

## 2022-11-12 RX ADMIN — HYDROCHLOROTHIAZIDE 12.5 MG: 12.5 TABLET ORAL at 07:58

## 2022-11-12 RX ADMIN — NICOTINE POLACRILEX 4 MG: 4 GUM, CHEWING BUCCAL at 19:19

## 2022-11-12 RX ADMIN — DIVALPROEX SODIUM 1000 MG: 500 TABLET, DELAYED RELEASE ORAL at 07:58

## 2022-11-12 RX ADMIN — NICOTINE POLACRILEX 4 MG: 4 GUM, CHEWING BUCCAL at 17:43

## 2022-11-12 RX ADMIN — NICOTINE POLACRILEX 4 MG: 4 GUM, CHEWING BUCCAL at 07:58

## 2022-11-12 RX ADMIN — DIVALPROEX SODIUM 1000 MG: 500 TABLET, DELAYED RELEASE ORAL at 19:19

## 2022-11-12 RX ADMIN — NICOTINE 1 PATCH: 14 PATCH, EXTENDED RELEASE TRANSDERMAL at 07:58

## 2022-11-12 ASSESSMENT — ACTIVITIES OF DAILY LIVING (ADL)
ADLS_ACUITY_SCORE: 29

## 2022-11-12 NOTE — PLAN OF CARE
Problem: Suicidal Behavior  Goal: Suicidal Behavior is Absent or Managed  Outcome: Progressing  Intervention: Provide Immediate and Ongoing Protective Physical Environment  Problem: Anxiety  Goal: Anxiety Reduction or Resolution  Outcome: Progressing  Intervention: Promote Anxiety Reduction  Patient up and visible on the unit most part of the shift, pleasant and presented with full range affect on approach, patient denies all psych symptoms, denies pain or discomfort, patient is monae for safety, safety checks in progress this shift. ,medication compliant with no stated or observed side effects, patient is excited discharging on Monday, selectively engaged with peers, had headphones on most part of the shift, listening to music, watched TV and movie, eating and drinking adequately, patient offers no other known concerns at this time, will continue to monitor and offer therapeutic support as needed for the rest of the shift.

## 2022-11-12 NOTE — PLAN OF CARE
Pt had an uneventful shift without any behavioral concerns, he presents with a full range affect and is visible in the milieu. He endorses mild anxiety related to discharge, denies all other mental health symptoms. Pt is med compliant without issue. No other concerns at this time.    Problem: Anxiety  Goal: Anxiety Reduction or Resolution  Outcome: Adequate for Care Transition   Goal Outcome Evaluation:    Plan of Care Reviewed With: patient

## 2022-11-13 PROCEDURE — 124N000002 HC R&B MH UMMC

## 2022-11-13 PROCEDURE — 250N000013 HC RX MED GY IP 250 OP 250 PS 637: Performed by: PSYCHIATRY & NEUROLOGY

## 2022-11-13 PROCEDURE — 250N000013 HC RX MED GY IP 250 OP 250 PS 637: Performed by: EMERGENCY MEDICINE

## 2022-11-13 PROCEDURE — 250N000013 HC RX MED GY IP 250 OP 250 PS 637

## 2022-11-13 PROCEDURE — 90853 GROUP PSYCHOTHERAPY: CPT

## 2022-11-13 RX ADMIN — NICOTINE POLACRILEX 4 MG: 4 GUM, CHEWING BUCCAL at 11:25

## 2022-11-13 RX ADMIN — NICOTINE POLACRILEX 4 MG: 4 GUM, CHEWING BUCCAL at 10:17

## 2022-11-13 RX ADMIN — HYDROCHLOROTHIAZIDE 12.5 MG: 12.5 TABLET ORAL at 07:47

## 2022-11-13 RX ADMIN — BUPROPION HYDROCHLORIDE 300 MG: 150 TABLET, EXTENDED RELEASE ORAL at 07:47

## 2022-11-13 RX ADMIN — NICOTINE POLACRILEX 4 MG: 4 GUM, CHEWING BUCCAL at 13:00

## 2022-11-13 RX ADMIN — NICOTINE 1 PATCH: 14 PATCH, EXTENDED RELEASE TRANSDERMAL at 07:47

## 2022-11-13 RX ADMIN — DIVALPROEX SODIUM 1000 MG: 500 TABLET, DELAYED RELEASE ORAL at 07:47

## 2022-11-13 RX ADMIN — DIVALPROEX SODIUM 1000 MG: 500 TABLET, DELAYED RELEASE ORAL at 20:30

## 2022-11-13 RX ADMIN — NICOTINE POLACRILEX 4 MG: 4 GUM, CHEWING BUCCAL at 15:45

## 2022-11-13 RX ADMIN — NICOTINE POLACRILEX 4 MG: 4 GUM, CHEWING BUCCAL at 19:29

## 2022-11-13 RX ADMIN — ATOMOXETINE 40 MG: 40 CAPSULE ORAL at 07:47

## 2022-11-13 RX ADMIN — NICOTINE POLACRILEX 4 MG: 4 GUM, CHEWING BUCCAL at 07:47

## 2022-11-13 ASSESSMENT — ACTIVITIES OF DAILY LIVING (ADL)
ADLS_ACUITY_SCORE: 29

## 2022-11-13 NOTE — PLAN OF CARE
Pt is visible in the milieu and social with peers, he attended community meeting this morning. He is med compliant without issue, no PRNs given aside from nicotine gum. Pt endorses feeling nervous for discharge tomorrow but is looking forward to it and feels ready to move onto his next step. No other concerns at this time.    Problem: Plan of Care - These are the overarching goals to be used throughout the patient stay.    Goal: Optimal Comfort and Wellbeing  Outcome: Adequate for Care Transition   Goal Outcome Evaluation:    Plan of Care Reviewed With: patient

## 2022-11-13 NOTE — PLAN OF CARE
Patient alert and oriented x 4. Patient denies pain and discomfort. Patient had snacks this shift. Patient appears calm, no behavioral issues noted. Patient no psyche symptoms observed. Will continue to monitor the patient and provide therapeutic support as needed. Will continue with current plan of care. Notify MD with any concerns.   Problem: Sleep Disturbance  Goal: Adequate Sleep/Rest  Outcome: Progressing     0642: Patient had 6.25 total hours of sleep this shift.

## 2022-11-14 VITALS
OXYGEN SATURATION: 99 % | TEMPERATURE: 97.7 F | DIASTOLIC BLOOD PRESSURE: 89 MMHG | SYSTOLIC BLOOD PRESSURE: 134 MMHG | BODY MASS INDEX: 35.42 KG/M2 | HEART RATE: 77 BPM | WEIGHT: 254.4 LBS | RESPIRATION RATE: 16 BRPM

## 2022-11-14 PROCEDURE — G0177 OPPS/PHP; TRAIN & EDUC SERV: HCPCS

## 2022-11-14 PROCEDURE — 250N000013 HC RX MED GY IP 250 OP 250 PS 637: Performed by: PSYCHIATRY & NEUROLOGY

## 2022-11-14 PROCEDURE — 250N000013 HC RX MED GY IP 250 OP 250 PS 637: Performed by: EMERGENCY MEDICINE

## 2022-11-14 PROCEDURE — 99239 HOSP IP/OBS DSCHRG MGMT >30: CPT | Performed by: PSYCHIATRY & NEUROLOGY

## 2022-11-14 PROCEDURE — 250N000013 HC RX MED GY IP 250 OP 250 PS 637

## 2022-11-14 RX ADMIN — BUPROPION HYDROCHLORIDE 300 MG: 150 TABLET, EXTENDED RELEASE ORAL at 07:41

## 2022-11-14 RX ADMIN — DIVALPROEX SODIUM 1000 MG: 500 TABLET, DELAYED RELEASE ORAL at 07:41

## 2022-11-14 RX ADMIN — NICOTINE POLACRILEX 4 MG: 4 GUM, CHEWING BUCCAL at 10:29

## 2022-11-14 RX ADMIN — HYDROCHLOROTHIAZIDE 12.5 MG: 12.5 TABLET ORAL at 07:41

## 2022-11-14 RX ADMIN — NICOTINE POLACRILEX 4 MG: 4 GUM, CHEWING BUCCAL at 07:41

## 2022-11-14 RX ADMIN — NICOTINE 1 PATCH: 14 PATCH, EXTENDED RELEASE TRANSDERMAL at 07:41

## 2022-11-14 RX ADMIN — ATOMOXETINE 40 MG: 40 CAPSULE ORAL at 07:41

## 2022-11-14 RX ADMIN — NICOTINE POLACRILEX 4 MG: 4 GUM, CHEWING BUCCAL at 12:36

## 2022-11-14 ASSESSMENT — ACTIVITIES OF DAILY LIVING (ADL)
ADLS_ACUITY_SCORE: 29

## 2022-11-14 NOTE — PLAN OF CARE
Pt had a good shift, he is pleasant and presents with a full range affect. He denies mental health symptoms upon assessment. Pt is med compliant without issue, no PRNs given. He is very excited to discharge, pt was accepting when there were minor obstacles to time of discharge. No other concerns at this time, pt will discharge to [facility] this afternoon.    Problem: Anxiety  Goal: Anxiety Reduction or Resolution  Outcome: Adequate for Care Transition   Goal Outcome Evaluation:    Plan of Care Reviewed With: patient
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
## 2022-11-14 NOTE — PLAN OF CARE
Assessment/Intervention/Current Symptoms and Care Coordination  Met with team. Reviewed patient's chart and progress notes.      - Checked in with patient. Reviewed discharged plan. Writer explained PD agreement. Patient singed PD.     -  Admission time with Jana Abdalla was moved up due delay in pharmacy restrictions being lifted with [facility]. Patient will be discharged as soon as medications arrive. AVS completed.     - Writer [facility]. Writer provided update on discharge disposition.     - Writer emailed pt's signed PD to pt's CM.      Discharge Plan or Goal:  [facility] I[facility]  Will follow up with Outpatient Mental Health providers at [facility]      Barriers to Discharge:  Waiting for medications to be filled.         [facility] Status  [facility]   Patient will need PD

## 2022-11-14 NOTE — PLAN OF CARE
11/14/22 1252   Group Therapy Session   Group Attendance attended group session   Time Session Began 1015   Time Session Ended 1200   Total Time (minutes) 90   Total # Attendees 7   Group Type task skill;recreation   Group Topic Covered cognitive activities;coping skills/lifestyle management;problem-solving;leisure exploration/use of leisure time;structured socialization   Group Session Detail OT Clinic Group   Patient Response/Contribution able to recall/repeat info presented;cooperative with task;discussed personal experience with topic;listened actively;offered helpful suggestions to peers;organized   Patient Participation Detail pt actively engaged in group activity. pt was polite and pleasant during interactions. pt assisted peer with beading project. pt was independent with completing his own beading project and clean up of supplies. pt shared plans for discharge today

## 2022-11-14 NOTE — PLAN OF CARE
Patient alert and oriented x 4. Patient no complains of pain and discomfort. Patient no snacks requested this shift. Patient appears calm, no behavioral issues noted. Patient no psyche symptoms observed. Will continue to monitor the patient and provide therapeutic support as needed. Will continue with current plan of care. Notify MD with any concerns. Patient is discharging today.   Problem: Sleep Disturbance  Goal: Adequate Sleep/Rest  Outcome: Progressing

## 2022-11-28 ENCOUNTER — TELEPHONE (OUTPATIENT)
Dept: BEHAVIORAL HEALTH | Facility: CLINIC | Age: 32
End: 2022-11-28

## 2022-11-28 NOTE — TELEPHONE ENCOUNTER
Writer spoke with patient on que and confirmed TC appointment for 11/29/2022 @ 10:30 am. Writer updated e-mail and phone number on demographics.    Josi Velasquez  11/28/22  [id number]

## 2022-11-29 ENCOUNTER — VIRTUAL VISIT (OUTPATIENT)
Dept: BEHAVIORAL HEALTH | Facility: CLINIC | Age: 32
End: 2022-11-29
Payer: COMMERCIAL

## 2022-11-29 ENCOUNTER — TELEPHONE (OUTPATIENT)
Dept: BEHAVIORAL HEALTH | Facility: CLINIC | Age: 32
End: 2022-11-29

## 2022-11-29 DIAGNOSIS — F15.94 METHAMPHETAMINE-INDUCED MOOD DISORDER (H): Primary | ICD-10-CM

## 2022-11-29 DIAGNOSIS — F90.2 ATTENTION DEFICIT HYPERACTIVITY DISORDER (ADHD), COMBINED TYPE: ICD-10-CM

## 2022-11-29 PROCEDURE — 99204 OFFICE O/P NEW MOD 45 MIN: CPT | Mod: 95 | Performed by: NURSE PRACTITIONER

## 2022-11-29 RX ORDER — HYDROXYZINE HYDROCHLORIDE 25 MG/1
25-50 TABLET, FILM COATED ORAL EVERY 6 HOURS PRN
Qty: 60 TABLET | Refills: 1 | Status: SHIPPED | OUTPATIENT
Start: 2022-11-29 | End: 2023-07-17

## 2022-11-29 RX ORDER — OLANZAPINE 10 MG/1
5-10 TABLET ORAL 3 TIMES DAILY PRN
Qty: 30 TABLET | Refills: 0 | Status: SHIPPED | OUTPATIENT
Start: 2022-11-29 | End: 2023-04-10

## 2022-11-29 RX ORDER — BUPROPION HYDROCHLORIDE 150 MG/1
150 TABLET ORAL 2 TIMES DAILY
Qty: 60 TABLET | Refills: 1 | Status: SHIPPED | OUTPATIENT
Start: 2022-11-29 | End: 2023-04-10

## 2022-11-29 RX ORDER — DIVALPROEX SODIUM 500 MG/1
1000 TABLET, DELAYED RELEASE ORAL 2 TIMES DAILY
Qty: 60 TABLET | Refills: 1 | Status: SHIPPED | OUTPATIENT
Start: 2022-11-29 | End: 2023-04-10

## 2022-11-29 RX ORDER — BUPROPION HYDROCHLORIDE 300 MG/1
300 TABLET ORAL EVERY MORNING
Qty: 30 TABLET | Refills: 1 | Status: CANCELLED | OUTPATIENT
Start: 2022-11-29

## 2022-11-29 RX ORDER — ATOMOXETINE 40 MG/1
40 CAPSULE ORAL 2 TIMES DAILY
Qty: 60 CAPSULE | Refills: 1 | Status: SHIPPED | OUTPATIENT
Start: 2022-11-29 | End: 2023-04-10

## 2022-11-29 ASSESSMENT — ANXIETY QUESTIONNAIRES
GAD7 TOTAL SCORE: 5
GAD7 TOTAL SCORE: 5
2. NOT BEING ABLE TO STOP OR CONTROL WORRYING: NOT AT ALL
7. FEELING AFRAID AS IF SOMETHING AWFUL MIGHT HAPPEN: NOT AT ALL
4. TROUBLE RELAXING: MORE THAN HALF THE DAYS
1. FEELING NERVOUS, ANXIOUS, OR ON EDGE: NOT AT ALL
3. WORRYING TOO MUCH ABOUT DIFFERENT THINGS: NOT AT ALL
6. BECOMING EASILY ANNOYED OR IRRITABLE: MORE THAN HALF THE DAYS
5. BEING SO RESTLESS THAT IT IS HARD TO SIT STILL: SEVERAL DAYS

## 2022-11-29 ASSESSMENT — PATIENT HEALTH QUESTIONNAIRE - PHQ9: SUM OF ALL RESPONSES TO PHQ QUESTIONS 1-9: 10

## 2022-11-29 NOTE — PATIENT INSTRUCTIONS
Start:  Atomoxetine/Strattera 40 mg twice daily; TDD = 80 mg/day    Bupropion/Wellbutrin  mg twice daily;  TDD = 300 mg    Hydroxyzine/Atarax 25 to 50 mg (1 to 2 tabs) every 6 hours as needed for anxiety or agitation    Olanzapine/Zyprexa 5 to 10 mg (1/2 to 1 tab) up to three times per day as needed for anxiety or agitation    Continue:  Divalproex Sodium/Depakote DR 1000 mg (2 tab) twice daily; TDD = 2000 mg

## 2022-11-29 NOTE — TELEPHONE ENCOUNTER
Call from Jenna KIRKLAND at [facility] fax number [phone].    1. Jenna KIRKLAND would like to get aa provider signed current medication list with medication changes that were made during appointment today with Gifty Infante.

## 2022-11-29 NOTE — PROGRESS NOTES
"MH&A Post-Appointment Chart -check      Medications ordered this visit were e-scribed.  Verified by order class [x] yes  [] no    List Medications:      Medication changes or discontinuations were communicated to patient's pharmacy: [] yes  [x] no    UA collected [] yes  [] no  [x] n/a-virtual     Outside referrals / labs, etc support staff to follow up: [] yes  [x] no    Future appointment was made: [x] yes  [] no  [] n/a  Future Appointments 11/29/2022 - 5/28/2023      Date Visit Type Length Department Provider     12/20/2022  1:30 PM ADULT PSYCHIATRY RETURN 30 min [facility] TRANSITION [facility]INIC Gifty Infante APRN CNP              12/22/2022  1:00 PM P PHYSICAL 20 min [facility] FAMILY MEDICINE Opal Hanks DO    Location Instructions:     [facility] is in [address] at [address] in [place]. This is at the [place]&nbsp; corner of the intersection of [address] and [address], one mile south of [place] [address]. Free lot parking is available.                    Dictation completed at time of chart check: [x] yes  [] no    I have checked the documentation for today s encounters and the above information has been reviewed and completed.      Deloris De Dios, TATUM on November 29, 2022 at 3:05 PM            This video/telephone visit will be conducted virtually between you and your provider. We have found that certain health care needs can be provided without the need for an in-person physical exam. This service lets us provide the care you need with a video /telephone conversation. If a prescription is necessary we can send it directly to your pharmacy. If lab work is needed we can place an order for that and you can then stop by our lab to have the test done at a later time.\"   Just as we bill insurance for in-person visits, we also bill insurance for video/telephone visits. If you have questions about your insurance coverage, we recommend that you speak with your " "insurance company.      Patient/Parent has given verbal consent for video/Telephone visit? Yes    Patient would like the video visit invitation sent by: [employer], if unable to connect call [phone]    Patient verified allergies, medications and pharmacy via phone. Patient states they are ready for visit.      Mental Health &Addiction (MH&A)Transition Clinic (TC):     Provides Patient Support While Waiting to Access Programmatic and Outpatient MH&A Care and Provides Select Crisis Assessment Services     INTAKE  ____________________________________________________    \"The Transition Clinic is a temporary psychiatry service that helps to bridge the time to your next appointment. It is not intended to be a long-term service and you are expected to attend your scheduled appointment with your next provider.\"  [x] Patient/Parent verbalized understanding    If you need support between appointments, please call [phone] and let them know you're seen by [facility] Psychiat[facility]. You may also send a [employer] message to reach us.    General-     Most pressing MH needs at this time: Medication refills- Has Zyprexa 30 tabs but is not taking it at this time.       Any physical health conditions or diagnoses we should be aware of or that are impacting you: No       Medications-     Injectable medications currently prescribed: None   If yes, do you need an appointment for the next injection:     Any Controlled Substances that you are prescribed: No       Primary care provider: Opal Hanks, DO        KRISTA-7 scores:    KRISTA-7 SCORE 11/29/2022   Total Score 5       PHQ-9 scores:   PHQ-9 SCORE 9/9/2021 11/29/2022   PHQ-9 Total Score 13 10           Anything the provider should be aware of for today's appointment: No    New (awaiting) Mental health provider or next programming:       Provider(s)[facility]   Location Online   Date/Time 1/10/2023 at 10:00 AM   Psychiatry Provider: Joseph Bassett MSN,  " PMHNP-BC  Phone: [phone]         Deloris De Dios CMA on November 29, 2022 at 8:59 AM
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
## 2022-12-01 ENCOUNTER — TELEPHONE (OUTPATIENT)
Dept: BEHAVIORAL HEALTH | Facility: CLINIC | Age: 32
End: 2022-12-01

## 2022-12-01 DIAGNOSIS — F15.94 METHAMPHETAMINE-INDUCED MOOD DISORDER (H): Primary | ICD-10-CM

## 2022-12-01 DIAGNOSIS — I10 ESSENTIAL HYPERTENSION: ICD-10-CM

## 2022-12-01 RX ORDER — HYDROCHLOROTHIAZIDE 12.5 MG/1
12.5 TABLET ORAL EVERY MORNING
Qty: 30 TABLET | Refills: 2 | Status: SHIPPED | OUTPATIENT
Start: 2022-12-01 | End: 2023-03-20

## 2022-12-01 RX ORDER — OMEGA-3 FATTY ACIDS/FISH OIL 300-1000MG
1 CAPSULE ORAL DAILY
Qty: 30 CAPSULE | Refills: 2 | Status: SHIPPED | OUTPATIENT
Start: 2022-12-01 | End: 2023-04-10

## 2022-12-01 NOTE — TELEPHONE ENCOUNTER
Call received from Jenna KIRKLAND at [facility] [phone] ext [phone]. Jenna KIRKLAND reports.    1. Patient saw provider Patricia REYNOSO on 11/29/22 and received medication refills. Guild RN wondering is Patricia REYNOSO will prescribe refills of other medications taken by patient as patient is on restricted provider list and currently does not have a primary care provider that can prescribe these refills.    2. Jana KIRKLAND requests refills for  1000 mg Omega 3 capsule on time daily by mouth.  12.5 mg Hydrochlorothiazide one time daily in the morning.    3. Next patient's primary care provider appointment is on 12/22/22.    Request forwarded to Patricia REYNOSO for consideration.

## 2022-12-01 NOTE — TELEPHONE ENCOUNTER
YNES RN reached and updated [facility] RN that scripts for hydrochlorothiazide and omega 3 had been sent. [facility] RN verbalized an understanding

## 2022-12-02 ENCOUNTER — TELEPHONE (OUTPATIENT)
Dept: BEHAVIORAL HEALTH | Facility: CLINIC | Age: 32
End: 2022-12-02

## 2022-12-05 NOTE — TELEPHONE ENCOUNTER
Central Prior Authorization Team   Phone: [phone]      PA Initiation    Medication: buPROPion (WELLBUTRIN XL) 150 MG 24 hr tablet  Insurance Company: [employer] - Phone [phone] Fax [phone]  Pharmacy Filling the Rx: [facility] [id number] - [place], [place] - [address]  Filling Pharmacy Phone: [phone]  Filling Pharmacy Fax:    Start Date: 12/5/2022    Started PA on CMM and a response of An active PA is already on file with expiration date of 05/20/2024. Please wait to resubmit request within 60 days of that expiration date to obtain a PA renewal. Called pharmacy and they state they still cannot get a paid claim.  Called insurance and spoke with Herber, completed PA via phone.

## 2022-12-06 NOTE — TELEPHONE ENCOUNTER
PRIOR AUTHORIZATION DENIED    Medication: buPROPion (WELLBUTRIN XL) 150 MG 24 hr tablet-DENIED    Denial Date: 12/5/2022    Denial Rational:       Appeal Information:

## 2022-12-07 ENCOUNTER — TELEPHONE (OUTPATIENT)
Dept: BEHAVIORAL HEALTH | Facility: CLINIC | Age: 32
End: 2022-12-07

## 2022-12-07 NOTE — TELEPHONE ENCOUNTER
RN received a refill request from [facility] for Wellbutrin ER (XL) 150 mg tablets.      1.  RN noted that a PA had been generated for this medication and denied.  The reason for the denial is quantity limit required by insurance. The denial indicated:      2.  RN routed this message to Provider Gifty Infante for her review.

## 2022-12-07 NOTE — TELEPHONE ENCOUNTER
Reason for call:  Other   Patient called regarding (reason for call): call back  Additional comments:     Refills needed  PA completed > Bupropion 150mg  3x a day  OR New Prescriptions for Burprorpion 300mg 1 a day    Phone number to reach patient:   [phone], ext [phone] Jenna Nurse at Living Facility    Best Time: any    Can we leave a detailed message on this number?  YES    Travel screening: Not Applicable

## 2022-12-08 ENCOUNTER — TELEPHONE (OUTPATIENT)
Dept: BEHAVIORAL HEALTH | Facility: CLINIC | Age: 32
End: 2022-12-08

## 2022-12-08 NOTE — TELEPHONE ENCOUNTER
Medication Appeal Initiation    We have initiated an appeal for the requested medication:  Medication: buPROPion (WELLBUTRIN XL) 150 MG 24 hr tablet-APPEAL INITIATED  Appeal Start Date:  12/8/2022  Insurance Company: [employer] - Phone [phone] Fax [phone]  Comments:       Manually faxed LMN to [phone].

## 2022-12-08 NOTE — TELEPHONE ENCOUNTER
RECEIVED QUANTITY LEVEL LIMIT EXCEPTION REQUEST FORM - EMAILED TO PROVIDER AND PLACED IN CLINIC MAILBOX

## 2022-12-09 NOTE — TELEPHONE ENCOUNTER
RN received fax for Medication appeal letter decisioni from [facility].    Appeal ID: [id number]  Medical appeal request status: Approved.    Approval letter sent to Medical records for scanning. Copy saved for Abby's review.

## 2022-12-12 NOTE — TELEPHONE ENCOUNTER
MEDICATION APPEAL APPROVED    Medication: buPROPion (WELLBUTRIN XL) 150 MG 24 hr tablet-APPEAL APPROVED  Authorization Effective Date: 11/8/2022  Authorization Expiration Date: 12/8/2023  Approved Dose/Quantity:   Reference #:     Insurance Company: [employer] - Phone [phone] Fax [phone]  Expected CoPay:       CoPay Card Available:      Foundation Assistance Needed:    Which Pharmacy is filling the prescription (Not needed for infusion/clinic administered): [id number] - [place], [place] - [address]    **Instructed pharmacy to notify patient when script is ready to /ship.**

## 2022-12-20 ENCOUNTER — TELEPHONE (OUTPATIENT)
Dept: BEHAVIORAL HEALTH | Facility: CLINIC | Age: 32
End: 2022-12-20

## 2022-12-20 NOTE — TELEPHONE ENCOUNTER
Writer received call from pt who requested to reschedule TC med management appointment. Appointment rescheduled for 1/9/2023 at 9am.     Lillie MCCALLUM   12/20/2022  1125

## 2023-01-13 ENCOUNTER — TELEPHONE (OUTPATIENT)
Dept: BEHAVIORAL HEALTH | Facility: CLINIC | Age: 33
End: 2023-01-13

## 2023-01-13 NOTE — TELEPHONE ENCOUNTER
BOY Bowden at [facility] called  to reschedule a missed appointment for pt on 1/11 due to being hospitalized inpatient. Writer rescheduled a new psychiatry appointment with Gifty at  for 1/24/23 at 12:30pm and a return visit on 2/20/23 at 9:30am. Tracker completed.     Charanjit Milner on 1/13/2023 at 2:56 PM

## 2023-01-24 ENCOUNTER — TELEPHONE (OUTPATIENT)
Dept: BEHAVIORAL HEALTH | Facility: CLINIC | Age: 33
End: 2023-01-24
Payer: COMMERCIAL

## 2023-01-24 ENCOUNTER — TELEPHONE (OUTPATIENT)
Dept: EMERGENCY MEDICINE | Facility: CLINIC | Age: 33
End: 2023-01-24
Payer: COMMERCIAL

## 2023-01-24 NOTE — TELEPHONE ENCOUNTER
1.    Patient was seen (while residing at an [facility] facility) at the T.C. on 11/29/2022 with eventual next LOC:    Clinic: [facility]   Psychiatry Provider: Joseph Bassett MSN,  PMHNP-BC  Visit Type: Online   Date/Time: 1/10/2023 at 10:00 AM   Phone: [phone]         2.    -Patient has since underwent an inpatient psychiatric hospitalization: [facility] from 12/11/2022 to 1/18/2023    -Patient is currently attending chemical dependency programming through: [facility] for approximately the next 90 days    -[facility] has an attending psychiatric provider to prescribe medications      3.    T.C. nursing staff please do the following:    Contact the patient's :   -Jose Durham, through [facility]  -Phone #: [phone]   -Under commitment until:  4/12/2023      --------------    -Find out from the  if the patient will still need T.C. bridging psychiatry services AFTER discharge from the above facility (and review what T.C. is)    -If so, next LOC needs to be re-established PRIOR TO scheduling again at the T.C.     -Patient was under the impression writer/T.C. was a long term outpatient psychiatric provider and he would be able to see provider when he eventually returns to the [place] area    -After getting status update from , please also message the MAs or OBCs to cancel 2/20/2023 appointment already scheduled at the T.C.

## 2023-01-24 NOTE — TELEPHONE ENCOUNTER
Writer spoke with patient on que and updated profile and sent teams message to check patient in for TC psychiatry appointment.    Josi Velasquez  01/24/2023  1240

## 2023-01-24 NOTE — TELEPHONE ENCOUNTER
TC RN reviewed TC provider instructions at 12:47pm.    TC RN called and reached voicemail for the patient's :   Jose Durham, through [facility] at phone #: [phone] .    TC RN called and left voicemail indicating.  to call TC to update clinic whether patient Etienne will require TC clinic services following discharge from [facility] around 4/24/23. TC RN also explained Transition clinic model.  TC RN awaiting patient call from  Jose.    TC RN routing encounter to TC provider. OBC pool and transition clinic care coordinators for update.    TC RN to attempt follow up call to  Jose on 1/25/23.

## 2023-01-25 NOTE — TELEPHONE ENCOUNTER
TC RN called and reached  Leticia. She reports Etienne Joon's next LOC as:    Dr Jaspreet Burton - Psychiatry  Date: 1/30/23 at Time: 1.30 pm  [facility].  [address], [place] [address]  [phone]    Leticia states that TC clinic can close all Psychiatry Bridging services for Etienne Dupree.    Leticia informed TC clinic will cancel 2/20/23 appt. Leticia agreed for TC clinic to cancel this appointment as patient will have transfered care to Dr Jaspreet Burton.    TC RN gave TC number for scheduling with transition clinic if patient unable to keep next LOC.    TC RN routed encounter to TC provider, OBC pool and transition clinic coordinators for update. OBC pool to cancel Etienne Joon's next appointment on 2/20/23.

## 2023-01-25 NOTE — TELEPHONE ENCOUNTER
Reason for call:  Other   Patient called regarding (reason for call): Leticia WEISS from [facility]  Additional comments: Reporting that pt has an appointment w/ Mike and that services for pt can be discontinued    Phone number to reach patient:  Other phone number:  [phone]    Best Time: Business hours.    Can we leave a detailed message on this number?  YES    Travel screening: Not Applicable     Lillie MCCALLUM  1.24.2023  1901

## 2023-03-15 DIAGNOSIS — I10 ESSENTIAL HYPERTENSION: ICD-10-CM

## 2023-03-15 NOTE — TELEPHONE ENCOUNTER
[facility] phone call message- patient requesting a refill:    Full Medication Name: hydrochlorothiazide       Pharmacy confirmed as     [id number] - [address] [address]  Phone: [phone] Fax: [phone]  : Yes    Additional Comments: The patient is requesting a refill     OK to leave a message on voice mail? Yes    Primary language: English      needed? No    Call taken on March 15, 2023 at 9:10 AM by Dimitris Torres

## 2023-03-15 NOTE — TELEPHONE ENCOUNTER
Per external chart review, patient has 12.5mg hydrochlorothiazide listed but more recently is Hyzaar 50-12.5    Opal Hanks, DO   she/her  PGY-3  Family Medicine

## 2023-03-20 RX ORDER — HYDROCHLOROTHIAZIDE 12.5 MG/1
12.5 TABLET ORAL EVERY MORNING
Qty: 30 TABLET | Refills: 0 | Status: SHIPPED | OUTPATIENT
Start: 2023-03-20 | End: 2023-03-24

## 2023-03-20 NOTE — TELEPHONE ENCOUNTER
Called and spoke with Etienne, he is on a 90 day mental health confinement at [facility]. I spoke with the nurse Jeannie at the facility, she said his BP was elevated and so they referred him to an internal med doctor who put patient on Hyzaar but patient said it gave him palpations, that was changed to amlodipine 10mg but he refused to take it, he is insistent on only taking the hydrochlorothiazide.    Jose An to be discharged sometime in April, and is scheduled with Dr. Hanks on 4/10/23.    I asked the nurse to let patient know it is very important that he keep the appointment with Dr. Hanks.  Please send script for hydrochlorothiazide to [facility] in [place].    Kaila Whitmore RN

## 2023-03-23 DIAGNOSIS — I10 ESSENTIAL HYPERTENSION: ICD-10-CM

## 2023-03-23 NOTE — TELEPHONE ENCOUNTER
Patient called from rehab with nurse present. Dr. Gomez signed off on the hydrochlorothiazide and unfortunately Etienne is restricted to only Dr. Hanks  I explained that Dr. Hanks is not in clinic until next Wednesday but that I would forward the message to her.    I do not see the restriction in Epic but the nurse said that he is restricted    Nurse said that Etienne's bp has been WNL at this time.    Kaila Whitmore RN

## 2023-03-23 NOTE — TELEPHONE ENCOUNTER
Ok, I will reorder his hydrochlorothiazide. I didn't know he was restricted to me either     Opal

## 2023-03-24 RX ORDER — HYDROCHLOROTHIAZIDE 12.5 MG/1
12.5 TABLET ORAL EVERY MORNING
Qty: 30 TABLET | Refills: 0 | Status: SHIPPED | OUTPATIENT
Start: 2023-03-24 | End: 2023-04-10

## 2023-04-10 PROBLEM — Z91.51 HISTORY OF SUICIDE ATTEMPT: Status: ACTIVE | Noted: 2022-10-24

## 2023-04-10 PROBLEM — F90.0 ADHD (ATTENTION DEFICIT HYPERACTIVITY DISORDER), INATTENTIVE TYPE: Status: RESOLVED | Noted: 2022-10-13 | Resolved: 2023-04-10

## 2023-04-10 PROBLEM — F90.2 ATTENTION DEFICIT HYPERACTIVITY DISORDER (ADHD), COMBINED TYPE: Status: ACTIVE | Noted: 2023-04-10

## 2023-04-10 PROBLEM — N63.20 LEFT BREAST LUMP: Status: ACTIVE | Noted: 2022-08-10

## 2023-04-18 ENCOUNTER — TELEPHONE (OUTPATIENT)
Dept: FAMILY MEDICINE | Facility: CLINIC | Age: 33
End: 2023-04-18
Payer: COMMERCIAL

## 2023-04-18 NOTE — TELEPHONE ENCOUNTER
[facility] phone call message - order or referral request for patient:     Order or referral being requested:  Referral for [facility]  Provider name is Troy Long      Additional Comments:     OK to leave a message on voice mail? Yes    Primary language: English      needed? No    Call taken on April 18, 2023 at 9:54 AM by Riky Agrawal

## 2023-04-18 NOTE — TELEPHONE ENCOUNTER
Called and spoke with Blanca MESA Care Coordinator, patient is restricted to only Dr. Noble, but she is going to send a form to add Dr. Hanks and Troy Long to patient care team. This will need to be signed by any covering provider and faxed back    Kaila Whitmore RN

## 2023-04-19 ENCOUNTER — MEDICAL CORRESPONDENCE (OUTPATIENT)
Dept: HEALTH INFORMATION MANAGEMENT | Facility: CLINIC | Age: 33
End: 2023-04-19

## 2023-04-20 ENCOUNTER — DOCUMENTATION ONLY (OUTPATIENT)
Dept: FAMILY MEDICINE | Facility: CLINIC | Age: 33
End: 2023-04-20
Payer: COMMERCIAL

## 2023-04-20 NOTE — PROGRESS NOTES
"When opening a documentation only encounter, be sure to enter in \"Chief Complaint\" Forms and in \" Comments\" Title of form, description if needed.    Etienne is a 32 year old  male  Form received via: Fax  Form now resides in: Provider Ready    Kaila Whitmore RN                "

## 2023-04-28 DIAGNOSIS — F15.94 METHAMPHETAMINE-INDUCED MOOD DISORDER (H): ICD-10-CM

## 2023-04-28 DIAGNOSIS — I10 ESSENTIAL (PRIMARY) HYPERTENSION: ICD-10-CM

## 2023-04-28 RX ORDER — HYDROCHLOROTHIAZIDE 25 MG/1
25 TABLET ORAL EVERY MORNING
Qty: 30 TABLET | Refills: 0 | Status: ON HOLD | OUTPATIENT
Start: 2023-04-28 | End: 2023-08-11

## 2023-04-28 RX ORDER — DIVALPROEX SODIUM 250 MG/1
1250 TABLET, DELAYED RELEASE ORAL 2 TIMES DAILY
Qty: 300 TABLET | Refills: 3 | Status: ON HOLD | OUTPATIENT
Start: 2023-04-28 | End: 2023-08-11

## 2023-04-28 NOTE — TELEPHONE ENCOUNTER
[facility] phone call message- patient requesting a refill:    Full Medication Name: divalproex sodium delayed-release (DEPAKOTE) 250 MG DR tablet    hydrochlorothiazide (HYDRODIURIL) 25 MG tablet      Pharmacy confirmed as   [facility] [place]  [address] [place] [address]   P: [phone]  : Yes    Additional Comments: The patient needs refills. The patient is in [facility] and the group home would like the order and HNP faxed to [phone]     OK to leave a message on voice mail? Yes    Primary language: English      needed? No    Call taken on April 28, 2023 at 3:50 PM by Dimitris Torres

## 2023-04-28 NOTE — TELEPHONE ENCOUNTER
"Last seen 4/10/23 virtual visit    Additional Comments: The patient needs refills. The patient is in [facility] and the group home would like the order and HNP faxed to [phone]     Request for medication refill:  divalproex sodium delayed-release (DEPAKOTE) 250 MG DR tablet  hydrochlorothiazide (HYDRODIURIL) 25 MG tablet  Providers if patient needs an appointment and you are willing to give a one month supply please refill for one month and  send a letter/MyChart using \".SMILLIMITEDREFILL\" .smillimited and route chart to \"P SMI \" (Giving one month refill in non controlled medications is strongly recommended before denial)    If refill has been denied, meaning absolutely no refills without visit, please complete the smart phrase \".smirxrefuse\" and route it to the \"P SMI MED REFILLS\"  pool to inform the patient and the pharmacy.    Kaila Whitmore RN        "

## 2023-04-29 ENCOUNTER — HEALTH MAINTENANCE LETTER (OUTPATIENT)
Age: 33
End: 2023-04-29

## 2023-05-16 ENCOUNTER — MEDICAL CORRESPONDENCE (OUTPATIENT)
Dept: HEALTH INFORMATION MANAGEMENT | Facility: CLINIC | Age: 33
End: 2023-05-16

## 2023-05-19 ENCOUNTER — TELEPHONE (OUTPATIENT)
Dept: FAMILY MEDICINE | Facility: CLINIC | Age: 33
End: 2023-05-19
Payer: COMMERCIAL

## 2023-05-19 NOTE — TELEPHONE ENCOUNTER
called social leann Sagastume at [facility]. Tanika stated that patient is currently in the hospital and is tentatively discharging to a care facility on 5/22/23. Tanika stated she is looking to have a referral form completed for [facility]'s Restricted Recipient program so providers at the hospital are able to write discharge orders for patient. BOY completed [facility] Restricted Recipient Member referral form and faxed it to Blanca at [facility] ([phone]).      gave Tanika direct contact number if anything further is needed before patient's discharge.     BRIAN Muir

## 2023-05-19 NOTE — TELEPHONE ENCOUNTER
[facility] phone call message- general phone call:    Reason for call: The Patient is in the hospital and is being released on Monday. This patient is a restricted recipient. [facility] needs a referral for the [facility] provider so the patient is able to get the prescriptions before leaving the hospital on Monday. Please contact Tanika at [facility] at [phone].    Return call needed: Yes    OK to leave a message on voice mail? Yes    Primary language: English      needed? No    Call taken on May 19, 2023 at 1:33 PM by Dimitris Torres

## 2023-07-07 ENCOUNTER — MEDICAL CORRESPONDENCE (OUTPATIENT)
Dept: HEALTH INFORMATION MANAGEMENT | Facility: CLINIC | Age: 33
End: 2023-07-07
Payer: COMMERCIAL

## 2023-07-07 ENCOUNTER — DOCUMENTATION ONLY (OUTPATIENT)
Dept: FAMILY MEDICINE | Facility: CLINIC | Age: 33
End: 2023-07-07
Payer: COMMERCIAL

## 2023-07-07 NOTE — PROGRESS NOTES
received call from Blanca at [facility] Restricted Recipient Program, asking that a new referral form be filled out so patient can be seen by more providers at the clinic.    SW had PCP complete Restricted Recipient form and faxed form to the [facility] Restricted Recipient fax line at [phone].    BRIAN Muir

## 2023-07-13 ENCOUNTER — HOSPITAL ENCOUNTER (INPATIENT)
Facility: CLINIC | Age: 33
LOS: 25 days | Discharge: PSYCHIATRIC OR SUBSTANCE USE FACILITY | End: 2023-08-15
Attending: EMERGENCY MEDICINE | Admitting: PSYCHIATRY & NEUROLOGY
Payer: COMMERCIAL

## 2023-07-13 DIAGNOSIS — F31.4 SEVERE DEPRESSED BIPOLAR I DISORDER WITHOUT PSYCHOTIC FEATURES (H): ICD-10-CM

## 2023-07-13 DIAGNOSIS — R45.1 AGITATION: ICD-10-CM

## 2023-07-13 DIAGNOSIS — Z20.822 LAB TEST NEGATIVE FOR COVID-19 VIRUS: Primary | ICD-10-CM

## 2023-07-13 DIAGNOSIS — F90.2 ATTENTION DEFICIT HYPERACTIVITY DISORDER (ADHD), COMBINED TYPE: ICD-10-CM

## 2023-07-13 DIAGNOSIS — S09.90XS MOOD DISORDER DUE TO OLD HEAD INJURY: ICD-10-CM

## 2023-07-13 DIAGNOSIS — S06.9XAS TRAUMATIC BRAIN INJURY, WITH UNKNOWN LOSS OF CONSCIOUSNESS STATUS, SEQUELA (H): ICD-10-CM

## 2023-07-13 DIAGNOSIS — F17.200 TOBACCO DEPENDENCE: ICD-10-CM

## 2023-07-13 DIAGNOSIS — F41.9 ANXIETY: ICD-10-CM

## 2023-07-13 DIAGNOSIS — F06.30 MOOD DISORDER DUE TO OLD HEAD INJURY: ICD-10-CM

## 2023-07-13 DIAGNOSIS — F23 ACUTE PSYCHOSIS (H): ICD-10-CM

## 2023-07-13 DIAGNOSIS — I10 HYPERTENSION, UNSPECIFIED TYPE: ICD-10-CM

## 2023-07-13 PROCEDURE — 99285 EMERGENCY DEPT VISIT HI MDM: CPT | Mod: 25 | Performed by: EMERGENCY MEDICINE

## 2023-07-13 PROCEDURE — 250N000013 HC RX MED GY IP 250 OP 250 PS 637: Performed by: EMERGENCY MEDICINE

## 2023-07-13 PROCEDURE — 99285 EMERGENCY DEPT VISIT HI MDM: CPT | Performed by: EMERGENCY MEDICINE

## 2023-07-13 PROCEDURE — 90791 PSYCH DIAGNOSTIC EVALUATION: CPT

## 2023-07-13 RX ORDER — DIVALPROEX SODIUM 250 MG/1
TABLET, EXTENDED RELEASE ORAL
COMMUNITY
Start: 2023-05-04 | End: 2023-07-17

## 2023-07-13 RX ADMIN — OLANZAPINE 15 MG: 10 TABLET, ORALLY DISINTEGRATING ORAL at 19:41

## 2023-07-13 ASSESSMENT — ACTIVITIES OF DAILY LIVING (ADL)
ADLS_ACUITY_SCORE: 33
ADLS_ACUITY_SCORE: 35
ADLS_ACUITY_SCORE: 35

## 2023-07-13 ASSESSMENT — COLUMBIA-SUICIDE SEVERITY RATING SCALE - C-SSRS
1. HAVE YOU WISHED YOU WERE DEAD OR WISHED YOU COULD GO TO SLEEP AND NOT WAKE UP?: NO
2. HAVE YOU ACTUALLY HAD ANY THOUGHTS OF KILLING YOURSELF?: NO

## 2023-07-13 NOTE — ED NOTES
Bed: ED14  Expected date:   Expected time:   Means of arrival:   Comments:  [facility], 32 yo M, need security

## 2023-07-14 ENCOUNTER — MEDICAL CORRESPONDENCE (OUTPATIENT)
Dept: HEALTH INFORMATION MANAGEMENT | Facility: CLINIC | Age: 33
End: 2023-07-14

## 2023-07-14 ENCOUNTER — TELEPHONE (OUTPATIENT)
Dept: BEHAVIORAL HEALTH | Facility: CLINIC | Age: 33
End: 2023-07-14

## 2023-07-14 PROCEDURE — 250N000013 HC RX MED GY IP 250 OP 250 PS 637: Performed by: EMERGENCY MEDICINE

## 2023-07-14 PROCEDURE — 96372 THER/PROPH/DIAG INJ SC/IM: CPT | Performed by: EMERGENCY MEDICINE

## 2023-07-14 PROCEDURE — 250N000011 HC RX IP 250 OP 636: Mod: JZ

## 2023-07-14 PROCEDURE — 250N000011 HC RX IP 250 OP 636: Mod: JZ | Performed by: EMERGENCY MEDICINE

## 2023-07-14 RX ORDER — HYDROXYZINE HYDROCHLORIDE 50 MG/1
50 TABLET, FILM COATED ORAL 3 TIMES DAILY PRN
Status: DISCONTINUED | OUTPATIENT
Start: 2023-07-14 | End: 2023-07-21

## 2023-07-14 RX ORDER — OLANZAPINE 10 MG/2ML
INJECTION, POWDER, FOR SOLUTION INTRAMUSCULAR
Status: DISCONTINUED
Start: 2023-07-14 | End: 2023-07-14 | Stop reason: HOSPADM

## 2023-07-14 RX ORDER — BUPROPION HYDROCHLORIDE 300 MG/1
300 TABLET ORAL DAILY
Status: DISCONTINUED | OUTPATIENT
Start: 2023-07-14 | End: 2023-07-21

## 2023-07-14 RX ORDER — LORAZEPAM 2 MG/ML
2 INJECTION INTRAMUSCULAR ONCE
Status: COMPLETED | OUTPATIENT
Start: 2023-07-14 | End: 2023-07-14

## 2023-07-14 RX ORDER — OLANZAPINE 10 MG/2ML
10 INJECTION, POWDER, FOR SOLUTION INTRAMUSCULAR ONCE
Status: COMPLETED | OUTPATIENT
Start: 2023-07-14 | End: 2023-07-14

## 2023-07-14 RX ADMIN — NICOTINE POLACRILEX 4 MG: 4 GUM, CHEWING BUCCAL at 01:22

## 2023-07-14 RX ADMIN — OLANZAPINE 10 MG: 10 INJECTION, POWDER, FOR SOLUTION INTRAMUSCULAR at 06:58

## 2023-07-14 RX ADMIN — HYDROXYZINE HYDROCHLORIDE 50 MG: 50 TABLET, FILM COATED ORAL at 01:22

## 2023-07-14 RX ADMIN — OLANZAPINE 10 MG: 10 INJECTION, POWDER, FOR SOLUTION INTRAMUSCULAR at 19:13

## 2023-07-14 RX ADMIN — LORAZEPAM 2 MG: 2 INJECTION INTRAMUSCULAR; INTRAVENOUS at 19:13

## 2023-07-14 ASSESSMENT — ACTIVITIES OF DAILY LIVING (ADL)
ADLS_ACUITY_SCORE: 35
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
## 2023-07-14 NOTE — ED NOTES
Jose, [facility]  [phone], called to verify that pt was here in the ED. Jose stated pt is under civil commitment, and will email paperwork to [facility] email address. Jose requested a UA be done. Writer informed Jose to call back after 9am if she needs to speak with a LMHP.

## 2023-07-14 NOTE — ED NOTES
Patient in hallway tearing up money frojm his wallet. When asked to stop he said no, it's my money and if I want to rip it up I can. Patient pacing hallway preaching outloud

## 2023-07-14 NOTE — ED NOTES
Etienne has been awake all night talking to an empty room, ceiling and phone about Sabianism, money and his health. He is answering questions and responding to a conversation on his phone with no one on the other end of the phone
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
## 2023-07-14 NOTE — ED NOTES
Writer provided Pt with copy of 72 hour hold paperwork as well as copy of Intent to revoke Provisional Discharge court form. Pt was sleeping but briefly acknowledged writer. Writer left forms on mat on the floor next to jaspreet Pt is sleeping on.
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
## 2023-07-14 NOTE — ED NOTES
Etienne has been redirectable but still refuses labs and urine. Patient pacing around room but cooperative with staff when asked to stay in his room.

## 2023-07-14 NOTE — ED PROVIDER NOTES
[facility] EMERGENCY DEPARTMENT ([facility])    7/13/23      ED PROVIDER NOTE      History     Chief Complaint   Patient presents with     Manic Behavior     Pt was picked up by EMS, running around in the traffic, carrying wooden plank. Pt is wearing with 5-6 sweaters. Pt has rapid disorganized speech, Hx of substance abuse. Meth use is suspected. Pt is tachycardic, with 130s. Refused other VS. [facility] put pt on hold. Pt presents danger to self and others.      HPI  Etienne Dupree is a 33 year old male with a past medical history significant for stimulant dependence with intoxication delirium, methamphetamine abuse, bipolar 1 disorder, borderline personality disorder, and antisocial personality disorder presents to the ED via EMS for evaluation of manic behavior.  Per EMS, patient had been observed running around in traffic while carrying a wooden plank.  He presents wearing 5-6 sweaters, which is atypical with the 85 F weather outside.  Patient appears to be speaking rapidly in a disorganized manner.  Of note, patient is currently on a psychiatric hold by the [facility] center.    Past Medical History  Past Medical History:   Diagnosis Date     ADHD (attention deficit hyperactivity disorder)      Bipolar 1 disorder (H)      Depressive disorder      No past surgical history on file.  atomoxetine (STRATTERA) 40 MG capsule  buPROPion (WELLBUTRIN XL) 150 MG 24 hr tablet  buPROPion (WELLBUTRIN XL) 300 MG 24 hr tablet  divalproex sodium delayed-release (DEPAKOTE) 250 MG DR tablet  divalproex sodium extended-release (DEPAKOTE ER) 250 MG 24 hr tablet  fish oil-omega-3 fatty acids (OMEGA-3 FISH OIL) 1000 MG capsule  hydrochlorothiazide (HYDRODIURIL) 25 MG tablet  hydrOXYzine (ATARAX) 25 MG tablet  multivitamin w/minerals (MULTIVITAMINS W/MINERALS) tablet  nicotine polacrilex (NICORETTE) 4 MG gum  OLANZapine (ZYPREXA) 10 MG tablet  OLANZapine (ZYPREXA) 15 MG tablet  omega 3 1000 MG CAPS      Allergies    Allergen Reactions     Penicillins      Family History  Family History   Problem Relation Age of Onset     Diabetes Sister      Social History   Social History     Tobacco Use     Smoking status: Every Day     Packs/day: 0.00     Types: Cigarettes, Vaping Device     Smokeless tobacco: Former   Vaping Use     Vaping Use: Every day   Substance Use Topics     Alcohol use: Not Currently     Drug use: Not Currently     Types: Methamphetamines      Past medical history, past surgical history, medications, allergies, family history, and social history were reviewed with the patient. No additional pertinent items.      A complete review of systems was attempted but limited due to altered mental status.    Physical Exam   BP: 135/84  Pulse: (!) 129  Temp: 99.5  F (37.5  C)  Resp: 22  SpO2: 97 %  Physical Exam  Vitals and nursing note reviewed.   Constitutional:       Comments: Agitated and pacing in room    HENT:      Head: Atraumatic.   Eyes:      Pupils: Pupils are equal, round, and reactive to light.   Cardiovascular:      Rate and Rhythm: Regular rhythm.      Heart sounds: Normal heart sounds.   Pulmonary:      Effort: No respiratory distress.      Breath sounds: Normal breath sounds.   Chest:      Chest wall: No tenderness.   Abdominal:      Tenderness: There is no abdominal tenderness.   Musculoskeletal:      Cervical back: No tenderness.      Thoracic back: No tenderness.      Lumbar back: No tenderness.   Neurological:      Mental Status: He is alert and oriented to person, place, and time.             ED Course, Procedures, & Data      Procedures                 No results found for any visits on 07/13/23.  Medications   OLANZapine zydis (zyPREXA) ODT tab 15 mg (15 mg Oral $Given 7/13/23 1941)     Labs Ordered and Resulted from Time of ED Arrival to Time of ED Departure - No data to display  No orders to display          Critical care was not performed.     Medical Decision Making  The patient's presentation was  of high complexity (a chronic illness severe exacerbation, progression, or side effect of treatment).    The patient's evaluation involved:  discussion of management or test interpretation with another health professional (see separate area of note for details)    The patient's management necessitated further care after sign-out to overnight staff (see their note for further management).      Assessment & Plan      33 year old male with a past medical history significant for stimulant dependence with intoxication delirium, methamphetamine abuse, bipolar 1 disorder, borderline personality disorder, and antisocial personality disorder presents to the ED via EMS for evaluation of manic behavior.  Patient seen in conjunction with behavioral crisis , please refer to their note for further details.  He was agreeable to Zyprexa 15 mg ODT and this is administered.  Medical screening labs and UDS ordered and pending at time of this dictation.  Patient will be observed while metabolizing his toxidrome and signed out to overnight staff pending repeat assessment by DEC in the morning.    I have reviewed the nursing notes. I have reviewed the findings, diagnosis, plan and need for follow up with the patient.    New Prescriptions    No medications on file       Final diagnoses:   Agitation       Elise Torrez MD  [facility] EMERGENCY DEPARTMENT  7/13/2023     Elise Torrez MD  07/14/23 0152
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
## 2023-07-14 NOTE — ED NOTES
Pt just woke up and started yelling, cursing at staffs and demanding food, blanket and demanding to be admitted to the unit asap. Food and blanket given to the pt. Pt continues to rant and rave so unable to tell pt that he does not have a unit assigned.   Will try to approach later when calm for VS check

## 2023-07-14 NOTE — ED NOTES
"Continues to yell out loud. Praying, citing the 12 steps, talking about movies such as the \"God Father\". Staying in room at this time  "
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
## 2023-07-14 NOTE — PLAN OF CARE
Etienne Dupree  July 13, 2023  Plan of Care Hand-off Note     Patient Care Path: Observation    Plan for Care:     Etienne was unable to tell writer what occurred for him to be brought to the emergency department tonight. He was discharged from inpatient psychiatry two days ago and has been at [facility] for the last two days. His behavior became erratic and manic like today. In the ED he was pacing, hyper verbal, pressured speech, disorganized, and flight of ideas. It is believed that this behavior could be substance induced. Labs still needed to be collected at time of this note.    The [facility] facility ([phone]) will call tomorrow about whether or not they are able to accept Etienne back if he were to discharge. He is also under commitment and writer left a voicemail for Jose Durham at [phone]. He did refuse to sign releases and gave writer a disconnected number for the Ts facility.     Critical Safety Issues: No    Overview:  This patient is a child/adolescent: No    This patient has additional special visitor precautions: No    Legal Status: Voluntary    Contacts:   Svetlana Garrett, Mom: Contact [phone]    Psychiatry Consult:  Psychiatry Consult not requested because up to date medications from inpatient discharge two days ago. [facility] sent an up to date medication list with Etienne    Updated Attending Provider regarding plan of care.    Cristin Ledesma, BAYRON, LGSW

## 2023-07-14 NOTE — PLAN OF CARE
Etienne Dupree  July 14, 2023  Plan of Care Hand-off Note     Patient Care Path: Inpatient Mental Health    Plan for Care:     It is the recommendation of this writer that pt be admitted to an inpatient psychiatric unit on the basis of his psychosis, disorganized thinking, and aggression. Pt demonstrates an inability to care for himself in an outpatient setting at this point in time. Pt's mental health  has provided a notice of intent to revoke his provisional discharge but the official revocation paperwork has not yet been received. Nursing staff has provided pt with a copy of his notice of intent to revoke and his 72 HH.    Critical Safety Issues: julianna and psychosis, substances may be playing a factor in clinical presentation    Overview:  This patient is a child/adolescent: No    This patient has additional special visitor precautions: No    Legal Status: 72 HH expiring 07/19/23 @ 1:45 PM    Contacts:   Svetlana Garrett, mother, PH: [phone]   Jose Durham [facility] , PH: [phone]    Psychiatry Consult:  Adult Psychiatry Consult requested related to commitment status. Psychiatry IP Consult Order Placed: Yes    Updated RN and Attending Provider regarding plan of care.    JOVANY Ferrell
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
## 2023-07-14 NOTE — ED NOTES
IP MH Referral Acuity Rating Score (RARS)    LMHP complete at referral to IP MH, with DEC; and, daily while awaiting IP MH placement. Call score to PPS.  CRITERIA SCORING   New 72 HH and Involuntary for IP MH (not adolescent) 1/1   Boarding over 24 hours 0/1   Vulnerable adult at least 55+ with multiple co morbidities; or, Patient age 11 or under 0/1   Suicide ideation without relief of precipitating factors 0/1   Current plan for suicide 0/1   Current plan for homicide 0/1   Imminent risk or actual attempt to seriously harm another without relief of factors precipitating the attempt 1/1   Severe dysfunction in daily living (ex: complete neglect for self care, extreme disruption in vegetative function, extreme deterioration in social interactions) 1/1   Recent (last 2 weeks) or current physical aggression in the ED 1/1   Restraints or seclusion episode in ED 1/1   Verbal aggression, agitation, yelling, etc., while in the ED 1/1   Active psychosis with psychomotor agitation or catatonia 1/1   Need for constant or near constant redirection (from leaving, from others, etc).  1/1   Intrusive or disruptive behaviors 1/1   TOTAL Acuity Total Score: 9     JOVANY Ferrell

## 2023-07-15 ENCOUNTER — TELEPHONE (OUTPATIENT)
Dept: BEHAVIORAL HEALTH | Facility: CLINIC | Age: 33
End: 2023-07-15
Payer: COMMERCIAL

## 2023-07-15 ENCOUNTER — TELEPHONE (OUTPATIENT)
Dept: BEHAVIORAL HEALTH | Facility: CLINIC | Age: 33
End: 2023-07-15

## 2023-07-15 PROCEDURE — 250N000013 HC RX MED GY IP 250 OP 250 PS 637: Performed by: EMERGENCY MEDICINE

## 2023-07-15 PROCEDURE — 250N000013 HC RX MED GY IP 250 OP 250 PS 637

## 2023-07-15 PROCEDURE — 99254 IP/OBS CNSLTJ NEW/EST MOD 60: CPT

## 2023-07-15 PROCEDURE — 250N000013 HC RX MED GY IP 250 OP 250 PS 637: Performed by: STUDENT IN AN ORGANIZED HEALTH CARE EDUCATION/TRAINING PROGRAM

## 2023-07-15 RX ORDER — DIPHENHYDRAMINE HYDROCHLORIDE 50 MG/ML
50 INJECTION INTRAMUSCULAR; INTRAVENOUS EVERY 8 HOURS PRN
Status: DISCONTINUED | OUTPATIENT
Start: 2023-07-15 | End: 2023-07-21

## 2023-07-15 RX ORDER — NICOTINE 21 MG/24HR
1 PATCH, TRANSDERMAL 24 HOURS TRANSDERMAL DAILY
Status: DISCONTINUED | OUTPATIENT
Start: 2023-07-15 | End: 2023-08-15 | Stop reason: HOSPADM

## 2023-07-15 RX ORDER — DIPHENHYDRAMINE HCL 50 MG
50 CAPSULE ORAL EVERY 8 HOURS PRN
Status: DISCONTINUED | OUTPATIENT
Start: 2023-07-15 | End: 2023-07-21

## 2023-07-15 RX ORDER — LORAZEPAM 2 MG/ML
2 INJECTION INTRAMUSCULAR EVERY 8 HOURS PRN
Status: DISCONTINUED | OUTPATIENT
Start: 2023-07-15 | End: 2023-07-21

## 2023-07-15 RX ORDER — HALOPERIDOL 5 MG/ML
5 INJECTION INTRAMUSCULAR EVERY 8 HOURS PRN
Status: DISCONTINUED | OUTPATIENT
Start: 2023-07-15 | End: 2023-07-21

## 2023-07-15 RX ORDER — LOPERAMIDE HCL 2 MG
2 CAPSULE ORAL ONCE
Status: COMPLETED | OUTPATIENT
Start: 2023-07-15 | End: 2023-07-16

## 2023-07-15 RX ORDER — LORAZEPAM 2 MG/1
2 TABLET ORAL EVERY 8 HOURS PRN
Status: DISCONTINUED | OUTPATIENT
Start: 2023-07-15 | End: 2023-07-21

## 2023-07-15 RX ORDER — HALOPERIDOL 5 MG/1
5 TABLET ORAL EVERY 8 HOURS PRN
Status: DISCONTINUED | OUTPATIENT
Start: 2023-07-15 | End: 2023-07-21

## 2023-07-15 RX ADMIN — DIVALPROEX SODIUM 1250 MG: 500 TABLET, DELAYED RELEASE ORAL at 20:24

## 2023-07-15 RX ADMIN — OLANZAPINE 15 MG: 10 TABLET, ORALLY DISINTEGRATING ORAL at 20:25

## 2023-07-15 RX ADMIN — DIPHENHYDRAMINE HYDROCHLORIDE 50 MG: 50 CAPSULE ORAL at 12:09

## 2023-07-15 RX ADMIN — NICOTINE 1 PATCH: 21 PATCH, EXTENDED RELEASE TRANSDERMAL at 11:48

## 2023-07-15 RX ADMIN — LORAZEPAM 2 MG: 2 TABLET ORAL at 12:09

## 2023-07-15 ASSESSMENT — ACTIVITIES OF DAILY LIVING (ADL)
ADLS_ACUITY_SCORE: 35
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
## 2023-07-15 NOTE — TELEPHONE ENCOUNTER
R:  [place]  Access Inpatient Bed Call Log 7/13/23 @ 3:44 pm:   Intake has called facilities that have not updated their bed status within the last 12 hours.                    [facility] is at capacity.         [facility] is posting 0 beds.  [phone]. Per call at 3:52 pm to Angelo. they are at cap.     [facility] is posting 0 beds. Negative covid required.            [facility] is posting 0 beds. Negative covid required.  [phone].  Per call at 3:53 pm to Seng, they have 1-2 low acuity beds only. Pt not appropriate for avail beds.     [facility] is posting 0 beds.      [facility] is posting 0 beds. Per call at 3:57 pm to Karen, they are at cap.     [facility] is posting 0 beds      [facility] is posting 0 beds.            [facility] is posting 1 bed. Low acuity only; pt not appropriate for avail bed.             [facility] is posting 3 beds -LOW acuity ONLY. Mixed unit 12+. Negative covid- [phone]. Pt not appropriate for avail beds.     [facility] has 0 beds posted. No aggression.  Negative Covid. Low acuity        [facility] is posting 0 beds.  Negative covid.            [facility] is posting 5 beds Low acuity only.  Negative covid. -   [phone].  Pt not appropriate for avail beds.     [facility]- is posting 4 beds. Low acuity. No current aggression. Pt not appropriate for avail beds.       [facility] ([place] is posting 0 beds Low acuity only.  Negative covid. -   [phone]       [facility] is posting 2 bed. Low acuity. 72 HH hold preferred. [phone].  Negative covid required. Declined 7/14 due to receiving injection meds    [facility] ([place]) is posting 3 beds. Low acuity only.  Negative covid. -   [phone].   Pt not appropriate for beds avail.     [facility] [place] is posting 2 Beds.  Negative covid required.   Vol only, No history of  aggression, violence, or assault. No sexual offenders. No 72 HH holds.   [phone]; pt meets exclusionary criteria due to 72 hr hold       [facility] is posting 7 beds Negative covid required.  (Must have the cognitive ability to do programming. No aggressive or violent behavior or recent HX in the last 2 yrs. MH must be primary.) Always low acuity.  [phone].  Per call at 4:10 pm to [facility], they have beds avail. Pt not appropriate for avail beds.       [facility] has 0 beds posted. Negative covid required.  Low acuity only. Violence and aggression capped.  [phone].      [facility] is posting 0 bed. Low acuity, Negative covid required.  [phone].  Per call at 4:05 pm to [facility], low acuity beds only and those are being reviewed for currently. He took intake's phone number and said he will call back if something becomes avail     [facility] is posting 1 bed. Negative covid required.  Vol only. Combined adolescent and adult unit. No aggressive or violent behavior. No registered sex offenders.  [phone];  Per call at 4:08 pm to Aliza, they may possibly have 1 bed for low acuity. Pt meets exclusionary criteria due to 72 hr hold     [facility] posting 2 beds. Negative covid required.  [phone] pt was declined 7/15 due to acuity      [facility] is posting 1 bed. Per call to [facility] at 4:14 pm, they are at cap. Declined to review 7/14 due to not meeting inpt criteria    [facility] is posting 22 beds. No covid test required.  [phone].   facility is in [place]; pt is on a 72 hr hold; per [facility]'s policy, pts on holds are not presented for review at out of state facilities.    [facility] is posting 3 beds. Mixed unit/low acuity. Per call at 4:17 pm to [place], they have 5 beds avail. Pt not appropriate for beds avail.               Pt remains on work list pending appropriate bed availability.

## 2023-07-15 NOTE — TELEPHONE ENCOUNTER
No appropriate beds are currently available within the  system. Bed search update @ 12:50AM       [facility]: @ cap per website  [facility]: @ cap per website  [facility]: @ cap per website. Per Carolyn @ 00:52, they may have beds available in the morning, but not overnight  [facility]: @ cap per website  [facility]: @ cap per website  [facility]: @ cap per website  [facility]: @ cap per website  [facility]: @ cap per website  [facility]: @ cap per website  [facility]: Posting 3 beds (Mixed unit/Low acuity only). Pt not appropriate d/t acuity/aggression in ED  [facility]: @ cap per website  [facility]: @ cap per website  [facility]: Posting 4 beds; low acuity only. Ivone @ 01:14 requests a callback later as they have patients in their ED  [facility]: Does not review after 10PM.    [facility]: Posting 6 beds. Per Abril @ 00:56, they have beds available. Requires labwork - per notes, pt refusing at this time  [facility]: @ cap per website  [facility]: Posting 5 beds. Per Abril @ 00:56, they have beds available. Requires labwork - per notes, pt refusing at this time  [facility]: Posting 3 beds. (No hx of aggression. No sexual offenders. Voluntary patients only).  [facility]: Posting 7 beds (Low acuity only. Must have the cognitive ability to do programming. No aggressive or violent behavior or recent HX in the last 2 yrs. MH must be primary). Pt not appropriate d/t acuity/aggression in ED  [facility]: @ cap per website  [facility]: @ [facility] per website  [facility]: Posting 1 bed (Covid neg. Voluntary only. Mixed unit. No aggressive or violent behavior. No registered sex offenders). Pt not appropriate d/t acuity/aggression in ED and 72 HH  [facility]: Posting 1 bed (No hx of aggression/assault. No lines, drains or tubes. Does not provide detox or CD treatment). Pt not appropriate d/t acuity/aggression in  ED  [facility]: Posting 18 beds. Facility is in [place]. Pt is on a [place] 72 HH; cannot be placed in [place]  [facility]: Posting 3 beds (Mixed unit/Low acuity). Pt not appropriate d/t acuity/aggression in ED       Pt remains on work list until appropriate placement is available

## 2023-07-15 NOTE — ED NOTES
"Psychiatry woke pt to assess. Pt refused urine and labs. Pt refused to take voluntary medications.     Pt demanding breakfast. When breakfast came this writer brought it in and turned on a light as pt requested but pt yelled at this writer for this is the wrong light. Pt wanted brighter light and it was then turned on.    Pt appeared to be done eating and sleeping so this writer asked if pt was done pt loudly stated,\" If I was done I would tell you.\"     Pt then got up and used the bathroom and then returned to bed and sleeping.     Pt remains agitated and uncooperative but is staying in room at this time.   "

## 2023-07-15 NOTE — ED NOTES
Writer removed dinner tray from pt's room. Baker, goldfish and fluids left inside room. Pt in bed, eyes closed at time of exchange.
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
## 2023-07-15 NOTE — ED NOTES
Pt repetitively posturing and aggressive toward staff and not staying in room, pacing and wondering to room 15, code 21 called to medicate and search pt, code team and security present outside room to begin search, taking much time to talk with pt in explanation of plan and search.

## 2023-07-15 NOTE — ED NOTES
IP MH Referral Acuity Rating Score (RARS)    LMHP complete at referral to IP MH, with DEC; and, daily while awaiting IP MH placement. Call score to PPS.  CRITERIA SCORING   New 72 HH and Involuntary for IP MH (not adolescent) 1/1   Boarding over 24 hours 1/1   Vulnerable adult at least 55+ with multiple co morbidities; or, Patient age 11 or under 0/1   Suicide ideation without relief of precipitating factors 0/1   Current plan for suicide 0/1   Current plan for homicide 0/1   Imminent risk or actual attempt to seriously harm another without relief of factors precipitating the attempt 1/1   Severe dysfunction in daily living (ex: complete neglect for self care, extreme disruption in vegetative function, extreme deterioration in social interactions) 1/1   Recent (last 2 weeks) or current physical aggression in the ED 1/1   Restraints or seclusion episode in ED 1/1   Verbal aggression, agitation, yelling, etc., while in the ED 1/1   Active psychosis with psychomotor agitation or catatonia 1/1   Need for constant or near constant redirection (from leaving, from others, etc).  1/1   Intrusive or disruptive behaviors 1/1   TOTAL Acuity Total Score: 10
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
## 2023-07-15 NOTE — ED NOTES
Pt resting in bed with eyes closed. Seclusion discontinued at this time. 1:1 remains outside door for safety.
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
## 2023-07-15 NOTE — ED NOTES
Within an hour after restraint an in person face to face assessment was completed at 2000, including an evaluation of the patient's immediate reaction to the intervention, behavioral assessment and review/assessment of history, drugs and medications, recent labs, etc., and behavioral condition.  The patient experienced: No adverse physical outcome from seclusion/restraint initiation.  The intervention of restraint or seclusion needs to continue.     Edvin Magana,   07/14/23 2024

## 2023-07-15 NOTE — ED NOTES
"Pt appeared to be moving his belongs from one bed to another restlessly. When writer checked in with pt, pt angrily exclaimed \"I'm getting ready for bed!\" They called writer a \"bitch.\"   "

## 2023-07-15 NOTE — CONSULTS
Etienne Dupree MRN# [id number]   Age: 33 year old YOB: 1990   Date of Admission to ED: 7/13/2023    In person visit Details:     Patient was assessed and interviewed face-to-face in person with this writer prem. Patient was observed to be able to participate in the assessment as evidenced by verbal consent. Assessment methods included conducting a formal interview with patient, review of medical records, collaboration with medical staff, and obtaining relevant collateral information from family and community providers when available.        Reason for Consult:   This note is being entered to supplement the psychiatry consultation note that was completed on July 13, 2023 by the licensed mental health professional Baltazar NEGRETE have reviewed the pertinent clinical details related to their encounter. I am being consulted to offer additional guidance on psychiatric pharmacological interventions    Writer met patient in seclusion room, patient open his eyes unable to engage in conversation besides saying his name, where he is, patient was very agitated and irritable during assessment interview asked the writer to come back another time.    Chart reviewed patient was on multiple psychopharmacology medications such as Zyprexa, Depakote, Wellbutrin which is his PTA medications.  Continue his current PTA medication due to severe agitation and aggression while in the emergency room we will add Haldol 5 mg, Ativan 2 mg, Benadryl 50 mg for severe agitation and aggression.  Currently patient is on 72-hour hold, intend to revoke his civil commitment is in process,  Please see initial DEC  note for further detail    I have reviewed the nursing notes. I have reviewed the findings, diagnosis, plan and need for follow up with the patient.         HPI:     Etienne Dupree is a 33 year old male with a past medical history significant for stimulant dependence with intoxication delirium, methamphetamine  "abuse, bipolar 1 disorder, borderline personality disorder, and antisocial personality disorder presents to the ED via EMS for evaluation of manic behavior.  Per EMS, patient had been observed running around in traffic while carrying a wooden plank.  He presents wearing 5-6 sweaters, which is atypical with the 85 F weather outside.  Patient appears to be speaking rapidly in a disorganized manner.  Of note, patient is currently on a psychiatric hold by the [facility].          Pt has  required locked seclusion or restraints in the past 24 hours to maintain safety, please refer to RN documentation for further details.  Substance use does appear to be playing a contributing role in the patient's presentation.  Brief Therapeutic Intervention(s):   Patient declined to participate        Past Psychiatric History:     Etienne has a history of inpatient psychiatric stays with his most recent discharge being on Tuesday 7/11/2023 through [facility]. He told writer that he is not interested in \"being locked up again for over 720 days\".     Etienne told writer that he has a history of drinking alcohol and using marijuana, meth, cocaine, and ectasy. He said that he last used substances on May 1, 2023.         Substance Use and History:     Currently unknown history of polysubstance use disorder      Past Medical History:   PAST MEDICAL HISTORY:   Past Medical History:   Diagnosis Date     ADHD (attention deficit hyperactivity disorder)      Bipolar 1 disorder (H)      Depressive disorder        PAST SURGICAL HISTORY: No past surgical history on file.            Allergies:     Allergies   Allergen Reactions     Penicillins              Medications:     I have reviewed this patient's current medications  Current Facility-Administered Medications   Medication     buPROPion (WELLBUTRIN XL) 24 hr tablet 300 mg     haloperidol (HALDOL) tablet 5 mg    And     LORazepam (ATIVAN) tablet 2 mg    And     diphenhydrAMINE (BENADRYL) " capsule 50 mg     haloperidol lactate (HALDOL) injection 5 mg    And     LORazepam (ATIVAN) injection 2 mg    And     diphenhydrAMINE (BENADRYL) injection 50 mg     divalproex sodium delayed-release (DEPAKOTE) DR tablet 1,250 mg     hydrOXYzine (ATARAX) tablet 50 mg     OLANZapine zydis (zyPREXA) ODT tab 15 mg     Current Outpatient Medications   Medication Sig     atomoxetine (STRATTERA) 40 MG capsule Take 2 capsules (80 mg) by mouth daily     buPROPion (WELLBUTRIN XL) 150 MG 24 hr tablet Take 2 tablets (300 mg) by mouth every morning     buPROPion (WELLBUTRIN XL) 300 MG 24 hr tablet Take 1 tablet by mouth daily at 2 pm     divalproex sodium delayed-release (DEPAKOTE) 250 MG DR tablet Take 5 tablets (1,250 mg) by mouth 2 times daily     divalproex sodium extended-release (DEPAKOTE ER) 250 MG 24 hr tablet      fish oil-omega-3 fatty acids (OMEGA-3 FISH OIL) 1000 MG capsule Take 1,000 mg by mouth daily     hydrochlorothiazide (HYDRODIURIL) 25 MG tablet Take 1 tablet (25 mg) by mouth every morning     hydrOXYzine (ATARAX) 25 MG tablet Take 1-2 tablets (25-50 mg) by mouth every 6 hours as needed for anxiety (or agitation)     multivitamin w/minerals (MULTIVITAMINS W/MINERALS) tablet Take 1 tablet by mouth daily     nicotine polacrilex (NICORETTE) 4 MG gum 4 mg, buccal, every hour as needed for nicotine withdrawal symptoms. Chew until tingling, then place between cheek and gum. Repeat. Do not swallow. Not to exceed 96 mg (24 pieces) in a 24 hour period     OLANZapine (ZYPREXA) 10 MG tablet Take 1.5 tablets (15 mg) by mouth At Bedtime     OLANZapine (ZYPREXA) 15 MG tablet TAKE 1 TABLET (15 MG) BY MOUTH EVERY NIGHT AT BEDTIME     omega 3 1000 MG CAPS Take 1 capsule by mouth daily              Family History:   FAMILY HISTORY:   Family History   Problem Relation Age of Onset     Diabetes Sister            Social History:   SOCIAL HISTORY:   Social History     Tobacco Use     Smoking status: Every Day     Packs/day: 0.00      Types: Cigarettes, Vaping Device     Smokeless tobacco: Former   Substance Use Topics     Alcohol use: Not Currently            PTA Medications:   (Not in a hospital admission)         Allergies:     Allergies   Allergen Reactions     Penicillins           Labs:   No results found for this or any previous visit (from the past 48 hour(s)).       Physical and Psychiatric Examination:     /84   Pulse (!) 129   Temp 99.5  F (37.5  C) (Oral)   Resp 22   SpO2 97%   Weight is 0 lbs 0 oz  There is no height or weight on file to calculate BMI.    Mental Status Exam:  Patient declined to participate in mental status exam         Diagnoses:      Agitation  Acute psychosis (H)         Recommendations:     1.* Pt displays the following risk factors that support IP admission:  Severe agitation and aggression while staying in the emergency room, making suicidal and homicidal threats unable to contract for safety. Pt is unable to engage in safety planning to mitigate risk level in a non-secure setting. Lower levels of care have not been successful in mitigating risk. Due to this IP is the least restrictive option of care for pt. Pt should remain in IP until deemed safe to return to the community and engage in OP MH supports    - Continue to recommend inpatient psychiatric hospitalizations for further stabilization   2.  Currently patient is on 72-hour hold and plan to revoke his provisional discharge  3.  Continue his current PTA medications Zyprexa, Depakote and Wellbutrin, will add as needed medication for his severe agitation and aggression Haldol, Benadryl, Ativan IM or p.o.  4.  Consult psychiatry as needed  4.   Refer to psychiatric provider for medication management.    treatment per ED team    - Consulted with [facility], ED physician, Dr Ribera  asked if they would like this writer to enter orders in the EHR,  patient's ED MARITA Dugan regarding this case.     Please call [facility]/DEC at [phone] if you have follow-up  questions or wish to place another consult.  Belinda Chanel, Psychiatric Nurse practitioner  Attestation:  Time with:  Patient: 5 minutes  Treatment Team: 25 Minutes  Chart Review: 30 minutes    Total time spent was 60 minutes. Over 50% of times was spent counseling and coordination of care.    I, Belinda Chanel, COURTNEY, APRN, Psychiatric Nurse Practitioner have personally performed an examination of this patient.  I have edited the note to reflect all relevant changes.  I have discussed this patient with the care team July 15, 2023.  I have reviewed all vitals and laboratory findings.    Disclaimer: This note consists of symbols derived from keyboarding,

## 2023-07-16 LAB
ALBUMIN SERPL BCG-MCNC: 4.8 G/DL (ref 3.5–5.2)
ALP SERPL-CCNC: 81 U/L (ref 40–129)
ALT SERPL W P-5'-P-CCNC: 197 U/L (ref 0–70)
ANION GAP SERPL CALCULATED.3IONS-SCNC: 16 MMOL/L (ref 7–15)
AST SERPL W P-5'-P-CCNC: 96 U/L (ref 0–45)
BASOPHILS # BLD AUTO: 0.1 10E3/UL (ref 0–0.2)
BASOPHILS NFR BLD AUTO: 1 %
BILIRUB SERPL-MCNC: 0.4 MG/DL
BUN SERPL-MCNC: 20.2 MG/DL (ref 6–20)
CALCIUM SERPL-MCNC: 9.4 MG/DL (ref 8.6–10)
CHLORIDE SERPL-SCNC: 99 MMOL/L (ref 98–107)
CK SERPL-CCNC: 660 U/L (ref 39–308)
CREAT SERPL-MCNC: 0.97 MG/DL (ref 0.67–1.17)
DEPRECATED HCO3 PLAS-SCNC: 21 MMOL/L (ref 22–29)
EOSINOPHIL # BLD AUTO: 0.2 10E3/UL (ref 0–0.7)
EOSINOPHIL NFR BLD AUTO: 2 %
ERYTHROCYTE [DISTWIDTH] IN BLOOD BY AUTOMATED COUNT: 13 % (ref 10–15)
ETHANOL SERPL-MCNC: <0.01 G/DL
GFR SERPL CREATININE-BSD FRML MDRD: >90 ML/MIN/1.73M2
GLUCOSE SERPL-MCNC: 160 MG/DL (ref 70–99)
HCT VFR BLD AUTO: 51.9 % (ref 40–53)
HGB BLD-MCNC: 17.7 G/DL (ref 13.3–17.7)
IMM GRANULOCYTES # BLD: 0 10E3/UL
IMM GRANULOCYTES NFR BLD: 0 %
LYMPHOCYTES # BLD AUTO: 3.6 10E3/UL (ref 0.8–5.3)
LYMPHOCYTES NFR BLD AUTO: 33 %
MCH RBC QN AUTO: 29.5 PG (ref 26.5–33)
MCHC RBC AUTO-ENTMCNC: 34.1 G/DL (ref 31.5–36.5)
MCV RBC AUTO: 87 FL (ref 78–100)
MONOCYTES # BLD AUTO: 0.7 10E3/UL (ref 0–1.3)
MONOCYTES NFR BLD AUTO: 7 %
NEUTROPHILS # BLD AUTO: 6.2 10E3/UL (ref 1.6–8.3)
NEUTROPHILS NFR BLD AUTO: 57 %
NRBC # BLD AUTO: 0 10E3/UL
NRBC BLD AUTO-RTO: 0 /100
PLATELET # BLD AUTO: 287 10E3/UL (ref 150–450)
POTASSIUM SERPL-SCNC: 3.4 MMOL/L (ref 3.4–5.3)
PROT SERPL-MCNC: 8 G/DL (ref 6.4–8.3)
RBC # BLD AUTO: 6 10E6/UL (ref 4.4–5.9)
SODIUM SERPL-SCNC: 136 MMOL/L (ref 136–145)
WBC # BLD AUTO: 10.8 10E3/UL (ref 4–11)

## 2023-07-16 PROCEDURE — 250N000013 HC RX MED GY IP 250 OP 250 PS 637: Performed by: EMERGENCY MEDICINE

## 2023-07-16 PROCEDURE — 36415 COLL VENOUS BLD VENIPUNCTURE: CPT | Performed by: EMERGENCY MEDICINE

## 2023-07-16 PROCEDURE — 82077 ASSAY SPEC XCP UR&BREATH IA: CPT | Performed by: EMERGENCY MEDICINE

## 2023-07-16 PROCEDURE — 85025 COMPLETE CBC W/AUTO DIFF WBC: CPT | Performed by: EMERGENCY MEDICINE

## 2023-07-16 PROCEDURE — 250N000013 HC RX MED GY IP 250 OP 250 PS 637

## 2023-07-16 PROCEDURE — 80053 COMPREHEN METABOLIC PANEL: CPT | Performed by: EMERGENCY MEDICINE

## 2023-07-16 PROCEDURE — 82550 ASSAY OF CK (CPK): CPT | Performed by: EMERGENCY MEDICINE

## 2023-07-16 PROCEDURE — 250N000013 HC RX MED GY IP 250 OP 250 PS 637: Performed by: STUDENT IN AN ORGANIZED HEALTH CARE EDUCATION/TRAINING PROGRAM

## 2023-07-16 RX ORDER — LOPERAMIDE HCL 2 MG
2 CAPSULE ORAL 4 TIMES DAILY PRN
Status: DISCONTINUED | OUTPATIENT
Start: 2023-07-16 | End: 2023-08-15 | Stop reason: HOSPADM

## 2023-07-16 RX ORDER — LOPERAMIDE HCL 2 MG
2 CAPSULE ORAL ONCE
Status: COMPLETED | OUTPATIENT
Start: 2023-07-16 | End: 2023-07-16

## 2023-07-16 RX ORDER — HYDROCHLOROTHIAZIDE 25 MG/1
25 TABLET ORAL DAILY
Status: DISCONTINUED | OUTPATIENT
Start: 2023-07-16 | End: 2023-08-15 | Stop reason: HOSPADM

## 2023-07-16 RX ORDER — LOPERAMIDE HCL 2 MG
4 CAPSULE ORAL ONCE
Status: COMPLETED | OUTPATIENT
Start: 2023-07-16 | End: 2023-07-16

## 2023-07-16 RX ORDER — CHLORAL HYDRATE 500 MG
1 CAPSULE ORAL DAILY
Status: DISCONTINUED | OUTPATIENT
Start: 2023-07-16 | End: 2023-08-15 | Stop reason: HOSPADM

## 2023-07-16 RX ADMIN — NICOTINE 1 PATCH: 21 PATCH, EXTENDED RELEASE TRANSDERMAL at 10:24

## 2023-07-16 RX ADMIN — HALOPERIDOL 5 MG: 5 TABLET ORAL at 16:55

## 2023-07-16 RX ADMIN — LORAZEPAM 2 MG: 2 TABLET ORAL at 16:56

## 2023-07-16 RX ADMIN — HYDROCHLOROTHIAZIDE 25 MG: 25 TABLET ORAL at 10:36

## 2023-07-16 RX ADMIN — BUPROPION HYDROCHLORIDE 300 MG: 150 TABLET, FILM COATED, EXTENDED RELEASE ORAL at 10:23

## 2023-07-16 RX ADMIN — DIPHENHYDRAMINE HYDROCHLORIDE 50 MG: 50 CAPSULE ORAL at 16:55

## 2023-07-16 RX ADMIN — LOPERAMIDE HYDROCHLORIDE 2 MG: 2 CAPSULE ORAL at 03:11

## 2023-07-16 RX ADMIN — DIVALPROEX SODIUM 1250 MG: 500 TABLET, DELAYED RELEASE ORAL at 10:09

## 2023-07-16 RX ADMIN — OLANZAPINE 15 MG: 10 TABLET, ORALLY DISINTEGRATING ORAL at 21:14

## 2023-07-16 RX ADMIN — LOPERAMIDE HYDROCHLORIDE 2 MG: 2 CAPSULE ORAL at 10:23

## 2023-07-16 RX ADMIN — LOPERAMIDE HYDROCHLORIDE 4 MG: 2 CAPSULE ORAL at 18:58

## 2023-07-16 RX ADMIN — Medication 1 G: at 10:36

## 2023-07-16 RX ADMIN — DIVALPROEX SODIUM 1250 MG: 500 TABLET, DELAYED RELEASE ORAL at 21:13

## 2023-07-16 ASSESSMENT — ACTIVITIES OF DAILY LIVING (ADL)
ADLS_ACUITY_SCORE: 35
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
## 2023-07-16 NOTE — ED PROVIDER NOTES
[facility] ED Mental Health Handoff Note:       Brief HPI:  This is a 33 year old male signed out to me by Dr. Iyer.  See initial ED Provider note for full details of the presentation.  There were no events during my shift.     Home meds reviewed and ordered/administered: Yes    Medically stable for inpatient mental health admission: Yes.    Evaluated by mental health: Yes. The recommendation is for inpatient mental health treatment. Bed search in process    Safety concerns: At the time I received sign out, there were no safety concerns.    Hold Status:  Active Orders   Legal    Emergency Hospitalization Hold (72 Hr Hold)     Frequency: Effective Now     Start Date/Time: 07/14/23 1345      Number of Occurrences: Until Specified    Health Officer Authority to Detain (MICHELLE)     Frequency: Effective Now     Start Date/Time: 07/14/23 0644      Number of Occurrences: Until Specified     Order Comments: This patient presented with circumstances that have led me to be reasonably suspicious that the patient is experiencing psychosis. The patient's judgment to this situation appears to be impaired. Given the circumstances in which the patient presented, it is likely that the patient is at significant risk of harming self or others if this situation is not investigated further. I am highly concerned that the patient is mentally ill and currently cannot safely care for oneself. This represents endangerment to the patient's well-being and safety, and I am placing a Health Officer Authority hold upon the patient at this time.           Exam:   Patient Vitals for the past 24 hrs:   BP Temp Temp src Pulse Resp SpO2   07/16/23 0957 (!) 128/90 97.7  F (36.5  C) Oral 97 16 97 %     General: Patient is sleeping but arouses to verbal stimulation, has no complaints  Lungs: Breathing comforably in room air  Neuro: Movs all sides of the body equally  Psych: Patient has been sleeping.  Patient is calm during my evaluation and there  is no agitation during my shift.        ED Course:    Medications   divalproex sodium delayed-release (DEPAKOTE) DR tablet 1,250 mg (1,250 mg Oral $Given 7/16/23 1009)   hydrOXYzine (ATARAX) tablet 50 mg (50 mg Oral $Given 7/14/23 0122)   buPROPion (WELLBUTRIN XL) 24 hr tablet 300 mg (300 mg Oral $Given 7/16/23 1023)   OLANZapine zydis (zyPREXA) ODT tab 15 mg (0 mg Oral Hold 7/15/23 2110)   haloperidol lactate (HALDOL) injection 5 mg (0 mg Intramuscular Return to Cabinet 7/15/23 1347)     And   LORazepam (ATIVAN) injection 2 mg (0 mg Intramuscular Return to Cabinet 7/15/23 1348)     And   diphenhydrAMINE (BENADRYL) injection 50 mg (0 mg Intramuscular Return to Cabinet 7/15/23 1348)   haloperidol (HALDOL) tablet 5 mg (5 mg Oral Not Given 7/15/23 1209)     And   LORazepam (ATIVAN) tablet 2 mg (2 mg Oral $Given 7/15/23 1209)     And   diphenhydrAMINE (BENADRYL) capsule 50 mg (50 mg Oral $Given 7/15/23 1209)   nicotine (NICODERM CQ) 21 MG/24HR 24 hr patch 1 patch (1 patch Transdermal $Given During Downtime 7/16/23 1024)     And   nicotine Patch in Place ( Transdermal Patch Free Period 7/16/23 1024)   fish oil-omega-3 fatty acids capsule 1 g (1 g Oral $Given 7/16/23 1036)   hydrochlorothiazide (HYDRODIURIL) tablet 25 mg (25 mg Oral $Given 7/16/23 1036)   OLANZapine zydis (zyPREXA) ODT tab 15 mg (15 mg Oral $Given 7/13/23 1941)   nicotine polacrilex (NICORETTE) gum 4 mg (4 mg Buccal $Given 7/14/23 0122)   OLANZapine (zyPREXA) injection 10 mg (10 mg Intramuscular $Given 7/14/23 0658)   OLANZapine (zyPREXA) injection 10 mg (10 mg Intramuscular $Given 7/14/23 1913)   LORazepam (ATIVAN) injection 2 mg (2 mg Intramuscular $Given 7/14/23 1913)   loperamide (IMODIUM) capsule 2 mg (2 mg Oral $Given 7/16/23 0311)   loperamide (IMODIUM) capsule 2 mg (2 mg Oral $Given 7/16/23 1023)            There were no events during my shift.    Impression:    ICD-10-CM    1. Agitation  R45.1       2. Acute psychosis (H)  F23           Plan:     1. Awaiting inpatient mental health admission/transfer.    RESULTS:   Results for orders placed or performed during the hospital encounter of 07/13/23 (from the past 24 hour(s))   Ethyl Alcohol Level     Status: Normal    Collection Time: 07/16/23 10:34 AM   Result Value Ref Range    Alcohol ethyl <0.01 <=0.01 g/dL   CBC with platelets differential     Status: Abnormal    Collection Time: 07/16/23 10:34 AM    Narrative    The following orders were created for panel order CBC with platelets differential.  Procedure                               Abnormality         Status                     ---------                               -----------         ------                     CBC with platelets and d...[[id number]]  Abnormal            Final result                 Please view results for these tests on the individual orders.   Comprehensive metabolic panel     Status: Abnormal    Collection Time: 07/16/23 10:34 AM   Result Value Ref Range    Sodium 136 136 - 145 mmol/L    Potassium 3.4 3.4 - 5.3 mmol/L    Chloride 99 98 - 107 mmol/L    Carbon Dioxide (CO2) 21 (L) 22 - 29 mmol/L    Anion Gap 16 (H) 7 - 15 mmol/L    Urea Nitrogen 20.2 (H) 6.0 - 20.0 mg/dL    Creatinine 0.97 0.67 - 1.17 mg/dL    Calcium 9.4 8.6 - 10.0 mg/dL    Glucose 160 (H) 70 - 99 mg/dL    Alkaline Phosphatase 81 40 - 129 U/L    AST 96 (H) 0 - 45 U/L     (H) 0 - 70 U/L    Protein Total 8.0 6.4 - 8.3 g/dL    Albumin 4.8 3.5 - 5.2 g/dL    Bilirubin Total 0.4 <=1.2 mg/dL    GFR Estimate >90 >60 mL/min/1.73m2   CK total     Status: Abnormal    Collection Time: 07/16/23 10:34 AM   Result Value Ref Range     (H) 39 - 308 U/L   CBC with platelets and differential     Status: Abnormal    Collection Time: 07/16/23 10:34 AM   Result Value Ref Range    WBC Count 10.8 4.0 - 11.0 10e3/uL    RBC Count 6.00 (H) 4.40 - 5.90 10e6/uL    Hemoglobin 17.7 13.3 - 17.7 g/dL    Hematocrit 51.9 40.0 - 53.0 %    MCV 87 78 - 100 fL    MCH 29.5 26.5 - 33.0 pg     MCHC 34.1 31.5 - 36.5 g/dL    RDW 13.0 10.0 - 15.0 %    Platelet Count 287 150 - 450 10e3/uL    % Neutrophils 57 %    % Lymphocytes 33 %    % Monocytes 7 %    % Eosinophils 2 %    % Basophils 1 %    % Immature Granulocytes 0 %    NRBCs per 100 WBC 0 <1 /100    Absolute Neutrophils 6.2 1.6 - 8.3 10e3/uL    Absolute Lymphocytes 3.6 0.8 - 5.3 10e3/uL    Absolute Monocytes 0.7 0.0 - 1.3 10e3/uL    Absolute Eosinophils 0.2 0.0 - 0.7 10e3/uL    Absolute Basophils 0.1 0.0 - 0.2 10e3/uL    Absolute Immature Granulocytes 0.0 <=0.4 10e3/uL    Absolute NRBCs 0.0 10e3/uL       MD Willie Blanc, Ford Martinez MD  07/16/23 8751

## 2023-07-16 NOTE — TELEPHONE ENCOUNTER
No appropriate beds are currently available within the  system. Bed search update @ 12AM        [facility]: @ cap per website  [facility]: @ cap per website  [facility]: @ cap per website. Per Carolyn @ 00:01 only low acuity beds on their step- down unit- Pt not appropriate for current beds avail d/t acuity  : @ cap per website  [facility]: @ cap per website  [facility]: @ cap per website  [facility]: @ cap per website  [facility]: @ cap per website  [facility]: Posting 1 bed for low acuity only. Per Gifty @ 00:02, they only have low acuity beds in [place] and [place] - Pt not appropriate for current beds avail d/t acuity  [facility]: Posting 3 beds. Mixed unit/Low acuity only. Pt not appropriate d/t acuity  [facility]: @ cap per website  [facility]: @ cap per website  [facility]: Posting 4 beds. Per Gifty @ 00:02, they only have low acuity beds in [place] and [facility]: Does not review after 10PM.    [facility]: Posting 5 beds. Per Ada @ 00:12, low acuity beds available - Pt not appropriate for current beds avail d/t acuity  [facility]: @ cap per website  [facility]: Posting 3 beds. Per Ada @ 00:12, low acuity beds available - Pt not appropriate for current beds avail d/t acuity  [facility]: @ cap per website  [facility]: Posting 7 beds (Low acuity only. Must have the cognitive ability to do programming. No aggressive or violent behavior or recent HX in the last 2 yrs. MH must be primary). Meets exclusionary d/t aggression  [facility]: @ cap per website  [facility]: @ cap per website  [facility]: Posting 1 bed (Covid neg. Voluntary only. Mixed unit. No aggressive or violent behavior. No registered sex offenders). Meets exclusionary d/t aggression and 72 HH  [facility]: Posting 1 bed (No hx of aggression/assault. No lines, drains or tubes. Does not provide detox or CD  treatment). Meets exclusionary d/t aggression  [facility]: Posting 22 beds. Facility is in [place]. Pt [place] a [place] 72 HH; cannot be placed in [place]  [facility]: Posting 3 beds (Mixed unit/Low acuity).  Pt not appropriate d/t acuity        Pt remains on work list until appropriate placement is available

## 2023-07-16 NOTE — PROGRESS NOTES
Triage & Transition Services, Extended Care     Etienne KENNEDY BLAISE Dupree  July 16, 2023       Etienne is followed related to 72 Hour Hold: placed. Please see initial DEC/[facility] Crisis Assessment completed by Cristin Ledesma  On 7/13/2023  for complete assessment information. Notable concerns include psychosis, julianna and violent behavior.       Current Supports and Contact Information    : Jose Durham with [facility] [phone]    Case Management  Case management for patient included collaborating with patient's support system. In course of the day today, writer had contact with patient's nurse. Spoke about Pts presentation. Pt does appear to be agitated but has been more redirectable. Pt has been more cooperative with cares and was willing to have a blood draw and has been taking his medications. This does appear to be an improvement since arrival and yesterday. Writer attempted to meet with Pt today and he declined. Pt was observed in the milieu, Pt was pacing and at times was demanding with staff but it appears he has been somewhat more cooperative today.      Plan  Inpatient Mental Health: Pt still presents with increased psychosis symptoms and julianna. Pt is high risk for violent behavior. Pts current symptoms do present as an imminent risk to himself and others. Pts current symptoms as impairing his ability to function approprietly and safely in the community. At this time it does appear an IP MH admission would be the most therapeutically beneficial intervention for Pt to mitigate current safety concerns. Pt is currently on a 72 hour hold which was initiated on 7/14/2023 1345. Hospital staff have received and intent to revoke Pts provisional discharge but hospital staff are still awaiting court order of revocation.     Plan for Care reviewed with Assigned Medical Provider? No. No change in disposition, recommendation continues to be IP MH admission.     Extended Care will follow and meet with  patient/family/care team as able or requested.     Yasemin Vo, [facility]   [phone]

## 2023-07-16 NOTE — ED NOTES
Patient requesting that his Wellbutrin dose be increased from 300 mg to 400 mg. Patient did not take his Wellbutrin today.

## 2023-07-17 ENCOUNTER — MEDICAL CORRESPONDENCE (OUTPATIENT)
Dept: HEALTH INFORMATION MANAGEMENT | Facility: CLINIC | Age: 33
End: 2023-07-17
Payer: COMMERCIAL

## 2023-07-17 LAB — SARS-COV-2 RNA RESP QL NAA+PROBE: NEGATIVE

## 2023-07-17 PROCEDURE — 250N000013 HC RX MED GY IP 250 OP 250 PS 637

## 2023-07-17 PROCEDURE — 250N000013 HC RX MED GY IP 250 OP 250 PS 637: Performed by: STUDENT IN AN ORGANIZED HEALTH CARE EDUCATION/TRAINING PROGRAM

## 2023-07-17 PROCEDURE — 250N000013 HC RX MED GY IP 250 OP 250 PS 637: Performed by: EMERGENCY MEDICINE

## 2023-07-17 PROCEDURE — 87635 SARS-COV-2 COVID-19 AMP PRB: CPT | Performed by: EMERGENCY MEDICINE

## 2023-07-17 RX ORDER — HYDROXYZINE PAMOATE 50 MG/1
50 CAPSULE ORAL 3 TIMES DAILY PRN
Status: ON HOLD | COMMUNITY
End: 2023-08-11

## 2023-07-17 RX ADMIN — Medication 1 G: at 08:55

## 2023-07-17 RX ADMIN — BUPROPION HYDROCHLORIDE 300 MG: 150 TABLET, FILM COATED, EXTENDED RELEASE ORAL at 08:56

## 2023-07-17 RX ADMIN — DIVALPROEX SODIUM 1250 MG: 500 TABLET, DELAYED RELEASE ORAL at 08:54

## 2023-07-17 RX ADMIN — DIPHENHYDRAMINE HYDROCHLORIDE 50 MG: 50 CAPSULE ORAL at 15:37

## 2023-07-17 RX ADMIN — LORAZEPAM 2 MG: 2 TABLET ORAL at 15:37

## 2023-07-17 RX ADMIN — HALOPERIDOL 5 MG: 5 TABLET ORAL at 15:37

## 2023-07-17 RX ADMIN — NICOTINE 1 PATCH: 21 PATCH, EXTENDED RELEASE TRANSDERMAL at 09:03

## 2023-07-17 RX ADMIN — DIVALPROEX SODIUM 1250 MG: 500 TABLET, DELAYED RELEASE ORAL at 23:38

## 2023-07-17 RX ADMIN — HYDROCHLOROTHIAZIDE 25 MG: 25 TABLET ORAL at 08:56

## 2023-07-17 ASSESSMENT — ACTIVITIES OF DAILY LIVING (ADL)
ADLS_ACUITY_SCORE: 35
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
## 2023-07-17 NOTE — PROGRESS NOTES
Triage & Transition Services, Extended Care     Etienne Dupree  July 17, 2023    Etienne is followed related to 72 Hour Hold: Pending revocation of PD. Please see initial DEC Crisis Assessment completed for complete assessment information. Medical record is reviewed.     There are not significant status changes.     Left message at 9:57 am for  Jose Durham @[phone] regarding need for revocation order signed by .    Met with pt from 12:47 until 12:56 pm.  He reported that he feels fine.  He stated that he came to the hospital for anxiety, described as racing thoughts.  Pt denied suicidal and homicidal thoughts.  He denied auditory and visual hallucinations.  He denied mental health symptoms and concerns.  Pt understands that he is on a hold and there is intent to revoke his PD.  He stated that he does not know why they would revoke because he was doing everything he was supposed to be doing.    Plan:  Inpatient Mental Health: Pt has been agitated and aggressive in the ED.  His behavior has been described as manic and psychotic.  Although he is denying all symptoms and concerns, pt is not considered a reliable .  He is under commitment with [facility] and we have the notice of intent to revoke his PD. We are still waiting for the official order signed by the .  Pt is not stable.  He is a risk to self and others.  He needs inpatient care.    Plan for Care reviewed with Assigned Medical Provider? Yes. Provider, Dr. Zacarias, response: Agreement    Extended Care will follow and meet with patient/family/care team as able or requested.     Mayi Suresh LP  [facility], Extended Care   [phone]

## 2023-07-17 NOTE — ED PROVIDER NOTES
[facility] ED Mental Health Handoff Note:       Brief HPI:  This is a 33 year old male signed out to me by Dr. Owens.  See initial ED Provider note for full details of the presentation. Interval history is pertinent for no acute events.    Home meds reviewed and ordered/administered: Yes    Medically stable for inpatient mental health admission: Yes.    Evaluated by mental health: Yes. The recommendation is for inpatient mental health treatment. Bed search in process    Safety concerns: At the time I received sign out, the patient had been aggressive/combative/agitated, but has calmed.    Hold Status:  Active Orders   Legal    Emergency Hospitalization Hold (72 Hr Hold)     Frequency: Effective Now     Start Date/Time: 07/14/23 1345      Number of Occurrences: Until Specified    Health Officer Authority to Detain (MICHELLE)     Frequency: Effective Now     Start Date/Time: 07/14/23 0644      Number of Occurrences: Until Specified     Order Comments: This patient presented with circumstances that have led me to be reasonably suspicious that the patient is experiencing psychosis. The patient's judgment to this situation appears to be impaired. Given the circumstances in which the patient presented, it is likely that the patient is at significant risk of harming self or others if this situation is not investigated further. I am highly concerned that the patient is mentally ill and currently cannot safely care for oneself. This represents endangerment to the patient's well-being and safety, and I am placing a Health Officer Authority hold upon the patient at this time.              Exam:   Patient Vitals for the past 24 hrs:   BP Temp Temp src Pulse Resp SpO2   07/16/23 2125 (!) 141/82 -- -- 84 16 96 %   07/16/23 0957 (!) 128/90 97.7  F (36.5  C) Oral 97 16 97 %         ED Course:    Medications   divalproex sodium delayed-release (DEPAKOTE) DR tablet 1,250 mg (1,250 mg Oral $Given 7/16/23 2113)   hydrOXYzine (ATARAX)  tablet 50 mg (50 mg Oral $Given 7/14/23 0122)   buPROPion (WELLBUTRIN XL) 24 hr tablet 300 mg (300 mg Oral $Given 7/16/23 1023)   OLANZapine zydis (zyPREXA) ODT tab 15 mg (15 mg Oral $Given 7/16/23 2114)   haloperidol lactate (HALDOL) injection 5 mg (0 mg Intramuscular Return to Cabinet 7/15/23 1347)     And   LORazepam (ATIVAN) injection 2 mg (0 mg Intramuscular Return to Cabinet 7/15/23 1348)     And   diphenhydrAMINE (BENADRYL) injection 50 mg (0 mg Intramuscular Return to Cabinet 7/15/23 1348)   haloperidol (HALDOL) tablet 5 mg (5 mg Oral $Given 7/16/23 1655)     And   LORazepam (ATIVAN) tablet 2 mg (2 mg Oral $Given 7/16/23 1656)     And   diphenhydrAMINE (BENADRYL) capsule 50 mg (50 mg Oral $Given 7/16/23 1655)   nicotine (NICODERM CQ) 21 MG/24HR 24 hr patch 1 patch (1 patch Transdermal $Given During Downtime 7/16/23 1024)     And   nicotine Patch in Place ( Transdermal Patch in Place 7/17/23 0650)   fish oil-omega-3 fatty acids capsule 1 g (1 g Oral $Given 7/16/23 1036)   hydrochlorothiazide (HYDRODIURIL) tablet 25 mg (25 mg Oral $Given 7/16/23 1036)   loperamide (IMODIUM) capsule 2 mg (has no administration in time range)   OLANZapine zydis (zyPREXA) ODT tab 15 mg (15 mg Oral $Given 7/13/23 1941)   nicotine polacrilex (NICORETTE) gum 4 mg (4 mg Buccal $Given 7/14/23 0122)   OLANZapine (zyPREXA) injection 10 mg (10 mg Intramuscular $Given 7/14/23 0658)   OLANZapine (zyPREXA) injection 10 mg (10 mg Intramuscular $Given 7/14/23 1913)   LORazepam (ATIVAN) injection 2 mg (2 mg Intramuscular $Given 7/14/23 1913)   loperamide (IMODIUM) capsule 2 mg (2 mg Oral $Given 7/16/23 0311)   loperamide (IMODIUM) capsule 2 mg (2 mg Oral $Given 7/16/23 1023)   loperamide (IMODIUM) capsule 4 mg (4 mg Oral $Given 7/16/23 5668)            There were no significant events during my shift.  Paperwork was received that the patient's provisional discharge has been revoked.    Patient was signed out to the oncoming provider,   Maureen      Impression:    ICD-10-CM    1. Agitation  R45.1       2. Acute psychosis (H)  F23           Plan:    1. Awaiting inpatient mental health admission/transfer.      RESULTS:   Results for orders placed or performed during the hospital encounter of 07/13/23 (from the past 24 hour(s))   Ethyl Alcohol Level     Status: Normal    Collection Time: 07/16/23 10:34 AM   Result Value Ref Range    Alcohol ethyl <0.01 <=0.01 g/dL   CBC with platelets differential     Status: Abnormal    Collection Time: 07/16/23 10:34 AM    Narrative    The following orders were created for panel order CBC with platelets differential.  Procedure                               Abnormality         Status                     ---------                               -----------         ------                     CBC with platelets and d...[[id number]]  Abnormal            Final result                 Please view results for these tests on the individual orders.   Comprehensive metabolic panel     Status: Abnormal    Collection Time: 07/16/23 10:34 AM   Result Value Ref Range    Sodium 136 136 - 145 mmol/L    Potassium 3.4 3.4 - 5.3 mmol/L    Chloride 99 98 - 107 mmol/L    Carbon Dioxide (CO2) 21 (L) 22 - 29 mmol/L    Anion Gap 16 (H) 7 - 15 mmol/L    Urea Nitrogen 20.2 (H) 6.0 - 20.0 mg/dL    Creatinine 0.97 0.67 - 1.17 mg/dL    Calcium 9.4 8.6 - 10.0 mg/dL    Glucose 160 (H) 70 - 99 mg/dL    Alkaline Phosphatase 81 40 - 129 U/L    AST 96 (H) 0 - 45 U/L     (H) 0 - 70 U/L    Protein Total 8.0 6.4 - 8.3 g/dL    Albumin 4.8 3.5 - 5.2 g/dL    Bilirubin Total 0.4 <=1.2 mg/dL    GFR Estimate >90 >60 mL/min/1.73m2   CK total     Status: Abnormal    Collection Time: 07/16/23 10:34 AM   Result Value Ref Range     (H) 39 - 308 U/L   CBC with platelets and differential     Status: Abnormal    Collection Time: 07/16/23 10:34 AM   Result Value Ref Range    WBC Count 10.8 4.0 - 11.0 10e3/uL    RBC Count 6.00 (H) 4.40 - 5.90 10e6/uL     Hemoglobin 17.7 13.3 - 17.7 g/dL    Hematocrit 51.9 40.0 - 53.0 %    MCV 87 78 - 100 fL    MCH 29.5 26.5 - 33.0 pg    MCHC 34.1 31.5 - 36.5 g/dL    RDW 13.0 10.0 - 15.0 %    Platelet Count 287 150 - 450 10e3/uL    % Neutrophils 57 %    % Lymphocytes 33 %    % Monocytes 7 %    % Eosinophils 2 %    % Basophils 1 %    % Immature Granulocytes 0 %    NRBCs per 100 WBC 0 <1 /100    Absolute Neutrophils 6.2 1.6 - 8.3 10e3/uL    Absolute Lymphocytes 3.6 0.8 - 5.3 10e3/uL    Absolute Monocytes 0.7 0.0 - 1.3 10e3/uL    Absolute Eosinophils 0.2 0.0 - 0.7 10e3/uL    Absolute Basophils 0.1 0.0 - 0.2 10e3/uL    Absolute Immature Granulocytes 0.0 <=0.4 10e3/uL    Absolute NRBCs 0.0 10e3/uL   Asymptomatic COVID-19 Virus (Coronavirus) by PCR Nasopharyngeal     Status: Normal    Collection Time: 07/17/23  1:11 AM    Specimen: Nasopharyngeal; Swab   Result Value Ref Range    SARS CoV2 PCR Negative Negative    Narrative    Testing was performed using the Xpert Xpress SARS-CoV-2 Assay on the Cepheid Gene-Xpert Instrument Systems. Additional information about this Emergency Use Authorization (EUA) assay can be found via the Lab Guide. This test should be ordered for the detection of SARS-CoV-2 in individuals who meet SARS-CoV-2 clinical and/or epidemiological criteria as well as from individuals without symptoms or other reasons to suspect COVID-19. Test performance for asymptomatic patients has only been established in anterior nasal swab specimens. This test is for in vitro diagnostic use under the FDA EUA for laboratories certified under CLIA to perform high complexity testing. This test has not been FDA cleared or approved. A negative result does not rule out the presence of PCR inhibitors in the specimen or target RNA concentration below the limit of detection for the assay. The possibility of a false negative should be considered if the patient's recent exposure or clinical presentation suggests COVID-19. This test was validated by  [facility]. This laboratory is certified under the Clinical Laboratory Improvement Amendments (CLIA) as qualified to perform high complexity laboratory testing.               MD Rell Wakefield, Maria Dolores Guerra MD  07/17/23 1533

## 2023-07-18 ENCOUNTER — TELEPHONE (OUTPATIENT)
Dept: BEHAVIORAL HEALTH | Facility: CLINIC | Age: 33
End: 2023-07-18
Payer: COMMERCIAL

## 2023-07-18 LAB
ALBUMIN UR-MCNC: NEGATIVE MG/DL
AMPHETAMINES UR QL SCN: ABNORMAL
APPEARANCE UR: CLEAR
BARBITURATES UR QL SCN: ABNORMAL
BENZODIAZ UR QL SCN: ABNORMAL
BILIRUB UR QL STRIP: NEGATIVE
BZE UR QL SCN: ABNORMAL
CANNABINOIDS UR QL SCN: ABNORMAL
COLOR UR AUTO: NORMAL
GLUCOSE UR STRIP-MCNC: NEGATIVE MG/DL
HGB UR QL STRIP: NEGATIVE
KETONES UR STRIP-MCNC: NEGATIVE MG/DL
LEUKOCYTE ESTERASE UR QL STRIP: NEGATIVE
NITRATE UR QL: NEGATIVE
OPIATES UR QL SCN: ABNORMAL
PH UR STRIP: 6 [PH] (ref 5–7)
RBC URINE: <1 /HPF
SP GR UR STRIP: 1.01 (ref 1–1.03)
UROBILINOGEN UR STRIP-MCNC: NORMAL MG/DL
WBC URINE: <1 /HPF

## 2023-07-18 PROCEDURE — 250N000013 HC RX MED GY IP 250 OP 250 PS 637

## 2023-07-18 PROCEDURE — 80307 DRUG TEST PRSMV CHEM ANLYZR: CPT | Performed by: EMERGENCY MEDICINE

## 2023-07-18 PROCEDURE — 250N000013 HC RX MED GY IP 250 OP 250 PS 637: Performed by: STUDENT IN AN ORGANIZED HEALTH CARE EDUCATION/TRAINING PROGRAM

## 2023-07-18 PROCEDURE — 250N000013 HC RX MED GY IP 250 OP 250 PS 637: Performed by: EMERGENCY MEDICINE

## 2023-07-18 PROCEDURE — 81001 URINALYSIS AUTO W/SCOPE: CPT | Performed by: EMERGENCY MEDICINE

## 2023-07-18 RX ADMIN — DIVALPROEX SODIUM 1250 MG: 500 TABLET, DELAYED RELEASE ORAL at 08:42

## 2023-07-18 RX ADMIN — DIVALPROEX SODIUM 1250 MG: 500 TABLET, DELAYED RELEASE ORAL at 21:08

## 2023-07-18 RX ADMIN — LOPERAMIDE HYDROCHLORIDE 2 MG: 2 CAPSULE ORAL at 12:26

## 2023-07-18 RX ADMIN — NICOTINE 1 PATCH: 21 PATCH, EXTENDED RELEASE TRANSDERMAL at 08:46

## 2023-07-18 RX ADMIN — OLANZAPINE 15 MG: 10 TABLET, ORALLY DISINTEGRATING ORAL at 21:16

## 2023-07-18 RX ADMIN — Medication 1 G: at 08:45

## 2023-07-18 RX ADMIN — BUPROPION HYDROCHLORIDE 300 MG: 150 TABLET, FILM COATED, EXTENDED RELEASE ORAL at 08:44

## 2023-07-18 RX ADMIN — LOPERAMIDE HYDROCHLORIDE 2 MG: 2 CAPSULE ORAL at 17:44

## 2023-07-18 RX ADMIN — LORAZEPAM 2 MG: 2 TABLET ORAL at 13:21

## 2023-07-18 RX ADMIN — HYDROCHLOROTHIAZIDE 25 MG: 25 TABLET ORAL at 08:45

## 2023-07-18 ASSESSMENT — ACTIVITIES OF DAILY LIVING (ADL)
ADLS_ACUITY_SCORE: 35

## 2023-07-18 NOTE — ED NOTES
IP MH Referral Acuity Rating Score (RARS)    LMHP complete at referral to IP MH, with DEC; and, daily while awaiting IP MH placement. Call score to PPS.  CRITERIA SCORING   New 72 HH and Involuntary for IP MH (not adolescent) 1/1   Boarding over 24 hours 1/1   Vulnerable adult at least 55+ with multiple co morbidities; or, Patient age 11 or under 0/1   Suicide ideation without relief of precipitating factors 0/1   Current plan for suicide 0/1   Current plan for homicide 0/1   Imminent risk or actual attempt to seriously harm another without relief of factors precipitating the attempt 0/1   Severe dysfunction in daily living (ex: complete neglect for self care, extreme disruption in vegetative function, extreme deterioration in social interactions) 1/1   Recent (last 2 weeks) or current physical aggression in the ED 1/1   Restraints or seclusion episode in ED 1/1   Verbal aggression, agitation, yelling, etc., while in the ED 1/1   Active psychosis with psychomotor agitation or catatonia 0/1   Need for constant or near constant redirection (from leaving, from others, etc).  0/1   Intrusive or disruptive behaviors 0/1   TOTAL Acuity Total Score: 6

## 2023-07-18 NOTE — PROGRESS NOTES
"Triage & Transition Services, Extended Care     Therapy Progress Note    Patient: Etienne goes by \"Etienne,\" uses he/him pronouns  Date of Service: July 18, 2023  Site of Service: [facility]  Patient was seen in-person.     Presenting problem:   Etienne is followed related to long wait time for admission and revocation of PD. Please see initial DEC/[facility] Crisis Assessment completed by Cristin Ledesma on 7/13/23 for complete assessment information. Notable concerns include disorganized thoughts, pressured speech, pacing, manic behavior..     Individuals Present: Etienne & Mayi Suresh LP    Session start: 10:00 am  Session end: 10:16 am  Session duration in minutes: 16  Session number: 3  Anticipated number of sessions or this episode of care: 3-5  CPT utilized: 18269 - Psychotherapy (with patient) - 30 (16-37*) min    Current Presentation:   Pt reported that he is depressed about going back to the hospital since he just got out.  He reported adequate sleep, appetite, energy and concentration.  Pt denied suicidal and homicidal thoughts.  He denied hallucinations and paranoid thoughts.  Pt reported that his medications are working okay.  He was unable to identify any goals for treatment.  Pt understands that his PD was revoked.  He spoke to his  earlier today about CARE.  Pt stated that she wants a UA.  He claimed that he did not use prior to admission.     Mental Status Exam:   Appearance: awake, alert  Attitude: cooperative  Eye Contact: fair  Mood: good  Affect: appropriate and in normal range  Speech: clear, coherent  Psychomotor Behavior: no evidence of tardive dyskinesia, dystonia, or tics  Thought Process:  goal oriented  Associations: no loose associations  Thought Content: no evidence of suicidal ideation or homicidal ideation and no evidence of psychotic thought  Insight: partial  Judgement: limited  Oriented to: time, person, and place  Attention Span and Concentration: fair  Recent and Remote Memory: " fair    Diagnosis:   296.40 Bipolar I Disorder Current or Most Recent Episode Manic, Unspecified   Substance-Related & Addictive Disorders 292.9 (F19.99) 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 Explored motivation for behavioral change.    Treatment Objective(s) Addressed:   The focus of this session was on assessing safety and identifying treatment goals .     Progress Towards Goals:   Patient reports improving symptoms. Patient is making progress towards treatment goals as evidenced by pt has been calm, cooperative and medication compliant for the last couple of days.     Case Management:   Spoke with  Jose at [phone] from 10:20-10:27 am.  She indicated that she made a referral to CARE.  She stated that this does not mean she is not supporting inpatient mental health, but there can sometimes be a long wait to get in the program.    Spoke with Anisha [place] pre admission / [facility], at [phone] from 10:20- 10:36 am.  She stated that she received all of his records from the .  She stated that if he needs hospital level of care, then he would be placed at a [facility].  If he is stable, then he would go to CD treatment at [facility].  She was placing him on both lists.    General Recommendations:   Continue to monitor for harm.    Plan:   Inpatient Mental Health: Pt continues to be appropriate for inpatient mental health.  He has shown some stabilization in the ED over the last couple of days.  His PD was revoked and pt has been referred for [facility] and CARE.      Plan for Care reviewed with Assigned Medical Provider? No assigned provider.  No change in disposition to report.     Mayi Suresh LP   Licensed Mental Health Professional (LMHP), [facility]  [phone]

## 2023-07-18 NOTE — PHARMACY-ADMISSION MEDICATION HISTORY
Pharmacy Intern Admission Medication History    Admission medication history is complete. The information provided in this note is only as accurate as the sources available at the time of the update.    Medication reconciliation/reorder completed by provider prior to medication history? No    Information Source(s): Patient, Hospital records and CareEverywhere/SureScripts via in-person    Pertinent Information: Patient somnolent and 05/05/2023 [facility] InPatient records & dispense history relied on for med history.     Changes made to PTA medication list:    Added: None    Deleted:   o bupropion  mg at 2 pm  o Depakote  mg tab (no sig)  o Duplicated omega-3   o Duplicated olanzapine  o Nicotine gum (discontinued per 05/05/2023 inpatient records)    Changed:   o Strattera 80 mg every day -> 60 mg every day  o Bupropion 300 mg QAM -> 150 mg QAM  o Atarax 25 mg-50 mg Q 6 hours prn -> Vistaril 50 mg TID prn    Medication Affordability:  Not including over the counter (OTC) medications, was there a time in the past 3 months when you did not take your medications as prescribed because of cost?: Unable to Assess    Allergies reviewed with patient and updates made in EHR: unable to assess    Medication History Completed By: Brenda Neal 7/17/2023 8:41 PM    Prior to Admission medications    Medication Sig Last Dose Taking? Auth Provider Long Term End Date   atomoxetine (STRATTERA) 60 MG capsule Take 60 mg by mouth daily Unknown Yes Opal Hanks, DO No    buPROPion (WELLBUTRIN XL) 150 MG 24 hr tablet Take 150 mg by mouth every morning Unknown Yes Opal Hanks, DO Yes    divalproex sodium delayed-release (DEPAKOTE) 250 MG DR tablet Take 5 tablets (1,250 mg) by mouth 2 times daily Unknown Yes Opal Hanks, DO Yes    hydrochlorothiazide (HYDRODIURIL) 25 MG tablet Take 1 tablet (25 mg) by mouth every morning Unknown Yes Opal Hanks, DO Yes    hydrOXYzine (VISTARIL) 50 MG capsule Take 50 mg by mouth 3 times  daily as needed for itching Unknown Yes Unknown, Entered By History     multivitamin w/minerals (MULTIVITAMINS W/MINERALS) tablet Take 1 tablet by mouth daily Unknown Yes Opal Hanks DO     OLANZapine (ZYPREXA) 15 MG tablet TAKE 1 TABLET (15 MG) BY MOUTH EVERY NIGHT AT BEDTIME Unknown Yes Reported, Patient Yes    omega 3 1000 MG CAPS Take 1 capsule by mouth daily Unknown Yes Opal Hanks DO

## 2023-07-18 NOTE — PROGRESS NOTES
1:28 PM I have taken report from [facility] care patient is to remain on the list for inpatient however if he should get a bed for [facility] or CARE he could discharge to 1 of these.

## 2023-07-18 NOTE — TELEPHONE ENCOUNTER
R:    2:14 PM Call [facility] ED per Julio Cesar ED nurse, pt is really good improved dramatically, redireatble, operative, pt asked for ativan, nurse indicates pt is doing way better. Not suicidal, pt is just frustrated. No SIO's.    2:24 pm Intake called Dr. Lin to present pt for 12. Delia stated unit is highly acute and will discuss pt with unit 12 and intake will call 12 to follow up.    2:35 pm 12 Charge called to get MRN to review pt. WCB.    4:42 pm Unit 12 charge updated that unit acuity increased and unit is too high of acuity for any placement.

## 2023-07-18 NOTE — ED NOTES
"Pt asking writer if he \"passed his UA:. Writer informed pt that we collect a UA from everyone and that the MD would share results with him.   "

## 2023-07-18 NOTE — ED NOTES
"Pt stating he has diarrhea and \"gas in is stomach\". Writer offered imodium and water- pt agreed.   "

## 2023-07-18 NOTE — TELEPHONE ENCOUNTER
R:  [facility] Access Inpatient Bed Call Log 7/18/23 5:03 PM   Intake has called facilities that have not updated their bed status within the last 12 hours.                Adults:          [facility] is at capacity.        [facility] is posting 0 beds.  [phone].    [facility] is posting 0 beds. Negative covid required.           [facility] is posting 0 beds. Negative covid required.  [phone]. Per call @7:24am to Cinthia no beds available.    [facility] is posting 0 beds.     [facility] is posting 0 beds.      [facility] is posting 0 beds     [facility] is posting 0 beds.           [facility] is posting 0 bed. Low acuity only      Patient is too acute.   [facility] is posting 2 bed -LOW acuity ONLY. Mixed unit 12+. Negative covid- [phone].Patient is too acute.          has 0 beds posted. No aggression.  Negative Covid. Low acuity        [facility] is posting 0 beds.  Negative covid.           [facility] ([place]) 3 beds Low acuity only.  Negative covid. -   [phone].   Patient is too acute.         [facility]- is posting 4 bed. Low acuity. No current aggression.   Patient is too acute.         [facility] ([place]) 0 beds Low acuity only.  Negative covid. -   [phone].     [facility] is posting 3 bed. Low acuity. 72 HH hold preferred. [phone].  Negative covid required.   Patient is too acute.         [facility] ([place]) 2 beds Low acuity only.  Negative covid. -   [phone].    Patient is too acute.         [facility] [place] is posting 0 Beds.  Negative covid required.   Vol only, No history of aggression, violence, or assault. No sexual offenders. No 72 HH holds.   [phone].    [facility] is posting 8 beds Negative covid required.  (Must have the cognitive ability to do programming. No aggressive or  violent behavior or recent HX in the last 2 yrs.  must be primary.) Always low acuity.  [phone].  Patient is too acute.         [facility] has 2 beds posted. Negative covid required.  Low acuity only. Violence and aggression capped.  [phone].   Patient is too acute.         [facility] is posting 2 beds. Low acuity, Negative covid required.  [phone]. Per call @7:26am to [facility] no beds available, pending discharges.  Patient is too acute.         [facility] is posting 1 bed. Negative covid required.  Vol only. Combined adolescent and adult unit. No aggressive or violent behavior. No registered sex offenders.  [phone]; Per call @7:27am to John amaro wendy beds available.  Patient is too acute.         [facility] posting 0 beds Negative covid required.  [phone]    [facility] [place] is posting 0 beds.  Negative covid. (No lines, drains, or tubes, oxygen, CPAP, IV, etc.). [phone]. Per call @7:32am to [facility] one bed available.    [facility] ([facility]) is posting 1 bed. Negative covid. (No lines, drains, or tubes, oxygen, CPAP, IV, etc.).   [phone]. Per call @7:29am to [place] beds are available on case-by-case basis.        Pt remains on work list pending appropriate bed availability.

## 2023-07-19 PROCEDURE — 250N000013 HC RX MED GY IP 250 OP 250 PS 637: Performed by: EMERGENCY MEDICINE

## 2023-07-19 PROCEDURE — 250N000013 HC RX MED GY IP 250 OP 250 PS 637: Performed by: STUDENT IN AN ORGANIZED HEALTH CARE EDUCATION/TRAINING PROGRAM

## 2023-07-19 PROCEDURE — 250N000013 HC RX MED GY IP 250 OP 250 PS 637

## 2023-07-19 RX ADMIN — LOPERAMIDE HYDROCHLORIDE 2 MG: 2 CAPSULE ORAL at 14:12

## 2023-07-19 RX ADMIN — LORAZEPAM 2 MG: 2 TABLET ORAL at 14:12

## 2023-07-19 RX ADMIN — NICOTINE 1 PATCH: 21 PATCH, EXTENDED RELEASE TRANSDERMAL at 07:51

## 2023-07-19 RX ADMIN — DIVALPROEX SODIUM 1250 MG: 500 TABLET, DELAYED RELEASE ORAL at 22:06

## 2023-07-19 RX ADMIN — BUPROPION HYDROCHLORIDE 300 MG: 150 TABLET, FILM COATED, EXTENDED RELEASE ORAL at 07:52

## 2023-07-19 RX ADMIN — Medication 1 G: at 07:52

## 2023-07-19 RX ADMIN — OLANZAPINE 15 MG: 10 TABLET, ORALLY DISINTEGRATING ORAL at 22:05

## 2023-07-19 RX ADMIN — HYDROCHLOROTHIAZIDE 25 MG: 25 TABLET ORAL at 07:48

## 2023-07-19 RX ADMIN — DIVALPROEX SODIUM 1250 MG: 500 TABLET, DELAYED RELEASE ORAL at 07:49

## 2023-07-19 ASSESSMENT — ACTIVITIES OF DAILY LIVING (ADL)
ADLS_ACUITY_SCORE: 35

## 2023-07-19 NOTE — ED PROVIDER NOTES
[facility] ED Mental Health Handoff Note:       Brief HPI:  This is a 33 year old male signed out to me by Dr. Owens.  See initial ED Provider note for full details of the presentation. Interval history is pertinent for no changes or acute events.  He is being mated for agitation and acute psychosis and is awaiting bed placement.    Home meds reviewed and ordered/administered: Yes    Medically stable for inpatient mental health admission: Yes.    Evaluated by mental health: Yes. The recommendation is for inpatient mental health treatment. Bed search in process    Safety concerns: At the time I received sign out, there were no safety concerns.    Hold Status:  Active Orders   Legal    Emergency Hospitalization Hold (72 Hr Hold)     Frequency: Effective Now     Start Date/Time: 07/14/23 1345      Number of Occurrences: Until Specified    Health Officer Authority to Detain (MICHELLE)     Frequency: Effective Now     Start Date/Time: 07/14/23 0644      Number of Occurrences: Until Specified     Order Comments: This patient presented with circumstances that have led me to be reasonably suspicious that the patient is experiencing psychosis. The patient's judgment to this situation appears to be impaired. Given the circumstances in which the patient presented, it is likely that the patient is at significant risk of harming self or others if this situation is not investigated further. I am highly concerned that the patient is mentally ill and currently cannot safely care for oneself. This represents endangerment to the patient's well-being and safety, and I am placing a Health Officer Authority hold upon the patient at this time.              Exam:   Patient Vitals for the past 24 hrs:   BP Temp Temp src Pulse Resp SpO2   07/18/23 1533 (!) 141/86 98.8  F (37.1  C) Oral 75 -- 96 %   07/18/23 0838 (!) 143/89 97.9  F (36.6  C) Oral 70 16 95 %   07/18/23 0823 -- -- -- -- 16 --       General: Patient is in no acute distress  and is resting comfortably.  HEENT: Normocephalic atraumatic  Neck: Supple  Cardiovascular: Heart rate normal  Pulmonary: Patient is in no respiratory distress  Extremities: No signs of any significant or life-threatening trauma.  Neurologic: No new focal neurologic deficits.      ED Course:    Medications   divalproex sodium delayed-release (DEPAKOTE) DR tablet 1,250 mg (1,250 mg Oral $Given 7/18/23 2108)   hydrOXYzine (ATARAX) tablet 50 mg (50 mg Oral $Given 7/14/23 0122)   buPROPion (WELLBUTRIN XL) 24 hr tablet 300 mg (300 mg Oral $Given 7/18/23 0844)   OLANZapine zydis (zyPREXA) ODT tab 15 mg (15 mg Oral $Given 7/18/23 2116)   haloperidol lactate (HALDOL) injection 5 mg (0 mg Intramuscular Return to Cabinet 7/15/23 1347)     And   LORazepam (ATIVAN) injection 2 mg (0 mg Intramuscular Return to Cabinet 7/15/23 1348)     And   diphenhydrAMINE (BENADRYL) injection 50 mg (0 mg Intramuscular Return to Cabinet 7/15/23 1348)   haloperidol (HALDOL) tablet 5 mg (5 mg Oral $Given 7/17/23 1537)     And   LORazepam (ATIVAN) tablet 2 mg (2 mg Oral $Given 7/18/23 1321)     And   diphenhydrAMINE (BENADRYL) capsule 50 mg (50 mg Oral $Given 7/17/23 1537)   nicotine (NICODERM CQ) 21 MG/24HR 24 hr patch 1 patch (1 patch Transdermal Patch/Med Removed 7/18/23 0920)     And   nicotine Patch in Place ( Transdermal Patch in Place 7/18/23 2117)   fish oil-omega-3 fatty acids capsule 1 g (1 g Oral $Given 7/18/23 0845)   hydrochlorothiazide (HYDRODIURIL) tablet 25 mg (25 mg Oral $Given 7/18/23 0845)   loperamide (IMODIUM) capsule 2 mg (2 mg Oral $Given 7/18/23 1744)   OLANZapine zydis (zyPREXA) ODT tab 15 mg (15 mg Oral $Given 7/13/23 1941)   nicotine polacrilex (NICORETTE) gum 4 mg (4 mg Buccal $Given 7/14/23 0122)   OLANZapine (zyPREXA) injection 10 mg (10 mg Intramuscular $Given 7/14/23 0658)   OLANZapine (zyPREXA) injection 10 mg (10 mg Intramuscular $Given 7/14/23 1913)   LORazepam (ATIVAN) injection 2 mg (2 mg Intramuscular $Given  7/14/23 1913)   loperamide (IMODIUM) capsule 2 mg (2 mg Oral $Given 7/16/23 0311)   loperamide (IMODIUM) capsule 2 mg (2 mg Oral $Given 7/16/23 1023)   loperamide (IMODIUM) capsule 4 mg (4 mg Oral $Given 7/16/23 9552)            There were no significant events during my shift.    Patient was signed out to the oncoming provider      Impression:    ICD-10-CM    1. Agitation  R45.1       2. Acute psychosis (H)  F23           Plan:    1. Awaiting inpatient mental health admission/transfer.      RESULTS:   Results for orders placed or performed during the hospital encounter of 07/13/23 (from the past 24 hour(s))   Urine Drugs of Abuse Screen     Status: Abnormal    Collection Time: 07/18/23  9:14 AM    Narrative    The following orders were created for panel order Urine Drugs of Abuse Screen.  Procedure                               Abnormality         Status                     ---------                               -----------         ------                     Drug abuse screen 1 urin...[[id number]]  Abnormal            Final result                 Please view results for these tests on the individual orders.   Drug abuse screen 1 urine (ED)     Status: Abnormal    Collection Time: 07/18/23  9:14 AM   Result Value Ref Range    Amphetamines Urine Screen Positive (A) Screen Negative    Barbituates Urine Screen Negative Screen Negative    Benzodiazepine Urine Screen Negative Screen Negative    Cannabinoids Urine Screen Negative Screen Negative    Cocaine Urine Screen Negative Screen Negative    Opiates Urine Screen Negative Screen Negative   UA with Microscopic reflex to Culture     Status: Normal    Collection Time: 07/18/23  9:15 AM    Specimen: Urine, NOS   Result Value Ref Range    Color Urine Light Yellow Colorless, Straw, Light Yellow, Yellow    Appearance Urine Clear Clear    Glucose Urine Negative Negative mg/dL    Bilirubin Urine Negative Negative    Ketones Urine Negative Negative mg/dL    Specific Gravity  Urine 1.010 1.003 - 1.035    Blood Urine Negative Negative    pH Urine 6.0 5.0 - 7.0    Protein Albumin Urine Negative Negative mg/dL    Urobilinogen Urine Normal Normal, 2.0 mg/dL    Nitrite Urine Negative Negative    Leukocyte Esterase Urine Negative Negative    RBC Urine <1 <=2 /HPF    WBC Urine <1 <=5 /HPF    Narrative    Urine Culture not indicated             MD Maureen Vasquez David, MD  07/19/23 4212

## 2023-07-19 NOTE — TELEPHONE ENCOUNTER
9:38 Intake received a call from [employer] inquiring note on work list that states Peña CONTE is needing further review.    10:28 AM: Intake called [employer] to inform that information was not found as to why Pt is needing further review.    1:31 PM: Writer spoke with [employer] notifying that if a patient is declined for not meeting IPMH criteria that they would need additional follow up by P. Pt has been declined multiple times for outside locations d/t acuity.        R:  [place] [facility] Access Inpatient Bed Call Log 7/19/23 7:26AM   Intake has called facilities that have not updated their bed status within the last 12 hours.               Adults:          [facility] is at capacity.        [facility] is posting 0 beds.  [phone].     [facility] is posting 0 beds. Negative covid required.           [facility] is posting 0 beds. Negative covid required. [phone]. Per call @7:35am to Yaa no beds available, callback after 3pm.     [facility] is posting 0 beds.     [facility] [place] [facility] is posting 0 beds.      [facility] is posting 0 beds     [facility] ([facility]) is posting 0 beds.           [facility] is posting 0 bed. Low acuity only        [facility] is posting 2 bed -LOW acuity ONLY. Mixed unit 12+. Negative              covid- [phone].        [facility] has 0 beds posted. No aggression. Negative Covid. Low acuity       [facility] is posting 0 beds. Negative covid.           [facility] ([place]) 4 beds Low acuity only. Negative covid. -   [phone].      [facility]- is posting 4 bed. Low acuity. No current aggression.     [facility] ([place]) 0 beds Low acuity only. Negative covid. -   [phone].     [facility] is posting 3 bed. Low acuity. 72 HH hold preferred. [phone].  Negative covid required. Per call @7:49am to Simona a couple beds available, are screening today.    [facility] ([place]) 1 beds Low  acuity only. Negative covid. -   [phone].      [facility] [place] is posting 2 Beds.  Negative covid required.   Vol only, No history of aggression, violence, or assault. No sexual offenders. No 72 HH holds.   [phone].     [facility] is posting 7 beds Negative covid required.  (Must have the cognitive ability to do programming. No aggressive or violent behavior or recent HX in the last 2 yrs. MH must be primary.) Always low acuity. [phone].     [facility] has 0 beds posted. Negative covid required.  Low acuity only. Violence and aggression capped.  [phone].     [facility] is posting 2 beds. Low acuity, Negative covid required.  [phone]. Per call @7:36am to [place] no beds available today due to staffing.     [facility] is posting 1 bed. Negative covid required.  Vol only. Combined adolescent and adult unit. No aggressive or violent behavior. No registered sex offenders.  [phone]; Per call @7:37am to [place] a couple adult beds no adolescent.     [facility] [place] posting 0 beds Negative covid required.  [phone]     [facility] [place] is posting 1 beds. Negative covid. (No lines, drains, or tubes, oxygen, CPAP, IV, etc.). [phone]. Per call @7:39am to Maria Isabel lee on case-by-case basis.      [facility] ([facility]) is posting 3 bed. Negative covid. (No lines, drains, or tubes, oxygen, CPAP, IV, etc.).   [phone].      [facility] is posting 18 beds. No covid test required.  [phone]. Per call @7:43 to Intake 1 Female and 1 Male available, pending discharges.         Pt remains on work list pending appropriate bed availability.

## 2023-07-19 NOTE — ED NOTES
Pt has been calm and cooperative, requested PRN for anxiety. Pt met with MH, stated he would like to move to a group home or other alternative to inpatient, feeling if he is admitted, he will remain inpatient for a long time. Pt is exploring alternatives and reaching out to his .

## 2023-07-19 NOTE — PROGRESS NOTES
"Triage & Transition Services, Extended Care     Therapy Progress Note    Patient: Etienne goes by \"Etienne,\" uses he/him pronouns  Date of Service: July 19, 2023  Site of Service: [facility]  Patient was seen virtually ([employer] cart or other teleconferencing device).     Presenting problem:   Etienne is followed related to long wait for admission and revocation of PD. Please see initial [facility] Crisis Assessment completed by Cristin Ledesma on 7/13/23 for complete assessment information. Notable concerns include disorganized thoughts, pressured speech, pacing, manic behavior.     Individuals Present: Etienne & MADALYN Pratt    Session start: 10:03 am  Session end: 10:25am  Session duration in minutes: 22 minutes  Session number: 4  Anticipated number of sessions or this episode of care: 4-6  CPT utilized: 06685 - Psychotherapy (with patient) - 30 (16-37*) min    Current Presentation:     Patient is focused on wanting to be discharged.  He states if he goes upstairs he will be here a long time and he does not want that to happen. He states he wants to go to a group home and do the IOP program at [facility].  He states he would like to go to a crisis residence if he can't go to the group home or IRTS.  He states he feels stable enough to leave.  He reports when he first came to the ED he was anxious, had racing thoughts, and felt like he was in a manic episode.  He states everything was closing in and he was experiencing an inner dialogue telling him he couldn't make it and they were setting him up to fail again and to just run.  He denies having this \"inner dialogue\" currently.  He states he is feeling fairly stable.  He states he does not understand why his  is referring him to back to CARE as he was just there.  Discussed with patient that his provisional discharge was revoked. Patient has his paperwork and states he has been reviewing it.   Provided support and answered questions.       Mental Status " Exam:   Appearance: awake, alert  Attitude: cooperative  Eye Contact: fair  Mood: anxious  Affect: mood congruent  Speech: clear, coherent  Psychomotor Behavior: no evidence of tardive dyskinesia, dystonia, or tics  Thought Process:  goal oriented  Associations: no loose associations  Thought Content: no evidence of suicidal ideation or homicidal ideation and no evidence of psychotic thought  Insight: partial  Judgement: limited  Oriented to: time, person, and place  Attention Span and Concentration: fair  Recent and Remote Memory: fair    Diagnosis:   296.40 Bipolar I Disorder Current or Most Recent Episode Manic, Unspecified   Substance-Related & Addictive Disorders 292.9 (F19.99) 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    Treatment Objective(s) Addressed:   The focus of this session was on rapport building, assessing safety and identifying treatment goals .     Progress Towards Goals:   Patient reports improving symptoms. Patient is making progress towards treatment goals as evidenced by remaining calm and cooperative.  Medication compliant.  Appropriately engaged.     Case Management:   Left message for patient's  Jose ([phone]).      Received a call back from patient's  Jose. She reports patient uses as soon as he leaves treatment he uses and becomes a danger to himself and others.  She reports this is the 5th time patient has been through this and he knows how this goes.  She reports patient's CADI waiver has not yet been approved and group home placement is not a option at this time.  She reports at one time IOP at [facility] was a plan but patient has not been able to be stable enough for it.  She reports patient was provisionally discharged from [facility]ARE.  She requests updated progress notes be sent to her.  Clinician requested  assistance from clinical coordinators in sending information to [email or web address].    General Recommendations:   Continue to monitor for harm. Consider: Provide the pt with options to provide a sense of control. Try to tell the pt what they can do instead of what they can't do and Listen in a neutral, non-judgmental way. Offer reassurance    Plan:   Inpatient Mental Health: Patient's provisional discharge was revoked.  Plan for inpatient mental health.  He has been referred for [facility] and [facility].  Patient may be able to discharge to [facility] or [facility] if a bed becomes available prior to inpatient.  No change in plan at this time.    Plan for Care reviewed with Assigned Medical Provider? Yes. Provider, Dr. Reddy, response: agreement     Kinjal [facility]   Licensed Mental Health Professional (LMHP), [facility]  [phone]

## 2023-07-19 NOTE — ED NOTES
Patient has been calm and cooperative the whole night. Slept for the most hours of the night. Patient did not show any behaviors or concerns. Woke up at night, requested for snacks x2 but went back to sleep.

## 2023-07-20 ENCOUNTER — TELEPHONE (OUTPATIENT)
Dept: BEHAVIORAL HEALTH | Facility: CLINIC | Age: 33
End: 2023-07-20
Payer: COMMERCIAL

## 2023-07-20 PROCEDURE — 250N000013 HC RX MED GY IP 250 OP 250 PS 637: Performed by: EMERGENCY MEDICINE

## 2023-07-20 PROCEDURE — 250N000013 HC RX MED GY IP 250 OP 250 PS 637: Performed by: STUDENT IN AN ORGANIZED HEALTH CARE EDUCATION/TRAINING PROGRAM

## 2023-07-20 RX ORDER — LIDOCAINE HYDROCHLORIDE 20 MG/ML
10 SOLUTION OROPHARYNGEAL ONCE
Status: COMPLETED | OUTPATIENT
Start: 2023-07-20 | End: 2023-07-20

## 2023-07-20 RX ORDER — MAGNESIUM HYDROXIDE/ALUMINUM HYDROXICE/SIMETHICONE 120; 1200; 1200 MG/30ML; MG/30ML; MG/30ML
15 SUSPENSION ORAL ONCE
Status: COMPLETED | OUTPATIENT
Start: 2023-07-20 | End: 2023-07-20

## 2023-07-20 RX ADMIN — DIVALPROEX SODIUM 1250 MG: 500 TABLET, DELAYED RELEASE ORAL at 07:41

## 2023-07-20 RX ADMIN — Medication 1 G: at 07:41

## 2023-07-20 RX ADMIN — LOPERAMIDE HYDROCHLORIDE 2 MG: 2 CAPSULE ORAL at 16:39

## 2023-07-20 RX ADMIN — OLANZAPINE 15 MG: 10 TABLET, ORALLY DISINTEGRATING ORAL at 21:42

## 2023-07-20 RX ADMIN — HYDROCHLOROTHIAZIDE 25 MG: 25 TABLET ORAL at 07:41

## 2023-07-20 RX ADMIN — NICOTINE 1 PATCH: 21 PATCH, EXTENDED RELEASE TRANSDERMAL at 07:40

## 2023-07-20 RX ADMIN — BUPROPION HYDROCHLORIDE 300 MG: 150 TABLET, FILM COATED, EXTENDED RELEASE ORAL at 07:41

## 2023-07-20 RX ADMIN — DIVALPROEX SODIUM 1250 MG: 500 TABLET, DELAYED RELEASE ORAL at 20:33

## 2023-07-20 ASSESSMENT — ACTIVITIES OF DAILY LIVING (ADL)
ADLS_ACUITY_SCORE: 35

## 2023-07-20 NOTE — TELEPHONE ENCOUNTER
R: The patient is currently in the [place] ED awaiting placement. Patient is on a court hold     Intake afternoon Bed Search (Done at 2:26 PM) Facilities that have not updated their [facility] access site in the last 12 hours have been contacted for bed availability.   [facility] is posting 0 beds.   [facility] is posting 0 beds.  [facility] is posting 0 beds.  [facility] is posting 2 beds. 2:25 PM Intake called and spoke to Florinda, no beds available for high acuity.   [facility] is posting 0 beds.  [facility] is posting 0 beds.  [facility] is posting 0 beds.  [facility] is posting 0 beds. Low acuity.    [facility] is posting 0 bed. Mixed unit 12+. Low acuity only.   [facility] is posting 0 beds. No aggression.   [facility] is posting 0 beds.   [facility] is posting 5 beds. Low acuity only.Patient is not appropriate for open bed: Hx of restraints 2x on 7/14  [facility] is posting 0 beds. Low acuity only.   [facility] is posting 2 beds. 0 acute, 0 med psych, 2 mood disorder- very low acuity.Patient is not appropriate for open bed: not presenting with a mood disorder  [facility] is posting 0 beds. Low acuity only. 72 hr hold required.   [facility] is posting 3 beds. Low acuity.Patient is not appropriate for open bed: Hx of restraints 2x on 7/14    [facility] is posting 1 bed. Vol only, No Hx of aggression, violence, or assault. No sexual offenders. No 72 hr holds.   Patient is not appropriate for open bed: on supported commitment   [facility] is posting 6 beds. (Must have the cognitive ability to do programming. No aggressive or violent behavior or recent HX in the last 2 yrs. MH must be primary). Patient is not appropriate for open bed: Hx of restraints 2x on 7/14  [facility] is posting 0 beds. Low acuity only. Violence and aggression capped.   [facility] is posting 1 bed. Low acuity, Neg Covid.  Patient is not appropriate for open bed: Hx of restraints 2x on 7/14  [facility] is posting 2 beds. Covid neg. Vol only. Combined adolescent and adult unit. No aggressive or violent behavior. No registered sex offenders. Patient is not appropriate for open bed: on supported commitment   [facility] is posting 1 bed. No Hx aggression, violence or assault. 7/17 Patient has been declined d/t acuity   [facility] is posting 18 beds. No Covid needed. Call for details.Patient is not appropriate for open bed: on supported commitment in [place] and must be placed in [place]. Facility in [place] [facility] is posting 5 beds. Mixed Unit (13+). Low acuity only. 7/17 Patient has been declined d/t acuity    [facility] and [place] location at capacity. The patient remains on the worklist. Intake continues to identify appropriate inpatient psychiatry placement.

## 2023-07-20 NOTE — ED NOTES
[facility] ED Mental Health Handoff Note:       Brief HPI:  This is a 33 year old male signed out to me by Dr. Magana.  See initial ED Provider note for full details of the presentation. Interval history is pertinent for being admitted for agitation and psychosis and bed placement.    Home meds reviewed and ordered/administered: Yes    Medically stable for inpatient mental health admission: Yes.    Evaluated by mental health: Yes. The recommendation is for inpatient mental health treatment. Bed search in process    Safety concerns: At the time I received sign out, there were no safety concerns.    Hold Status:  Active Orders   Legal    Emergency Hospitalization Hold (72 Hr Hold)     Frequency: Effective Now     Start Date/Time: 07/14/23 1345      Number of Occurrences: Until Specified    Health Officer Authority to Detain (MICHELLE)     Frequency: Effective Now     Start Date/Time: 07/14/23 0644      Number of Occurrences: Until Specified     Order Comments: This patient presented with circumstances that have led me to be reasonably suspicious that the patient is experiencing psychosis. The patient's judgment to this situation appears to be impaired. Given the circumstances in which the patient presented, it is likely that the patient is at significant risk of harming self or others if this situation is not investigated further. I am highly concerned that the patient is mentally ill and currently cannot safely care for oneself. This represents endangerment to the patient's well-being and safety, and I am placing a Health Officer Authority hold upon the patient at this time.              Exam:   No data found.        ED Course:    Medications   divalproex sodium delayed-release (DEPAKOTE) DR tablet 1,250 mg (1,250 mg Oral $Given 7/20/23 0741)   hydrOXYzine (ATARAX) tablet 50 mg (50 mg Oral $Given 7/14/23 0122)   buPROPion (WELLBUTRIN XL) 24 hr tablet 300 mg (300 mg Oral $Given 7/20/23 0741)   OLANZapine zydis  (zyPREXA) ODT tab 15 mg (15 mg Oral $Given 7/19/23 2205)   haloperidol lactate (HALDOL) injection 5 mg (0 mg Intramuscular Return to Cabinet 7/15/23 1347)     And   LORazepam (ATIVAN) injection 2 mg (0 mg Intramuscular Return to Cabinet 7/15/23 1348)     And   diphenhydrAMINE (BENADRYL) injection 50 mg (0 mg Intramuscular Return to Cabinet 7/15/23 1348)   haloperidol (HALDOL) tablet 5 mg (5 mg Oral $Given 7/17/23 1537)     And   LORazepam (ATIVAN) tablet 2 mg (2 mg Oral $Given 7/19/23 1412)     And   diphenhydrAMINE (BENADRYL) capsule 50 mg (50 mg Oral $Given 7/17/23 1537)   nicotine (NICODERM CQ) 21 MG/24HR 24 hr patch 1 patch (1 patch Transdermal $Patch/Med Applied 7/20/23 0740)     And   nicotine Patch in Place ( Transdermal Patch in Place 7/20/23 1341)   fish oil-omega-3 fatty acids capsule 1 g (1 g Oral $Given 7/20/23 0741)   hydrochlorothiazide (HYDRODIURIL) tablet 25 mg (25 mg Oral $Given 7/20/23 0741)   loperamide (IMODIUM) capsule 2 mg (2 mg Oral $Given 7/19/23 1412)   OLANZapine zydis (zyPREXA) ODT tab 15 mg (15 mg Oral $Given 7/13/23 1941)   nicotine polacrilex (NICORETTE) gum 4 mg (4 mg Buccal $Given 7/14/23 0122)   OLANZapine (zyPREXA) injection 10 mg (10 mg Intramuscular $Given 7/14/23 0658)   OLANZapine (zyPREXA) injection 10 mg (10 mg Intramuscular $Given 7/14/23 1913)   LORazepam (ATIVAN) injection 2 mg (2 mg Intramuscular $Given 7/14/23 1913)   loperamide (IMODIUM) capsule 2 mg (2 mg Oral $Given 7/16/23 0311)   loperamide (IMODIUM) capsule 2 mg (2 mg Oral $Given 7/16/23 1023)   loperamide (IMODIUM) capsule 4 mg (4 mg Oral $Given 7/16/23 4871)   alum & mag hydroxide-simethicone (MAALOX) suspension 15 mL (15 mLs Oral Not Given 7/20/23 0116)   lidocaine (viscous) (XYLOCAINE) 2 % solution 10 mL (10 mLs Mouth/Throat Not Given 7/20/23 0120)            There were no significant events during my shift.    Patient was signed out to the oncoming provider, Dr. Simon      Impression:    ICD-10-CM    1.  Agitation  R45.1       2. Acute psychosis (H)  F23           Plan:    1. Awaiting inpatient mental health admission/transfer.      RESULTS:   No results found for this visit on 07/13/23 (from the past 24 hour(s)).          Simone Peacock MD    This note was created at least in part by the use of dragon voice dictation system. Inadvertent typographical errors may still exist.  Simone Peacock MD.    Patient evaluated in the emergency department during the COVID-19 pandemic period. Careful attention to patients safety was addressed throughout the evaluation. Evaluation and treatment management was initiated with disposition made efficiently and appropriate as possible to minimize any risk of potential exposure to patient during this evaluation.                       Simone Peacock MD  07/20/23 8349

## 2023-07-20 NOTE — PROGRESS NOTES
Triage & Transition Services, Extended Care     Etienne Dupree  July 20, 2023    Etienne is followed related to long wait for admission and revocation of PD. Please see initial DEC/[facility] Crisis Assessment completed by Cristin Baltazar on 7/13/23 for complete assessment information. Notable concerns include disorganized thoughts, pressured speech, pacing, manic behavior.        There are not significant status changes.     Recommend Extended care to continue care coordination with patient's county , [place] ([phone]).      Plan:  Inpatient Mental Health: Patient's provisional discharge was revoked.  Plan for inpatient mental health.  He has been referred for [facility] and CARE.  Patient may be able to discharge to [facility] or CARE if a bed becomes available prior to inpatient.  No change in plan at this time.Patient requested that this writer coordinate treatment at [facility] in [place], but I advised him that we have to wait for  before changing the disposition from inpatient since his commitment was revoked.    Plan for Care reviewed with Assigned Medical Provider? Yes. Provider, Dr. Gordillo, response: receptive.    Extended Care will follow and meet with patient/family/care team as able or requested.     Jessica Jessica, [facility], Extended Care   [phone]

## 2023-07-20 NOTE — TELEPHONE ENCOUNTER
R:  No appropriate beds within [place].  Bed Search update Statewide 9:20PM  Facilities not updated in the past 12 hours have been called    [facility]: @ cap per website- Per Hadley, no beds available   [facility]: @cap per website  [facility]: @ cap per website. Pt declined d/t acuity. 7/19 Per Seng, no beds available.     [facility]: @ cap per website  [facility]: @ cap per website- Per Nataliia, no beds available  [facility]: @ cap per website  [facility]: @ cap per website  [facility]:  @ cap per website  [facility]: 2 LOW ACUITY bed posted.  Mixed unit with children.  Pt declined for not meeting criteria  [facility]: @ cap per website   [facility]: @ cap per website   [facility]: 2 LOW ACUITY beds posted Pt too acute for unit  [facility]:  3 LOW ACUITY bed posted.  Pt declined d/t receiving injection meds  [facility]: 2 LOW ACUITY beds posted.  Mood disorder only. Aggression is exclusionary criteria  [facility]: 5 LOW ACUITY beds posted.  Aggression is exclusionary criteria  [facility]: @ cap per website Aggression is exclusionary criteria  [facility]: 2 LOW ACUITY beds posted.  Voluntary pts only.    [facility]: 6 LOW ACUITY beds posted. Aggression is exclusionary criteria  [facility]: @ cap per website  [facility]: 3 LOW ACUITY beds posted  [facility]: 2 beds available.  Voluntary pts only.  Mixed unit with children.  Aggression is exclusionary criteria  [facility]: 18 beds posted  [place] of state.    [facility]: 5 beds available.  Mixed unit with children. Pt declined for not meeting criteria    Pt remains on PPS work list awaiting appropriate placement.

## 2023-07-20 NOTE — TELEPHONE ENCOUNTER
[facility] Access Inpatient Bed Call Log 7/20/2023 12:24 AM      Intake has called facilities that have not updated their bed status within the last 12 hours.     Adults:         [facility] is posting 0 beds.      [facility] is posting 0 beds. [phone]      [facility] is posting 0 beds. [phone]     [facility] is posting 0 beds. [phone] 12:27am they are capped. - 7/16 Declined due to acuity    [facility] is posting 0 beds. [phone]     [facility] is posting 0 bed. [phone]      [facility] is posting 0 beds. [phone]     [facility] is posting 0 beds. [phone]     [facility] is posting 2 beds. [phone]     [facility] is posting 0 beds. [phone]       [facility] is posting 1 beds. Mixed unit 12+. Low acuity only.  [phone]     [facility] is posting 0 beds. No aggression.  [phone]     [facility] is posting 0 beds. [phone]      [facility] is posting 5 beds. [phone]      [facility] is posting 0 bed. [phone]      [facility] is posting 2 beds. Low acuity. [phone]     [facility] is posting 0 beds. 72 hr hold preferred. [phone] - Declined pt on 7/14    [facility] is posting 0 bed.  [phone]        [facility] is posting 2 bed. Voluntary only- no holds/commitments, No Hx of aggression, violence, or assault. No sexual offenders. No 72 hr holds. [phone]     [facility] is posting 6 beds. (Must have the cognitive ability to do programming. No aggressive or violent behavior or recent HX in the last 2 yrs. MH must be primary.) [phone]    [facility] is posting 0 beds. Low acuity only. Violence and aggression capped. [phone]      [facility] is posting 3 bed. Low acuity, Neg Covid. [phone]       [facility] is posting 2 beds. Covid neg. Vol only. Combined adolescent and adult  unit. No aggressive or violent behavior. No registered sex offenders. [phone]    [facility] [place] posting 1 beds. Negative covid.   7/15 [place] declined due to acuity.     [facility] is posting 5 beds. [phone]  - Pt declined on 7/14     Pt remains on work list pending appropriate bed availability.

## 2023-07-21 PROBLEM — F23 ACUTE PSYCHOSIS (H): Status: ACTIVE | Noted: 2023-07-21

## 2023-07-21 PROBLEM — R45.1 AGITATION: Status: ACTIVE | Noted: 2023-07-21

## 2023-07-21 PROCEDURE — 124N000002 HC R&B MH UMMC

## 2023-07-21 PROCEDURE — 250N000013 HC RX MED GY IP 250 OP 250 PS 637

## 2023-07-21 PROCEDURE — 250N000013 HC RX MED GY IP 250 OP 250 PS 637: Performed by: EMERGENCY MEDICINE

## 2023-07-21 PROCEDURE — 250N000013 HC RX MED GY IP 250 OP 250 PS 637: Performed by: STUDENT IN AN ORGANIZED HEALTH CARE EDUCATION/TRAINING PROGRAM

## 2023-07-21 RX ORDER — HYDROXYZINE HYDROCHLORIDE 25 MG/1
25 TABLET, FILM COATED ORAL EVERY 4 HOURS PRN
Status: DISCONTINUED | OUTPATIENT
Start: 2023-07-21 | End: 2023-08-15 | Stop reason: HOSPADM

## 2023-07-21 RX ORDER — LANOLIN ALCOHOL/MO/W.PET/CERES
3 CREAM (GRAM) TOPICAL
Status: DISCONTINUED | OUTPATIENT
Start: 2023-07-21 | End: 2023-08-15 | Stop reason: HOSPADM

## 2023-07-21 RX ORDER — OLANZAPINE 10 MG/2ML
10 INJECTION, POWDER, FOR SOLUTION INTRAMUSCULAR 3 TIMES DAILY PRN
Status: DISCONTINUED | OUTPATIENT
Start: 2023-07-21 | End: 2023-08-15 | Stop reason: HOSPADM

## 2023-07-21 RX ORDER — OLANZAPINE 10 MG/1
10 TABLET ORAL 3 TIMES DAILY PRN
Status: DISCONTINUED | OUTPATIENT
Start: 2023-07-21 | End: 2023-08-15 | Stop reason: HOSPADM

## 2023-07-21 RX ADMIN — LORAZEPAM 2 MG: 2 TABLET ORAL at 13:19

## 2023-07-21 RX ADMIN — DIVALPROEX SODIUM 1250 MG: 500 TABLET, DELAYED RELEASE ORAL at 08:01

## 2023-07-21 RX ADMIN — Medication 1 G: at 08:01

## 2023-07-21 RX ADMIN — BUPROPION HYDROCHLORIDE 300 MG: 150 TABLET, FILM COATED, EXTENDED RELEASE ORAL at 08:00

## 2023-07-21 RX ADMIN — DIVALPROEX SODIUM 1250 MG: 500 TABLET, DELAYED RELEASE ORAL at 20:19

## 2023-07-21 RX ADMIN — OLANZAPINE 15 MG: 10 TABLET, ORALLY DISINTEGRATING ORAL at 22:12

## 2023-07-21 RX ADMIN — HYDROCHLOROTHIAZIDE 25 MG: 25 TABLET ORAL at 08:00

## 2023-07-21 RX ADMIN — NICOTINE 1 PATCH: 21 PATCH, EXTENDED RELEASE TRANSDERMAL at 08:00

## 2023-07-21 ASSESSMENT — ACTIVITIES OF DAILY LIVING (ADL)
ADLS_ACUITY_SCORE: 35
DIFFICULTY_COMMUNICATING: NO
DIFFICULTY_EATING/SWALLOWING: NO
ADLS_ACUITY_SCORE: 35
DOING_ERRANDS_INDEPENDENTLY_DIFFICULTY: NO
ADLS_ACUITY_SCORE: 28
ADLS_ACUITY_SCORE: 35
ADLS_ACUITY_SCORE: 35
WEAR_GLASSES_OR_BLIND: NO
CHANGE_IN_FUNCTIONAL_STATUS_SINCE_ONSET_OF_CURRENT_ILLNESS/INJURY: NO
CONCENTRATING,_REMEMBERING_OR_MAKING_DECISIONS_DIFFICULTY: NO
ADLS_ACUITY_SCORE: 35
FALL_HISTORY_WITHIN_LAST_SIX_MONTHS: NO
TOILETING_ISSUES: NO
ADLS_ACUITY_SCORE: 41
ADLS_ACUITY_SCORE: 35
WALKING_OR_CLIMBING_STAIRS_DIFFICULTY: NO
ADLS_ACUITY_SCORE: 35
DRESSING/BATHING_DIFFICULTY: NO

## 2023-07-21 NOTE — PROGRESS NOTES
Triage & Transition Services, Extended Care     Etienne Dupree  July 21, 2023    Etienne is followed related to Long wait time for admission and revocation of PD. Please see initial DEC Crisis Assessment completed for complete assessment information. Medical record is reviewed.  While patient is in the ED, care team is working towards Learn and Demonstrate at [employer]ill Focused on Crisis Stabilization. Additional notes include disorganized thoughts, pressured speech, pacing, manic behavior.    There are not significant status changes.       Patient's  came to visit him today.  Clinician left message for  Jose ([phone]) to check in on how meeting went.      Spoke with patient's nurse, Ernst.  Discussed patient's presentation.  Patient appears to be accepted to inpatient mental health.  Discussed meeting with patient via telehealth.  Received update that patient's sister just arrived and brought food.  Clinician will attempt to follow up with patient today if time allows.     Plan:  Inpatient Mental Health: Patient's provisional discharge was revoked. Plan for inpatient mental health.  Patient appears to have a bed on inpatient mental health.      As noted earlier, patient ay be able to discharge to [facility] or CARE if a bed becomes available prior to inpatient.      Plan for Care reviewed with Assigned Medical Provider? No. Provider, updated nursing.    Extended Care will follow and meet with patient/family/care team as able or requested.     Kinjal Walls, [facility], Extended Care   [phone]

## 2023-07-21 NOTE — TELEPHONE ENCOUNTER
R: The patient is currently in the [place] ED awaiting placement. Patient is on a court hold      Intake evening Bed Search (Done at 7:22 PM) Facilities that have not updated their [facility] access site in the last 12 hours have been contacted for bed availability.   [facility] is posting 0 beds.   [facility] is posting 0 beds.  [facility] is posting 0 beds.  [facility] is posting 2 beds. Per call to [place], no open beds throughout all facilities   [facility] is posting 0 beds.  [facility] is posting 0 beds.  [facility] is posting 0 beds.  [facility] is posting 0 beds. Low acuity.     [facility] is posting 0 bed. Mixed unit 12+. Low acuity only.   [facility] is posting 0 beds. No aggression.   [facility] is posting 0 beds.   [facility] is posting 4 beds. Low acuity only.Patient is not appropriate for open bed: Hx of restraints 2x on 7/14  [facility] is posting 0 beds. Low acuity only.   [facility] is posting 1 beds. 0 acute, 0 med psych, 1 mood disorder- very low acuity.Patient is not appropriate for open bed: not presenting with a mood disorder  [facility] is posting 1 bed. Low acuity only. 72 hr hold required.  Patient is not appropriate for open bed: on supported commitment- commitment is exclusionary for facility.   [facility] is posting 3 beds. Low acuity. Patient is not appropriate for open bed: Hx of restraints 2x on 7/14     [facility] is posting 1 bed. Vol only, No Hx of aggression, violence, or assault. No sexual offenders. No 72 hr holds.   Patient is not appropriate for open bed: on supported commitment   [facility] is posting 7 beds. (Must have the cognitive ability to do programming. No aggressive or violent behavior or recent HX in the last 2 yrs. MH must be primary). Patient is not appropriate for open bed: Hx of restraints 2x on 7/14  [facility] is posting 0 beds. Low acuity  only. Violence and aggression capped.   [facility] is posting 0 beds. Low acuity, Neg Covid.   [facility] is posting 2 beds. Covid neg. Vol only. Combined adolescent and adult unit. No aggressive or violent behavior. No registered sex offenders. Patient is not appropriate for open bed: on supported commitment   [facility] is posting 1 bed. No Hx aggression, violence or assault. 7/17 Patient has been declined d/t acuity   [facility] is posting 18 beds. No Covid needed. Call for details.Patient is not appropriate for open bed: on supported commitment in [place] and must be placed in [place]. Facility in [place] [facility] is posting 5 beds. Mixed Unit (13+). Low acuity only. 7/17 Patient has been declined d/t acuity     [facility] and [place] location at capacity. The patient remains on the worklist. Intake continues to identify appropriate inpatient psychiatry placement.

## 2023-07-21 NOTE — ED NOTES
Pt met with County , stated it went well, hopeful she will allow him to discharge to alternative placement rather than admit to inpatient. Pt appears mostly calm, showing some frustration with the significant length of time waiting for placement. Pt makes several requests for food. Pt takes medications and requests these when scheduled and PRN.
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
## 2023-07-21 NOTE — ED NOTES
Pt has been pleasant, calm and cooperative throughout shift thus far with pt. Denies any SI/HI, denies any auditory or visual hallucinations. Pt resting calmly in bed. Even chest rise and fall.

## 2023-07-21 NOTE — TELEPHONE ENCOUNTER
8:34 AM: Per , [facility] is only reviewing low acuity pts at this time.    10:20 AM: Intake paged Jones to review Pt for station 20/[place].    10:59 AM: Intake sent second page.    11:39 AM: Intake paged Dr. Heller to review Pt.    1:50 PM: Pt placed in queue and added to admit board.     1:55 PM: Intake called CRN on station 20. Discharge expected to occur around 2pm to open a private room. For Pt. Room will evelyne to be cleaned.     1:56 PM: Intake called [facility] ED and provided disposition to [place]. [facility] ED RN to call station after 4 for RN to RN report.

## 2023-07-21 NOTE — ED NOTES
Attempted to call report to station 20, they will call after a patient is discharged and the unit has rearranged rooms. Charge RN notified.

## 2023-07-21 NOTE — TELEPHONE ENCOUNTER
R: [place]  Access Inpatient Bed Call Log 7/20/23 11:53PM (Statewide)  Intake has called facilities that have not updated their bed status within the last 12 hours.              [facility] is at capacity.12 declined d/t unit acuity        [facility] is posting 0 beds.  [phone].     [facility] is posting 0 beds. Negative covid required.           [facility] is posting 0 beds. Negative covid required. [phone]. Per call @ 12:02am at capacity.7/16 [facility] will reconsider 7/17 if ITC bed avail   [facility] is posting 0 beds.     [facility] is posting 0 beds.      [facility] is posting 0 beds     [facility] ([facility]) is posting 2 beds.           [facility] is posting 0 bed. Low acuity only        [facility] is posting 0 bed -LOW acuity ONLY. Mixed unit 12+. Negative covid- [phone].     [facility] has 0 beds posted. No aggression. Negative Covid. Low acuity       [facility] is posting 0 beds. Negative covid.           [facility] ([place]) 4 beds Low acuity only. Negative covid. -   [phone]. 7/21 Patient not appropriate restraints on 7/14  [facility]- is posting 2 bed. Low acuity. No current aggression.     [facility] ([place]) 1 beds Low acuity only. Negative covid. -   [phone].     [facility] is posting 0 bed. Low acuity. 72 HH hold preferred. [phone].  Negative covid required.7/14 declined [facility] too acute d/t receiving injection med   [facility] ([place]) 3 bed. Low acuity only. Negative covid. -   [phone].     [facility] [place] is posting 1 Beds.  Negative covid required.   Vol only, No history of aggression, violence, or assault. No sexual offenders. No 72 HH holds.   [phone]. 7/21 Patient not appropriate d/t commitment Vol only.   [facility] is posting 7 beds Negative covid required.  (Must have the  cognitive ability to do programming. No aggressive or violent behavior or recent HX in the last 2 yrs. MH must be primary.) Always low acuity. [phone].     [facility] has 0 beds posted. Negative covid required.  Low acuity only. Violence and aggression capped.  [phone].  Per call @12:08am to Billie diaz fax clinical if you have a specific patient.   [facility] is posting 0 beds. Low acuity, Negative covid required.  [phone].   [facility] is posting 2 bed. Negative covid required.  Vol only. Combined adolescent and adult unit. No aggressive or violent behavior. No registered sex offenders.  [phone]; Per call@12:28am to [place] beds available just call for screening. 7/21 Patient is not appropriate d/t commitment.  [facility] posting 0 beds Negative covid required.  [phone] 7/15 [facility] d/t acuity    [facility] is posting 1 beds. Negative covid. (No lines, drains, or tubes, oxygen, CPAP, IV, etc.). [phone]. Per call @12:25am to [place] 3 available beds.7/14 [facility] d/t not meeting IPMH criteria   [facility] ([facility]) is posting 4 bed. Negative covid. (No lines, drains, or tubes, oxygen, CPAP, IV, etc.).   [phone]. 7/17 [facility] d/t acuity  [facility] is posting 18 beds. No covid test required.  [phone]. 7/21 Patient not appropriate d/t being on commitment in [place].    Patient remains on work list pending appropriate bed availability.

## 2023-07-22 LAB
ALBUMIN SERPL BCG-MCNC: 4.4 G/DL (ref 3.5–5.2)
ALP SERPL-CCNC: 91 U/L (ref 40–129)
ALT SERPL W P-5'-P-CCNC: 36 U/L (ref 0–70)
ANION GAP SERPL CALCULATED.3IONS-SCNC: 7 MMOL/L (ref 7–15)
AST SERPL W P-5'-P-CCNC: 21 U/L (ref 0–45)
BASOPHILS # BLD AUTO: 0.1 10E3/UL (ref 0–0.2)
BASOPHILS NFR BLD AUTO: 1 %
BILIRUB SERPL-MCNC: 0.2 MG/DL
BUN SERPL-MCNC: 16 MG/DL (ref 6–20)
CALCIUM SERPL-MCNC: 9.2 MG/DL (ref 8.6–10)
CHLORIDE SERPL-SCNC: 100 MMOL/L (ref 98–107)
CHOLEST SERPL-MCNC: 90 MG/DL
CK SERPL-CCNC: 138 U/L (ref 39–308)
CREAT SERPL-MCNC: 0.89 MG/DL (ref 0.67–1.17)
DEPRECATED HCO3 PLAS-SCNC: 31 MMOL/L (ref 22–29)
EOSINOPHIL # BLD AUTO: 0.2 10E3/UL (ref 0–0.7)
EOSINOPHIL NFR BLD AUTO: 2 %
ERYTHROCYTE [DISTWIDTH] IN BLOOD BY AUTOMATED COUNT: 13.1 % (ref 10–15)
FOLATE SERPL-MCNC: 9.8 NG/ML (ref 4.6–34.8)
GFR SERPL CREATININE-BSD FRML MDRD: >90 ML/MIN/1.73M2
GGT SERPL-CCNC: 19 U/L (ref 8–61)
GLUCOSE SERPL-MCNC: 87 MG/DL (ref 70–99)
HBA1C MFR BLD: 5.7 %
HCT VFR BLD AUTO: 43.4 % (ref 40–53)
HDLC SERPL-MCNC: 31 MG/DL
HGB BLD-MCNC: 14.5 G/DL (ref 13.3–17.7)
IMM GRANULOCYTES # BLD: 0.1 10E3/UL
IMM GRANULOCYTES NFR BLD: 1 %
LDLC SERPL CALC-MCNC: 36 MG/DL
LYMPHOCYTES # BLD AUTO: 2.5 10E3/UL (ref 0.8–5.3)
LYMPHOCYTES NFR BLD AUTO: 32 %
MCH RBC QN AUTO: 29.7 PG (ref 26.5–33)
MCHC RBC AUTO-ENTMCNC: 33.4 G/DL (ref 31.5–36.5)
MCV RBC AUTO: 89 FL (ref 78–100)
MONOCYTES # BLD AUTO: 0.7 10E3/UL (ref 0–1.3)
MONOCYTES NFR BLD AUTO: 9 %
NEUTROPHILS # BLD AUTO: 4.3 10E3/UL (ref 1.6–8.3)
NEUTROPHILS NFR BLD AUTO: 55 %
NONHDLC SERPL-MCNC: 59 MG/DL
NRBC # BLD AUTO: 0 10E3/UL
NRBC BLD AUTO-RTO: 0 /100
PLAT MORPH BLD: NORMAL
PLATELET # BLD AUTO: 238 10E3/UL (ref 150–450)
POTASSIUM SERPL-SCNC: 4.2 MMOL/L (ref 3.4–5.3)
PROT SERPL-MCNC: 6.8 G/DL (ref 6.4–8.3)
RBC # BLD AUTO: 4.89 10E6/UL (ref 4.4–5.9)
RBC MORPH BLD: NORMAL
SODIUM SERPL-SCNC: 138 MMOL/L (ref 136–145)
TRIGL SERPL-MCNC: 114 MG/DL
TSH SERPL DL<=0.005 MIU/L-ACNC: 3.03 UIU/ML (ref 0.3–4.2)
VIT B12 SERPL-MCNC: 704 PG/ML (ref 232–1245)
WBC # BLD AUTO: 7.8 10E3/UL (ref 4–11)

## 2023-07-22 PROCEDURE — 124N000002 HC R&B MH UMMC

## 2023-07-22 PROCEDURE — 82977 ASSAY OF GGT: CPT

## 2023-07-22 PROCEDURE — 82607 VITAMIN B-12: CPT

## 2023-07-22 PROCEDURE — 85025 COMPLETE CBC W/AUTO DIFF WBC: CPT

## 2023-07-22 PROCEDURE — 99222 1ST HOSP IP/OBS MODERATE 55: CPT | Mod: AI | Performed by: PSYCHIATRY & NEUROLOGY

## 2023-07-22 PROCEDURE — 250N000013 HC RX MED GY IP 250 OP 250 PS 637

## 2023-07-22 PROCEDURE — 82550 ASSAY OF CK (CPK): CPT

## 2023-07-22 PROCEDURE — 84443 ASSAY THYROID STIM HORMONE: CPT

## 2023-07-22 PROCEDURE — 80053 COMPREHEN METABOLIC PANEL: CPT

## 2023-07-22 PROCEDURE — 83036 HEMOGLOBIN GLYCOSYLATED A1C: CPT

## 2023-07-22 PROCEDURE — 82746 ASSAY OF FOLIC ACID SERUM: CPT

## 2023-07-22 PROCEDURE — 80061 LIPID PANEL: CPT

## 2023-07-22 PROCEDURE — 36415 COLL VENOUS BLD VENIPUNCTURE: CPT

## 2023-07-22 RX ADMIN — NICOTINE 1 PATCH: 21 PATCH, EXTENDED RELEASE TRANSDERMAL at 08:20

## 2023-07-22 RX ADMIN — HYDROCHLOROTHIAZIDE 25 MG: 25 TABLET ORAL at 08:20

## 2023-07-22 RX ADMIN — NICOTINE POLACRILEX 2 MG: 2 GUM, CHEWING ORAL at 22:20

## 2023-07-22 RX ADMIN — DIVALPROEX SODIUM 1250 MG: 500 TABLET, DELAYED RELEASE ORAL at 22:20

## 2023-07-22 RX ADMIN — HYDROXYZINE HYDROCHLORIDE 25 MG: 25 TABLET, FILM COATED ORAL at 18:13

## 2023-07-22 RX ADMIN — DIVALPROEX SODIUM 1250 MG: 500 TABLET, DELAYED RELEASE ORAL at 08:19

## 2023-07-22 RX ADMIN — HYDROXYZINE HYDROCHLORIDE 25 MG: 25 TABLET, FILM COATED ORAL at 12:25

## 2023-07-22 RX ADMIN — OLANZAPINE 15 MG: 10 TABLET, ORALLY DISINTEGRATING ORAL at 22:20

## 2023-07-22 RX ADMIN — Medication 1 G: at 09:06

## 2023-07-22 ASSESSMENT — ACTIVITIES OF DAILY LIVING (ADL)
ADLS_ACUITY_SCORE: 28
DRESS: INDEPENDENT;SCRUBS (BEHAVIORAL HEALTH)
ADLS_ACUITY_SCORE: 28
HYGIENE/GROOMING: INDEPENDENT
ADLS_ACUITY_SCORE: 28
ADLS_ACUITY_SCORE: 28
ORAL_HYGIENE: INDEPENDENT
HYGIENE/GROOMING: INDEPENDENT
ORAL_HYGIENE: INDEPENDENT
ADLS_ACUITY_SCORE: 28
DRESS: INDEPENDENT;SCRUBS (BEHAVIORAL HEALTH)
ADLS_ACUITY_SCORE: 28

## 2023-07-22 NOTE — PLAN OF CARE
Initial Psychosocial Assessment    I have reviewed the chart, met with the patient, and developed Care Plan.  Information for assessment was obtained from:     Patient: attempted to meet with Pt but he was meeting with residents and then resting when Writer came back and Chart: reviewed    Presenting Problem:  Per Admitting RN:  Pt arrived @ the unit around 2000 from ED accompanied by security. Pt alert and oriented X 4, able to verbalize needs well. Pt compliant with the search process. Pt denies SI, SIB, A/V Hallucination. Pt contracted for safety. Pt is pacing on the hallway back to back with headphones on. Pt is agitated at the moment, when the writer approached him for his medication pt was busy with his stuff and not ready for it. Pt was left alone for sometime and i re approach for the second time for his medication. Later pt was compliant  Per the pt he has a history of elopement risk   Pt is wearing street clothes. The order needs to be modified to match with what the pt was told.     History of Mental Health and Chemical Dependency:  Pt's MICD commitment with [facility] has been revoked. Pt is on wait list's for [facility] and [facility] facilities, please review extensive notes from coordinators. Pt's DOC is methamphetamines. Pt's mental health history is bipolar I with manic symptomology and methamphetamine use.   History of inpatient psychiatric hospitalizations, most recently he was discharged from [facility] on 7/11/23.     Family Description (Constellation, Family Psychiatric History):  Pt was raised by mother in [place].     Significant Life Events (Illness, Abuse, Trauma, Death):  Loss of parent at 13 yo    Living Situation:  Staying [facility] in [place] but then other records say: [facility] in [place], [place].     Educational Background:  HS diploma     Occupational History:  Unemployed     Financial Status:  Unknown, may receive SSDI      Legal Issues:  Pt is on an MICD commitment with [facility]    Ethnic/Cultural Issues:  None     Spiritual Orientation:  None      Service History:  None     Social Functioning (organization, interests):  Interests: Hockey, jogging, painting, doing bead work, and writing in his journal.   Supports: , mother, sister, aunt     Current Treatment Providers are:  : Jose Durham with [facility] [phone] email:  [email or web address].    Social Service Assessment/Plan:  Patient has been admitted for psychiatric stabilization due to erratic and unsafe behaviors. Patient will have psychiatric assessment and medication management by the psychiatrist. Medications will be reviewed and adjusted per MD as indicated. The treatment team will continue to assess and stabilize the patient's mental health symptoms with the use of medications and therapeutic programming. Hospital staff will provide a safe environment and a therapeutic milieu. Staff will continue to assess patient as needed. Patient will be encouraged to participate in unit groups and activities. Patient will receive individual and group support on the unit.  CTC will do individual inpatient treatment planning and after care planning. CTC will discuss options for increasing community supports with the patient. CTC will coordinate with outpatient providers and will place referrals to ensure appropriate follow up care is in place.

## 2023-07-22 NOTE — PLAN OF CARE
Problem: Sleep Disturbance  Goal: Adequate Sleep/Rest  Outcome: Progressing   Goal Outcome Evaluation:  Patient awake at the beginning of the shift but id eventually go to bed. Awake briefly after a few hours fo snacks. Pleasant upon approach. No complaints of pain or requests for prn's. Routine safety checks in place, no concerns thus far tonight. Appears to have slept for 4.5 hours.

## 2023-07-22 NOTE — CARE PLAN
"     07/21/23 2054   Patient Belongings   Did you bring any home meds/supplements to the hospital?  No   Patient Belongings locker   Patient Belongings Put in Hospital Secure Location (Security or Locker, etc.) cash/credit card;cell phone/electronics;clothing;wallet   Belongings Search Yes   Clothing Search Yes   Second Staff [place]       Patients Belonging while admitted:  Man Wallet (1); [place] ID (last four digits = [id number]); Insurance card \"UCARE\" (2); Bag-pack (1); Phone-case (1); (07/28/2023)AM (1); Broken SAMS[employer]G cellphone (1); NIKE sandals (1-pair); Sports pants (2); Shorts (1); Sports jackets (2); t-Shirts (1); Books (2); Lighter with bag (1); Pemberton cigarettes (1); UN-Checked duffle bag with shoes (1); UN-Checked Big brown travel bag (1); Small single Key (1); $2.72 of dollar, quarters, nickels, and pennies.  (07/28/2023)AM      To Security in Safe-Place  : (07/28/2023)Security # [id number]  Capital [facility] MC - [id number]  CLEAR Bank Visa debit card - [id number]  Capital One MC - [id number]  Chime Visa card - [id number]  Chime debit Visa card - 1948  [email or web address] Debit visa card - 2287  [email or web address] Debit visa card - 2324  Green dot Debit visa card - 6334  (07/28/2023) AM  (07/28/2023) AM  251 doallrs in cash and 1 dollar check sent to security on 8/9/21 envelope [id number]    Added on July/26/2023:    Patient requested his money that was ripped up to be given back to his girlfriend. The money was located in his unchecked bag that came with him.     Added on July 28, 2023 @8 PM, The patient give his girlfriend the following items, Samsung phone with , $15 cash and EBT Card. The patient's belonging hard copy-paper, he initial and staff initial.    Additional security envelope added on 8/5/23 ([id number]): $50 cash, EBT card ending in [id number]    Miscellaneous receipts, documents, a white USB cord, a rainbow USB cord, a green charging block, and a GeoTracng cellphone were dropped off on 8/7/23.     ..A               Admission:  I am responsible for any " personal items that are not sent to the safe or pharmacy.  [facility] is not responsible for loss, theft or damage of any property in my possession.    Signature:  _________________________________ Date: _______  Time: _____                                              Staff Signature:  ____________________________ Date: ________  Time: _____      2nd Staff person, if patient is unable/unwilling to sign:    Signature: ________________________________ Date: ________  Time: _____     Discharge:  [facility] has returned all of my personal belongings:    Signature: _________________________________ Date: ________  Time: _____                                          Staff Signature:  ____________________________ Date: ________  Time: _____

## 2023-07-22 NOTE — PLAN OF CARE
"Goal Outcome Evaluation:    Plan of Care Reviewed With: patient        Problem: Anxiety  Goal: Anxiety Reduction or Resolution  Outcome: Not Progressing     Problem: Sleep Disturbance  Goal: Adequate Sleep/Rest  Outcome: Not Progressing   Patient was visible in the milieu during shift, was obeserved making phone calls, pacing, listening to music on headphones and singing. Patient approached RN at the beginning of the shift and requested his AM medications. During medication administration, patient was agitated when he realized the his Wellbutrin was discontinued. Patient became loud and yelled at RN. The CN intervened and the situation was de-escalated, patient was redirectable and took his medications and requested to speak with provider regarding his prescriptions. Patient was hyperverbal, tangential and unable to assess. Denied all mental health symptoms and verbalized \"am well\". Patient was seen by the resident doctor regarding his medications, the visit did not end well since patient came out agitated and cursing. Resident will not restart the Wellbutrin as of now. Patient was given prn hydroxyzine 25mg at 12 noon. Family/friend visited patient and patient has been excited about the visit.  "

## 2023-07-22 NOTE — PROGRESS NOTES
SPIRITUAL HEALTH SERVICES Progress Note  [facility] ([facility]) 20N    Attempted visit with pt Etienne twice today per admission request but he was not available. Pt was interested in a visit but had an appointment with medical staff and requested to come back at a later time/date.     Plan: Triaged for on-call  tomorrow 07/23.    Corey Sy, CPRS, CHP   Intern  Pager [phone]      * [facility] remains available 24/7 for emergent requests/referrals, either by having the switchboard page the on-call  or by entering an ASAP/STAT consult in Epic (this will also page the on-call ). Routine Epic consults receive an initial response within 24 hours.*

## 2023-07-22 NOTE — PLAN OF CARE
ADMISSION NOTE    Pt arrived @ the unit around 2000 from ED accompanied by security. Pt alert and oriented X 4, able to verbalize needs well. Pt compliant with the search process. Pt denies SI, SIB, A/V Hallucination. Pt contracted for safety. Pt is pacing on the hallway back to back with headphones on. Pt is agitated at the moment, when the writer approached him for his medication pt was busy with his stuff and not ready for it. Pt was left alone for sometime and i re approach for the second time for his medication. Later pt was compliant  Per the pt he has a history of elopement risk   Pt is wearing street clothes. The order needs to be modified to match with what the pt was told.

## 2023-07-22 NOTE — TELEPHONE ENCOUNTER
8:38 PM: Pt is no longer in queue and has been admitted to station 20. Pt removed from work list. Intake no longer following.

## 2023-07-22 NOTE — ED NOTES
ED to Behavioral Floor Handoff    SITUATION  Etienne Dupree is a 33 year old male who speaks English and lives in a home with others The patient arrived in the ED by ambulance from emergency room with a complaint of Manic Behavior (Pt was picked up by EMS, running around in the traffic, carrying wooden plank. Pt is wearing with 5-6 sweaters. Pt has rapid disorganized speech, Hx of substance abuse. Meth use is suspected. Pt is tachycardic, with 130s. Refused other VS. [facility] crisis put pt on hold. Pt presents danger to self and others. )  .The patient's current symptoms started/worsened 2 day(s) ago and during this time the symptoms have increased.   In the ED, pt was diagnosed with   Final diagnoses:   Agitation   Acute psychosis (H)        Initial vitals were: BP: 135/84  Pulse: (!) 129  Temp: 99.5  F (37.5  C)  Resp: 22  SpO2: 97 %   --------  Is the patient diabetic? No   If yes, last blood glucose? --     If yes, was this treated in the ED? --  --------  Is the patient inebriated (ETOH) No or Impaired on other substances? No  MSSA done? N/A  Last MSSA score: --    Were withdrawal symptoms treated? N/A  Does the patient have a seizure history? No. If yes, date of most recent seizure--  --------  Is the patient patient experiencing suicidal ideation? denies current or recent suicidal ideation     Homicidal ideation? denies current or recent homicidal ideation or behaviors.    Self-injurious behavior/urges? denies current or recent self injurious behavior or ideation.  ------  Was pt aggressive in the ED YES  Was a code called YES   Is the pt now cooperative? Yes  -------  Meds given in ED:   Medications   divalproex sodium delayed-release (DEPAKOTE) DR tablet 1,250 mg (1,250 mg Oral $Given 7/21/23 0801)   hydrOXYzine (ATARAX) tablet 50 mg (0 mg Oral Return to Cabinet 7/20/23 1752)   buPROPion (WELLBUTRIN XL) 24 hr tablet 300 mg (300 mg Oral $Given 7/21/23 0800)   OLANZapine zydis (zyPREXA) ODT tab 15 mg (15 mg  Oral $Given 7/20/23 2142)   haloperidol lactate (HALDOL) injection 5 mg (0 mg Intramuscular Return to Cabinet 7/15/23 1347)     And   LORazepam (ATIVAN) injection 2 mg (0 mg Intramuscular Return to Cabinet 7/15/23 1348)     And   diphenhydrAMINE (BENADRYL) injection 50 mg (0 mg Intramuscular Return to Cabinet 7/15/23 1348)   haloperidol (HALDOL) tablet 5 mg (5 mg Oral $Given 7/17/23 1537)     And   LORazepam (ATIVAN) tablet 2 mg (2 mg Oral $Given 7/21/23 1319)     And   diphenhydrAMINE (BENADRYL) capsule 50 mg (50 mg Oral $Given 7/17/23 1537)   nicotine (NICODERM CQ) 21 MG/24HR 24 hr patch 1 patch (1 patch Transdermal Patch/Med Removed 7/21/23 1021)     And   nicotine Patch in Place ( Transdermal Patch in Place 7/21/23 1553)   fish oil-omega-3 fatty acids capsule 1 g (1 g Oral $Given 7/21/23 0801)   hydrochlorothiazide (HYDRODIURIL) tablet 25 mg (25 mg Oral $Given 7/21/23 0800)   loperamide (IMODIUM) capsule 2 mg (2 mg Oral $Given 7/20/23 1639)   OLANZapine zydis (zyPREXA) ODT tab 15 mg (15 mg Oral $Given 7/13/23 1941)   nicotine polacrilex (NICORETTE) gum 4 mg (4 mg Buccal $Given 7/14/23 0122)   OLANZapine (zyPREXA) injection 10 mg (10 mg Intramuscular $Given 7/14/23 0658)   OLANZapine (zyPREXA) injection 10 mg (10 mg Intramuscular $Given 7/14/23 1913)   LORazepam (ATIVAN) injection 2 mg (2 mg Intramuscular $Given 7/14/23 1913)   loperamide (IMODIUM) capsule 2 mg (2 mg Oral $Given 7/16/23 0311)   loperamide (IMODIUM) capsule 2 mg (2 mg Oral $Given 7/16/23 1023)   loperamide (IMODIUM) capsule 4 mg (4 mg Oral $Given 7/16/23 1858)   alum & mag hydroxide-simethicone (MAALOX) suspension 15 mL (15 mLs Oral Not Given 7/20/23 0116)   lidocaine (viscous) (XYLOCAINE) 2 % solution 10 mL (10 mLs Mouth/Throat Not Given 7/20/23 0120)      Family present during ED course? No  Family currently present? No    BACKGROUND  Does the patient have a cognitive impairment or developmental disability? No  Allergies:   Allergies   Allergen  Reactions    Penicillins    .   Social demographics are   Social History     Socioeconomic History    Marital status: Single   Tobacco Use    Smoking status: Every Day     Packs/day: 0.00     Types: Cigarettes, Vaping Device    Smokeless tobacco: Former   Vaping Use    Vaping Use: Every day   Substance and Sexual Activity    Alcohol use: Not Currently    Drug use: Not Currently     Types: Methamphetamines        ASSESSMENT  Labs results   Labs Ordered and Resulted from Time of ED Arrival to Time of ED Departure   COMPREHENSIVE METABOLIC PANEL - Abnormal       Result Value    Sodium 136      Potassium 3.4      Chloride 99      Carbon Dioxide (CO2) 21 (*)     Anion Gap 16 (*)     Urea Nitrogen 20.2 (*)     Creatinine 0.97      Calcium 9.4      Glucose 160 (*)     Alkaline Phosphatase 81      AST 96 (*)      (*)     Protein Total 8.0      Albumin 4.8      Bilirubin Total 0.4      GFR Estimate >90     CK TOTAL - Abnormal     (*)    CBC WITH PLATELETS AND DIFFERENTIAL - Abnormal    WBC Count 10.8      RBC Count 6.00 (*)     Hemoglobin 17.7      Hematocrit 51.9      MCV 87      MCH 29.5      MCHC 34.1      RDW 13.0      Platelet Count 287      % Neutrophils 57      % Lymphocytes 33      % Monocytes 7      % Eosinophils 2      % Basophils 1      % Immature Granulocytes 0      NRBCs per 100 WBC 0      Absolute Neutrophils 6.2      Absolute Lymphocytes 3.6      Absolute Monocytes 0.7      Absolute Eosinophils 0.2      Absolute Basophils 0.1      Absolute Immature Granulocytes 0.0      Absolute NRBCs 0.0     DRUG ABUSE SCREEN 1 URINE (ED) - Abnormal    Amphetamines Urine Screen Positive (*)     Barbituates Urine Screen Negative      Benzodiazepine Urine Screen Negative      Cannabinoids Urine Screen Negative      Cocaine Urine Screen Negative      Opiates Urine Screen Negative     ETHYL ALCOHOL LEVEL - Normal    Alcohol ethyl <0.01     ROUTINE UA WITH MICROSCOPIC REFLEX TO CULTURE - Normal    Color Urine Light  Yellow      Appearance Urine Clear      Glucose Urine Negative      Bilirubin Urine Negative      Ketones Urine Negative      Specific Gravity Urine 1.010      Blood Urine Negative      pH Urine 6.0      Protein Albumin Urine Negative      Urobilinogen Urine Normal      Nitrite Urine Negative      Leukocyte Esterase Urine Negative      RBC Urine <1      WBC Urine <1     COVID-19 VIRUS (CORONAVIRUS) BY PCR - Normal    SARS CoV2 PCR Negative        Imaging Studies: No results found for this or any previous visit (from the past 24 hour(s)).   Most recent vital signs /80   Pulse 82   Temp 97.4  F (36.3  C) (Oral)   Resp 20   SpO2 95%    Abnormal labs/tests/findings requiring intervention:---   Pain control: good  Nausea control: good    RECOMMENDATION  Are any infection precautions needed (MRSA, VRE, etc.)? No If yes, what infection? --  ---  Does the patient have mobility issues? independently. If yes, what device does the pt use? ---  ---  Is patient on 72 hour hold or commitment? No If on 72 hour hold, have hold and rights been given to patient? N/A  Are admitting orders written if after 10 p.m. ?N/A  Tasks needing to be completed:---     Jose Mendoza, RN   ascom--    [phone]   [phone] [facility] ED

## 2023-07-22 NOTE — ED NOTES
Report given to Bhavana on Station 20, no questions or concerns. Ready for patient, and patient is aware of movement.

## 2023-07-23 LAB — VALPROATE SERPL-MCNC: 78 UG/ML

## 2023-07-23 PROCEDURE — 250N000013 HC RX MED GY IP 250 OP 250 PS 637

## 2023-07-23 PROCEDURE — 124N000002 HC R&B MH UMMC

## 2023-07-23 PROCEDURE — 80164 ASSAY DIPROPYLACETIC ACD TOT: CPT

## 2023-07-23 PROCEDURE — 36415 COLL VENOUS BLD VENIPUNCTURE: CPT

## 2023-07-23 RX ORDER — MULTIPLE VITAMINS W/ MINERALS TAB 9MG-400MCG
1 TAB ORAL DAILY
Status: CANCELLED | OUTPATIENT
Start: 2023-07-23

## 2023-07-23 RX ORDER — HYDROCHLOROTHIAZIDE 25 MG/1
25 TABLET ORAL EVERY MORNING
Status: CANCELLED | OUTPATIENT
Start: 2023-07-24

## 2023-07-23 RX ORDER — BUPROPION HYDROCHLORIDE 150 MG/1
150 TABLET ORAL EVERY MORNING
Status: CANCELLED | OUTPATIENT
Start: 2023-07-24

## 2023-07-23 RX ORDER — HYDROXYZINE PAMOATE 50 MG/1
50 CAPSULE ORAL 3 TIMES DAILY PRN
Status: CANCELLED | OUTPATIENT
Start: 2023-07-23

## 2023-07-23 RX ORDER — ATOMOXETINE 60 MG/1
60 CAPSULE ORAL DAILY
Status: CANCELLED | OUTPATIENT
Start: 2023-07-23

## 2023-07-23 RX ADMIN — NICOTINE POLACRILEX 2 MG: 2 GUM, CHEWING ORAL at 21:06

## 2023-07-23 RX ADMIN — NICOTINE 1 PATCH: 21 PATCH, EXTENDED RELEASE TRANSDERMAL at 07:56

## 2023-07-23 RX ADMIN — HYDROCHLOROTHIAZIDE 25 MG: 25 TABLET ORAL at 07:55

## 2023-07-23 RX ADMIN — Medication 1 G: at 07:55

## 2023-07-23 RX ADMIN — DIVALPROEX SODIUM 1250 MG: 500 TABLET, DELAYED RELEASE ORAL at 07:55

## 2023-07-23 RX ADMIN — OLANZAPINE 15 MG: 10 TABLET, ORALLY DISINTEGRATING ORAL at 21:05

## 2023-07-23 RX ADMIN — NICOTINE POLACRILEX 2 MG: 2 GUM, CHEWING ORAL at 11:46

## 2023-07-23 RX ADMIN — NICOTINE POLACRILEX 2 MG: 2 GUM, CHEWING ORAL at 08:10

## 2023-07-23 RX ADMIN — DIVALPROEX SODIUM 1250 MG: 500 TABLET, DELAYED RELEASE ORAL at 21:05

## 2023-07-23 ASSESSMENT — ACTIVITIES OF DAILY LIVING (ADL)
ADLS_ACUITY_SCORE: 28
ORAL_HYGIENE: INDEPENDENT
ADLS_ACUITY_SCORE: 28
ADLS_ACUITY_SCORE: 28
DRESS: INDEPENDENT
ADLS_ACUITY_SCORE: 28
ADLS_ACUITY_SCORE: 28
HYGIENE/GROOMING: INDEPENDENT
ADLS_ACUITY_SCORE: 28

## 2023-07-23 NOTE — PLAN OF CARE
Problem: Plan of Care - These are the overarching goals to be used throughout the patient stay.    Goal: Plan of Care Review  Description: The Plan of Care Review/Shift note should be completed every shift.  The Outcome Evaluation is a brief statement about your assessment that the patient is improving, declining, or no change.  This information will be displayed automatically on your shift note.  Outcome: Progressing     Problem: Anxiety  Goal: Anxiety Reduction or Resolution  Outcome: Progressing   Goal Outcome Evaluation:    Plan of Care Reviewed With: patient    Pt visible in the milieu, pacing on the hallway with headphones on and singing, pt hyper verbal, social with peers and staff, constantly comes to the station for information about his medication and some other stuffs. Pt stated being anxious and irritable for the changes of his medication that they discontinued that he has been on for the last 7 years. PRN hydroxyzine given and pt requested the information for the medication side effects. Pt wanted to talk to the doctor and the doctor already saw him on the previous shift. Pt was told to wait till Monday to raise his concerns and was calm and compliant with the feedback. Pt stated he was craving to smoke and requested for nicotine gum order. The order was placed. Pt denied SI, SIB, A/VH and all other mental health issues. Pt ate 100% in the dining room, compliant with medication, hygiene is fair, safety was contracted. No escalation behavior noted.    BP (!) 142/89   Pulse 81   Temp 97.2  F   Resp 18   SpO2 100%

## 2023-07-23 NOTE — PROGRESS NOTES
07/22/23 2145   Group Therapy Session   Group Attendance attended group session   Time Session Began 2010   Time Session Ended 2100   Total Time (minutes) 30   Total # Attendees 7   Group Type task skill   Group Session Detail Scratch art project for concentration, creative expression, fine motor skills, attention to detail, follow through, frustration tolerance, ability to accept imperfections, healthy leisure, an opportunity to experience success, socialization, encourage daily structure/routine and participation in meaningful activity.   Patient Participation Detail Pt joined group late and was in and out of group a couple of times.  Pt seemed to have extra energy, was mildly intense, but cooperative.  Pt eventually settled and completed 2 projects.  Pt was social with a peer.  While talking with said peer pt revealed a limited understanding and insight to his MH sx's and diagnosis.  Pt was receptive to positive feedback about his completed work.  Pt left group early, once he was finished with both projects.  No charge.

## 2023-07-23 NOTE — PLAN OF CARE
"  Problem: Plan of Care - These are the overarching goals to be used throughout the patient stay.    Goal: Plan of Care Review  Description: The Plan of Care Review/Shift note should be completed every shift.  The Outcome Evaluation is a brief statement about your assessment that the patient is improving, declining, or no change.  This information will be displayed automatically on your shift note.  Outcome: Progressing  Flowsheets (Taken 7/23/2023 1442)  Plan of Care Reviewed With: patient  Overall Patient Progress: no change  Goal: Patient-Specific Goal (Individualized)  Description: You can add care plan individualizations to a care plan. Examples of Individualization might be:  \"Parent requests to be called daily at 9am for status\", \"I have a hard time hearing out of my right ear\", or \"Do not touch me to wake me up as it startles me\".  Outcome: Progressing  Goal: Absence of Hospital-Acquired Illness or Injury  Outcome: Progressing  Intervention: Identify and Manage Fall Risk  Recent Flowsheet Documentation  Taken 7/23/2023 1159 by Beverly Iqbal RN  Safety Promotion/Fall Prevention: safety round/check completed  Intervention: Prevent Skin Injury  Recent Flowsheet Documentation  Taken 7/23/2023 1159 by Beverly Iqbal RN  Body Position: position changed independently  Goal: Optimal Comfort and Wellbeing  Outcome: Progressing  Intervention: Provide Person-Centered Care  Recent Flowsheet Documentation  Taken 7/23/2023 1159 by Beverly Iqbal RN  Trust Relationship/Rapport:   care explained   choices provided  Goal: Readiness for Transition of Care  Outcome: Progressing     Problem: Seclusion/Restraint, Behavioral  Goal: Absence of Harm or Injury  Outcome: Progressing  Intervention: Protect Dignity, Rights, and Personal Wellbeing  Recent Flowsheet Documentation  Taken 7/23/2023 1159 by Beverly Iqbal RN  Trust Relationship/Rapport:   care explained   choices provided  Intervention: Protect Skin and Joint " "Integrity  Recent Flowsheet Documentation  Taken 7/23/2023 1159 by Beverly Iqbal RN  Body Position: position changed independently     Problem: Adult Behavioral Health Plan of Care  Goal: Plan of Care Review  Outcome: Progressing  Flowsheets (Taken 7/23/2023 1442)  Plan of Care Reviewed With: patient  Overall Patient Progress: no change  Goal: Patient-Specific Goal (Individualization)  Description: You can add care plan individualizations to a care plan. Examples of Individualization might be:  \"Parent requests to be called daily at 9am for status\", \"I have a hard time hearing out of my right ear\", or \"Do not touch me to wake me up as it startles me\".  Outcome: Progressing  Goal: Individualized Daily Interaction Plan (IDIP)  Outcome: Progressing  Goal: Adheres to Safety Considerations for Self and Others  Outcome: Progressing  Goal: Absence of New-Onset Illness or Injury  Outcome: Progressing  Goal: Optimized Coping Skills in Response to Life Stressors  Outcome: Progressing  Goal: Develops/Participates in Therapeutic [place] to Support Successful Transition  Outcome: Progressing  Intervention: Foster Therapeutic [place]  Recent Flowsheet Documentation  Taken 7/23/2023 1159 by Beverly Iqbal RN  Trust Relationship/Rapport:   care explained   choices provided     Problem: Anxiety  Goal: Anxiety Reduction or Resolution  Outcome: Progressing     Problem: Sleep Disturbance  Goal: Adequate Sleep/Rest  Outcome: Progressing     Problem: Suicidal Behavior  Goal: Suicidal Behavior is Absent or Managed  Outcome: Progressing   Goal Outcome Evaluation:      Plan of Care Reviewed With: patient    Overall Patient Progress: no change  Patient has been visible in the milieu.Watched TV &attended community meeting.Paced halls with Head phones in place.Hyper-verbal.Constant requests.Patient  was assisted to do laundry.He changed to own clothes & will shower this evening.Adequate intake.NO safety issues.Denies all MH issues.  Temp: " 97.5  F (36.4  C) Temp src: Oral BP: (!) 142/89 Pulse: 81   Resp: 16

## 2023-07-24 PROCEDURE — 250N000013 HC RX MED GY IP 250 OP 250 PS 637

## 2023-07-24 PROCEDURE — 93005 ELECTROCARDIOGRAM TRACING: CPT

## 2023-07-24 PROCEDURE — 93010 ELECTROCARDIOGRAM REPORT: CPT | Performed by: INTERNAL MEDICINE

## 2023-07-24 PROCEDURE — H2032 ACTIVITY THERAPY, PER 15 MIN: HCPCS

## 2023-07-24 PROCEDURE — 124N000002 HC R&B MH UMMC

## 2023-07-24 PROCEDURE — 99232 SBSQ HOSP IP/OBS MODERATE 35: CPT | Mod: GC | Performed by: STUDENT IN AN ORGANIZED HEALTH CARE EDUCATION/TRAINING PROGRAM

## 2023-07-24 RX ORDER — PRAZOSIN HYDROCHLORIDE 1 MG/1
1 CAPSULE ORAL AT BEDTIME
Status: DISCONTINUED | OUTPATIENT
Start: 2023-07-24 | End: 2023-08-01

## 2023-07-24 RX ADMIN — DIVALPROEX SODIUM 1250 MG: 500 TABLET, DELAYED RELEASE ORAL at 07:51

## 2023-07-24 RX ADMIN — NICOTINE POLACRILEX 2 MG: 2 GUM, CHEWING ORAL at 13:27

## 2023-07-24 RX ADMIN — HYDROCHLOROTHIAZIDE 25 MG: 25 TABLET ORAL at 07:51

## 2023-07-24 RX ADMIN — DIVALPROEX SODIUM 1250 MG: 500 TABLET, DELAYED RELEASE ORAL at 20:17

## 2023-07-24 RX ADMIN — Medication 1 G: at 07:51

## 2023-07-24 RX ADMIN — NICOTINE 1 PATCH: 21 PATCH, EXTENDED RELEASE TRANSDERMAL at 07:51

## 2023-07-24 RX ADMIN — PRAZOSIN HYDROCHLORIDE 1 MG: 1 CAPSULE ORAL at 21:18

## 2023-07-24 RX ADMIN — OLANZAPINE 15 MG: 10 TABLET, ORALLY DISINTEGRATING ORAL at 21:18

## 2023-07-24 ASSESSMENT — ACTIVITIES OF DAILY LIVING (ADL)
ADLS_ACUITY_SCORE: 28
LAUNDRY: WITH SUPERVISION
ADLS_ACUITY_SCORE: 28
DRESS: INDEPENDENT
ORAL_HYGIENE: INDEPENDENT
LAUNDRY: WITH SUPERVISION
DRESS: INDEPENDENT
ADLS_ACUITY_SCORE: 28
ORAL_HYGIENE: INDEPENDENT
ADLS_ACUITY_SCORE: 28
HYGIENE/GROOMING: INDEPENDENT
ADLS_ACUITY_SCORE: 28
ADLS_ACUITY_SCORE: 28
HYGIENE/GROOMING: INDEPENDENT
ADLS_ACUITY_SCORE: 28

## 2023-07-24 NOTE — PROGRESS NOTES
"SPIRITUAL HEALTH SERVICES Progress Note  [facility] ([facility]) 20 NB    Saw pt Etienne KENNEDY BLAISE Dupree per admission request.    Patient/Family Understanding of Illness and Goals of Care - pt understands, \"I have not thought about hurting myself with cloths; I just need clothes to wear.\" And \"I have struggled with drug use.\"    Distress and Loss - pt feels like he is letting God/Bernard down by his continued drug use.    Strengths, Coping, and Resources  - pt looks to Bernard for forgiveness.    Meaning, Beliefs, and Spirituality - pt is a \"Jew.\" Pt wants to know that God/Bernard forgives him of his sins. He is worried hat he is disappointing God/Bernard for using drugs over and over again. Pt appreciated prayer to remember that he is forgiven by God/Bernard.    Plan of Care - pt can request another  visit if they want one.    Isaac Anglin III   Intern  Pager [phone]    * [facility] remains available 24/7 for emergent requests/referrals, either by having the switchboard page the on-call  or by entering an ASAP/STAT consult in Epic (this will also page the on-call ). Routine Epic consults receive an initial response within 24 hours.*   "

## 2023-07-24 NOTE — PLAN OF CARE
Problem: Sleep Disturbance  Goal: Adequate Sleep/Rest  Outcome: Progressing   Goal Outcome Evaluation:  Patient appears to have slept for 5.75 hours. No new events during the night. Patient offered no complaints of pain. No concerns noted during routine safety checks.

## 2023-07-24 NOTE — PLAN OF CARE
"Goal Outcome Evaluation:    Plan of Care Reviewed With: patient        Pt observed in the milieu, intermittently pacing the hallway with headphone listening to music, socialized with peers and staff, attended and participated in group activities but did not attend community group this morning despite encouragement from writer. Pt denies all mental health psych symptoms and contracted for safety. Pt described mood and \"very good\", presented with brighter affect, calm, polite and pleasant on approach. Pt speech clear, linear, coherent and organized. Pt stated he feeling better and likely discharge soon. Pt complied with medications and no side effects observed or reported. Adequate food and fluid intake, voiding freely and no BM issue per pt. Even breathing pattern and vital sign stable except elevated BP which did not meet parameter to notify MD.. BP (!) 150/99   Pulse 80   Temp 97.4  F (36.3  C) (Oral)   Resp 16   Wt 118.8 kg (262 lb)   SpO2 97%   BMI 36.48 kg/m               "

## 2023-07-24 NOTE — CARE PLAN
Occupational Therapy Group Note:     07/24/23 1646   Group Therapy Session   Group Attendance attended group session   Time Session Began 1110   Time Session Ended 1200   Total Time (minutes) 10 (no charge)   Total # Attendees 6   Group Type recreation   Group Topic Covered leisure exploration/use of leisure time;structured socialization   Group Session Detail OT Leisure Group   Patient Response/Contribution confronted peers appropriately;cooperative with task;listened actively   Patient Participation Detail Focus of today's group was on healthy leisure exploration and engagement. Patient actively participated in a group game in order to: improve social skills and decrease isolative behaviors, exercise cognitive skills, improve concentration, and encourage new learning and retention. Patient entered group late. Patient made an abrupt entrance into group room; it was disruptive to the current process of game. Patient was able to pickup quickly on instructions of activity following initial instructions. Patient was able to think of appropriate and clever responses to game prompts. Patient remained in group for only one round of game before exiting group room without reason and without return. Patient appeared somewhat tense/restless in interactions/body language; but remained appropriate in interactions.

## 2023-07-24 NOTE — PROGRESS NOTES
"  ----------------------------------------------------------------------------------------------------------  [facility]  Psychiatry Progress Note  Hospital Day #3     Interim History:     The patient's care was discussed with the treatment team and chart notes were reviewed.    Vitals: VSS  Sleep: (P) 5.5 hours (07/23/23 0600)  Scheduled medications: Took all scheduled medications as prescribed  Psychiatric PRN medications: Hydroxyzine 25 mg    Staff Report:   The patient continues to respond to internal stimuli. He reported that he has difficulty breathing when laying down. Endorsed anxiety and depression, and agitation was reported when the patient's Wellbutrin was discontinued. Please see staff notes for details.      Subjective:     Patient Interview:  Etienne Dupree was interviewed in his room today.  He summarized the events leading up to his hospitalization.  He states he would like to discharge as soon as possible to a residential facility as he awaits his IOP intake.  He would like to restart his wellbutrin and strattera.  He would like to discuss his diagnosis and what he can do to decrease his impulsivity.  He reports feeling safe in the hospital.     ROS:   Reports depressed mood               Denies suicidal ideation, self-destructive thoughts, anhedonia, low energy, insomnia, hypersomnia, appetite changes, weight changes, feeling worthless, excessive guilt, indecisiveness, feeling hopeless, feeling trapped, excessive crying and overwhelmed   Dysregulation:               Reports mood dysregulation, impulsive, aggressive and irritable               Denies suicidal ideation, violent ideation and SIB   Psychosis:               Per chart review: Patient was paranoid people were following him and having AH of \"invisible things inside the house\"              Denies delusions, auditory hallucinations and visual hallucinations  Shweta:               Reports " "increased energy, increased activity, grandiosity, distractibility , racing thoughts, pressured speech, excessive spending, excessive pleasure seeking, excessive risk taking, inter-episode mood instability, dysregulation and mood dysregulation   Objective:     Vitals:  /88   Pulse 87   Temp 98.1  F (36.7  C) (Oral)   Resp 16   Wt 118.8 kg (262 lb)   SpO2 97%   BMI 36.48 kg/m      Allergies:  Allergies   Allergen Reactions     Penicillins        Current Medications:  Scheduled:  Current Facility-Administered Medications   Medication Dose Route Frequency     divalproex sodium delayed-release  1,250 mg Oral BID     fish oil-omega-3 fatty acids  1 g Oral Daily     hydrochlorothiazide  25 mg Oral Daily     nicotine  1 patch Transdermal Daily    And     nicotine   Transdermal Q8H PAULETTE     OLANZapine zydis  15 mg Oral At Bedtime     prazosin  1 mg Oral At Bedtime       PRN:  Current Facility-Administered Medications   Medication Dose Route Frequency     hydrOXYzine  25 mg Oral Q4H PRN     loperamide  2 mg Oral 4x Daily PRN     melatonin  3 mg Oral At Bedtime PRN     nicotine  2 mg Buccal Q1H PRN     OLANZapine  10 mg Oral TID PRN    Or     OLANZapine  10 mg Intramuscular TID PRN       Labs and Imaging:  New results:   No results found for this or any previous visit (from the past 24 hour(s)).    Data this admission:  - CBC unremarkable  - CMP Co2 31  - TSH normal  - UDS negative  - Vit D ordered on 07/24  - Hgb A1c 5.7  - Lipids, HDL 31  - Vit B12 normal  - Folate normal  - Urinalysis unremarkable  - EKG ordered on 07/24     Mental Status Exam:     Oriented to:  Grossly Oriented  General:  Awake and Alert  Appearance:  appears stated age  Behavior/Attitude:  calm and cooperative  Eye Contact:  appropriate  Psychomotor: normal no catatonia present  Speech:  loud volume/tone and talkative  Language: Fluent in English with appropriate syntax and vocabulary.  Mood:  \"fine\"  Affect:  appropriate  Thought Process:  " linear and goal directed  Thought Content:  No SI/HI/AH/VH; No apparent delusions  Associations:  intact  Insight:  good   Judgment:  fair due to due to recently using substances and leaving [facility]   Impulse control: good  Attention Span:  grossly intact  Concentration:  grossly intact  Recent and Remote Memory:  not formally assessed  Fund of Knowledge:  average  Muscle Strength and Tone: normal  Gait and Station: Normal     Psychiatric Assessment     Etienne Dupree is a 33 year old male previously diagnosed with Bipolar Disorder Type 1, substance use disorder (meth, alcohol, cocaine, ecstasy, marijuana), TBI, ADHD, and PTSD who presented by EMS after his PD was revoked in the context of manic like symptoms likely from substance use. Most recent psychiatric hospitalization was in May 2023. Significant symptoms on admission include irritability, tangential, pressured speech. The MSE on admission was pertinent for irritability, elevated, tangential. Biological contributions to mental health presentation include long standing history of substance use, previous diagnosis of Bipolar Disorder type 1 which he takes Depakote and Zyprexa. Psychological contributions to mental health presentation include poor insight, poor distress tolerance,and cluster B traits. Social factors contributing to mental health presentation include being on MICD commitment, conflicts with daughter, ongoing legal charges, financial difficulties.     In summary, differential is still in evolution. The patient's reported symptoms of pressured speech, irritability are consistent with prior diagnosis of Bipolar Disorder Type 1, however, patient recently used substances which is contributing to the overall presentation. Utox was positive for amphetamines. Will consider Substance Induced Shweta vs Bipolar 1 Disorder, current episode shweta. He will likely benefit from med optimization and substance use cessation this admission. More collateral will  be needed to gather a more accurate history.      Given that he currently has out of control behaviors, aggression and shweta, patient warrants inpatient psychiatric hospitalization to maintain his safety.      Psychiatric Plan by Diagnosis      Today's changes:  - Prazosin 1 mg at bedtime        # Bipolar Disorder vs Substance Induced Shweta  1. Medications:  - Depakote 1250mg BID  - Zyprexa 15mg  - Wellbutrin 300mg is being Held due to manic like behaviors     2. Pertinent Labs/Monitoring:   - VPA level pending  - EKG 5/14 was 391. Due to irritability will hold repeat EKG for this hospitalization until patient is calmer.      3. Additional Plans:  - Patient will be treated in therapeutic milieu with appropriate individual and group therapies as described     # Nicotine Use Disorder   - Nicotine Patch  - Nicotine Gum     #Anxiety  -Hydroxyzine     #ADHD  -Holding Strattera due to manic like symptoms       2. Pertinent Labs/Monitoring:        3. Additional Plans:  - Patient will be treated in therapeutic milieu with appropriate individual and group therapies as described    -     Psychiatric Hospital Course:    In the ED,  Patient was Chart reviewed and was found to be on multiple psychopharmacology medications such as Zyprexa, Depakote, Wellbutrin which are his PTA medications. His current PTA medication were continued due to severe agitation and aggression while in the emergency room, Haldol 5 mg, Ativan 2 mg, Benadryl 50 mg were added for severe agitation and aggression.     After patient arrived to the unit, his Strattera and Wellbutrin were held due to his manic like symptoms. Nicotine gum ordered 7/22/23. VPA level was ordered for 7/23/23.     Etienne Dupree was admitted to [facility] as a on a court hold.     Medications:  ? PTA Depakote & Zyprexa were continued.   ? PTA Strattera and Wellbutrin were held due to shweta.    ? No new medications started at the time of admission     The risks, benefits,  alternatives, and side effects were discussed and understood by the patient and other caregivers.        Medical Assessment and Plan     Medical diagnoses to be addressed this admission:    # Hypertension  hydrochlorothiazide 25mg     # Supplements  Fish oil      Medical course:  Patient was physically examined by the ED prior to being transferred to the unit and was found to be medically stable and appropriate for admission.      Consults:  none     Checklist     Legal Status: Court Hold     Safety Assessment:   Behavioral Orders   Procedures     Assault precautions     Code 1 - Restrict to Unit     Elopement precautions     Routine Programming     As clinically indicated     Status 15     Every 15 minutes.       Risk Assessment:  Risk for harm is moderate.  Risk factors: substance use, trauma, impulsive and past behaviors  Protective factors: [facility]     SIO: No    Disposition: Pending stabilization, medication optimization, & development of a safe discharge plan.     Attestations     This patient was seen and discussed with my attending physician.  Resident Attestation:   I  participatedin the documentation of the note. I agree with the assessment and plan of care as documented in the note.    Enma Oates MD. PGY1  Psychiatry Resident Physician    This patient has been seen and evaluated by me, Jeannie Jamison DO.  I have discussed this patient with the team including the resident and medical student(s) and I agree with the findings and plan in this note.  Dr. Jeannie Jamison DO, YOSI

## 2023-07-24 NOTE — PROVIDER NOTIFICATION
07/24/23 0959   Individualization/Patient Specific Goals   Patient Personal Strengths resilient;resourceful   Patient Vulnerabilities legal concerns;occupational insecurity;limited social skills;history of unsuccessful treatment;lacks insight into illness;substance abuse/addiction;poor impulse control   Anxieties, Fears or Concerns Lenght of stay, financial concerns, and legal concerns   Individualized Care Needs None   Interprofessional Rounds   Summary 1. Stabilization of psychiatric sx's  2. Medication mgmt  3.Safe with self /others  4. Substance use addressed.  5. Coordination of care with outpatient providers 6. Housing addressed  7. Psych f/u care in place   Participants CTC;nursing;psychiatrist  (med students)   Behavioral Team Discussion   Participants Dr. Jamison, Dr. Ramesh, Carmelina Mccord RN, Dennise Morton MA.BARBARA, Amira Niño - Psychotherapist, Med students   Progress Initial assessment   Anticipated length of stay 10-14 days   Continued Stay Criteria/Rationale Acute psychotic sx's   Medical/Physical Stable   Plan Patient will be seen by Psychiatry daily.  Meds will be reviewed/adjusted per MD's.  Substance use will be addressed. Care will be coordinated with outpatient providers. Placement will need to be secured.  CTC will continue to assess needs, ensure appropriate f/u care is in place   Anticipated Discharge Disposition IRTS;substance use treatment;another healthcare facility

## 2023-07-24 NOTE — PLAN OF CARE
Problem: Plan of Care - These are the overarching goals to be used throughout the patient stay.    Goal: Plan of Care Review  Description: The Plan of Care Review/Shift note should be completed every shift.  The Outcome Evaluation is a brief statement about your assessment that the patient is improving, declining, or no change.  This information will be displayed automatically on your shift note.  Outcome: Progressing     Problem: Anxiety  Goal: Anxiety Reduction or Resolution  Outcome: Progressing   Goal Outcome Evaluation:    Plan of Care Reviewed With: patient    Pt has been visible in the milieu, pacing back and forth in the hallway with headphones on. Pt observed singing and responding to internal stimuli. Pt reported having difficulty breathing at night when sleeping and he's afraid to sleep because of the nightmares that forces him to wake up at night to drink coffee to stay awake not to die in sleep. The on call resident was informed and stated they will address it tomorrow during the rounds with the pt. Pt endorsed having anxiety 4/10, denies depression, SI, SIB, AVH and all other mental issues. Pt is hyper verbal, social with peers and staff. Pt hygiene is appropriate by showering this evening, intake is adequate, pt contracted safety in the unit, no any escalation behavior noted     BP (!) 150/99   Pulse 80   Temp 98.3  F (36.8  C) (Oral)   Resp 16   SpO2 98%

## 2023-07-24 NOTE — CARE PLAN
07/23/23 2200   Group Therapy Session   Group Attendance attended group session   Time Session Began 2000   Time Session Ended 2045   Total Time (minutes) 45   Total # Attendees 6   Group Type task skill;expressive therapy   Group Topic Covered emotions/expression;coping skills/lifestyle management   Patient Response/Contribution cooperative with task   Patient Participation Detail See Note     Pt actively participated in occupational therapy clinic to facilitate coping skill exploration, creative expression within personally meaningful activities, and clinical observation of social, cognitive, and kinesthetic performance skills. Pt response: Independent to initiate, gather materials, sequence, and adjust to workspace demands as needed. Demonstrated good focus, planning, and problem solving for selected beading task. Able to ask for assistance as needed, and appropriately social with peers and staff. Pt will continue to be encouraged to attend groups for further asssesssment and to address goals identified on plan of care.

## 2023-07-24 NOTE — PROGRESS NOTES
Writer updated pts CM that pt moved to IP Station 20. Provided contact info for IP unit as well as fax number.     BRIAN Cuevas  [facility]  [phone]

## 2023-07-25 LAB
ATRIAL RATE - MUSE: 66 BPM
DEPRECATED CALCIDIOL+CALCIFEROL SERPL-MC: 32 UG/L (ref 20–75)
DIASTOLIC BLOOD PRESSURE - MUSE: NORMAL MMHG
INTERPRETATION ECG - MUSE: NORMAL
P AXIS - MUSE: 55 DEGREES
PR INTERVAL - MUSE: 180 MS
QRS DURATION - MUSE: 98 MS
QT - MUSE: 370 MS
QTC - MUSE: 387 MS
R AXIS - MUSE: 67 DEGREES
SYSTOLIC BLOOD PRESSURE - MUSE: NORMAL MMHG
T AXIS - MUSE: 52 DEGREES
VALPROATE SERPL-MCNC: 73.6 UG/ML
VENTRICULAR RATE- MUSE: 66 BPM

## 2023-07-25 PROCEDURE — 99232 SBSQ HOSP IP/OBS MODERATE 35: CPT | Mod: GC | Performed by: STUDENT IN AN ORGANIZED HEALTH CARE EDUCATION/TRAINING PROGRAM

## 2023-07-25 PROCEDURE — G0177 OPPS/PHP; TRAIN & EDUC SERV: HCPCS

## 2023-07-25 PROCEDURE — 80164 ASSAY DIPROPYLACETIC ACD TOT: CPT | Performed by: STUDENT IN AN ORGANIZED HEALTH CARE EDUCATION/TRAINING PROGRAM

## 2023-07-25 PROCEDURE — 36415 COLL VENOUS BLD VENIPUNCTURE: CPT | Performed by: STUDENT IN AN ORGANIZED HEALTH CARE EDUCATION/TRAINING PROGRAM

## 2023-07-25 PROCEDURE — 124N000002 HC R&B MH UMMC

## 2023-07-25 PROCEDURE — 250N000013 HC RX MED GY IP 250 OP 250 PS 637

## 2023-07-25 PROCEDURE — 82306 VITAMIN D 25 HYDROXY: CPT | Performed by: STUDENT IN AN ORGANIZED HEALTH CARE EDUCATION/TRAINING PROGRAM

## 2023-07-25 PROCEDURE — 250N000013 HC RX MED GY IP 250 OP 250 PS 637: Performed by: STUDENT IN AN ORGANIZED HEALTH CARE EDUCATION/TRAINING PROGRAM

## 2023-07-25 PROCEDURE — H2032 ACTIVITY THERAPY, PER 15 MIN: HCPCS

## 2023-07-25 RX ORDER — HYDRALAZINE HYDROCHLORIDE 10 MG/1
10 TABLET, FILM COATED ORAL 4 TIMES DAILY
Status: COMPLETED | OUTPATIENT
Start: 2023-07-25 | End: 2023-07-26

## 2023-07-25 RX ADMIN — DIVALPROEX SODIUM 1250 MG: 500 TABLET, DELAYED RELEASE ORAL at 08:19

## 2023-07-25 RX ADMIN — Medication 1 G: at 08:19

## 2023-07-25 RX ADMIN — HYDRALAZINE HYDROCHLORIDE 10 MG: 10 TABLET, FILM COATED ORAL at 22:51

## 2023-07-25 RX ADMIN — NICOTINE 1 PATCH: 21 PATCH, EXTENDED RELEASE TRANSDERMAL at 08:32

## 2023-07-25 RX ADMIN — DIVALPROEX SODIUM 1250 MG: 500 TABLET, DELAYED RELEASE ORAL at 21:57

## 2023-07-25 RX ADMIN — HYDROCHLOROTHIAZIDE 25 MG: 25 TABLET ORAL at 08:19

## 2023-07-25 RX ADMIN — OLANZAPINE 15 MG: 10 TABLET, ORALLY DISINTEGRATING ORAL at 21:57

## 2023-07-25 RX ADMIN — PRAZOSIN HYDROCHLORIDE 1 MG: 1 CAPSULE ORAL at 21:57

## 2023-07-25 ASSESSMENT — ACTIVITIES OF DAILY LIVING (ADL)
ADLS_ACUITY_SCORE: 28
ADLS_ACUITY_SCORE: 28
HYGIENE/GROOMING: SHOWER;INDEPENDENT
ADLS_ACUITY_SCORE: 28
ADLS_ACUITY_SCORE: 28
DRESS: INDEPENDENT
HYGIENE/GROOMING: INDEPENDENT
ADLS_ACUITY_SCORE: 28
ADLS_ACUITY_SCORE: 28
ORAL_HYGIENE: INDEPENDENT
LAUNDRY: WITH SUPERVISION
ADLS_ACUITY_SCORE: 28
ADLS_ACUITY_SCORE: 28
LAUNDRY: WITH SUPERVISION
ORAL_HYGIENE: INDEPENDENT
DRESS: INDEPENDENT
ADLS_ACUITY_SCORE: 28

## 2023-07-25 NOTE — PLAN OF CARE
The pt visible intermittently out on milieu watching TV, appeared walking on hallway listening music over headphone social with selected peer. Upon approach visibly calm in mood, bright in affect, cooperative in interaction, brief, clear and behaviorally controlled. The pt denied AH, VH, SI, HI and contracted for safety. The pt stated his mood as good, denied concern at the time. Appetite good ate 100% supper and adequate fluid intake. Comply with med and care. EKG done and lab attempted blood draw but the pt requested for tomorrow. Girlfriend visited and went well. Appeared attending group.    Problem: Plan of Care - These are the overarching goals to be used throughout the patient stay.    Goal: Plan of Care Review  Description: The Plan of Care Review/Shift note should be completed every shift.  The Outcome Evaluation is a brief statement about your assessment that the patient is improving, declining, or no change.  This information will be displayed automatically on your shift note.  Outcome: Progressing  Flowsheets (Taken 7/24/2023 1600)  Plan of Care Reviewed With: patient  Overall Patient Progress: improving   Goal Outcome Evaluation:    Plan of Care Reviewed With: patient Plan of Care Reviewed With: patient    Overall Patient Progress: improvingOverall Patient Progress: improving

## 2023-07-25 NOTE — CARE PLAN
"[facility] Occupational Therapy Group Intervention Note     07/25/23 1449   Group Therapy Session   Group Attendance attended group session   Total Time (minutes) 120   Group Type task skill;life skill   Group Topic Covered coping skills/lifestyle management;relapse prevention;cognitive activities;emotions/expression;structured socialization   Group Session Detail 1) Group game to facilitate peer socialization and openness within group discussion. (Attendance: 30mins - No charge).     2) Clinic - coping skill exploration, creative expression within personally meaningful activities, and observation of social, cognitive, and kinesthetic performance skills. (Attendance: 90 mins)   Patient Response/Contribution cooperative with task;discussed personal experience with topic;listened actively;organized   Patient Participation Detail 1) Attentive throughout duration of group. Able to sequence 3-step task. Fully engaged in discussion. Demonstrated openness with peers as he shared personal recovery / MH and goals for the future. Specifically, is looking forward to discharge location to \"learn more about my TBI and his it impacts my decision making and impulsivity\". Appeared restless; excused self early w/o return.      2) Congruent - bright affect. IND to gather materials, organize work space, sequence task, and reach task completion. Able to sustain attention for ~25 minute increments. Appeared motivated to socialize with nearby peers; seemingly more energetic and impulsive compared to previous admissions / baseline observations.      Madeline Rosenberg OT on 7/25/2023 at 2:50 PM    "

## 2023-07-25 NOTE — PLAN OF CARE
Problem: Plan of Care - These are the overarching goals to be used throughout the patient stay.    Goal: Plan of Care Review  Description: The Plan of Care Review/Shift note should be completed every shift.  The Outcome Evaluation is a brief statement about your assessment that the patient is improving, declining, or no change.  This information will be displayed automatically on your shift note.  Outcome: Progressing     Problem: Anxiety  Goal: Anxiety Reduction or Resolution  Outcome: Progressing   Goal Outcome Evaluation:    Plan of Care Reviewed With: patient    Pt has been visible in the milieu mostly this shift, affect is bright, cooperative upon approach, pt paced on the hallway with headphones on listening to the music and talking on the phone, pt verbalized in a good moo and happy. Pt endorsed anxiety 5/10 but didn't want any intervention, denied pain, SI, SIB, HI, A/VH, and all other mental health issues, contracted for safety in the unit. Pt intake is adequate, hygiene is appropriate, compliant with medication, no any behavior noted this shift.    BP (!) 151/90  Pulse 75   Temp 98  F  Resp 16  SpO2 98%

## 2023-07-25 NOTE — PLAN OF CARE
Problem: Adult Behavioral Health Plan of Care  Goal: Plan of Care Review  Outcome: Progressing  Flowsheets (Taken 7/25/2023 1700)  Patient Agreement with Plan of Care: agrees   Goal Outcome Evaluation:    Plan of Care Reviewed With: patient          Patient was visible in the milieux. He was observed pacing hallway with headphone. He socialized and interacted with peers. He was  tensed but pleasant on approach. He was hyper verbal and loud needing lots of redirection. He was very demanding and seeking out for staff with lots of demands or request. He was very dismissive with assessment questions. He denied pain and all psych symptoms. Contracted for safety. He was cooperative and med compliant. He was out for dinner and had good food and fluids intake. His girl friend visited him and he said the visit went well. She brought  some mountain dew for him and order got to give him half a can to night and the team will evaluate tomorrow. if he can have it. He had a shower this shift. His b/p was 166/108 and . Scheduled Minipress given and on call resident notified. New order for Hydralazine 10 mg 4 times a day for SBP> 160 and DBP> 100 was ordered. A dose was given to pt and B/p will be rechecked after an hour. Night shift notified to follow up. No other concern or outburst behavior noted this shift. Will continue to monitor and will assist if need arise.

## 2023-07-25 NOTE — PLAN OF CARE
Problem: Sleep Disturbance  Goal: Adequate Sleep/Rest  Outcome: Progressing   Goal Outcome Evaluation:    Pt appears to have slept for 6.25 hours. No PRN's given this shift. No behavioral or safety issues. Will continue to offer support.

## 2023-07-25 NOTE — PROGRESS NOTES
"  ----------------------------------------------------------------------------------------------------------  [facility]  Psychiatry Progress Note  Hospital Day #4     Interim History:     The patient's care was discussed with the treatment team and chart notes were reviewed.    Vitals: VSS  Sleep: 6.25 hours (07/25/23 0637)  Scheduled medications: Took all scheduled medications as prescribed  Psychiatric PRN medications: Hydroxyzine, Zyprexa    Staff Report/Interval Events:   No acute events. Slept 6.25 hrs last night.     Observed to be in the milieu, intermittently pacing the hallway with headphone, listening to music, socialized with peers and staff, attended and participated in group activities. Denied all MH symptoms, described mood as \"very good\". Stated he is feeling better and likely discharge soon.      met with pt yesterday. Pt expressed that he feels like he is letting God/Bernard down by his continued drug use. Expressed that he looks to Bernard for forgiveness. He appreciated the prayer.    Vitals unremarkable; EKG unremakrable- QTc 387.      Subjective:     Patient Interview:  Etienne was interviewed in his room after being seen engaging in group therapy. He discussed seeing his girlfriend yesterday who he feels is a good support person for him. She plans on visiting him this weekend. He notes that she feels that he is improving compared to when he was first hospitalized. He slept well and without nightmares last night after beginning prazosin. He mentions that he does not want to disturb anyone on the unit or \"trigger anyone's anxiety\" by being there, so he keeps his distance. Otherwise, he is getting along with the other patients on the unit. Discussed the discontinuation of his bupropion during this admission as it may be contributing to some of his symptoms. Spoke about how the addition of atomoxetine may be a more suitable choice for him closer to " "discharge, which he was agreeable to.    ROS:   Reports depressed mood               Denies suicidal ideation, self-destructive thoughts, anhedonia, low energy, insomnia, hypersomnia, appetite changes, weight changes, feeling worthless, excessive guilt, indecisiveness, feeling hopeless, feeling trapped, excessive crying and overwhelmed   Dysregulation:               Reports mood dysregulation, impulsive, aggressive and irritable               Denies suicidal ideation, violent ideation and SIB   Psychosis:               Per chart review: Patient was paranoid people were following him and having AH of \"invisible things inside the house\"              Denies delusions, auditory hallucinations and visual hallucinations  Shweta:               Reports increased energy, increased activity, grandiosity, distractibility, racing thoughts, pressured speech, excessive spending, excessive pleasure seeking, excessive risk taking, inter-episode mood instability, dysregulation and mood dysregulation   Objective:     Vitals:  BP (!) 151/90 (Patient Position: Sitting, Cuff Size: Adult Large)   Pulse 75   Temp 98  F (36.7  C) (Oral)   Resp 16   Wt 122.3 kg (269 lb 11.2 oz)   SpO2 98%   BMI 37.55 kg/m      Allergies:  Allergies   Allergen Reactions    Penicillins        Current Medications:  Scheduled:  Current Facility-Administered Medications   Medication Dose Route Frequency    divalproex sodium delayed-release  1,250 mg Oral BID    fish oil-omega-3 fatty acids  1 g Oral Daily    hydrochlorothiazide  25 mg Oral Daily    nicotine  1 patch Transdermal Daily    And    nicotine   Transdermal Q8H [facility]    OLANZapine zydis  15 mg Oral At Bedtime    prazosin  1 mg Oral At Bedtime       PRN:  Current Facility-Administered Medications   Medication Dose Route Frequency    hydrOXYzine  25 mg Oral Q4H PRN    loperamide  2 mg Oral 4x Daily PRN    melatonin  3 mg Oral At Bedtime PRN    nicotine  2 mg Buccal Q1H PRN    OLANZapine  10 mg Oral TID " "PRN    Or    OLANZapine  10 mg Intramuscular TID PRN       Labs and Imaging:  New results:   Recent Results (from the past 24 hour(s))   EKG 12-lead, complete    Collection Time: 07/24/23  8:37 PM   Result Value Ref Range    Systolic Blood Pressure  mmHg    Diastolic Blood Pressure  mmHg    Ventricular Rate 66 BPM    Atrial Rate 66 BPM    MI Interval 180 ms    QRS Duration 98 ms     ms    QTc 387 ms    P Axis 55 degrees    R AXIS 67 degrees    T Axis 52 degrees    Interpretation ECG       Sinus rhythm  Normal ECG  No previous ECGs available  Confirmed by MD MARLYS, JAMAR ([id number]) on 7/25/2023 10:32:43 AM     Valproic acid    Collection Time: 07/25/23  7:15 AM   Result Value Ref Range    Valproic acid 73.6   ug/mL   Vitamin D Deficiency    Collection Time: 07/25/23  7:15 AM   Result Value Ref Range    Vitamin D, Total (25-Hydroxy) 32 20 - 75 ug/L       Data this admission:  - CBC unremarkable  - CMP Co2 31  - TSH normal  - UDS negative  - Vit D ordered on 07/24  - Hgb A1c 5.7  - Lipids, HDL 31  - Vit B12 normal  - Folate normal  - Urinalysis unremarkable  - EKG ordered on 07/24     Mental Status Exam:     Oriented to:  Grossly Oriented  General:  Awake and Alert  Appearance:  appears stated age  Behavior/Attitude:  calm, cooperative and engaged  Eye Contact:  intense or glaring at times  Psychomotor: normal no catatonia present  Speech:  talkative and with mildly elevated tone  Language: Fluent in English with appropriate syntax and vocabulary.  Mood:  \"Good\"  Affect: bright, congruent with mood  Thought Process:  linear, coherent and goal directed  Thought Content:  No SI/HI/AH/VH; No apparent delusions  Associations:  intact  Insight:  partial due to julianna/hypomania  Judgment:  fair due to julianna/hypomania  Impulse control: good  Attention Span:  grossly intact and adequate for conversation  Concentration:  grossly intact  Recent and Remote Memory:  not formally assessed  Fund of Knowledge:  average  Muscle " Strength and Tone: normal  Gait and Station: Normal     Psychiatric Assessment     Etienne Dupree is a 33 year old male previously diagnosed with Bipolar Disorder Type 1, substance use disorder (meth, alcohol, cocaine, ecstasy, marijuana), TBI, ADHD, and PTSD who presented by EMS after his PD was revoked in the context of manic like symptoms likely from substance use. Most recent psychiatric hospitalization was in May 2023. Significant symptoms on admission include irritability, tangential, pressured speech. The MSE on admission was pertinent for irritability, elevated, tangential. Biological contributions to mental health presentation include long standing history of substance use, previous diagnosis of Bipolar Disorder type 1 which he takes Depakote and Zyprexa. Psychological contributions to mental health presentation include poor insight, poor distress tolerance,and cluster B traits. Social factors contributing to mental health presentation include being on MICD commitment, conflicts with daughter, ongoing legal charges, financial difficulties.     In summary, differential is still in evolution. The patient's reported symptoms of pressured speech, irritability are consistent with prior diagnosis of Bipolar Disorder Type 1, however, patient recently used substances which is contributing to the overall presentation. Utox was positive for amphetamines. Will consider Substance Induced Shweta vs Bipolar 1 Disorder, current episode shweta. He will likely benefit from med optimization and substance use cessation this admission. More collateral will be needed to gather a more accurate history.      Given that he currently has out of control behaviors, aggression and shweta, patient warrants inpatient psychiatric hospitalization to maintain his safety.      Psychiatric Plan by Diagnosis      Today's changes:  - No changes for today       # Bipolar Disorder vs Substance Induced Shweta  1. Medications:  - Depakote 1250mg  BID  - Zyprexa 15mg at bedtime  - Wellbutrin 300 mg is being held due to manic-like behaviors     2. Pertinent Labs/Monitoring:   - VPA level pending  - EKG 5/14 was 391, EKG 7/24 normal with QTc of 387     3. Additional Plans:  - Patient will be treated in therapeutic milieu with appropriate individual and group therapies as described    #PTSD/Nightmares  - Prazosin 1 mg at bedtime     # Nicotine Use Disorder   - Nicotine Patch  - Nicotine Gum     #Anxiety  -Hydroxyzine 25 mg PRN     #ADHD  -Holding Wellbutrin & Strattera due to manic-like symptoms, will consider Strattera on discharge    2. Pertinent Labs/Monitoring:   - EKG unremakrable- QTc 387.      3. Additional Plans:  - Patient will be treated in therapeutic milieu with appropriate individual and group therapies as described     Psychiatric Hospital Course:    In the ED,  Patient was Chart reviewed and was found to be on multiple psychopharmacology medications such as Zyprexa, Depakote, Wellbutrin which are his PTA medications. His current PTA medication were continued due to severe agitation and aggression while in the emergency room, Haldol 5 mg, Ativan 2 mg, Benadryl 50 mg were added for severe agitation and aggression.     After patient arrived to the unit, his Strattera and Wellbutrin were held due to his manic like symptoms. Nicotine gum ordered 7/22/23. VPA level was ordered for 7/23/23.     Etienne Dupree was admitted to [facility] on a [facility] hold.   Medications:  PTA Depakote & Zyprexa were continued.   PTA Strattera and Wellbutrin were held due to julianna.    No new medications started at the time of admission     The risks, benefits, alternatives, and side effects were discussed and understood by the patient and other caregivers.     Medical Assessment and Plan     Medical diagnoses to be addressed this admission:    # Hypertension  Hydrochlorothiazide 25mg     # Supplements  Fish oil      Medical course:  Patient was physically examined by the  ED prior to being transferred to the unit and was found to be medically stable and appropriate for admission.      Consults:  none     Checklist     Legal Status: Court Hold     Safety Assessment:   Behavioral Orders   Procedures    Assault precautions    Code 1 - Restrict to Unit    Elopement precautions    Routine Programming     As clinically indicated    Status 15     Every 15 minutes.       Risk Assessment:  Risk for harm is moderate.  Risk factors: substance use, trauma, impulsive and past behaviors  Protective factors: [facility]     SIO: No    Disposition: Pending stabilization, medication optimization, & development of a safe discharge plan.     Attestations     This patient was seen and discussed with my attending physician.    Elis Paul, MS4  [facility]    Resident Attestation:   I participated in the documentation of the note. I agree with the assessment and plan of care as documented in the note.    Enma Oates MD. PGY1  Psychiatry Resident Physician    This patient has been seen and evaluated by me, Jeannie Jamison DO.  I have discussed this patient with the team including the resident and medical student(s) and I agree with the findings and plan in this note.  Dr. Jeannie Jamison DO, YOSI

## 2023-07-25 NOTE — DISCHARGE INSTRUCTIONS
Behavioral Discharge Planning and Instructions    Summary:   You were admitted to [facility] on 7/21/23 with altered mental status in the context of substance use  under the care of Pastora Mehta.   Due to concerns for your safety, you commitment was revoked.  You will be provisionally discharged to [facility] .    You met with the Psychiatry team daily for ongoing psychiatric assessment and medication management.  You had opportunities to participate in therapeutic groups on the unit.   At this time you report your mood is stabilizing and you report you are not having thoughts or intent to harm yourself or others. You will be discharged home and will resume care with your outpatient providers.    Disposition:  [facility]      Main Diagnosis:   Bipolar Disorder  Polysubstance Use Disorder  ADHD    Health Care Follow-up:   Psychiatry appointment:  Chantal Joy CNP:  Wednesday, August 23, 2023 @ 9:10am for 75 minutes via Telehealth  **Paperwork to be completed prior to your appointment will be sent to your email ([email or web address]). A link for your appointment and subsequent appointments will also be sent to your email.   Location: [facility] [place] [address]  Phone: [phone]   Fax: [phone]    Follow-up appointment: Tuesday, September 19, 2023 @ 9:35am for 25 minutes via Telehealth      [facility] : Jose Durham  Cell: [phone]    Major Treatments, Procedures and Findings:   Medications were  managed throughout your stay. An internal medicine consult was completed during your stay. You had the opportunity to participate in treatment programming while on the unit including occupational therapy, mental health support and education and spiritual services.     Symptoms to Report:   Please report if you are experiencing increased aggression and/or confusion, problematic loss of sleep, worsening mood, or thoughts of  "suicide to your treatment team or notify your primary provider.   IF THE SYMPTOMS YOU ARE EXPERIENCING ARE A MEDICAL EMERGENCY, CALL 911 IMMEDIATELY    Lifestyle Adjustment:   1. Adjust your lifestyle to get enough sleep, relaxation, exercise and good nutrition.  Continue to develop healthy coping skills to decrease stress and promote a healthy and sober lifestyle.  2. Abstain from all substances of abuse.  3. Take medications as prescribed.  Please work with your doctor to discuss any concerns you have with your medications or side effects you may be experiencing.  4. Follow up with appointments as scheduled.      Resources:   *[facility] Crisis: COPE: ([phone]) 24 hour mobile crisis support for people having a mental health crisis in [facility].   *Acute Psychiatric Services ([phone]). 24-hour walk-in crisis psychiatric support at [facility]; Emergency Medications Clinic available 7:30am - 2:00pm  *Yxpz[facility]zngy: Text  \"life\"  to [phone]   A trained crisis counselor will respond immediately  *[facility]is [facility]: ([phone])  24-hour confidential telephone counseling   *[facility] Emergency Room: [phone]    General Medication Instructions:   See your medication sheet(s) for instructions.   Take all medicines as directed.  Make no changes unless your doctor suggests them.   Go to all your doctor visits.  Be sure to have all your required lab tests. This way, your medicines can be refilled on time.  Do not use any drugs not prescribed by your doctor.    Advance Directives:   Scanned document on file with [employer]? No scanned doc  Is document scanned? Pt states no documents  Honoring Choices Your Rights Handout: Informed and given  Was more information offered? Materials given    The Treatment team has appreciated the opportunity to work with you. If you have any questions or concerns about your recent admission, you can contact the unit which can " receive your call 24 hours a day, 7 days a week. They will be able to get in touch with a Provider if needed. The unit number is [phone]

## 2023-07-25 NOTE — CARE PLAN
"   07/24/23 2100   Group Therapy Session   Group Attendance attended group session   Time Session Began 2000   Time Session Ended 2100   Total Time (minutes) 30   Total # Attendees 5   Group Type expressive therapy   Group Topic Covered emotions/expression   Patient Response/Contribution cooperative with task       Art Therapy directive was to create art in response to pts written answer(s) on handout related to the five senses.  Goals of directive: emotional expression, emotional regulation, mindfulness  Pt was an engaged participant, focused on task for the majority of time spent in group. Pt answered questions on handout and briefly verbally processed with author and group.  Pt created a drawing of a tree in response to the question \"if I was a natural object what would I be?\"   'Pts mood was calm, pleasant participant.                  "

## 2023-07-25 NOTE — PLAN OF CARE
Assessment/Intervention/Current Symtoms and Care Coordination  The patient's care was discussed with the treatment team and chart notes were reviewed.   Patient met with team.  Patient has been social on the unit, attending groups.  Patient reports his mood is good- denies any MH symptoms.  Patient appears somewhat restless- pacing halls, but has remained appropriate.  Writer spoke with patient's [facility] CM Jose Durham .  Patient's PD has been revoked and she is recommending placement at [facility] in [place] for TBI.  She stated that patient has been strugggling to remain sober, has been going from facility to facility and would benefit from locked environment for stability.  She did let patient know this is her plan.  Patient has been placed on wait list.    Discharge Plan or Goal  [facility]  Psychiatry  Therapy    Barriers to Discharge   Patient in need of further stability/medication mgmt  Placement    Referral Status  Referral in place for [facility]    Legal Status     Civil commitment/Aguiar through [facility] through 4/2024

## 2023-07-26 PROCEDURE — 250N000013 HC RX MED GY IP 250 OP 250 PS 637

## 2023-07-26 PROCEDURE — 124N000002 HC R&B MH UMMC

## 2023-07-26 PROCEDURE — G0177 OPPS/PHP; TRAIN & EDUC SERV: HCPCS

## 2023-07-26 PROCEDURE — H2032 ACTIVITY THERAPY, PER 15 MIN: HCPCS

## 2023-07-26 PROCEDURE — 99232 SBSQ HOSP IP/OBS MODERATE 35: CPT | Mod: GC | Performed by: PSYCHIATRY & NEUROLOGY

## 2023-07-26 RX ADMIN — HYDROCHLOROTHIAZIDE 25 MG: 25 TABLET ORAL at 08:23

## 2023-07-26 RX ADMIN — NICOTINE POLACRILEX 2 MG: 2 GUM, CHEWING ORAL at 20:28

## 2023-07-26 RX ADMIN — DIVALPROEX SODIUM 1250 MG: 500 TABLET, DELAYED RELEASE ORAL at 21:05

## 2023-07-26 RX ADMIN — Medication 1 G: at 08:22

## 2023-07-26 RX ADMIN — PRAZOSIN HYDROCHLORIDE 1 MG: 1 CAPSULE ORAL at 20:28

## 2023-07-26 RX ADMIN — OLANZAPINE 15 MG: 10 TABLET, ORALLY DISINTEGRATING ORAL at 20:28

## 2023-07-26 RX ADMIN — NICOTINE 1 PATCH: 21 PATCH, EXTENDED RELEASE TRANSDERMAL at 08:28

## 2023-07-26 RX ADMIN — DIVALPROEX SODIUM 1250 MG: 500 TABLET, DELAYED RELEASE ORAL at 08:22

## 2023-07-26 ASSESSMENT — ACTIVITIES OF DAILY LIVING (ADL)
ADLS_ACUITY_SCORE: 28
DRESS: INDEPENDENT
ADLS_ACUITY_SCORE: 28
HYGIENE/GROOMING: INDEPENDENT
LAUNDRY: WITH SUPERVISION
ADLS_ACUITY_SCORE: 28
ORAL_HYGIENE: INDEPENDENT
ADLS_ACUITY_SCORE: 28

## 2023-07-26 NOTE — PLAN OF CARE
Problem: Adult Behavioral Health Plan of Care  Goal: Plan of Care Review  Outcome: Progressing  Flowsheets (Taken 7/26/2023 1700)  Patient Agreement with Plan of Care: agrees   Goal Outcome Evaluation:    Plan of Care Reviewed With: patient          Pt had a good shift. He was out in the lounge most of the shift using his head phone. He was also observed pacing hallway. He was hyper verbal and loud sometimes. Affect was bright and pleasant. He socialized and engaged with peers. He was observed watching TV with peers. He did not attend OT groups. Vitals stable but at bed time, B/p ufo329/90 and after given b/p medication it was rechecked for 126/74. He denied pain and all mental health symptoms. Contracted for safety. He was cooperative and med compliant. He was out for dinner and snacks and had good food and fluids intake. His girl friend visited this evening and pt stated it went well. Pt had some damaged money with his belongings and requested to take it because he wanted to give to girl friend to take home. This was given to him and he signed for it. No other concern or outburst behavior noted at this time. No safety concern noted at this time. Will continue to monitor and will assist if need arise.

## 2023-07-26 NOTE — PLAN OF CARE
Assessment/Intervention/Current Symtoms and Care Coordination  The patient's care was discussed with the treatment team and chart notes were reviewed.   Patient met with team.  Patient has been social on the unit, attending most groups. No behavioral issues.    Patient reports his mood is good- denies any MH symptoms  Patient requested to have his credit card given to a friend  Writer spoke with patient's Cty CM Jose Durham .    Patient's PD has been revoked and she is recommending placement at [facility] in [place] for TBI.  She stated that patient has been strugggling to remain sober, has been going from facility to facility and would benefit from locked environment for stability.  She did let patient   know this is her plan.  Patient has been placed on wait list..  He has an intake interview on 7/31/23 at 11:00am  Received call from [facility] Recipient Program contact for discharge assistance    Discharge Plan or Goal  [facility]  Psychiatry  Therapy     Barriers to Discharge   Patient in need of further stability/medication mgmt  Placement     Referral Status  Referral in place for [facility]. Interview 7/3/23 at 11:00am     Legal Status     Civil commitment/Aguiar through [facility] through 4/2024

## 2023-07-26 NOTE — PLAN OF CARE
Patient appears sleeping resting in bed during No behavioral issues or any safety concerns currently. Will continue to monitor the patient and provide therapeutic intervention as needed. Will continue with current plan of care. Notify MD with any concerns. The patient had 7 total hours of sleep this shift.   Problem: Sleep Disturbance  Goal: Adequate Sleep/Rest  Outcome: Progressing

## 2023-07-26 NOTE — CARE PLAN
[facility] Occupational Therapy Group Intervention Note     07/26/23 1222   Group Therapy Session   Group Attendance attended group session   Time Session Began 1115   Time Session Ended 1200   Total Time (minutes) 45   Total # Attendees 2   Group Type task skill;psychoeducation   Group Topic Covered coping skills/lifestyle management;relapse prevention;problem-solving;self-care activities   Group Session Detail Sleep hygiene group including education with interactive game, sleep self-assessment, and sleep journal for follow-through. Education was provided on importance of adequate sleep for optimal occupational performance, cognitive well-being and engagement in daily life.    Patient Response/Contribution cooperative with task;discussed personal experience with topic;listened actively   Patient Participation Detail Accepting of material and actively participated throughout group. Required additional assistance to complete self-assessment. Pt expressed intention to change sleep routine and environment to improve quality of sleep.       Madeline Rosenberg OT on 7/26/2023 at 12:22 PM

## 2023-07-26 NOTE — PROGRESS NOTES
07/25/23 2100   Group Therapy Session   Time Session Began 2000   Time Session Ended 2100   Total Time (minutes) 50   Total # Attendees 8   Group Type expressive therapy   Group Topic Covered balanced lifestyle;relaxation techniques;self-care activities   Group Session Detail Evening Relaxation   Patient Response/Contribution cooperative with task   Patient Participation Detail Cooperatively engaged in Evening Music Relaxation group to decrease anxiety and promote sleep.  Calm/engaged affect, but became more disengaged as more participants entered the room.  However, maintained a calm and respectful attitude, though keeping more to himself.

## 2023-07-26 NOTE — CARE PLAN
"[facility] Occupational Therapy Group Intervention Note     07/26/23 1414   Group Therapy Session   Group Attendance attended group session   Time Session Began 1315   Time Session Ended 1400   Total Time (minutes) 35 (no charge)   Total # Attendees 3   Group Type task skill;psychoeducation   Group Topic Covered coping skills/lifestyle management;relapse prevention;cognitive activities   Group Session Detail Clinic - coping skill exploration, creative expression within personally meaningful activities, and observation of social, cognitive, and kinesthetic performance skills   Patient Response/Contribution cooperative with task;discussed personal experience with topic;organized   Patient Participation Detail Congruent-bright affect. Opted to piece & tape together $20 bills that he tore in the ED \"because I thought I was going to die\". Made light of this action and how \"ridiculous\" it was in his current mental state. IND to gather materials, organize work space, and sequence task. Does appear more distractible and hyperactive compared to previous baseline observations.      Madeline Rosenberg, OT on 7/26/2023 at 2:15 PM    "

## 2023-07-26 NOTE — PLAN OF CARE
Problem: Plan of Care - These are the overarching goals to be used throughout the patient stay.    Goal: Plan of Care Review  Description: The Plan of Care Review/Shift note should be completed every shift.  The Outcome Evaluation is a brief statement about your assessment that the patient is improving, declining, or no change.  This information will be displayed automatically on your shift note.  Outcome: Progressing  Flowsheets (Taken 7/26/2023 1501)  Plan of Care Reviewed With: patient  Overall Patient Progress: improving   Goal Outcome Evaluation:      Plan of Care Reviewed With: patient    Overall Patient Progress: improving  Patient has been visible in the milieu.Patient is sociable with peers.Patient attends groups.Patient compliant with cares.Patient requested for shoes and was given per MD's orders.Patient eats and drinks okay.Hygiene is appropriate.Denies all MH issues.No safety concerns.

## 2023-07-26 NOTE — PROGRESS NOTES
"  ----------------------------------------------------------------------------------------------------------  [facility]  Psychiatry Progress Note  Hospital Day #5     Interim History:     The patient's care was discussed with the treatment team and chart notes were reviewed.    Vitals: VSS  Sleep: 7 hours (07/26/23 0627)  Scheduled medications: Took all scheduled medications as prescribed  Psychiatric PRN medications: Hydroxyzine, Zyprexa    Staff Report/Interval Events:     No overnight events, slept well for 7 hours. He remains social and approachable on the unit, and has been getting along with his peers. He continues to attend group sessions and is sometimes intrusive, but redirectable. Noted 5/10 anxiety but did not want any PRNs. With staff, he was hyperverbal and had many requests, but still calm and cooperative.     Subjective:     Patient Interview:  Etienne was interviewed today in his room. He was talking to his girlfriend before being interviewed, which he said went well. His girlfriend visited him again last night. Today Etienne notes that he feels a bit different today compared to yesterday.  When asked, he shares that he has felt more depressed today. He relates his depressed feelings to his daughter and being unable to see her given his hospitalization and separation from his previous partner. Notes that he does not get to see his daughter as much as he would like to since the separation. Reassurance was provided that he is being a good role model for his daughter by seeking help when he needs it. With his elevated blood pressure last night, he notes that he was feeling increased anxiety at the time the nurse was taking his vitals, saying \"it felt intimate and I don't know her like that\". Has experienced some lightheadedness when waking up the last few days, which he attributes to the added dose of prazosin. Team recommended he gets up slowly when waking " "up to minimize risk of falls.    ROS:   Reports depressed mood               Denies suicidal ideation, self-destructive thoughts, anhedonia, low energy, insomnia, hypersomnia, appetite changes, weight changes, feeling worthless, excessive guilt, indecisiveness, feeling hopeless, feeling trapped, excessive crying and overwhelmed   Dysregulation:               Reports mood dysregulation, impulsive, aggressive and irritable               Denies suicidal ideation, violent ideation and SIB   Psychosis:               Per chart review: Patient was paranoid people were following him and having AH of \"invisible things inside the house\"              Denies delusions, auditory hallucinations and visual hallucinations currently  Shweta:               Reports increased energy, increased activity, grandiosity, distractibility, racing thoughts, pressured speech, excessive spending, excessive pleasure seeking, excessive risk taking, inter-episode mood instability, dysregulation and mood dysregulation   Objective:     Vitals:  BP (!) 166/108 (BP Location: Right arm, Patient Position: Sitting)   Pulse 103   Temp 97.4  F (36.3  C) (Oral)   Resp 16   Wt 122.3 kg (269 lb 11.2 oz)   SpO2 97%   BMI 37.55 kg/m      Allergies:  Allergies   Allergen Reactions     Penicillins        Current Medications:  Scheduled:  Current Facility-Administered Medications   Medication Dose Route Frequency     divalproex sodium delayed-release  1,250 mg Oral BID     fish oil-omega-3 fatty acids  1 g Oral Daily     hydrochlorothiazide  25 mg Oral Daily     nicotine  1 patch Transdermal Daily    And     nicotine   Transdermal Q8H [facility]     OLANZapine zydis  15 mg Oral At Bedtime     prazosin  1 mg Oral At Bedtime       PRN:  Current Facility-Administered Medications   Medication Dose Route Frequency     hydrOXYzine  25 mg Oral Q4H PRN     loperamide  2 mg Oral 4x Daily PRN     melatonin  3 mg Oral At Bedtime PRN     nicotine  2 mg Buccal Q1H PRN     " "OLANZapine  10 mg Oral TID PRN    Or     OLANZapine  10 mg Intramuscular TID PRN       Labs and Imaging:  New results:   No results found for this or any previous visit (from the past 24 hour(s)).    Data this admission:  - CBC unremarkable  - CMP Co2 31  - TSH normal  - UDS negative  - Vit D ordered on 07/24  - Hgb A1c 5.7  - Lipids, HDL 31  - Vit B12 normal  - Folate normal  - Urinalysis unremarkable  - EKG ordered on 07/24     Mental Status Exam:     Oriented to:  Grossly Oriented  General:  Awake and Alert  Appearance:  appears stated age  Behavior/Attitude:  calm, cooperative and engaged  Eye Contact:  appropriate at times  Psychomotor: normal no catatonia present  Speech:  talkative, though less than previous days  Language: Fluent in English with appropriate syntax and vocabulary.  Mood: \"Alright\"  Affect: restricted, bright at times, congruent with mood  Thought Process:  linear, coherent and goal directed  Thought Content:  No SI/HI/AH/VH; No apparent delusions  Associations:  intact  Insight:  partial due to julianna/hypomania  Judgment:  fair due to julianna/hypomania  Impulse control: fair  Attention Span:  grossly intact and adequate for conversation  Concentration:  grossly intact  Recent and Remote Memory:  not formally assessed  Fund of Knowledge:  average  Muscle Strength and Tone: normal  Gait and Station: Normal     Psychiatric Assessment     Etienne Dupree is a 33 year old male previously diagnosed with Bipolar Disorder Type 1, substance use disorder (meth, alcohol, cocaine, ecstasy, marijuana), TBI, ADHD, and PTSD who presented by EMS after his PD was revoked in the context of manic like symptoms likely from substance use. Most recent psychiatric hospitalization was in May 2023. Significant symptoms on admission include irritability, tangential, pressured speech. The MSE on admission was pertinent for irritability, elevated, tangential. Biological contributions to mental health presentation include " long standing history of substance use, previous diagnosis of Bipolar Disorder type 1 which he takes Depakote and Zyprexa. Psychological contributions to mental health presentation include poor insight, poor distress tolerance,and cluster B traits. Social factors contributing to mental health presentation include being on MICD commitment, conflicts with daughter, ongoing legal charges, financial difficulties.     In summary, differential is still in evolution. The patient's reported symptoms of pressured speech, irritability are consistent with prior diagnosis of Bipolar Disorder Type 1, however, patient recently used substances which is contributing to the overall presentation. Utox was positive for amphetamines. Will consider Substance Induced Shweta vs Bipolar 1 Disorder, current episode shweta. He will likely benefit from med optimization and substance use cessation this admission. More collateral will be needed to gather a more accurate history.      Given that he currently has out of control behaviors, aggression and shweta, patient warrants inpatient psychiatric hospitalization to maintain his safety.      Psychiatric Plan by Diagnosis      Today's changes:  - Orthostatics ordered for AM lightheadedness    # Bipolar Disorder vs Substance Induced Shweta  1. Medications:  - Depakote 1250mg BID  - Zyprexa 15mg at bedtime  - Wellbutrin 300 mg is being held due to manic-like behaviors     2. Pertinent Labs/Monitoring:   - VPA level 73.6 ug/mL (7/25)  - EKG 5/14 was 391, EKG 7/24 normal with QTc of 387     3. Additional Plans:  - Patient will be treated in therapeutic milieu with appropriate individual and group therapies as described    #PTSD/Nightmares  - Prazosin 1 mg at bedtime   -Monitor orthostatics for lightheadedness    # Nicotine Use Disorder   - Nicotine Patch  - Nicotine Gum     #Anxiety  -Hydroxyzine 25 mg PRN     #ADHD  -Holding Wellbutrin & Strattera due to manic-like symptoms, will consider Strattera on  discharge    2. Pertinent Labs/Monitoring:   - EKG unremarkable- QTc 387.      3. Additional Plans:  - Patient will be treated in therapeutic milieu with appropriate individual and group therapies as described     Psychiatric Hospital Course:    In the ED,  Patient was Chart reviewed and was found to be on multiple psychopharmacology medications such as Zyprexa, Depakote, Wellbutrin which are his PTA medications. His current PTA medication were continued due to severe agitation and aggression while in the emergency room, Haldol 5 mg, Ativan 2 mg, Benadryl 50 mg were added for severe agitation and aggression.     After patient arrived to the unit, his Strattera and Wellbutrin were held due to his manic like symptoms. Nicotine gum ordered 7/22/23. VPA level was ordered for 7/23/23 which was 73.6.     Etienne Dupree was [facility] [address] after his PD was revoked by his .     Medications:  ? PTA Depakote & Zyprexa were continued.   ? PTA Strattera and Wellbutrin were held due to julianna.    ? No new medications started at the time of admission     The risks, benefits, alternatives, and side effects were discussed and understood by the patient and other caregivers.     Medical Assessment and Plan     Medical diagnoses to be addressed this admission:    # Hypertension  Hydrochlorothiazide 25 mg  Hydralazine for SBP >160 or DBP >100    # Supplements  Fish oil      Medical course:  Patient was physically examined by the ED prior to being transferred to the unit and was found to be medically stable and appropriate for admission.      Consults:  none     Checklist     Legal Status: Court Hold     Safety Assessment:   Behavioral Orders   Procedures     Assault precautions     Code 1 - Restrict to Unit     Elopement precautions     Routine Programming     As clinically indicated     Status 15     Every 15 minutes.       Risk Assessment:  Risk for harm is moderate.  Risk factors: substance use,  trauma, impulsive and past behaviors  Protective factors: [facility]     SIO: No    Disposition: Pending stabilization, medication optimization, & development of a safe discharge plan.     Attestations     This patient was seen and discussed with my attending physician.    Elis Paul, MS4  [facility]    Resident Attestation:      I was present with the medical student who participated in the service and documentation of the note. I have verified the history and personally performed the physical/mental status exam and medical decision making. I agree with the assessment and plan documented in the note.     Octavio Shi  PGY-2 Psychiatry Resident  [facility]

## 2023-07-27 PROCEDURE — 250N000013 HC RX MED GY IP 250 OP 250 PS 637

## 2023-07-27 PROCEDURE — 124N000002 HC R&B MH UMMC

## 2023-07-27 PROCEDURE — 99232 SBSQ HOSP IP/OBS MODERATE 35: CPT | Mod: GC | Performed by: STUDENT IN AN ORGANIZED HEALTH CARE EDUCATION/TRAINING PROGRAM

## 2023-07-27 PROCEDURE — G0177 OPPS/PHP; TRAIN & EDUC SERV: HCPCS

## 2023-07-27 RX ADMIN — OLANZAPINE 15 MG: 10 TABLET, ORALLY DISINTEGRATING ORAL at 20:36

## 2023-07-27 RX ADMIN — HYDROCHLOROTHIAZIDE 25 MG: 25 TABLET ORAL at 08:18

## 2023-07-27 RX ADMIN — Medication 1 G: at 08:17

## 2023-07-27 RX ADMIN — DIVALPROEX SODIUM 1250 MG: 500 TABLET, DELAYED RELEASE ORAL at 20:37

## 2023-07-27 RX ADMIN — NICOTINE 1 PATCH: 21 PATCH, EXTENDED RELEASE TRANSDERMAL at 08:18

## 2023-07-27 RX ADMIN — DIVALPROEX SODIUM 1250 MG: 500 TABLET, DELAYED RELEASE ORAL at 08:18

## 2023-07-27 RX ADMIN — NICOTINE POLACRILEX 2 MG: 2 GUM, CHEWING ORAL at 19:08

## 2023-07-27 ASSESSMENT — ACTIVITIES OF DAILY LIVING (ADL)
ADLS_ACUITY_SCORE: 28
DRESS: INDEPENDENT
ADLS_ACUITY_SCORE: 28
ORAL_HYGIENE: INDEPENDENT
ADLS_ACUITY_SCORE: 28
HYGIENE/GROOMING: INDEPENDENT
LAUNDRY: WITH SUPERVISION
ADLS_ACUITY_SCORE: 28

## 2023-07-27 NOTE — PROGRESS NOTES
"  ----------------------------------------------------------------------------------------------------------  [facility]  Psychiatry Progress Note  Hospital Day #6     Interim History:     The patient's care was discussed with the treatment team and chart notes were reviewed.    Vitals: VSS  Sleep: 7 hours (07/26/23 0627)  Scheduled medications: Took all scheduled medications as prescribed  Psychiatric PRN medications: Hydroxyzine, Zyprexa    Staff Report/Interval Events:     No overnight events, slept well for 6 hours. Continues to be calm and cooperative with staff, but was noted to be more hyperverbal, distractible, and making frequent requests. Reports increased anxiety but does not request PRNs. His girlfriend visited and brought him snacks, which went well. Participating in groups intermittently.     Subjective:     Patient Interview:  Etienne was interviewed today in his room. Mentions feeling tense this morning, but that he had a restful night of sleep without nightmares. Did not have any lightheadedness upon waking this morning. He continues to feel supported by his girlfriend visiting him and bringing him snacks. Yesterday he expressed feelings of depression and was revisited today. He reports feeling \"numb\". When asked to elaborate, he mentions that with his family being on vacation, him being unable to see his daughter, and trouble getting in contact with his . Reassurance was provided, and he was encouraged to try calling his . Following this, he was asked to explore his feelings of impulsivity. He did not know how to answer and said \"I need more information\". When asked what impulsivity feels like to him, he said \"I just need it now\". Further explorations of his impulsivity was attempted, but he agreed he would need more time to think about it. After this, he stood up and abruptly ended the interview after hearing a phone call outside his " "room.    ROS:   Reports depressed mood               Denies suicidal ideation, self-destructive thoughts, anhedonia, low energy, insomnia, hypersomnia, appetite changes, weight changes, feeling worthless, excessive guilt, indecisiveness, feeling hopeless, feeling trapped, excessive crying and overwhelmed   Dysregulation:               Reports mood dysregulation, impulsive, aggressive and irritable               Denies suicidal ideation, violent ideation and SIB   Psychosis:               Per chart review: Patient was paranoid people were following him and having AH of \"invisible things inside the house\"              Denies delusions, auditory hallucinations and visual hallucinations currently  Shweta:               Reports increased energy, increased activity, grandiosity, distractibility, racing thoughts, pressured speech, excessive spending, excessive pleasure seeking, excessive risk taking, inter-episode mood instability, dysregulation and mood dysregulation   Objective:     Vitals:  /74 (BP Location: Left arm, Patient Position: Supine, Cuff Size: Adult Large)   Pulse 83   Temp 97.2  F (36.2  C) (Temporal)   Resp 20   Wt 122.3 kg (269 lb 11.2 oz)   SpO2 99%   BMI 37.55 kg/m      Allergies:  Allergies   Allergen Reactions     Penicillins        Current Medications:  Scheduled:  Current Facility-Administered Medications   Medication Dose Route Frequency     divalproex sodium delayed-release  1,250 mg Oral BID     fish oil-omega-3 fatty acids  1 g Oral Daily     hydrochlorothiazide  25 mg Oral Daily     nicotine  1 patch Transdermal Daily    And     nicotine   Transdermal Q8H PAULETTE     OLANZapine zydis  15 mg Oral At Bedtime     prazosin  1 mg Oral At Bedtime       PRN:  Current Facility-Administered Medications   Medication Dose Route Frequency     hydrOXYzine  25 mg Oral Q4H PRN     loperamide  2 mg Oral 4x Daily PRN     melatonin  3 mg Oral At Bedtime PRN     nicotine  2 mg Buccal Q1H PRN     OLANZapine  " "10 mg Oral TID PRN    Or     OLANZapine  10 mg Intramuscular TID PRN       Labs and Imaging:  New results:   No results found for this or any previous visit (from the past 24 hour(s)).    Data this admission:  - CBC unremarkable  - CMP Co2 31  - TSH normal  - UDS negative  - Vit D ordered on 07/24  - Hgb A1c 5.7  - Lipids, HDL 31  - Vit B12 normal  - Folate normal  - Urinalysis unremarkable  - EKG ordered on 07/24     Mental Status Exam:     Oriented to:  Grossly Oriented  General:  Awake and Alert  Appearance:  appears stated age  Behavior/Attitude:  calm, cooperative and engaged  Eye Contact:  appropriate at times  Psychomotor: normal, no catatonia present  Speech:  talkative, though less than previous days  Language: Fluent in English with appropriate syntax and vocabulary.  Mood: \"Tense\"  Affect: restricted, bright at times, congruent with mood  Thought Process:  linear, coherent and goal directed  Thought Content:  No SI/HI/AH/VH; No apparent delusions  Associations:  intact  Insight:  partial due to julianna/hypomania  Judgment:  fair due to julianna/hypomania  Impulse control: fair, due to julianna/hypomania/ADHD/TBI  Attention Span:  grossly intact and adequate for conversation  Concentration:  grossly intact  Recent and Remote Memory:  not formally assessed  Fund of Knowledge:  average  Muscle Strength and Tone: normal  Gait and Station: Normal     Psychiatric Assessment     Etienne Dupere is a 33 year old male previously diagnosed with Bipolar Disorder Type 1, substance use disorder (meth, alcohol, cocaine, ecstasy, marijuana), TBI, ADHD, and PTSD who presented by EMS after his PD was revoked in the context of manic like symptoms likely from substance use. Most recent psychiatric hospitalization was in May 2023. Significant symptoms on admission include irritability, tangential, pressured speech. The MSE on admission was pertinent for irritability, elevated, tangential. Biological contributions to mental health " presentation include long standing history of substance use, previous diagnosis of Bipolar Disorder type 1 which he takes Depakote and Zyprexa. Psychological contributions to mental health presentation include poor insight, poor distress tolerance,and cluster B traits. Social factors contributing to mental health presentation include being on MICD commitment, conflicts with daughter, ongoing legal charges, financial difficulties.     In summary, differential is still in evolution. The patient's reported symptoms of pressured speech, irritability are consistent with prior diagnosis of Bipolar Disorder Type 1, however, patient recently used substances which is contributing to the overall presentation. Utox was positive for amphetamines. Will consider Substance Induced Shweta vs Bipolar 1 Disorder, current episode shweta. He will likely benefit from med optimization and substance use cessation this admission. More collateral will be needed to gather a more accurate history.      Given that he currently has out of control behaviors, aggression and shweta, patient warrants inpatient psychiatric hospitalization to maintain his safety.      Psychiatric Plan by Diagnosis     Today's changes:  - Double portions added for meals  - Will explore impulsivity further tomorrow, possibly with unit psychologist   - Encourage symptom tracker for impulsivity    # Bipolar Disorder vs Substance Induced Shweta  1. Medications:  - Depakote 1250mg BID  - Zyprexa 15mg at bedtime  - Wellbutrin 300 mg is being held due to manic-like behaviors     2. Pertinent Labs/Monitoring:   - VPA level 73.6 ug/mL (7/25)  - EKG 5/14 was 391, EKG 7/24 normal with QTc of 387     3. Additional Plans:  - Patient will be treated in therapeutic milieu with appropriate individual and group therapies as described    #PTSD/Nightmares  - Prazosin 1 mg at bedtime   -Monitor orthostatics for lightheadedness    # Nicotine Use Disorder   - Nicotine Patch  - Nicotine Gum      #Anxiety  -Hydroxyzine 25 mg PRN     #ADHD  -Holding Wellbutrin & Strattera due to manic-like symptoms, will consider Strattera on discharge    2. Pertinent Labs/Monitoring:   - EKG unremarkable- QTc 387.      3. Additional Plans:  - Patient will be treated in therapeutic milieu with appropriate individual and group therapies as described     Psychiatric Hospital Course:    In the ED,  Patient was Chart reviewed and was found to be on multiple psychopharmacology medications such as Zyprexa, Depakote, Wellbutrin which are his PTA medications. His current PTA medication were continued due to severe agitation and aggression while in the emergency room, Haldol 5 mg, Ativan 2 mg, Benadryl 50 mg were added for severe agitation and aggression.     After patient arrived to the unit, his Strattera and Wellbutrin were held due to his manic like symptoms. Nicotine gum ordered 7/22/23. VPA level was ordered for 7/23/23 which was 73.6.     Etienne Dupree was [facility] [address] after his PD was revoked by his .     Medications:  ? PTA Depakote & Zyprexa were continued.   ? PTA Strattera and Wellbutrin were held due to julianna.    ? No new medications started at the time of admission     The risks, benefits, alternatives, and side effects were discussed and understood by the patient and other caregivers.     Medical Assessment and Plan     Medical diagnoses to be addressed this admission:    # Hypertension  Hydrochlorothiazide 25 mg  Hydralazine for SBP >160 or DBP >100    # Supplements  Fish oil      Medical course:  Patient was physically examined by the ED prior to being transferred to the unit and was found to be medically stable and appropriate for admission.      Consults:  none     Checklist     Legal Status: Court Hold     Safety Assessment:   Behavioral Orders   Procedures     Assault precautions     Code 1 - Restrict to Unit     Elopement precautions     Routine Programming     As  clinically indicated     Status 15     Every 15 minutes.       Risk Assessment:  Risk for harm is moderate.  Risk factors: substance use, trauma, impulsive and past behaviors  Protective factors: [facility]     SIO: No    Disposition: Pending stabilization, medication optimization, & development of a safe discharge plan.     Attestations     This patient was seen and discussed with my attending physician.    Elis Paul, MS4  [facility]    Resident Attestation:      I was present with the medical student who participated in the service and documentation of the note. I have verified the history and personally performed the physical/mental status exam and medical decision making. I agree with the assessment and plan documented in the note.    Enma Ramesh MD, PGY2    This patient has been seen and evaluated by me, Jeannie Jamison DO.  I have discussed this patient with the team including the resident and medical student(s) and I agree with the findings and plan in this note.  Dr. Jeannie Jamison DO, YOSI

## 2023-07-27 NOTE — PLAN OF CARE
Problem: Plan of Care - These are the overarching goals to be used throughout the patient stay.    Goal: Plan of Care Review  Description: The Plan of Care Review/Shift note should be completed every shift.  The Outcome Evaluation is a brief statement about your assessment that the patient is improving, declining, or no change.  This information will be displayed automatically on your shift note.  Outcome: Progressing  Flowsheets (Taken 7/27/2023 8054)  Plan of Care Reviewed With: patient  Overall Patient Progress: improving   Goal Outcome Evaluation:      Plan of Care Reviewed With: patient    Overall Patient Progress: improving  Patient has been visible in the milieu.Sociable with peers as well as staff.Seeks out staff.Compliant with cares.  Denies pain.Patient denies all MH issues.Adequate food intake and hydration.NO PRN this shift.  Temp: 97.2  F (36.2  C) Temp src: Temporal BP: 126/74 Pulse: 83   Resp: 20 SpO2: 99 % O2 Device: None (Room air)

## 2023-07-27 NOTE — PLAN OF CARE
The pt visible most of the shift out on milieu sitting in lounge watching TV, social with selected peer/staff, occasionally visible walking on hallway listening music over headphone and using phone. Upon approach calm in mood, flat to bright in affect, stated his mood as good, no concern stated and contracted for safety. The pt occasionally make needs request with out loud voice, want things quick, got needs with redirection and receptive without outburst behavior. Appetite good ate 100% and adequate fluid intake. During HS med the pt requested not to take scheduled Minipress stating concern with agitation, despite redirection and refused.     Problem: Plan of Care - These are the overarching goals to be used throughout the patient stay.    Goal: Plan of Care Review  Description: The Plan of Care Review/Shift note should be completed every shift.  The Outcome Evaluation is a brief statement about your assessment that the patient is improving, declining, or no change.  This information will be displayed automatically on your shift note.  Outcome: Progressing  Flowsheets (Taken 7/27/2023 1600)  Plan of Care Reviewed With: patient  Overall Patient Progress: improving   Goal Outcome Evaluation:    Plan of Care Reviewed With: patient Plan of Care Reviewed With: patient    Overall Patient Progress: improvingOverall Patient Progress: improving

## 2023-07-27 NOTE — PLAN OF CARE
Assessment/Intervention/Current Symtoms and Care Coordination  The patient's care was discussed with the treatment team and chart notes were reviewed.   Patient met with team.  Patient s/w hyper, loud this AM, but has been social on the unit, attending most groups. No behavioral issues.        Patient's PD has been revoked and she is recommending placement at [facility] in [place] for TBI.   Patient has been placed on wait list..  He has an intake interview on 7/31/23 at 11:00am    Received call from [facility] contact for discharge assistance     Discharge Plan or Goal  [facility]  Psychiatry  Therapy     Barriers to Discharge   Patient in need of further stability/medication mgmt  Placement     Referral Status  Referral in place for [facility]. Interview 7/3/23 at 11:00am     Legal Status     Civil commitment/Aguiar through [facility] through 4/2024

## 2023-07-27 NOTE — CARE PLAN
07/27/23 1446   Group Therapy Session   Group Attendance attended group session   Time Session Began 1115   Time Session Ended 1200   Total Time (minutes) 45   Total # Attendees 6   Group Type recreation;task skill;community   Group Topic Covered balanced lifestyle;leisure exploration/use of leisure time;self-care activities;structured socialization   Group Session Detail OT Clinic: open group activities to promote self-expression and leisure exploration while working to demonstrate cognitive skills   Patient Participation Detail Pt participated in open OT group to explore leisure activities. Pt appeared upbeat and social, having light conversations with peers while working on a beading project. Pt was organized and independent with their beading project, sharing with writer that they were making a bracelet for their partner. Pt asked if they could use writer's wrist as a reference to gauge how to size the bracelet for their partner with similar sized wrists; writer assisted pt with sizing and opinions for pt's progress. Pt appeared engaged in beading and was focused for the entire duration of the group.

## 2023-07-27 NOTE — CARE PLAN
07/27/23 1520   Group Therapy Session   Group Attendance attended group session   Time Session Began 1315   Time Session Ended 1400   Total Time (minutes) 20   Total # Attendees 3   Group Type psychoeducation;life skill   Group Topic Covered coping skills/lifestyle management;relationship;self-care activities   Group Session Detail General Health and Coping Skills: education and group discussion on boundaries.   Patient Participation Detail Pt participated in a group discussion on boundaries; pt joined group late. Pt was an active participant and was contributing insightful responses to the discussion and was attentive to the group. Pt appeared conversational and engaged, and shared personal experiences and thoughts on the topic.

## 2023-07-27 NOTE — PROGRESS NOTES
Pt joined dance/movement therapy (D/MT) interventions of cognitive organization by joining and leading movement.  Props supported sensory presence, providing a direction and focus for high energy and appropriate social engagement.  Pt was grandiose and lacked insight.         07/26/23 1015   Expressive Therapy   Therapy Type dance/movement   Minutes of Treatment 55

## 2023-07-27 NOTE — PLAN OF CARE
Pt appears to have slept for 6.50 hours. No PRNs given or requested. Remained in his room the entire shift. No concerns noted this shift. Will continue to monitor and offer support.     Problem: Sleep Disturbance  Goal: Adequate Sleep/Rest  Outcome: Progressing   Goal Outcome Evaluation:

## 2023-07-28 PROCEDURE — 250N000013 HC RX MED GY IP 250 OP 250 PS 637

## 2023-07-28 PROCEDURE — 99232 SBSQ HOSP IP/OBS MODERATE 35: CPT | Mod: GC | Performed by: PSYCHIATRY & NEUROLOGY

## 2023-07-28 PROCEDURE — 90853 GROUP PSYCHOTHERAPY: CPT

## 2023-07-28 PROCEDURE — 124N000002 HC R&B MH UMMC

## 2023-07-28 RX ADMIN — Medication 1 G: at 08:20

## 2023-07-28 RX ADMIN — OLANZAPINE 15 MG: 10 TABLET, ORALLY DISINTEGRATING ORAL at 21:56

## 2023-07-28 RX ADMIN — HYDROCHLOROTHIAZIDE 25 MG: 25 TABLET ORAL at 08:20

## 2023-07-28 RX ADMIN — DIVALPROEX SODIUM 1250 MG: 500 TABLET, DELAYED RELEASE ORAL at 08:19

## 2023-07-28 RX ADMIN — DIVALPROEX SODIUM 1250 MG: 500 TABLET, DELAYED RELEASE ORAL at 21:57

## 2023-07-28 RX ADMIN — NICOTINE 1 PATCH: 21 PATCH, EXTENDED RELEASE TRANSDERMAL at 08:21

## 2023-07-28 ASSESSMENT — ACTIVITIES OF DAILY LIVING (ADL)
DRESS: INDEPENDENT
ADLS_ACUITY_SCORE: 28
ADLS_ACUITY_SCORE: 28
LAUNDRY: WITH SUPERVISION
ADLS_ACUITY_SCORE: 28
ORAL_HYGIENE: INDEPENDENT
ADLS_ACUITY_SCORE: 28

## 2023-07-28 NOTE — PLAN OF CARE
"  Problem: Plan of Care - These are the overarching goals to be used throughout the patient stay.    Goal: Plan of Care Review  Description: The Plan of Care Review/Shift note should be completed every shift.  The Outcome Evaluation is a brief statement about your assessment that the patient is improving, declining, or no change.  This information will be displayed automatically on your shift note.  Outcome: Progressing  Flowsheets (Taken 7/28/2023 1450)  Plan of Care Reviewed With: patient  Overall Patient Progress: improving  Goal: Patient-Specific Goal (Individualized)  Description: You can add care plan individualizations to a care plan. Examples of Individualization might be:  \"Parent requests to be called daily at 9am for status\", \"I have a hard time hearing out of my right ear\", or \"Do not touch me to wake me up as it startles me\".  Outcome: Progressing  Goal: Absence of Hospital-Acquired Illness or Injury  Outcome: Progressing  Intervention: Identify and Manage Fall Risk  Recent Flowsheet Documentation  Taken 7/28/2023 0947 by Beverly Iqbal RN  Safety Promotion/Fall Prevention:   activity supervised   check orthostatic blood pressure   clutter free environment maintained  Goal: Optimal Comfort and Wellbeing  Outcome: Progressing  Intervention: Provide Person-Centered Care  Recent Flowsheet Documentation  Taken 7/28/2023 0947 by Beverly Iqbal RN  Trust Relationship/Rapport:   care explained   choices provided   empathic listening provided   questions answered   reassurance provided   thoughts/feelings acknowledged  Goal: Readiness for Transition of Care  Outcome: Progressing     Problem: Seclusion/Restraint, Behavioral  Goal: Absence of Harm or Injury  Outcome: Progressing  Intervention: Protect Dignity, Rights, and Personal Wellbeing  Recent Flowsheet Documentation  Taken 7/28/2023 0947 by Beverly Iqbal RN  Trust Relationship/Rapport:   care explained   choices provided   empathic listening provided   " "questions answered   reassurance provided   thoughts/feelings acknowledged     Problem: Adult Behavioral Health Plan of Care  Goal: Plan of Care Review  Outcome: Progressing  Flowsheets (Taken 7/28/2023 2510)  Plan of Care Reviewed With: patient  Overall Patient Progress: improving  Goal: Patient-Specific Goal (Individualization)  Description: You can add care plan individualizations to a care plan. Examples of Individualization might be:  \"Parent requests to be called daily at 9am for status\", \"I have a hard time hearing out of my right ear\", or \"Do not touch me to wake me up as it startles me\".  Outcome: Progressing  Goal: Individualized Daily Interaction Plan (IDIP)  Outcome: Progressing  Goal: Adheres to Safety Considerations for Self and Others  Outcome: Progressing  Goal: Absence of New-Onset Illness or Injury  Outcome: Progressing  Goal: Optimized Coping Skills in Response to Life Stressors  Outcome: Progressing  Goal: Develops/Participates in Therapeutic [place] to Support Successful Transition  Outcome: Progressing  Intervention: Foster Therapeutic [place]  Recent Flowsheet Documentation  Taken 7/28/2023 0932 by Beverly Iqbal RN  Trust Relationship/Rapport:   care explained   choices provided   empathic listening provided   questions answered   reassurance provided   thoughts/feelings acknowledged     Problem: Anxiety  Goal: Anxiety Reduction or Resolution  Outcome: Progressing  Intervention: Promote Anxiety Reduction  Recent Flowsheet Documentation  Taken 7/28/2023 0947 by Beverly Iqbal, RN  Family/Support System Care:   involvement promoted   self-care encouraged     Problem: Sleep Disturbance  Goal: Adequate Sleep/Rest  Outcome: Progressing     Problem: Suicidal Behavior  Goal: Suicidal Behavior is Absent or Managed  Outcome: Progressing   Goal Outcome Evaluation:      Plan of Care Reviewed With: patient    Overall Patient Progress: improving  Patient has been visible in the milieu.Sociable with peers " & staff.Patient has been on phone whenever he has had a chance.Compliant with medication,no side effect noted.NO PRN given.Denies all MH issues.  Contracted for safety.  .

## 2023-07-28 NOTE — PROGRESS NOTES
"   07/28/23 1201   Group Therapy Session   Group Attendance attended group session   Time Session Began 1015   Time Session Ended 1100   Total Time (minutes) 40   Total # Attendees 3   Group Type psychoeducation   Group Session Detail Psychoeducation for psychotherapy: therapeutic alliance, trust, duration, power dynamics, myths. Reflect on past experiences and discuss expectations   Patient Response/Contribution expressed readiness to alter behaviors;listened actively   Patient Participation Detail Pt stated he feels \"clouded\" and \"forgotten,\" referring to a family trip he is missing. Pt recounted his interaction with providers yesterday where Pt became escalated. Pt stated he \"shuts down\" and get defensive when he feels he is being \"interrogated\" or questioned by people he does not trust, which reminds him of interactions with the police. Writer encouraged Pt to try not to shut down completely, but to share those feelings with the team who would be understanding of his feelings. Pt wants to learn why his decision making is impaired when he is triggered. Pt also shared he likes when therapists/providers provide him with resources, books, podcasts to help his learning. Writer offered to provide resources on trauma response. Pt appeared fairly organized, personable, and engaged.       "

## 2023-07-28 NOTE — PLAN OF CARE
The pt visible out on milieu most of the shift sitting in lounge watching TV, social with staff/peer and listening music over headphone. The pt calm in mood, bright in affect, cooperative in interaction, appropriately make needs request and no outburst behavioral concern noted or reported. The pt acknowledged improvement in mood stated no concern at the time. The pt stated did not have nightmare last night as he attributed it to Minipress before and doubtful. The pt denied MH symptoms and contracted for safety. Appetite good and adequate fluid intake. The pt on phone most of the shift with girlfriend, she visited and went well. During HS med the pt refused VS recheck and scheduled Minipress but comply with other med.    BP (!) 141/96 (BP Location: Right arm, Patient Position: Sitting, Cuff Size: Adult Large)   Pulse 74   Temp 97  F (36.1  C) (Temporal)   Resp 16   Wt 121.2 kg (267 lb 2 oz)   SpO2 99%   BMI 37.19 kg/m       Problem: Plan of Care - These are the overarching goals to be used throughout the patient stay.    Goal: Plan of Care Review  Description: The Plan of Care Review/Shift note should be completed every shift.  The Outcome Evaluation is a brief statement about your assessment that the patient is improving, declining, or no change.  This information will be displayed automatically on your shift note.  Outcome: Progressing  Flowsheets (Taken 7/28/2023 1600)  Plan of Care Reviewed With: patient  Overall Patient Progress: improving   Goal Outcome Evaluation:    Plan of Care Reviewed With: patient Plan of Care Reviewed With: patient    Overall Patient Progress: improvingOverall Patient Progress: improving

## 2023-07-28 NOTE — PROGRESS NOTES
"  ----------------------------------------------------------------------------------------------------------  [facility]  Psychiatry Progress Note  Hospital Day #7     Interim History:     The patient's care was discussed with the treatment team and chart notes were reviewed.    Vitals: VSS  Sleep: 7 hours (07/26/23 0627)  Scheduled medications: Took all scheduled medications as prescribed  Psychiatric PRN medications: Hydroxyzine, Zyprexa    Staff Report/Interval Events:     No overnight events, slept well for 6.5 hours. Continues to engage in group sessions. Is calm and cooperative with staff. Listens to music and walks in the hallways during most shifts. Did not take bedtime prazosin to see if he could sleep without nightmares.     Subjective:     Patient Interview:  Etienne was seen today in his room. He reports enjoying his double portions of food. When asked about what motivated him to not take his prazosin last evening, he said that he wanted to see if not taking it would bring the nightmares back. He didn't have any nightmares. Today he shares that he is feeling \"cloudy\" and notes that it stems from a place of feeling unheard by his providers. He also feels misunderstood in that he feels it is unfair that he has been told he cannot take certain medications for his ADHD given his history of drug use. Reassurance and validation was provided. He mentioned bringing up his feelings with one of the therapists on the unit, and was encouraged to continue doing so.    ROS:   Reports depressed mood               Denies suicidal ideation, self-destructive thoughts, anhedonia, low energy, insomnia, hypersomnia, appetite changes, weight changes, feeling worthless, excessive guilt, indecisiveness, feeling hopeless, feeling trapped, excessive crying and overwhelmed   Dysregulation:               Reports mood dysregulation, impulsive, aggressive and irritable               Denies " "suicidal ideation, violent ideation and SIB   Psychosis:               Per chart review: Patient was paranoid people were following him and having AH of \"invisible things inside the house\"              Denies delusions, auditory hallucinations and visual hallucinations currently  Shweta:               Reports increased energy, increased activity, grandiosity, distractibility, racing thoughts, pressured speech, excessive spending, excessive pleasure seeking, excessive risk taking, inter-episode mood instability, dysregulation and mood dysregulation   Objective:     Vitals:  /83 (BP Location: Right arm, Patient Position: Sitting, Cuff Size: Adult Large)   Pulse 84   Temp 98.8  F (37.1  C) (Oral)   Resp 16   Wt 121.2 kg (267 lb 2 oz)   SpO2 98%   BMI 37.19 kg/m      Allergies:  Allergies   Allergen Reactions    Penicillins        Current Medications:  Scheduled:  Current Facility-Administered Medications   Medication Dose Route Frequency    divalproex sodium delayed-release  1,250 mg Oral BID    fish oil-omega-3 fatty acids  1 g Oral Daily    hydrochlorothiazide  25 mg Oral Daily    nicotine  1 patch Transdermal Daily    And    nicotine   Transdermal Q8H PAULETTE    OLANZapine zydis  15 mg Oral At Bedtime    prazosin  1 mg Oral At Bedtime       PRN:  Current Facility-Administered Medications   Medication Dose Route Frequency    hydrOXYzine  25 mg Oral Q4H PRN    loperamide  2 mg Oral 4x Daily PRN    melatonin  3 mg Oral At Bedtime PRN    nicotine  2 mg Buccal Q1H PRN    OLANZapine  10 mg Oral TID PRN    Or    OLANZapine  10 mg Intramuscular TID PRN       Labs and Imaging:  New results:   No results found for this or any previous visit (from the past 24 hour(s)).    Data this admission:  - CBC unremarkable  - CMP Co2 31  - TSH normal  - UDS negative  - Vit D ordered on 07/24  - Hgb A1c 5.7  - Lipids, HDL 31  - Vit B12 normal  - Folate normal  - Urinalysis unremarkable  - EKG ordered on 07/24     Mental Status " "Exam:     Oriented to:  Grossly Oriented  General:  Awake and Alert  Appearance:  appears stated age  Behavior/Attitude:  calm, cooperative and engaged  Eye Contact:  appropriate at times  Psychomotor: normal, no catatonia present  Speech:  talkative, though less than previous days  Language: Fluent in English with appropriate syntax and vocabulary.  Mood: \"Cloudy\"  Affect: restricted, bright at times, congruent with mood  Thought Process:  linear, coherent and goal directed  Thought Content:  No SI/HI/AH/VH; No apparent delusions  Associations:  intact  Insight:  fair due to julianna/hypomania  Judgment:  fair due to julianna/hypomania  Impulse control: fair, due to julianna/hypomania/ADHD/TBI  Attention Span:  grossly intact and adequate for conversation  Concentration:  grossly intact  Recent and Remote Memory:  not formally assessed  Fund of Knowledge:  average  Muscle Strength and Tone: normal  Gait and Station: Normal     Psychiatric Assessment     Etienne Dupree is a 33 year old male previously diagnosed with Bipolar Disorder Type 1, substance use disorder (meth, alcohol, cocaine, ecstasy, marijuana), TBI, ADHD, and PTSD who presented by EMS after his PD was revoked in the context of manic like symptoms likely from substance use. Most recent psychiatric hospitalization was in May 2023. Significant symptoms on admission include irritability, tangential, pressured speech. The MSE on admission was pertinent for irritability, elevated, tangential. Biological contributions to mental health presentation include long standing history of substance use, previous diagnosis of Bipolar Disorder type 1 which he takes Depakote and Zyprexa. Psychological contributions to mental health presentation include poor insight, poor distress tolerance,and cluster B traits. Social factors contributing to mental health presentation include being on MICD commitment, conflicts with daughter, ongoing legal charges, financial difficulties.   "   In summary, differential is still in evolution. The patient's reported symptoms of pressured speech, irritability are consistent with prior diagnosis of Bipolar Disorder Type 1, however, patient recently used substances which is contributing to the overall presentation. Utox was positive for amphetamines. Will consider Substance Induced Shweta vs Bipolar 1 Disorder, current episode shweta. He will likely benefit from med optimization and substance use cessation this admission. More collateral will be needed to gather a more accurate history.      Given that he currently has out of control behaviors, aggression and shweta, patient warrants inpatient psychiatric hospitalization to maintain his safety.      Psychiatric Plan by Diagnosis     Today's changes:  - No changes for today    # Bipolar Disorder vs Substance Induced Shweta  1. Medications:  - Depakote 1250mg BID  - Zyprexa 15mg at bedtime  - Wellbutrin 300 mg is being held due to manic-like behaviors     2. Pertinent Labs/Monitoring:   - VPA level 73.6 ug/mL (7/25)  - EKG 5/14 was 391, EKG 7/24 normal with QTc of 387     3. Additional Plans:  - Patient will be treated in therapeutic milieu with appropriate individual and group therapies as described    #PTSD/Nightmares  - Prazosin 1 mg at bedtime   -Monitor orthostatics for lightheadedness    # Nicotine Use Disorder   - Nicotine Patch  - Nicotine Gum     #Anxiety  -Hydroxyzine 25 mg PRN     #ADHD  -Holding Wellbutrin & Strattera due to manic-like symptoms, will consider Strattera on discharge    2. Pertinent Labs/Monitoring:   - EKG unremarkable- QTc 387.      3. Additional Plans:  - Patient will be treated in therapeutic milieu with appropriate individual and group therapies as described     Psychiatric Hospital Course:    In the ED,  Patient was Chart reviewed and was found to be on multiple psychopharmacology medications such as Zyprexa, Depakote, Wellbutrin which are his PTA medications. His current PTA  medication were continued due to severe agitation and aggression while in the emergency room, Haldol 5 mg, Ativan 2 mg, Benadryl 50 mg were added for severe agitation and aggression.     After patient arrived to the unit, his Strattera and Wellbutrin were held due to his manic like symptoms. Nicotine gum ordered 7/22/23. VPA level was ordered for 7/23/23 which 78.0, VPA on 7/25 73.6.     Etienne Dupree was [facility] [address] hold after his PD was revoked by his .   Medications:  PTA Depakote & Zyprexa were continued.   PTA Strattera and Wellbutrin were held due to julianna.    No new medications started at the time of admission     The risks, benefits, alternatives, and side effects were discussed and understood by the patient and other caregivers.     Medical Assessment and Plan     Medical diagnoses to be addressed this admission:    # Hypertension  Hydrochlorothiazide 25 mg  Hydralazine for SBP >160 or DBP >100    # Supplements  Fish oil      Medical course:  Patient was physically examined by the ED prior to being transferred to the unit and was found to be medically stable and appropriate for admission.      Consults:  none     Checklist     Legal Status: Court Hold     Safety Assessment:   Behavioral Orders   Procedures    Assault precautions    Code 1 - Restrict to Unit    Elopement precautions    Routine Programming     As clinically indicated    Status 15     Every 15 minutes.       Risk Assessment:  Risk for harm is moderate.  Risk factors: substance use, trauma, impulsive and past behaviors  Protective factors: [employer]     SIO: No    Disposition: Pending stabilization, medication optimization, & development of a safe discharge plan.     Attestations     This patient was seen and discussed with my attending physician.    Elis Paul, MS4  [facility]    Resident Attestation:      I was present with the medical student who participated in the service and documentation of  the note. I have verified the history and personally performed the physical/mental status exam and medical decision making. I agree with the assessment and plan documented in the note.  Enma Ramesh MD, MSc, MPH, PhD candidate   Psychiatry resident, PGY1

## 2023-07-29 PROCEDURE — 250N000013 HC RX MED GY IP 250 OP 250 PS 637

## 2023-07-29 PROCEDURE — 124N000002 HC R&B MH UMMC

## 2023-07-29 RX ADMIN — NICOTINE 1 PATCH: 21 PATCH, EXTENDED RELEASE TRANSDERMAL at 09:01

## 2023-07-29 RX ADMIN — NICOTINE POLACRILEX 2 MG: 2 GUM, CHEWING ORAL at 09:01

## 2023-07-29 RX ADMIN — DIVALPROEX SODIUM 1250 MG: 500 TABLET, DELAYED RELEASE ORAL at 21:29

## 2023-07-29 RX ADMIN — DIVALPROEX SODIUM 1250 MG: 500 TABLET, DELAYED RELEASE ORAL at 09:00

## 2023-07-29 RX ADMIN — Medication 1 G: at 09:00

## 2023-07-29 RX ADMIN — NICOTINE POLACRILEX 2 MG: 2 GUM, CHEWING ORAL at 14:45

## 2023-07-29 RX ADMIN — NICOTINE POLACRILEX 2 MG: 2 GUM, CHEWING ORAL at 21:29

## 2023-07-29 RX ADMIN — NICOTINE POLACRILEX 2 MG: 2 GUM, CHEWING ORAL at 16:51

## 2023-07-29 RX ADMIN — HYDROCHLOROTHIAZIDE 25 MG: 25 TABLET ORAL at 09:01

## 2023-07-29 RX ADMIN — NICOTINE POLACRILEX 2 MG: 2 GUM, CHEWING ORAL at 18:09

## 2023-07-29 RX ADMIN — OLANZAPINE 15 MG: 10 TABLET, ORALLY DISINTEGRATING ORAL at 21:29

## 2023-07-29 ASSESSMENT — ACTIVITIES OF DAILY LIVING (ADL)
ADLS_ACUITY_SCORE: 28
LAUNDRY: WITH SUPERVISION
DRESS: INDEPENDENT
ADLS_ACUITY_SCORE: 28
ORAL_HYGIENE: INDEPENDENT
ADLS_ACUITY_SCORE: 28
HYGIENE/GROOMING: INDEPENDENT
ADLS_ACUITY_SCORE: 28

## 2023-07-29 NOTE — PLAN OF CARE
"  Problem: Plan of Care - These are the overarching goals to be used throughout the patient stay.    Goal: Plan of Care Review  Description: The Plan of Care Review/Shift note should be completed every shift.  The Outcome Evaluation is a brief statement about your assessment that the patient is improving, declining, or no change.  This information will be displayed automatically on your shift note.  Outcome: Progressing  Flowsheets (Taken 7/29/2023 0957)  Plan of Care Reviewed With: patient  Overall Patient Progress: improving  Goal: Patient-Specific Goal (Individualized)  Description: You can add care plan individualizations to a care plan. Examples of Individualization might be:  \"Parent requests to be called daily at 9am for status\", \"I have a hard time hearing out of my right ear\", or \"Do not touch me to wake me up as it startles me\".  Outcome: Progressing  Goal: Absence of Hospital-Acquired Illness or Injury  Outcome: Progressing  Goal: Optimal Comfort and Wellbeing  Outcome: Progressing  Goal: Readiness for Transition of Care  Outcome: Progressing     Problem: Seclusion/Restraint, Behavioral  Goal: Absence of Harm or Injury  Outcome: Progressing     Problem: Adult Behavioral Health Plan of Care  Goal: Plan of Care Review  Outcome: Progressing  Flowsheets (Taken 7/29/2023 0957)  Plan of Care Reviewed With: patient  Overall Patient Progress: improving  Goal: Patient-Specific Goal (Individualization)  Description: You can add care plan individualizations to a care plan. Examples of Individualization might be:  \"Parent requests to be called daily at 9am for status\", \"I have a hard time hearing out of my right ear\", or \"Do not touch me to wake me up as it startles me\".  Outcome: Progressing  Goal: Individualized Daily Interaction Plan (IDIP)  Outcome: Progressing  Goal: Adheres to Safety Considerations for Self and Others  Outcome: Progressing  Goal: Absence of New-Onset Illness or Injury  Outcome: " Progressing  Goal: Optimized Coping Skills in Response to Life Stressors  Outcome: Progressing  Goal: Develops/Participates in Therapeutic [place] to Support Successful Transition  Outcome: Progressing     Problem: Anxiety  Goal: Anxiety Reduction or Resolution  Outcome: Progressing     Problem: Sleep Disturbance  Goal: Adequate Sleep/Rest  Outcome: Progressing     Problem: Suicidal Behavior  Goal: Suicidal Behavior is Absent or Managed  Outcome: Progressing   Goal Outcome Evaluation:      Plan of Care Reviewed With: patient    Overall Patient Progress: improving  Patient alert & oriented x 4.Up in the halls with headphones listening to music.Denies all MH issues.Appetite great.No behavior escalation.No safety concerns.Mood is calm.Affect pleasant.

## 2023-07-29 NOTE — PLAN OF CARE
The pt visible most of the shift out on unit watching TV in lounge, social with peer/staff and listening music. Upon approach visibly calm in mood, pleasant, cooperative in interaction, bright in affect and behaviorally controlled. The pt stated no concern and in good mood. Denied AH, VH, SI, HI and contracted for safety. Occasionally make needs request with out loud voice, redirectable, no outburst behavorial concern noted and receptive for redirection. Appetite good ate 100% supper and adequate fluid intake. The pt refused Scheduled Minipress and comply with other med.    Problem: Plan of Care - These are the overarching goals to be used throughout the patient stay.    Goal: Plan of Care Review  Description: The Plan of Care Review/Shift note should be completed every shift.  The Outcome Evaluation is a brief statement about your assessment that the patient is improving, declining, or no change.  This information will be displayed automatically on your shift note.  Outcome: Progressing  Flowsheets (Taken 7/29/2023 1600)  Plan of Care Reviewed With: patient  Overall Patient Progress: improving   Goal Outcome Evaluation:    Plan of Care Reviewed With: patient Plan of Care Reviewed With: patient    Overall Patient Progress: improvingOverall Patient Progress: improving

## 2023-07-29 NOTE — PLAN OF CARE
Pt appears to have slept for 5 hours. No PRNs given or requested. No concerns noted this shift. Will continue to monitor and offer support.    Problem: Sleep Disturbance  Goal: Adequate Sleep/Rest  Outcome: Progressing   Goal Outcome Evaluation:

## 2023-07-29 NOTE — PLAN OF CARE
"  Problem: Plan of Care - These are the overarching goals to be used throughout the patient stay.    Goal: Plan of Care Review  Description: The Plan of Care Review/Shift note should be completed every shift.  The Outcome Evaluation is a brief statement about your assessment that the patient is improving, declining, or no change.  This information will be displayed automatically on your shift note.  7/29/2023 1442 by Beverly Iqbal RN  Outcome: Progressing  Flowsheets (Taken 7/29/2023 1442)  Plan of Care Reviewed With: patient  Overall Patient Progress: improving  7/29/2023 0957 by Beverly Iqbal RN  Outcome: Progressing  Flowsheets (Taken 7/29/2023 0957)  Plan of Care Reviewed With: patient  Overall Patient Progress: improving  Goal: Patient-Specific Goal (Individualized)  Description: You can add care plan individualizations to a care plan. Examples of Individualization might be:  \"Parent requests to be called daily at 9am for status\", \"I have a hard time hearing out of my right ear\", or \"Do not touch me to wake me up as it startles me\".  7/29/2023 1442 by Beverly Iqbal RN  Outcome: Progressing  7/29/2023 0957 by Beverly Iqbal RN  Outcome: Progressing  Goal: Absence of Hospital-Acquired Illness or Injury  7/29/2023 1442 by Beverly Iqbal RN  Outcome: Progressing  7/29/2023 0957 by Beverly Iqbal RN  Outcome: Progressing  Intervention: Identify and Manage Fall Risk  Recent Flowsheet Documentation  Taken 7/29/2023 1159 by Beverly Iqbal RN  Safety Promotion/Fall Prevention:   nonskid shoes/slippers when out of bed   clutter free environment maintained  Intervention: Prevent Skin Injury  Recent Flowsheet Documentation  Taken 7/29/2023 1159 by Beverly Iqbal RN  Body Position: position changed independently  Goal: Optimal Comfort and Wellbeing  7/29/2023 1442 by Beverly Iqbal RN  Outcome: Progressing  7/29/2023 0957 by Beverly Iqbal RN  Outcome: Progressing  Intervention: Provide Person-Centered Care  Recent " "Flowsheet Documentation  Taken 7/29/2023 1159 by Beverly Iqbal RN  Trust Relationship/Rapport:   care explained   choices provided   emotional support provided   empathic listening provided   questions answered   questions encouraged   reassurance provided   thoughts/feelings acknowledged  Goal: Readiness for Transition of Care  7/29/2023 1442 by Beverly Iqbal RN  Outcome: Progressing  7/29/2023 0957 by Beverly Iqbal RN  Outcome: Progressing     Problem: Seclusion/Restraint, Behavioral  Goal: Absence of Harm or Injury  7/29/2023 1442 by Beverly Iqbal RN  Outcome: Progressing  7/29/2023 0957 by Beverly Iqbal RN  Outcome: Progressing  Intervention: Protect Dignity, Rights, and Personal Wellbeing  Recent Flowsheet Documentation  Taken 7/29/2023 1159 by Beverly Iqbal RN  Trust Relationship/Rapport:   care explained   choices provided   emotional support provided   empathic listening provided   questions answered   questions encouraged   reassurance provided   thoughts/feelings acknowledged  Intervention: Protect Skin and Joint Integrity  Recent Flowsheet Documentation  Taken 7/29/2023 1159 by Beverly Iqbal RN  Body Position: position changed independently     Problem: Adult Behavioral Health Plan of Care  Goal: Plan of Care Review  7/29/2023 1442 by Beverly Iqbal RN  Outcome: Progressing  Flowsheets (Taken 7/29/2023 1442)  Plan of Care Reviewed With: patient  Overall Patient Progress: improving  7/29/2023 0957 by Beverly Iqbal RN  Outcome: Progressing  Flowsheets (Taken 7/29/2023 0957)  Plan of Care Reviewed With: patient  Overall Patient Progress: improving  Goal: Patient-Specific Goal (Individualization)  Description: You can add care plan individualizations to a care plan. Examples of Individualization might be:  \"Parent requests to be called daily at 9am for status\", \"I have a hard time hearing out of my right ear\", or \"Do not touch me to wake me up as it startles me\".  7/29/2023 1442 by Beverly Iqbal, " RN  Outcome: Progressing  7/29/2023 0957 by Beverly Iqbal RN  Outcome: Progressing  Goal: Individualized Daily Interaction Plan (IDIP)  7/29/2023 1442 by Beverly Iqbal RN  Outcome: Progressing  7/29/2023 0957 by Bevelry Iqbal RN  Outcome: Progressing  Goal: Adheres to Safety Considerations for Self and Others  7/29/2023 1442 by Beverly Iqbal RN  Outcome: Progressing  7/29/2023 0957 by Beverly Iqbal RN  Outcome: Progressing  Goal: Absence of New-Onset Illness or Injury  7/29/2023 1442 by Beverly Iqbal RN  Outcome: Progressing  7/29/2023 0957 by Beverly Iqbal RN  Outcome: Progressing  Goal: Optimized Coping Skills in Response to Life Stressors  7/29/2023 1442 by Beverly Iqbal RN  Outcome: Progressing  7/29/2023 0957 by Beverly Iqbal RN  Outcome: Progressing  Goal: Develops/Participates in Therapeutic [place] to Support Successful Transition  7/29/2023 1442 by Beverly Iqbal RN  Outcome: Progressing  7/29/2023 0957 by Beverly Iqbal RN  Outcome: Progressing  Intervention: Foster Therapeutic [place]  Recent Flowsheet Documentation  Taken 7/29/2023 1159 by Beverly Iqbal RN  Trust Relationship/Rapport:   care explained   choices provided   emotional support provided   empathic listening provided   questions answered   questions encouraged   reassurance provided   thoughts/feelings acknowledged     Problem: Anxiety  Goal: Anxiety Reduction or Resolution  7/29/2023 1442 by Beverly Iqbal RN  Outcome: Progressing  7/29/2023 0957 by Beverly Iqbal RN  Outcome: Progressing  Intervention: Promote Anxiety Reduction  Recent Flowsheet Documentation  Taken 7/29/2023 1159 by Beverly Iqbal RN  Family/Support System Care:   self-care encouraged   support provided     Problem: Sleep Disturbance  Goal: Adequate Sleep/Rest  7/29/2023 1442 by Beverly Iqbal RN  Outcome: Progressing  7/29/2023 0957 by Beverly Iqbal RN  Outcome: Progressing     Problem: Suicidal Behavior  Goal: Suicidal Behavior is Absent or Managed  7/29/2023  "1442 by Beverly Iqbal, RN  Outcome: Progressing  7/29/2023 0957 by Beverly Iqbal, RN  Outcome: Progressing   Goal Outcome Evaluation:      Plan of Care Reviewed With: patient    Overall Patient Progress: improving  Etienne has been visible in the milieu.Sociable with peers and staff.Up in the halls ambulating with headphones in place listening to music.Talked on phone a couple of times.Patient is high energy,multiple requests,can be argumentative.Appetite excellent eats and drinks okay.Medication compliant.No side effect noted.Nicotine gum given x 2 this shift.No behaviors.Denies MH.Contracted for safety.  Vital signs:  Temp: 97.8  F (36.6  C) Temp src: Oral BP: (!) 144/93 Pulse: 80   Resp: 18 SpO2: 100 % O2 Device: None (Room air)     Weight: 121.2 kg (267 lb 2 oz)  Estimated body mass index is 37.19 kg/m  as calculated from the following:    Height as of 9/9/21: 1.805 m (5' 11.06\").    Weight as of this encounter: 121.2 kg (267 lb 2 oz).                 "

## 2023-07-30 PROCEDURE — 124N000002 HC R&B MH UMMC

## 2023-07-30 PROCEDURE — 250N000013 HC RX MED GY IP 250 OP 250 PS 637

## 2023-07-30 RX ADMIN — NICOTINE POLACRILEX 2 MG: 2 GUM, CHEWING ORAL at 08:44

## 2023-07-30 RX ADMIN — NICOTINE POLACRILEX 2 MG: 2 GUM, CHEWING ORAL at 18:20

## 2023-07-30 RX ADMIN — HYDROCHLOROTHIAZIDE 25 MG: 25 TABLET ORAL at 08:44

## 2023-07-30 RX ADMIN — DIVALPROEX SODIUM 1250 MG: 500 TABLET, DELAYED RELEASE ORAL at 08:45

## 2023-07-30 RX ADMIN — NICOTINE POLACRILEX 2 MG: 2 GUM, CHEWING ORAL at 11:30

## 2023-07-30 RX ADMIN — DIVALPROEX SODIUM 1250 MG: 500 TABLET, DELAYED RELEASE ORAL at 20:06

## 2023-07-30 RX ADMIN — NICOTINE 1 PATCH: 21 PATCH, EXTENDED RELEASE TRANSDERMAL at 08:45

## 2023-07-30 RX ADMIN — Medication 1 G: at 08:44

## 2023-07-30 RX ADMIN — OLANZAPINE 15 MG: 10 TABLET, ORALLY DISINTEGRATING ORAL at 20:07

## 2023-07-30 ASSESSMENT — ACTIVITIES OF DAILY LIVING (ADL)
DRESS: INDEPENDENT
ADLS_ACUITY_SCORE: 28
LAUNDRY: WITH SUPERVISION
DRESS: INDEPENDENT
HYGIENE/GROOMING: INDEPENDENT
ADLS_ACUITY_SCORE: 28
LAUNDRY: WITH SUPERVISION
ADLS_ACUITY_SCORE: 28
ADLS_ACUITY_SCORE: 28
HYGIENE/GROOMING: INDEPENDENT
ORAL_HYGIENE: INDEPENDENT
ADLS_ACUITY_SCORE: 28
ORAL_HYGIENE: INDEPENDENT
ADLS_ACUITY_SCORE: 28
ADLS_ACUITY_SCORE: 28

## 2023-07-30 NOTE — PLAN OF CARE
The pt visible sleeping in room until supper time. Upon approach calm in mood, bright in affect, verbalized mood as good, feel tired and sleeping, denied anxiety, depression, AH, VH, SI, HI and contracted for safety. Appropriately requested for needs and no outburst behavior noted. Appetite good ate 100% supper and adequate fluid intake. The pt refused Scheduled Minipress and comply with other med.     /82 (BP Location: Left arm, Patient Position: Sitting, Cuff Size: Adult Large)   Pulse 73   Temp 97.6  F (36.4  C) (Oral)   Resp 16   Wt 121.3 kg (267 lb 8 oz)   SpO2 97%   BMI 37.24 kg/m       Problem: Adult Behavioral Health Plan of Care  Goal: Plan of Care Review  7/30/2023 1844 by Quyen Chanel RN  Outcome: Progressing  Flowsheets (Taken 7/30/2023 1730)  Plan of Care Reviewed With: patient  Overall Patient Progress: improving  7/30/2023 1844 by Quyen Chanel RN     Goal Outcome Evaluation:    Plan of Care Reviewed With: patient Plan of Care Reviewed With: patient    Overall Patient Progress: improvingOverall Patient Progress: improving

## 2023-07-30 NOTE — PLAN OF CARE
Problem: Adult Behavioral Health Plan of Care  Goal: Plan of Care Review  Recent Flowsheet Documentation  Taken 7/30/2023 1000 by Gigi Vieyra RN  Patient Agreement with Plan of Care: agrees   Goal Outcome Evaluation:       Pt was calm and cooperative. He was medication compliant and made his needs known. Pt came out to the lounge this morning and ate his breakfast. He was social with peers and staff. Pt spent most of his time in the lounge watching tv and socializing. This afternoon he ate 100% of his lunch. During check in he denies SI, SIB, AH, VH, and HI. No behaviors noted. Will continue to monitor.      Nicorette administered at 0844, 1130.

## 2023-07-31 PROCEDURE — 250N000013 HC RX MED GY IP 250 OP 250 PS 637

## 2023-07-31 PROCEDURE — 124N000002 HC R&B MH UMMC

## 2023-07-31 PROCEDURE — 99232 SBSQ HOSP IP/OBS MODERATE 35: CPT | Mod: GC | Performed by: STUDENT IN AN ORGANIZED HEALTH CARE EDUCATION/TRAINING PROGRAM

## 2023-07-31 RX ORDER — ATOMOXETINE 40 MG/1
40 CAPSULE ORAL ONCE
Status: COMPLETED | OUTPATIENT
Start: 2023-07-31 | End: 2023-07-31

## 2023-07-31 RX ORDER — ATOMOXETINE 40 MG/1
40 CAPSULE ORAL EVERY MORNING
Status: DISCONTINUED | OUTPATIENT
Start: 2023-08-01 | End: 2023-07-31

## 2023-07-31 RX ORDER — ATOMOXETINE 40 MG/1
40 CAPSULE ORAL EVERY MORNING
Status: DISCONTINUED | OUTPATIENT
Start: 2023-08-01 | End: 2023-08-04

## 2023-07-31 RX ADMIN — NICOTINE 1 PATCH: 21 PATCH, EXTENDED RELEASE TRANSDERMAL at 08:36

## 2023-07-31 RX ADMIN — NICOTINE POLACRILEX 2 MG: 2 GUM, CHEWING ORAL at 17:04

## 2023-07-31 RX ADMIN — OLANZAPINE 15 MG: 10 TABLET, ORALLY DISINTEGRATING ORAL at 20:48

## 2023-07-31 RX ADMIN — NICOTINE POLACRILEX 2 MG: 2 GUM, CHEWING ORAL at 20:48

## 2023-07-31 RX ADMIN — HYDROCHLOROTHIAZIDE 25 MG: 25 TABLET ORAL at 08:36

## 2023-07-31 RX ADMIN — DIVALPROEX SODIUM 1250 MG: 500 TABLET, DELAYED RELEASE ORAL at 20:48

## 2023-07-31 RX ADMIN — Medication 1 G: at 08:36

## 2023-07-31 RX ADMIN — DIVALPROEX SODIUM 1250 MG: 500 TABLET, DELAYED RELEASE ORAL at 08:36

## 2023-07-31 RX ADMIN — ATOMOXETINE HYDROCHLORIDE 40 MG: 40 CAPSULE ORAL at 15:09

## 2023-07-31 RX ADMIN — NICOTINE POLACRILEX 2 MG: 2 GUM, CHEWING ORAL at 08:36

## 2023-07-31 RX ADMIN — NICOTINE POLACRILEX 2 MG: 2 GUM, CHEWING ORAL at 19:26

## 2023-07-31 ASSESSMENT — ACTIVITIES OF DAILY LIVING (ADL)
ADLS_ACUITY_SCORE: 28
ORAL_HYGIENE: INDEPENDENT
ADLS_ACUITY_SCORE: 28
LAUNDRY: WITH SUPERVISION
ADLS_ACUITY_SCORE: 28
ADLS_ACUITY_SCORE: 28
DRESS: INDEPENDENT
ADLS_ACUITY_SCORE: 28
HYGIENE/GROOMING: INDEPENDENT
ADLS_ACUITY_SCORE: 28
HYGIENE/GROOMING: INDEPENDENT
ORAL_HYGIENE: INDEPENDENT
DRESS: INDEPENDENT
ADLS_ACUITY_SCORE: 28
ADLS_ACUITY_SCORE: 28
LAUNDRY: WITH SUPERVISION
ADLS_ACUITY_SCORE: 28
ADLS_ACUITY_SCORE: 28

## 2023-07-31 NOTE — PLAN OF CARE
Problem: Sleep Disturbance  Goal: Adequate Sleep/Rest  7/31/2023 0259 by Georgina Esposito RN  Outcome: Progressing  7/31/2023 0221 by Georgina Esposito RN  Outcome: Progressing     Problem: Suicidal Behavior  Goal: Suicidal Behavior is Absent or Managed  7/31/2023 0259 by Georgina Esposito RN  Outcome: Progressing  7/31/2023 0221 by Georgina Esposito RN  Outcome: Progressing   Goal Outcome Evaluation:         Pt observed during every 15 minutes safe checks to have a restful night. Respiration even and unlabored. Pt slept approximately 6.75 hours. No signs of any acute distress Precautions are in place. Pt denies pain

## 2023-07-31 NOTE — PLAN OF CARE
Pt has been visible in the milieu and is social with select peers, he does not attend groups. He has been pleasant with no behavioral issues. Pt tends to be hyperverbal during conversation. He has frequent requests but is generally redirectable. Pt denies all mental health symptoms upon assessment. He is med compliant without issue, no PRNs aside from nicotine gum. No other concerns at this time.    Problem: Anxiety  Goal: Anxiety Reduction or Resolution  Outcome: Progressing   Goal Outcome Evaluation:    Plan of Care Reviewed With: patient

## 2023-07-31 NOTE — PROVIDER NOTIFICATION
07/31/23 1247   Individualization/Patient Specific Goals   Patient Personal Strengths expressive of emotions;expressive of needs;medication/treatment adherence;resourceful;resilient;self-awareness   Patient Vulnerabilities legal concerns;occupational insecurity;limited social skills;history of unsuccessful treatment;lacks insight into illness;substance abuse/addiction;poor impulse control;family/relationship conflict   Anxieties, Fears or Concerns relationship with children and coparenting, recommendation for inpatient treatment   Individualized Care Needs None   Patient/Family-Specific Goals (Include Timeframe) Patient reported challenging relationship with children's mother   Interprofessional Rounds   Summary 1. Stabilization of psychiatric sx's 2. Medication mgmt 3.Safe with self /others 4. Substance use addressed. 5. Coordination of care with outpatient providers 6. Housing addressed/had interview for [facility] placement today 7. Psych f/u care in place   Participants CTC;nursing;psychiatrist   Behavioral Team Discussion   Participants Dr. Jamison, Dr. Ramesh, Carmelina Mccord RN, Dennise Morton MA.LP,  Med students   Progress Patient has been more engaged, will resume strattera today, had interview for [facility] placement today   Anticipated length of stay 7-10 days   Continued Stay Criteria/Rationale Acute psychotic sx's   Medical/Physical Stable   Precautions None   Plan Patient will be seen by Psychiatry daily.  Meds will be reviewed/adjusted per MD's.  Substance use will be addressed. Care will be coordinated with outpatient providers. Placement will need to be secured.  CTC will continue to assess needs, ensure appropriate f/u care is in place   Rationale for change in precautions or plan None   Anticipated Discharge Disposition another healthcare facility;substance use treatment;IRTS

## 2023-07-31 NOTE — PROGRESS NOTES
"  ----------------------------------------------------------------------------------------------------------  [facility]  Psychiatry Progress Note  Hospital Day #10     Interim History:     The patient's care was discussed with the treatment team and chart notes were reviewed.    Vitals: VSS  Sleep: 6.75 hours (07/31/23 0600)  Scheduled medications: Took all scheduled medications as prescribed other than prazosin  Psychiatric PRN medications: Hydroxyzine, Zyprexa    Staff Report/Interval Events:     No overnight events, slept well for 6.75 hours. Continues to engage in group sessions. Has been discussing impulsivity symptoms with therapist. Is calm and cooperative with staff. Appetite is good. Continues to decline bedtime prazosin because he is sleeping without nightmares.     Subjective:     Patient Interview:    Etienne was seen today in his room. He slept well and without nightmares over the weekend despite declining his prazosin. He discussed wanting to leave and notes again that it comes from a place of feeling misunderstood. Notably, he relates this to his  not knowing \"when I relapse versus when I slip up\". Reassurance and validation was provided. Today he also had an interview which he was looking forward to. His atomoxetine was restarted today given that current acute julianna was ruled out, which he felt glad about.    ROS:   Reports depressed mood               Denies suicidal ideation, self-destructive thoughts, anhedonia, low energy, insomnia, hypersomnia, appetite changes, weight changes, feeling worthless, excessive guilt, indecisiveness, feeling hopeless, feeling trapped, excessive crying and overwhelmed   Dysregulation:               Reports mood dysregulation, impulsive, aggressive and irritable               Denies suicidal ideation, violent ideation and SIB   Psychosis:               Per chart review: Patient was paranoid people were following " "him and having AH of \"invisible things inside the house\"              Denies delusions, auditory hallucinations and visual hallucinations currently  Shweta:               Reports increased energy, increased activity, grandiosity, distractibility, racing thoughts, pressured speech, excessive spending, excessive pleasure seeking, excessive risk taking, inter-episode mood instability, dysregulation and mood dysregulation   Objective:     Vitals:  /82 (BP Location: Left arm, Patient Position: Sitting, Cuff Size: Adult Large)   Pulse 73   Temp 98.3  F (36.8  C) (Oral)   Resp 16   Wt 121.3 kg (267 lb 8 oz)   SpO2 100%   BMI 37.24 kg/m      Allergies:  Allergies   Allergen Reactions     Penicillins        Current Medications:  Scheduled:  Current Facility-Administered Medications   Medication Dose Route Frequency     [START ON 8/1/2023] atomoxetine  40 mg Oral QAM     divalproex sodium delayed-release  1,250 mg Oral BID     fish oil-omega-3 fatty acids  1 g Oral Daily     hydrochlorothiazide  25 mg Oral Daily     nicotine  1 patch Transdermal Daily    And     nicotine   Transdermal Q8H PAULETTE     OLANZapine zydis  15 mg Oral At Bedtime     prazosin  1 mg Oral At Bedtime       PRN:  Current Facility-Administered Medications   Medication Dose Route Frequency     hydrOXYzine  25 mg Oral Q4H PRN     loperamide  2 mg Oral 4x Daily PRN     melatonin  3 mg Oral At Bedtime PRN     nicotine  2 mg Buccal Q1H PRN     OLANZapine  10 mg Oral TID PRN    Or     OLANZapine  10 mg Intramuscular TID PRN       Labs and Imaging:  New results:   No results found for this or any previous visit (from the past 24 hour(s)).    Data this admission:  - CBC unremarkable  - CMP Co2 31  - TSH normal  - UDS negative  - Vit D ordered on 07/24  - Hgb A1c 5.7  - Lipids, HDL 31  - Vit B12 normal  - Folate normal  - Urinalysis unremarkable  - EKG ordered on 07/24     Mental Status Exam:     Oriented to:  Grossly Oriented  General:  Awake and " "Alert  Appearance:  appears stated age  Behavior/Attitude:  calm, cooperative and engaged  Eye Contact:  appropriate at times  Psychomotor: normal, no catatonia present  Speech:  loud volume/tone and spontaneous  Language: Fluent in English with appropriate syntax and vocabulary.  Mood: \"Alright\"  Affect: slightly irritable, restricted, bright at times, congruent with mood  Thought Process:  linear, coherent and goal directed  Thought Content:  No SI/HI/AH/VH; No apparent delusions  Associations:  intact  Insight:  fair due to julianna/hypomania  Judgment:  fair due to julianna/hypomania  Impulse control: fair, due to julianna/hypomania/ADHD/TBI  Attention Span:  grossly intact and adequate for conversation  Concentration:  grossly intact  Recent and Remote Memory:  not formally assessed  Fund of Knowledge:  average  Muscle Strength and Tone: normal  Gait and Station: Normal     Psychiatric Assessment     Etienne Dupree is a 33 year old male previously diagnosed with Bipolar Disorder Type 1, substance use disorder (meth, alcohol, cocaine, ecstasy, marijuana), TBI, ADHD, and PTSD who presented by EMS after his PD was revoked in the context of manic like symptoms likely from substance use. Most recent psychiatric hospitalization was in May 2023. Significant symptoms on admission include irritability, tangential, pressured speech. The MSE on admission was pertinent for irritability, elevated, tangential. Biological contributions to mental health presentation include long standing history of substance use, previous diagnosis of Bipolar Disorder type 1 which he takes Depakote and Zyprexa. Psychological contributions to mental health presentation include poor insight, poor distress tolerance,and cluster B traits. Social factors contributing to mental health presentation include being on MICD commitment, conflicts with daughter, ongoing legal charges, financial difficulties.     In summary, differential is still in evolution. " The patient's reported symptoms of pressured speech, irritability are consistent with prior diagnosis of Bipolar Disorder Type 1, however, patient recently used substances which is contributing to the overall presentation. Utox was positive for amphetamines. Will consider Substance Induced Shweta vs Bipolar 1 Disorder, current episode shweta. He will likely benefit from med optimization and substance use cessation this admission. More collateral will be needed to gather a more accurate history.      Given that he currently has out of control behaviors, aggression and shweta, patient warrants inpatient psychiatric hospitalization to maintain his safety.      Psychiatric Plan by Diagnosis     Today's changes:  - Restart on Strattera 40 mg, up-titrate to PTA dose of 60 mg in 3 days    # Bipolar Disorder vs Substance Induced Shweta  1. Medications:  - Depakote 1250mg BID  - Zyprexa 15mg at bedtime  - Wellbutrin 300 mg being held due to manic-like behaviors     2. Pertinent Labs/Monitoring:   - VPA level 73.6 ug/mL (7/25)  - EKG 5/14 was 391, EKG 7/24 normal with QTc of 387     3. Additional Plans:  - Patient will be treated in therapeutic milieu with appropriate individual and group therapies as described    #PTSD/Nightmares  - Prazosin 1 mg at bedtime   -Monitor orthostatics for lightheadedness    # Nicotine Use Disorder   - Nicotine Patch  - Nicotine Gum     #Anxiety  - Hydroxyzine 25 mg PRN     #ADHD  - Restarted on Strattera 40 mg on 7/31    2. Pertinent Labs/Monitoring:   - EKG unremarkable- QTc 387.      3. Additional Plans:  - Patient will be treated in therapeutic milieu with appropriate individual and group therapies as described     Psychiatric Hospital Course:    In the ED,  Patient was Chart reviewed and was found to be on multiple psychopharmacology medications such as Zyprexa, Depakote, Wellbutrin which are his PTA medications. His current PTA medication were continued due to severe agitation and aggression  while in the emergency room, Haldol 5 mg, Ativan 2 mg, Benadryl 50 mg were added for severe agitation and aggression.     After patient arrived to the unit, his Strattera and Wellbutrin were held due to his manic like symptoms. Nicotine gum ordered 7/22/23. VPA level was ordered for 7/23/23 which 78.0, VPA on 7/25 73.6.     Etienne Dupree was [facility] [address] after his PD was revoked by his .     Medications:  ? PTA Depakote & Zyprexa were continued.   ? PTA Strattera and Wellbutrin were held due to julianna.    ? No new medications started at the time of admission     The risks, benefits, alternatives, and side effects were discussed and understood by the patient and other caregivers.    PTA Strattera was restarted for ADHD on 7/31 after ruling out acute julianna at this time.     Medical Assessment and Plan     Medical diagnoses to be addressed this admission:    # Hypertension  Hydrochlorothiazide 25 mg  Hydralazine for SBP >160 or DBP >100    # Supplements  Fish oil      Medical course:  Patient was physically examined by the ED prior to being transferred to the unit and was found to be medically stable and appropriate for admission.      Consults:  none     Checklist     Legal Status: Court Hold     Safety Assessment:   Behavioral Orders   Procedures     Assault precautions     Code 1 - Restrict to Unit     Elopement precautions     Routine Programming     As clinically indicated     Status 15     Every 15 minutes.       Risk Assessment:  Risk for harm is moderate.  Risk factors: substance use, trauma, impulsive and past behaviors  Protective factors: [facility]     SIO: No    Disposition: Pending stabilization, medication optimization, & development of a safe discharge plan.     Attestations     This patient was seen and discussed with my attending physician.    Elis Paul, MS4  [facility]    Resident Attestation:      I was present with the medical student who  participated in the service and documentation of the note. I have verified the history and personally performed the physical/mental status exam and medical decision making. I agree with the assessment and plan documented in the note.    Enma Ramesh MD, MSc, MPH, PhD candidate   Psychiatry resident, PGY1    This patient has been seen and evaluated by me, Jeannie Jamison DO.  I have discussed this patient with the team including the resident and medical student(s) and I agree with the findings and plan in this note.  Dr. Jeannie Jamison DO, YOSI

## 2023-07-31 NOTE — PLAN OF CARE
The pt visible out on milieu to lounge watching TV, social with selected peer and listening music over headphone. Upon approach calm in mood, bright in affect, cooperative and no behavorial concern noted. Denied all MH concern and contracted for safety. Appetite good ate 100% and adequate fluid intake. The pt refused Scheduled Minipress and comply with other med.      Problem: Plan of Care - These are the overarching goals to be used throughout the patient stay.    Goal: Plan of Care Review  Description: The Plan of Care Review/Shift note should be completed every shift.  The Outcome Evaluation is a brief statement about your assessment that the patient is improving, declining, or no change.  This information will be displayed automatically on your shift note.  Outcome: Progressing  Flowsheets (Taken 7/31/2023 1600)  Plan of Care Reviewed With: patient  Overall Patient Progress: improving   Goal Outcome Evaluation:    Plan of Care Reviewed With: patient Plan of Care Reviewed With: patient    Overall Patient Progress: improvingOverall Patient Progress: improving

## 2023-08-01 PROCEDURE — 124N000002 HC R&B MH UMMC

## 2023-08-01 PROCEDURE — 99232 SBSQ HOSP IP/OBS MODERATE 35: CPT | Mod: GC | Performed by: STUDENT IN AN ORGANIZED HEALTH CARE EDUCATION/TRAINING PROGRAM

## 2023-08-01 PROCEDURE — 250N000013 HC RX MED GY IP 250 OP 250 PS 637

## 2023-08-01 RX ADMIN — Medication 1 G: at 08:34

## 2023-08-01 RX ADMIN — HYDROXYZINE HYDROCHLORIDE 25 MG: 25 TABLET, FILM COATED ORAL at 15:50

## 2023-08-01 RX ADMIN — NICOTINE POLACRILEX 2 MG: 2 GUM, CHEWING ORAL at 13:49

## 2023-08-01 RX ADMIN — NICOTINE 1 PATCH: 21 PATCH, EXTENDED RELEASE TRANSDERMAL at 08:34

## 2023-08-01 RX ADMIN — NICOTINE POLACRILEX 2 MG: 2 GUM, CHEWING ORAL at 15:50

## 2023-08-01 RX ADMIN — NICOTINE POLACRILEX 2 MG: 2 GUM, CHEWING ORAL at 17:15

## 2023-08-01 RX ADMIN — OLANZAPINE 15 MG: 10 TABLET, ORALLY DISINTEGRATING ORAL at 20:50

## 2023-08-01 RX ADMIN — DIVALPROEX SODIUM 1250 MG: 500 TABLET, DELAYED RELEASE ORAL at 08:33

## 2023-08-01 RX ADMIN — HYDROCHLOROTHIAZIDE 25 MG: 25 TABLET ORAL at 08:34

## 2023-08-01 RX ADMIN — ATOMOXETINE 40 MG: 40 CAPSULE ORAL at 08:34

## 2023-08-01 RX ADMIN — DIVALPROEX SODIUM 1250 MG: 500 TABLET, DELAYED RELEASE ORAL at 20:50

## 2023-08-01 RX ADMIN — NICOTINE POLACRILEX 2 MG: 2 GUM, CHEWING ORAL at 10:19

## 2023-08-01 ASSESSMENT — ACTIVITIES OF DAILY LIVING (ADL)
ADLS_ACUITY_SCORE: 28
DRESS: INDEPENDENT
ADLS_ACUITY_SCORE: 28
DRESS: INDEPENDENT
ADLS_ACUITY_SCORE: 28
ORAL_HYGIENE: INDEPENDENT
ORAL_HYGIENE: INDEPENDENT
HYGIENE/GROOMING: INDEPENDENT
HYGIENE/GROOMING: INDEPENDENT
ADLS_ACUITY_SCORE: 28
LAUNDRY: WITH SUPERVISION
ADLS_ACUITY_SCORE: 28

## 2023-08-01 NOTE — CARE PLAN
Assessment/Intervention/Current Symtoms and Care Coordination  The patient's care was discussed with the treatment team and chart notes were reviewed.   Patient met with team.  Patient remains s/w hyper, loud , but has been social on the unit, attending most groups.   No behavioral issues.          Patient's PD has been revoked and she is recommending placement at [facility] in [place] for TBI.  Patient interviewed yesterday but this writer has not yet heard how it went  .  Patient is requesting referrals \  be made to IRTS programs however patient is in need of more structure than IRTS can provide.    Received call from [facility] contact for discharge assistance     Discharge Plan or Goal  [facility]  Psychiatry  Therapy     Barriers to Discharge   Patient in need of further stability/medication mgmt  Placement     Referral Status  Referral in place for [facility]. Awaiting word re: interview.     Legal Status     Civil commitment/Aguiar through [facility] through 4/2024

## 2023-08-01 NOTE — CARE PLAN
[facility] Occupational Therapy Brief Assessment Note     08/01/23 1011   Clinical Impression    Etienne intermittently attends OT group for ~15-30 minute increments; often excuses self d/t distractibility. Pleasant and motivated while in attendance. See intervention notes for further detail.    Affect Appropriate to situation  (restless / hyperactive)   Orientation Oriented to person, place and time   Appearance and ADLs General cleanliness observed in most areas   Attention to Internal Stimuli No observed signs   Interaction Skills Interacts appropriately with staff;Interacts appropriately with peers  (impulsive)   Ability to Communicate Needs Independent   Verbal Content Clear;Appropriate to topic;Tangential;Pressured   Ability to Maintain Boundaries Maintains appropriate physical boundaries;Maintains appropriate verbal boundaries   Participation Initiates participation   Concentration Concentrates 10-20 minutes   Ability to Concentrate With refocus;Easily distracted   Follows and Comprehends Directions Independently follows 2 step verbal directions   Memory Delayed and immediate recall intact   Organization Needs occasional assistance ;Independently organizes medium tasks   Decision Making Independent;Changes mind frequently;Impulsive   Planning and Problem Solving Occasionally needs assist/feedback   Ability to Apply and Learn Concepts Applies within group structure   Frustrations / Stress Tolerance Independently identifies skills ;Independently identifies sources of frustration/stress   Level of Insight Insightful into needs, issues, goals   Self Esteem Can identify positives;Accepts positive feedback   Social Supports Identifies utilizing supports   [facility] OT Care Plan Goals   BE[facility] OT Goal 1 With min assist, Etienne will demonstrate illness management skills necessary for positive resumption of home and community roles by discharge.     Madeline Rosenberg OT on 8/1/2023 at 10:18 AM

## 2023-08-01 NOTE — PROGRESS NOTES
08/01/23 1334   Group Therapy Session   Group Attendance attended group session   Time Session Began 1015   Time Session Ended 1100   Total Time (minutes) 7   Total # Attendees 4   Group Type life skill   Group Session Detail Bridge to self-confidence processing activity for concentration, sharing thoughts/feelings, expressing emotions, follow coping, reality-based activity, mood stabilization, building insight and expanding social skills, follow through, improving self-awareness, motivation for change, and instilling hope for a sustainable recovery.   Patient Participation Detail Pt initially joined group for less than the first 5 min, leaving group during check in.  Pt was in and out of group 3 times after that, did not participate at all, stood to the side while listening to his headphones and swaying to the music.  No charge.

## 2023-08-01 NOTE — CONSULTS
"Occupational Therapy Encounter: Cognitive Screen     Pt appeared eager to meet with Pt today \"to get my TBI checked\". Self reported difficulty with impulsivity. Patient education was provided on reason for OT evaluation and results of testing today.     [employer] Mental Status Examination (UMS)    The SLUMS is a rapid screening instrument used to assess the presence of cognitive deficits, and to identify changes in cognition over time.     Total Score: 21/30    Interpretation:  Pt's score of 21/30 indicates mild neurocognitive disorder, specifically in the domain of Q5: numeric calculation and registration, Q9: visual spatial relations, and Q11: executive function plus extrapolation.        Normal Mild Neurocognitive Disorder    Dementia   High School Education 27-30 21-26 1-20   Less than HS Education 25-30 20-24 1-19       Pt would benefit from opportunities to maintain and strengthen cognitive functioning in to context of functional tasks. Stimulating activities to be used throughout admission may include cross word puzzles, word searches, reading, sequential crafts, and/or math problems.       Madeline Rosenberg, OT on 8/1/2023 at 3:01 PM   "

## 2023-08-01 NOTE — PLAN OF CARE
Problem: Plan of Care - These are the overarching goals to be used throughout the patient stay.    Goal: Plan of Care Review  Description: The Plan of Care Review/Shift note should be completed every shift.  The Outcome Evaluation is a brief statement about your assessment that the patient is improving, declining, or no change.  This information will be displayed automatically on your shift note.  Outcome: Progressing   Goal Outcome Evaluation:                    Patient alert and oriented on approach. Has a bright affect. Hyper verbal, hyperactive and also exhibits poor boundaries with specific peers. Patient had dinner brought in for him by his girlfriend but he did not finish it and was told to do so, but he took it to his room and tried to share the burger with a female peer. PA staff and writer reminded him that he cannot share the food and he has to eat it and he said he was going to.   Later another PA noted to writer that patient shared the burger to the same female peer and she was seen eating it. Writer will report off to night shift to look into orders for patient to prevent further behavioral issues and policy implementation.     Patient endorse anxiety at 4/10 and requested prn hydroxyzine which was administered. Patient did not report any SI, HI, depression and he contracted for safety. He has been social, engaged and out in the milieu.   Patient was not given caffeine beverage this shift due to order noting patient gets caffeine drinks 1-2 beverages before 2 pm. This was explained to patient and he later understood. Patient vitals WNL, no further concerns noted.

## 2023-08-01 NOTE — PROGRESS NOTES
"  ----------------------------------------------------------------------------------------------------------  [facility]  Psychiatry Progress Note  Hospital Day #11     Interim History:     The patient's care was discussed with the treatment team and chart notes were reviewed.    Vitals: VSS  Sleep: 6.75 hours (07/31/23 0600)  Scheduled medications: Took all scheduled medications as prescribed other than prazosin  Psychiatric PRN medications: Hydroxyzine, Zyprexa    Staff Report/Interval Events:     No overnight events, slept well for 7 hours. Continues to engage in group sessions, but has excused himself intermittently due to distractibility. Calm and cooperative with staff, but makes many requests during shifts. Appetite is good. Continues to decline bedtime prazosin because he is sleeping without nightmares.     Subjective:     Patient Interview:    Etienne was seen today in his room. He slept well and without nightmares overnight despite again declining his prazosin. He does not feel that he needs it and is comfortable with discharging it. His interview yesterday left him with many questions, as he discussed the possible locations he would discharge. Notes that his placement would be determined in 2-4 weeks, and he is interested in discharging to an [facility] or [facility]. He is greatly interested in learning more about his TBI and placement at a TBI-specific location. He was offered a SLUMS assessment as well, which he was open to. He feels that the treatment team should advocate for him more in finding him placement. In agreement, he was told that the team would speak to social work about placing more referrals. Otherwise, sleep and mood have been good, and he is not currently feeling anxious. Since restarting his atomoxetine yesterday, he feels \"more calm\" and that he is able to get more done, such as phone calls.    ROS:   Reports depressed mood               " "Denies suicidal ideation, self-destructive thoughts, anhedonia, low energy, insomnia, hypersomnia, appetite changes, weight changes, feeling worthless, excessive guilt, indecisiveness, feeling hopeless, feeling trapped, excessive crying and overwhelmed   Dysregulation:               Reports mood dysregulation, impulsive, aggressive and irritable               Denies suicidal ideation, violent ideation and SIB   Psychosis:               Per chart review: Patient was paranoid people were following him and having AH of \"invisible things inside the house\"              Denies delusions, auditory hallucinations and visual hallucinations currently  Shweta:               Reports increased energy, increased activity, grandiosity, distractibility, racing thoughts, pressured speech, excessive spending, excessive pleasure seeking, excessive risk taking, inter-episode mood instability, dysregulation and mood dysregulation   Objective:     Vitals:  /84 (BP Location: Left arm, Patient Position: Sitting, Cuff Size: Adult Large)   Pulse 76   Temp 98.6  F (37  C) (Oral)   Resp 16   Wt 121.3 kg (267 lb 8 oz)   SpO2 96%   BMI 37.24 kg/m      Allergies:  Allergies   Allergen Reactions    Penicillins        Current Medications:  Scheduled:  Current Facility-Administered Medications   Medication Dose Route Frequency    atomoxetine  40 mg Oral QAM    divalproex sodium delayed-release  1,250 mg Oral BID    fish oil-omega-3 fatty acids  1 g Oral Daily    hydrochlorothiazide  25 mg Oral Daily    nicotine  1 patch Transdermal Daily    And    nicotine   Transdermal Q8H PAULETTE    OLANZapine zydis  15 mg Oral At Bedtime    prazosin  1 mg Oral At Bedtime       PRN:  Current Facility-Administered Medications   Medication Dose Route Frequency    hydrOXYzine  25 mg Oral Q4H PRN    loperamide  2 mg Oral 4x Daily PRN    melatonin  3 mg Oral At Bedtime PRN    nicotine  2 mg Buccal Q1H PRN    OLANZapine  10 mg Oral TID PRN    Or    OLANZapine  10 " "mg Intramuscular TID PRN       Labs and Imaging:  New results:   No results found for this or any previous visit (from the past 24 hour(s)).    Data this admission:  - CBC unremarkable  - CMP Co2 31  - TSH normal  - UDS negative  - Vit D ordered on 07/24  - Hgb A1c 5.7  - Lipids, HDL 31  - Vit B12 normal  - Folate normal  - Urinalysis unremarkable  - EKG ordered on 07/24     Mental Status Exam:     Oriented to:  Grossly Oriented  General:  Awake and Alert  Appearance:  appears stated age  Behavior/Attitude:  calm, cooperative and engaged  Eye Contact:  appropriate at times  Psychomotor: normal, no catatonia present  Speech:  loud volume/tone and spontaneous  Language: Fluent in English with appropriate syntax and vocabulary.  Mood: \"Pretty good\"  Affect: restricted, bright at times, congruent with mood  Thought Process:  linear, coherent and goal directed  Thought Content:  No SI/HI/AH/VH; No apparent delusions  Associations:  intact  Insight:  fair   Judgment:  fair   Impulse control: fair, due to ADHD/TBI  Attention Span:  grossly intact and adequate for conversation  Concentration:  grossly intact  Recent and Remote Memory:  not formally assessed  Fund of Knowledge:  average  Muscle Strength and Tone: normal  Gait and Station: Normal     Psychiatric Assessment     Etienne Dupree is a 33 year old male previously diagnosed with Bipolar Disorder Type 1, substance use disorder (meth, alcohol, cocaine, ecstasy, marijuana), TBI, ADHD, and PTSD who presented by EMS after his PD was revoked in the context of manic like symptoms likely from substance use. Most recent psychiatric hospitalization was in May 2023. Significant symptoms on admission include irritability, tangential, pressured speech. The MSE on admission was pertinent for irritability, elevated, tangential. Biological contributions to mental health presentation include long standing history of substance use, previous diagnosis of Bipolar Disorder type 1 " which he takes Depakote and Zyprexa. Psychological contributions to mental health presentation include poor insight, poor distress tolerance,and cluster B traits. Social factors contributing to mental health presentation include being on MICD commitment, conflicts with daughter, ongoing legal charges, financial difficulties.     In summary, differential is still in evolution. The patient's reported symptoms of pressured speech, irritability are consistent with prior diagnosis of Bipolar Disorder Type 1, however, patient recently used substances which is contributing to the overall presentation. Utox was positive for amphetamines. Will consider Substance Induced Shweta vs Bipolar 1 Disorder, current episode shweta. He will likely benefit from med optimization and substance use cessation this admission. More collateral will be needed to gather a more accurate history.      Given that he currently has out of control behaviors, aggression and shweta, patient warrants inpatient psychiatric hospitalization to maintain his safety.      Psychiatric Plan by Diagnosis     Today's changes:  - Discontinued prazosin    # Bipolar Disorder vs Substance Induced Shweta  1. Medications:  - Depakote 1250mg BID  - Zyprexa 15mg at bedtime  - Wellbutrin 300 mg being held due to manic-like behaviors     2. Pertinent Labs/Monitoring:   - VPA level 73.6 ug/mL (7/25)  - EKG 5/14 was 391, EKG 7/24 normal with QTc of 387     3. Additional Plans:  - Patient will be treated in therapeutic milieu with appropriate individual and group therapies as described    # Nicotine Use Disorder  - Nicotine Patch  - Nicotine Gum     #Anxiety  - Hydroxyzine 25 mg PRN     #ADHD  - Restarted on Strattera 40 mg on 7/31, plan to up-titrate to 60 mg on 8/3    2. Pertinent Labs/Monitoring:   - EKG unremarkable- QTc 387.      3. Additional Plans:  - Patient will be treated in therapeutic milieu with appropriate individual and group therapies as described     Psychiatric  Hospital Course:    In the ED,  Patient was Chart reviewed and was found to be on multiple psychopharmacology medications such as Zyprexa, Depakote, Wellbutrin which are his PTA medications. His current PTA medication were continued due to severe agitation and aggression while in the emergency room, Haldol 5 mg, Ativan 2 mg, Benadryl 50 mg were added for severe agitation and aggression.     After patient arrived to the unit, his Strattera and Wellbutrin were held due to his manic like symptoms. Nicotine gum ordered 7/22/23. VPA level was ordered for 7/23/23 which 78.0, VPA on 7/25 73.6.     Etienne Dupree [facility] [address] [facility] after his PD was revoked by his .   Medications:  PTA Depakote & Zyprexa were continued.   PTA Strattera and Wellbutrin were held due to julianna.    No new medications started at the time of admission  Strattera 40 mg started on 07/31  Discontinued prazosin on 08/1/2023     The risks, benefits, alternatives, and side effects were discussed and understood by the patient and other caregivers.       Medical Assessment and Plan     Medical diagnoses to be addressed this admission:    # Hypertension  Hydrochlorothiazide 25 mg  Hydralazine for SBP >160 or DBP >100    # Supplements  Fish oil      Medical course:  Patient was physically examined by the ED prior to being transferred to the unit and was found to be medically stable and appropriate for admission.      Consults:  none     Checklist     Legal Status: Court Hold     Safety Assessment:   Behavioral Orders   Procedures    Assault precautions    Code 1 - Restrict to Unit    Elopement precautions    Routine Programming     As clinically indicated    Status 15     Every 15 minutes.       Risk Assessment:  Risk for harm is moderate.  Risk factors: substance use, trauma, impulsive and past behaviors  Protective factors: [facility]     SIO: No    Disposition: Pending stabilization, medication optimization, &  development of a safe discharge plan.     Attestations     This patient was seen and discussed with my attending physician.    Elis Paul, MS4  [facility]    Resident Attestation:      I was present with the medical student who participated in the service and documentation of the note. I have verified the history and personally performed the physical/mental status exam and medical decision making. I agree with the assessment and plan documented in the note.    Enma Ramesh MD, MSc, MPH, PhD candidate   Psychiatry resident, PGY1    This patient has been seen and evaluated by me, Jeannie Jamison DO.  I have discussed this patient with the team including the resident and medical student(s) and I agree with the findings and plan in this note.  Dr. Jeannie Jamison DO, YOSI

## 2023-08-01 NOTE — PLAN OF CARE
Pt has been visible in the milieu, he spent most of the shift on the phone. Pt continues to be hyperverbal but has not had any episodes of aggression or any behavioral concerns. He is med compliant without issue, only PRNs received were nicotine gum. Pt denies all mental health symptoms upon assessment.     Problem: Anxiety  Goal: Anxiety Reduction or Resolution  Outcome: Progressing   Goal Outcome Evaluation:    Plan of Care Reviewed With: patient

## 2023-08-02 ENCOUNTER — APPOINTMENT (OUTPATIENT)
Dept: MRI IMAGING | Facility: CLINIC | Age: 33
End: 2023-08-02
Payer: COMMERCIAL

## 2023-08-02 LAB
C TRACH DNA SPEC QL NAA+PROBE: NEGATIVE
N GONORRHOEA DNA SPEC QL NAA+PROBE: NEGATIVE

## 2023-08-02 PROCEDURE — 87491 CHLMYD TRACH DNA AMP PROBE: CPT

## 2023-08-02 PROCEDURE — 250N000013 HC RX MED GY IP 250 OP 250 PS 637

## 2023-08-02 PROCEDURE — 70551 MRI BRAIN STEM W/O DYE: CPT | Mod: 26 | Performed by: RADIOLOGY

## 2023-08-02 PROCEDURE — 70551 MRI BRAIN STEM W/O DYE: CPT

## 2023-08-02 PROCEDURE — 124N000002 HC R&B MH UMMC

## 2023-08-02 PROCEDURE — 87591 N.GONORRHOEAE DNA AMP PROB: CPT

## 2023-08-02 PROCEDURE — 99232 SBSQ HOSP IP/OBS MODERATE 35: CPT | Performed by: PSYCHIATRY & NEUROLOGY

## 2023-08-02 RX ADMIN — DIVALPROEX SODIUM 1250 MG: 500 TABLET, DELAYED RELEASE ORAL at 09:06

## 2023-08-02 RX ADMIN — ATOMOXETINE 40 MG: 40 CAPSULE ORAL at 09:07

## 2023-08-02 RX ADMIN — HYDROCHLOROTHIAZIDE 25 MG: 25 TABLET ORAL at 09:07

## 2023-08-02 RX ADMIN — DIVALPROEX SODIUM 1250 MG: 500 TABLET, DELAYED RELEASE ORAL at 20:43

## 2023-08-02 RX ADMIN — NICOTINE 1 PATCH: 21 PATCH, EXTENDED RELEASE TRANSDERMAL at 09:07

## 2023-08-02 RX ADMIN — Medication 1 G: at 09:06

## 2023-08-02 RX ADMIN — OLANZAPINE 15 MG: 10 TABLET, ORALLY DISINTEGRATING ORAL at 20:44

## 2023-08-02 ASSESSMENT — ACTIVITIES OF DAILY LIVING (ADL)
ADLS_ACUITY_SCORE: 28
ORAL_HYGIENE: INDEPENDENT
HYGIENE/GROOMING: INDEPENDENT;SHOWER
LAUNDRY: WITH SUPERVISION
HYGIENE/GROOMING: INDEPENDENT
DRESS: INDEPENDENT
ADLS_ACUITY_SCORE: 28
DRESS: INDEPENDENT
ADLS_ACUITY_SCORE: 28
ADLS_ACUITY_SCORE: 28
ORAL_HYGIENE: INDEPENDENT
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
## 2023-08-02 NOTE — PLAN OF CARE
"Pt denies SI. Pt woke up a little late this am.  Pt ate break fest.  Pt on phone a lot.  Pt denies SI, denies urges to use drugs alcohol.  Pt states he because he didn't like the IRTS he was in.,  Pt reports waiting for placement.  Pt freq. Requests for food, hygiene products.  Pt requesting items during group time.  Pt explained had to wait till group.  Pt continued to ask other staff for items during group time.  Pt eating well and medication compliant.  Problem: Plan of Care - These are the overarching goals to be used throughout the patient stay.    Goal: Plan of Care Review  Description: The Plan of Care Review/Shift note should be completed every shift.  The Outcome Evaluation is a brief statement about your assessment that the patient is improving, declining, or no change.  This information will be displayed automatically on your shift note.  Outcome: Not Progressing  Goal: Patient-Specific Goal (Individualized)  Description: You can add care plan individualizations to a care plan. Examples of Individualization might be:  \"Parent requests to be called daily at 9am for status\", \"I have a hard time hearing out of my right ear\", or \"Do not touch me to wake me up as it startles me\".  Outcome: Not Progressing  Goal: Absence of Hospital-Acquired Illness or Injury  Outcome: Not Progressing  Goal: Optimal Comfort and Wellbeing  Outcome: Not Progressing  Goal: Readiness for Transition of Care  Outcome: Not Progressing   Goal Outcome Evaluation:                        "

## 2023-08-02 NOTE — PLAN OF CARE
Pt appears to have slept for 7 hours. No PRNs given or requested this shift. Pt remained in his room the entire shift. No concerns noted or reported. Will continue to monitor and offer support.     Problem: Sleep Disturbance  Goal: Adequate Sleep/Rest  Outcome: Progressing   Goal Outcome Evaluation:

## 2023-08-02 NOTE — PLAN OF CARE
Assessment/Intervention/Current Symtoms and Care Coordination  The patient's care was discussed with the treatment team and chart notes were reviewed.   Patient met with team.  Patient remains hyperverbal, loud at times.  He has been attending most groups.   No behavioral issues, however has displayed poor boundaries with selected peers.        Patient's PD has been revoked and she is recommending placement at [facility] in [place] for TBI.  Patient interviewed and has been placed on their wait list.  .  Patient is repeatedly requesting referrals \  be made to IRTS programs however patient is in need of more structure/supervision than IRTS can provide.          Discharge Plan or Goal  [facility]  Psychiatry  Therapy     Barriers to Discharge   Patient in need of further stability/medication mgmt  Placement     Referral Status  Referral in place for [facility]. Awaiting word re: interview.     Legal Status     Civil commitment/Aguiar through [facility] through 4/2024  Will need PD  Patient has [employer] - Restricted Recipient Program

## 2023-08-02 NOTE — PLAN OF CARE
Pt was out and visible in the milieu. Pt came back to the unit from the MRI procedure; no concerns noted. No aggression or hyper verbal  behaviors noted or reported. Pt affect brightens up upon approach. Pt noted interacting  appropriately with with staff and peers. Pt complaint with  scheduled meds; no PRNs requested or given. Ate 100% of his dinner; well hydrated. Pt denied SI/SIB/AH/VH; denied pain as well. Will continue to monitor and offer support.     Problem: Plan of Care - These are the overarching goals to be used throughout the patient stay.    Goal: Optimal Comfort and Wellbeing  Outcome: Progressing  Intervention: Provide Person-Centered Care  Recent Flowsheet Documentation  Taken 8/2/2023 1600 by Ernst Babb, RN  Trust Relationship/Rapport: care explained   Goal Outcome Evaluation:    Plan of Care Reviewed With: patient

## 2023-08-03 PROCEDURE — 250N000013 HC RX MED GY IP 250 OP 250 PS 637

## 2023-08-03 PROCEDURE — G0177 OPPS/PHP; TRAIN & EDUC SERV: HCPCS

## 2023-08-03 PROCEDURE — 99232 SBSQ HOSP IP/OBS MODERATE 35: CPT | Performed by: PSYCHIATRY & NEUROLOGY

## 2023-08-03 PROCEDURE — 124N000002 HC R&B MH UMMC

## 2023-08-03 RX ADMIN — NICOTINE 1 PATCH: 21 PATCH, EXTENDED RELEASE TRANSDERMAL at 08:49

## 2023-08-03 RX ADMIN — NICOTINE POLACRILEX 2 MG: 2 GUM, CHEWING ORAL at 10:48

## 2023-08-03 RX ADMIN — NICOTINE POLACRILEX 2 MG: 2 GUM, CHEWING ORAL at 21:53

## 2023-08-03 RX ADMIN — NICOTINE POLACRILEX 2 MG: 2 GUM, CHEWING ORAL at 18:50

## 2023-08-03 RX ADMIN — NICOTINE POLACRILEX 2 MG: 2 GUM, CHEWING ORAL at 13:48

## 2023-08-03 RX ADMIN — DIVALPROEX SODIUM 1250 MG: 500 TABLET, DELAYED RELEASE ORAL at 08:48

## 2023-08-03 RX ADMIN — OLANZAPINE 15 MG: 10 TABLET, ORALLY DISINTEGRATING ORAL at 21:52

## 2023-08-03 RX ADMIN — HYDROCHLOROTHIAZIDE 25 MG: 25 TABLET ORAL at 08:48

## 2023-08-03 RX ADMIN — ATOMOXETINE 40 MG: 40 CAPSULE ORAL at 08:48

## 2023-08-03 RX ADMIN — DIVALPROEX SODIUM 1250 MG: 500 TABLET, DELAYED RELEASE ORAL at 20:35

## 2023-08-03 RX ADMIN — Medication 1 G: at 08:48

## 2023-08-03 ASSESSMENT — ACTIVITIES OF DAILY LIVING (ADL)
ADLS_ACUITY_SCORE: 28
HYGIENE/GROOMING: INDEPENDENT
ADLS_ACUITY_SCORE: 28
ORAL_HYGIENE: INDEPENDENT
DRESS: SCRUBS (BEHAVIORAL HEALTH);INDEPENDENT

## 2023-08-03 NOTE — PLAN OF CARE
Goal Outcome Evaluation:  Problem: Sleep Disturbance  Goal: Adequate Sleep/Rest  Outcome: Progressing   Pt in bed at the start of this shift. Appears to have slept for 6.5 hours during rounds. No PRN's given. No c/o pain or discomfort. Dines all MH symptoms. No safety or behavioral concern this shift. Nursing staff to continue to monitor and offer support.

## 2023-08-03 NOTE — CARE PLAN
"[facility] Occupational Therapy Group Intervention Note     08/03/23 1532   Group Therapy Session   Group Attendance attended group session   Time Session Began 1415   Time Session Ended 1500   Total Time (minutes) 45   Group Type task skill;psychoeducation   Group Topic Covered coping skills/lifestyle management;relapse prevention;emotions/expression   Group Session Detail Mental health management group focused on goal setting for hopefulness and self-development related to meaningful occupations. Education was provided on SMART goals for increased follow-through and success.    Patient Response/Contribution cooperative with task;discussed personal experience with topic;listened actively;organized   Patient Participation Detail Congruent affect. Demonstrated fair insight as he describes new relationship with \"lady fried\" as positive yet requires intentional boundary setting and clear communication, which turned into his goal for the next week. Further, explained that this is his first relationship outside of recreational relations, which comes with feelings of excitement and caution.      Madeline Rosenberg, OT on 8/3/2023 at 3:33 PM    "

## 2023-08-03 NOTE — PLAN OF CARE
Assessment/Intervention/Current Symtoms and Care Coordination  The patient's care was discussed with the treatment team and chart notes were reviewed.   Patient met with team.  No changes in past 24 hours.  Patient has been attending groups.  Spends a great deal of catalina on the phone.       Patient's PD has been revoked and she is recommending placement at [facility] in [place] for TBI.  Patient interviewed and has been placed on their wait list.  .  Patient  continues to repeatedly requesting referrals  to IRTS programs however patient is in need of more structure/supervision than IRTS can provide.  Care closely coordinated with patient's Cty MARIA EUGENIA Durham    Discharge Plan or Goal  [facility] [place] or similar  Psychiatry  Therapy     Barriers to Discharge   Patient in need of further stability/medication mgmt  Placement     Referral Status  Referral in place for [facility]. Awaiting word re: interview.     Legal Status     Civil commitment/Aguiar through [facility] through 4/2024  Will need PD  Patient has [employer] - Restricted Recipient Program

## 2023-08-03 NOTE — PLAN OF CARE
Has spent most of the day making various phone calls.  States he has been paying bills that are due at the beginning of the month.  Reports depression and anxiety, rating both 4/10.  Denies suicidal thoughts and hallucinations.  Reports mood as good. Affect is bright. Speech tends to be hyper verbal, loud and upbeat.  Social with peers.     Problem: Anxiety  Goal: Anxiety Reduction or Resolution  Outcome: Progressing     Problem: Sleep Disturbance  Goal: Adequate Sleep/Rest  Outcome: Progressing     Problem: Suicidal Behavior  Goal: Suicidal Behavior is Absent or Managed  Outcome: Progressing     Problem: Plan of Care - These are the overarching goals to be used throughout the patient stay.    Goal: Optimal Comfort and Wellbeing  Intervention: Provide Person-Centered Care  Recent Flowsheet Documentation  Taken 8/3/2023 1336 by Lori Caal RN  Trust Relationship/Rapport: care explained     Problem: Seclusion/Restraint, Behavioral  Goal: Absence of Harm or Injury  Intervention: Protect Dignity, Rights, and Personal Wellbeing  Recent Flowsheet Documentation  Taken 8/3/2023 1336 by Lori Caal RN  Trust Relationship/Rapport: care explained     Problem: Adult Behavioral Health Plan of Care  Goal: Plan of Care Review  Recent Flowsheet Documentation  Taken 8/3/2023 1336 by Lori Caal RN  Patient Agreement with Plan of Care: agrees  Goal: Develops/Participates in Therapeutic [place] to Support Successful Transition  Intervention: Foster Therapeutic [place]  Recent Flowsheet Documentation  Taken 8/3/2023 1336 by Lori Caal RN  Trust Relationship/Rapport: care explained   Goal Outcome Evaluation:    Plan of Care Reviewed With: patient

## 2023-08-03 NOTE — PROGRESS NOTES
Behavioral Health  Note   Behavioral Health  Spirituality Group Note     Unit [address]    Name: Etienne Dupree    YOB: 1990   MRN: [id number]    Age: 33 year old     Patient attended -led group, which included discussion of spirituality, coping with illness and building resilience.   Patient attended group for [facility] - spirituality groups are not billed.   patient minimally participated, but was respectful to the group process.     Ozzie [facility]  Staff    Page [phone]

## 2023-08-03 NOTE — PROGRESS NOTES
----------------------------------------------------------------------------------------------------------  [facility]  Psychiatry Progress Note  Hospital Day #13     Interim History:     The patient's care was discussed with the treatment team and chart notes were reviewed.    Vitals: VSS  Sleep: 6.5 hours (08/03/23 0600)  Scheduled medications: Took all scheduled medications as prescribed  Psychiatric PRN medications: Hydroxyzine, Zyprexa    Staff Report/Interval Events:     No overnight events, slept for 6.5 hours. Urine GCC was negative; MRI w/o contrast was normal. Makes frequent requests to staff, but continues to be calm and cooperative. Medication adherent, attends groups. Denies psych symptoms.     Subjective:     Patient Interview:  Patient reports feeling good today. He reports he's been on the phone talking with loved ones about finances. He can't get social security benefits in the hospital. His mom has been helping him financially for months. Team relayed results of MRI and STD results and he expressed understanding and expressed happiness with the results. Patient stated he slept well last night with no nightmares. Inquired about the referrals and expressed frustration with the long process. He expressed frustration with his  because he expressed how they have a lot of stipulations which he feels is unfair. He would like a new  given that he feels misunderstood.    ROS:    Patient denies acute psychiatric concerns.  Patient denies acute medical concerns.     Objective:     Vitals:  BP (!) 139/100 (BP Location: Right arm, Patient Position: Sitting, Cuff Size: Adult Large)   Pulse 91   Temp 97.7  F (36.5  C) (Oral)   Resp 16   Wt 120.8 kg (266 lb 4.8 oz)   SpO2 96%   BMI 37.08 kg/m      Allergies:  Allergies   Allergen Reactions     Penicillins        Current Medications:  Scheduled:  Current Facility-Administered Medications  "  Medication Dose Route Frequency     atomoxetine  40 mg Oral QAM     divalproex sodium delayed-release  1,250 mg Oral BID     fish oil-omega-3 fatty acids  1 g Oral Daily     hydrochlorothiazide  25 mg Oral Daily     nicotine  1 patch Transdermal Daily    And     nicotine   Transdermal Q8H PAULETTE     OLANZapine zydis  15 mg Oral At Bedtime       PRN:  Current Facility-Administered Medications   Medication Dose Route Frequency     hydrOXYzine  25 mg Oral Q4H PRN     loperamide  2 mg Oral 4x Daily PRN     melatonin  3 mg Oral At Bedtime PRN     nicotine  2 mg Buccal Q1H PRN     OLANZapine  10 mg Oral TID PRN    Or     OLANZapine  10 mg Intramuscular TID PRN       Labs and Imaging:  New results:   Recent Results (from the past 24 hour(s))   Neisseria gonorrhoeae PCR    Collection Time: 08/02/23 12:03 PM    Specimen: Urine, Voided   Result Value Ref Range    Neisseria gonorrhoeae Negative Negative   Chlamydia trachomatis PCR    Collection Time: 08/02/23 12:03 PM    Specimen: Urine, Voided   Result Value Ref Range    Chlamydia trachomatis Negative Negative       Data this admission:  - CBC unremarkable  - CMP Co2 31  - TSH normal  - UDS negative  - Vit D ordered on 07/24  - Hgb A1c 5.7  - Lipids, HDL 31  - Vit B12 normal  - Folate normal  - Urinalysis unremarkable  - EKG ordered on 07/24     Mental Status Exam:     Oriented to:  Grossly Oriented  General:  Awake and Alert  Appearance:  appears stated age  Behavior/Attitude:  calm, cooperative and engaged  Eye Contact:  appropriate, intense at times  Psychomotor: normal, no catatonia present  Speech:  loud volume/tone and spontaneous  Language: Fluent in English with appropriate syntax and vocabulary.  Mood: \"Excited\"  Affect: euthymic, congruent with mood  Thought Process:  linear, coherent and goal directed  Thought Content:  No SI/HI/AH/VH; No apparent delusions  Associations:  intact  Insight:  fair   Judgment:  fair   Impulse control: fair, due to ADHD/TBI  Attention " Span:  grossly intact and adequate for conversation  Concentration:  grossly intact  Recent and Remote Memory:  not formally assessed  Fund of Knowledge:  average  Muscle Strength and Tone: normal  Gait and Station: Normal     Psychiatric Assessment     Etienne Dupree is a 33 year old male previously diagnosed with Bipolar Disorder Type 1, substance use disorder (meth, alcohol, cocaine, ecstasy, marijuana), TBI, ADHD, and PTSD who presented by EMS after his PD was revoked in the context of manic like symptoms likely from substance use. Most recent psychiatric hospitalization was in May 2023. Significant symptoms on admission include irritability, tangential, pressured speech. The MSE on admission was pertinent for irritability, elevated, tangential. Biological contributions to mental health presentation include long standing history of substance use, previous diagnosis of Bipolar Disorder type 1 which he takes Depakote and Zyprexa. Psychological contributions to mental health presentation include poor insight, poor distress tolerance,and cluster B traits. Social factors contributing to mental health presentation include being on MICD commitment, conflicts with daughter, ongoing legal charges, financial difficulties.     In summary, differential is still in evolution. The patient's reported symptoms of pressured speech, irritability are consistent with prior diagnosis of Bipolar Disorder Type 1, however, patient recently used substances which is contributing to the overall presentation. Utox was positive for amphetamines. Will consider Substance Induced Shweta vs Bipolar 1 Disorder, current episode shweta. He will likely benefit from med optimization and substance use cessation this admission. More collateral will be needed to gather a more accurate history.      Given that he currently has out of control behaviors, aggression and shweta, patient warrants inpatient psychiatric hospitalization to maintain his safety.       Psychiatric Plan by Diagnosis     Today's changes:  - No changes for today    # Bipolar Disorder vs Substance Induced Shweta  1. Medications:  - Depakote 1250mg BID  - Zyprexa 15mg at bedtime  - Wellbutrin 300 mg being held due to manic-like behaviors     2. Pertinent Labs/Monitoring:   - VPA level 73.6 ug/mL (7/25)  - EKG 5/14 was 391, EKG 7/24 normal with QTc of 387     3. Additional Plans:  - Patient will be treated in therapeutic milieu with appropriate individual and group therapies as described    # Nicotine Use Disorder  - Nicotine Patch  - Nicotine Gum     #Anxiety  - Hydroxyzine 25 mg PRN     #ADHD  - Strattera 40 mg    2. Pertinent Labs/Monitoring:   - EKG unremarkable- QTc 387.      3. Additional Plans:  - Patient will be treated in therapeutic milieu with appropriate individual and group therapies as described     Psychiatric Hospital Course:    In the ED,  Patient was Chart reviewed and was found to be on multiple psychopharmacology medications such as Zyprexa, Depakote, Wellbutrin which are his PTA medications. His current PTA medication were continued due to severe agitation and aggression while in the emergency room, Haldol 5 mg, Ativan 2 mg, Benadryl 50 mg were added for severe agitation and aggression.     After patient arrived to the unit, his PTA Strattera and Wellbutrin were held due to his manic like symptoms. Nicotine gum ordered 7/22/23. VPA level was ordered for 7/23/23 which 78.0, VPA on 7/25 73.6.     Etienne Dupree was admitted to [facility] [address] a [address] hold after his PD was revoked by his .     Medications:  ? PTA Depakote & Zyprexa were continued.   ? PTA Wellbutrin was held due to shweta.    ? No new medications started at the time of admission    Strattera 40 mg restarted on 07/31/2023    Discontinued prazosin on 08/1/2023     The risks, benefits, alternatives, and side effects were discussed and understood by the patient and other caregivers.       Medical Assessment  and Plan     Medical diagnoses to be addressed this admission:    # Hypertension  Hydrochlorothiazide 25 mg  Hydralazine for SBP >160 or DBP >100    # Supplements  Fish oil      Medical course:  Patient was physically examined by the ED prior to being transferred to the unit and was found to be medically stable and appropriate for admission.      Consults:  none     Checklist     Legal Status: Court Hold     Safety Assessment:   Behavioral Orders   Procedures     Assault precautions     Code 1 - Restrict to Unit     Code 2     Elopement precautions     Routine Programming     As clinically indicated     Status 15     Every 15 minutes.       Risk Assessment:  Risk for harm is moderate.  Risk factors: substance use, trauma, impulsive and past behaviors  Protective factors: [facility]     SIO: No    Disposition: Pending stabilization, medication optimization, & development of a safe discharge plan.     Attestations     This patient was seen and discussed with my attending physician.    Elis Paul, MS4  [facility]    Resident Attestation:      I was present with the medical student who participated in the service and documentation of the note. I have verified the history and personally performed the physical/mental status exam and medical decision making. I agree with the assessment and plan documented in the note.    Enma Ramesh MD, MSc, MPH, PhD candidate   Psychiatry resident, PGY1    Attestation:  This patient has been seen and evaluated by me, Donny Heller MD.  I have discussed this patient with the house staff team including the resident and medical student and I agree with the findings and plan in this note.    I have reviewed today's vital signs, medications, labs and imaging. Donny Heller MD , PhD.

## 2023-08-04 PROCEDURE — 250N000013 HC RX MED GY IP 250 OP 250 PS 637

## 2023-08-04 PROCEDURE — 99232 SBSQ HOSP IP/OBS MODERATE 35: CPT | Performed by: PSYCHIATRY & NEUROLOGY

## 2023-08-04 PROCEDURE — 124N000002 HC R&B MH UMMC

## 2023-08-04 RX ORDER — ATOMOXETINE 60 MG/1
60 CAPSULE ORAL EVERY MORNING
Status: DISCONTINUED | OUTPATIENT
Start: 2023-08-05 | End: 2023-08-15 | Stop reason: HOSPADM

## 2023-08-04 RX ADMIN — HYDROCHLOROTHIAZIDE 25 MG: 25 TABLET ORAL at 08:25

## 2023-08-04 RX ADMIN — NICOTINE POLACRILEX 2 MG: 2 GUM, CHEWING ORAL at 20:45

## 2023-08-04 RX ADMIN — Medication 1 G: at 08:25

## 2023-08-04 RX ADMIN — DIVALPROEX SODIUM 1250 MG: 500 TABLET, DELAYED RELEASE ORAL at 20:45

## 2023-08-04 RX ADMIN — ATOMOXETINE 40 MG: 40 CAPSULE ORAL at 08:25

## 2023-08-04 RX ADMIN — OLANZAPINE 15 MG: 10 TABLET, ORALLY DISINTEGRATING ORAL at 20:45

## 2023-08-04 RX ADMIN — NICOTINE 1 PATCH: 21 PATCH, EXTENDED RELEASE TRANSDERMAL at 08:25

## 2023-08-04 RX ADMIN — DIVALPROEX SODIUM 1250 MG: 500 TABLET, DELAYED RELEASE ORAL at 08:25

## 2023-08-04 RX ADMIN — NICOTINE POLACRILEX 2 MG: 2 GUM, CHEWING ORAL at 13:51

## 2023-08-04 RX ADMIN — NICOTINE POLACRILEX 2 MG: 2 GUM, CHEWING ORAL at 16:58

## 2023-08-04 ASSESSMENT — ACTIVITIES OF DAILY LIVING (ADL)
ADLS_ACUITY_SCORE: 28
ADLS_ACUITY_SCORE: 28
ORAL_HYGIENE: INDEPENDENT
ADLS_ACUITY_SCORE: 28
ADLS_ACUITY_SCORE: 28
HYGIENE/GROOMING: INDEPENDENT
ADLS_ACUITY_SCORE: 28
ADLS_ACUITY_SCORE: 28
HYGIENE/GROOMING: INDEPENDENT
ADLS_ACUITY_SCORE: 28
ORAL_HYGIENE: INDEPENDENT
LAUNDRY: WITH SUPERVISION
ADLS_ACUITY_SCORE: 28
ADLS_ACUITY_SCORE: 28
DRESS: INDEPENDENT
DRESS: INDEPENDENT

## 2023-08-04 NOTE — PLAN OF CARE
Problem: Suicidal Behavior  Goal: Suicidal Behavior is Absent or Managed  Outcome: Progressing   Goal Outcome Evaluation:     Pt in bed at the start of this shift. Appears to have slept for 6.75 hours during rounds. No PRN's given. No c/o pain or discomfort. Dines all MH symptoms. No safety or behavioral concern this shift. Nursing staff to continue to monitor and offer support.

## 2023-08-04 NOTE — PLAN OF CARE
"Reports decreased depression and anxiety today, rating both 3/10.  Denies hallucinations, suicidal thoughts and thoughts of self harm.  Has been talking on the phone frequently throughout the day.  Has been making and receiving phone calls.   Reports mood as \"alright\".  Affect is bright. Social with peers. Appetite good. Occasionally pacing the hallway with headphones on.     Problem: Anxiety  Goal: Anxiety Reduction or Resolution  Outcome: Progressing     Problem: Sleep Disturbance  Goal: Adequate Sleep/Rest  Outcome: Progressing     Problem: Suicidal Behavior  Goal: Suicidal Behavior is Absent or Managed  Outcome: Progressing     Problem: Plan of Care - These are the overarching goals to be used throughout the patient stay.    Goal: Optimal Comfort and Wellbeing  Intervention: Monitor Pain and Promote Comfort  Recent Flowsheet Documentation  Taken 8/4/2023 0900 by Lori Caal RN  Pain Management Interventions: declines  Intervention: Provide Person-Centered Care  Recent Flowsheet Documentation  Taken 8/4/2023 1259 by Lori Caal RN  Trust Relationship/Rapport: care explained     Problem: Seclusion/Restraint, Behavioral  Goal: Absence of Harm or Injury  Intervention: Protect Dignity, Rights, and Personal Wellbeing  Recent Flowsheet Documentation  Taken 8/4/2023 1259 by Lori Caal RN  Trust Relationship/Rapport: care explained     Problem: Adult Behavioral Health Plan of Care  Goal: Plan of Care Review  Recent Flowsheet Documentation  Taken 8/4/2023 1259 by Lori Caal RN  Patient Agreement with Plan of Care: agrees  Goal: Develops/Participates in Therapeutic [place] to Support Successful Transition  Intervention: Foster Therapeutic [place]  Recent Flowsheet Documentation  Taken 8/4/2023 1259 by Lori Caal RN  Trust Relationship/Rapport: care explained   Goal Outcome Evaluation:    Plan of Care Reviewed With: patient                   "

## 2023-08-04 NOTE — PROGRESS NOTES
----------------------------------------------------------------------------------------------------------  [facility]  Psychiatry Progress Note  Hospital Day #14     Interim History:     The patient's care was discussed with the treatment team and chart notes were reviewed.    Vitals: VSS  Sleep: 6.75 hours (08/04/23 0643)  Scheduled medications: Took all scheduled medications as prescribed  Psychiatric PRN medications: Hydroxyzine, Zyprexa    Staff Report/Interval Events:     No overnight events, slept for 6.75 hours. Girlfriend visited him last night and was noted to be getting touchy with her - was redirectable. Continues to be calm and cooperative with staff. Medication adherent, attends groups. Denies psych symptoms.     Subjective:     Patient Interview:    Patient reports that he slept well last night but continues to feel tired. He reports that his girlfriend visited him yesterday and brought food. He says that today he's is feeling alright. The patient continues to feel frustrated that every time he leave the hospital he ends up back here. The patient verbalizes that he wants to leave the hospital. States he is feeling less excited because he doesn't have many things to look forward to over the weekend. The patient inquires about starting him on Zoloft because of his depression. Was informed that his Zyprexa or Strattera can also have some antidepressant effect, which he was amenable to.    ROS:    Patient denies acute psychiatric concerns.  Patient denies acute medical concerns.     Objective:     Vitals:  BP (!) 143/95 (BP Location: Right arm, Patient Position: Sitting, Cuff Size: Adult Large)   Pulse 73   Temp 97.4  F (36.3  C) (Oral)   Resp 16   Wt 120.8 kg (266 lb 4.8 oz)   SpO2 98%   BMI 37.08 kg/m      Allergies:  Allergies   Allergen Reactions     Penicillins        Current Medications:  Scheduled:  Current Facility-Administered Medications  "  Medication Dose Route Frequency     atomoxetine  40 mg Oral QAM     divalproex sodium delayed-release  1,250 mg Oral BID     fish oil-omega-3 fatty acids  1 g Oral Daily     hydrochlorothiazide  25 mg Oral Daily     nicotine  1 patch Transdermal Daily    And     nicotine   Transdermal Q8H PAULETTE     OLANZapine zydis  15 mg Oral At Bedtime       PRN:  Current Facility-Administered Medications   Medication Dose Route Frequency     hydrOXYzine  25 mg Oral Q4H PRN     loperamide  2 mg Oral 4x Daily PRN     melatonin  3 mg Oral At Bedtime PRN     nicotine  2 mg Buccal Q1H PRN     OLANZapine  10 mg Oral TID PRN    Or     OLANZapine  10 mg Intramuscular TID PRN       Labs and Imaging:  New results:   No results found for this or any previous visit (from the past 24 hour(s)).    Data this admission:  - CBC unremarkable  - CMP Co2 31  - TSH normal  - UDS negative  - Vit D ordered on 07/24  - Hgb A1c 5.7  - Lipids, HDL 31  - Vit B12 normal  - Folate normal  - Urinalysis unremarkable  - EKG ordered on 07/24     Mental Status Exam:     Oriented to:  Grossly Oriented  General:  Awake and Alert  Appearance:  appears stated age  Behavior/Attitude:  calm, cooperative and engaged  Eye Contact:  appropriate, downcast at times  Psychomotor: normal, no catatonia present  Speech:  soft volume/tone and spontaneous  Language: Fluent in English with appropriate syntax and vocabulary.  Mood: \"Alright\"  Affect: dysthymic, congruent with mood  Thought Process:  linear, coherent and goal directed  Thought Content:  No SI/HI/AH/VH; No apparent delusions  Associations:  intact  Insight:  fair   Judgment:  fair   Impulse control: fair, due to ADHD/TBI  Attention Span:  grossly intact and adequate for conversation  Concentration:  grossly intact  Recent and Remote Memory:  not formally assessed  Fund of Knowledge:  average  Muscle Strength and Tone: normal  Gait and Station: Normal     Psychiatric Assessment     Etienne Dupree is a 33 year old " male previously diagnosed with Bipolar Disorder Type 1, substance use disorder (meth, alcohol, cocaine, ecstasy, marijuana), TBI, ADHD, and PTSD who presented by EMS after his PD was revoked in the context of manic like symptoms likely from substance use. Most recent psychiatric hospitalization was in May 2023. Significant symptoms on admission include irritability, tangential, pressured speech. The MSE on admission was pertinent for irritability, elevated, tangential. Biological contributions to mental health presentation include long standing history of substance use, previous diagnosis of Bipolar Disorder type 1 which he takes Depakote and Zyprexa. Psychological contributions to mental health presentation include poor insight, poor distress tolerance,and cluster B traits. Social factors contributing to mental health presentation include being on MICD commitment, conflicts with daughter, ongoing legal charges, financial difficulties.     In summary, differential is still in evolution. The patient's reported symptoms of pressured speech, irritability are consistent with prior diagnosis of Bipolar Disorder Type 1, however, patient recently used substances which is contributing to the overall presentation. Utox was positive for amphetamines. Will consider Substance Induced Shweta vs Bipolar 1 Disorder, current episode shweta. He will likely benefit from med optimization and substance use cessation this admission. More collateral will be needed to gather a more accurate history.      Given that he currently has out of control behaviors, aggression and shweta, patient warrants inpatient psychiatric hospitalization to maintain his safety.      Psychiatric Plan by Diagnosis     Today's changes:  - Strattera increased to PTA dose of 60 mg    # Bipolar Disorder vs Substance Induced Shweta  1. Medications:  - Depakote 1250mg BID  - Zyprexa 15mg at bedtime  - Wellbutrin 300 mg held due to manic-like behaviors     2. Pertinent  Labs/Monitoring:   - VPA level 73.6 ug/mL (7/25)  - EKG 5/14 was 391, EKG 7/24 normal with QTc of 387     3. Additional Plans:  - Patient will be treated in therapeutic milieu with appropriate individual and group therapies as described    # Nicotine Use Disorder  - Nicotine Patch  - Nicotine Gum     #Anxiety  - Hydroxyzine 25 mg PRN     #ADHD  - Strattera 60 mg    2. Pertinent Labs/Monitoring:   - EKG unremarkable- QTc 387.      3. Additional Plans:  - Patient will be treated in therapeutic milieu with appropriate individual and group therapies as described     Psychiatric Hospital Course:    In the ED,  Patient was Chart reviewed and was found to be on multiple psychopharmacology medications such as Zyprexa, Depakote, Wellbutrin which are his PTA medications. His current PTA medication were continued due to severe agitation and aggression while in the emergency room, Haldol 5 mg, Ativan 2 mg, Benadryl 50 mg were added for severe agitation and aggression.     After patient arrived to the unit, his PTA Strattera and Wellbutrin were held due to his manic like symptoms. Nicotine gum ordered 7/22/23. VPA level was ordered for 7/23/23 which 78.0, VPA on 7/25 73.6.     Etienne Dupree was [facility] [address] after his PD was revoked by his .     Medications:  ? PTA Depakote & Zyprexa were continued.   ? PTA Wellbutrin was held due to julianna.    ? No new medications started at the time of admission    Strattera 40 mg restarted on 07/31/2023, increased to PTA dose of 60 mg for feelings of depression    Discontinued prazosin on 08/1/2023    Strattera increased to PTA dose of 60 mg on 08/04/2023     The risks, benefits, alternatives, and side effects were discussed and understood by the patient and other caregivers.       Medical Assessment and Plan     Medical diagnoses to be addressed this admission:    # Hypertension  Hydrochlorothiazide 25 mg  Hydralazine for SBP >160 or DBP >100    #  Supplements  Fish oil      Medical course:  Patient was physically examined by the ED prior to being transferred to the unit and was found to be medically stable and appropriate for admission.      Consults:  none     Checklist     Legal Status: Court Hold     Safety Assessment:   Behavioral Orders   Procedures     Assault precautions     Code 1 - Restrict to Unit     Code 2     Elopement precautions     Routine Programming     As clinically indicated     Status 15     Every 15 minutes.       Risk Assessment:  Risk for harm is moderate.  Risk factors: substance use, trauma, impulsive and past behaviors  Protective factors: [facility]     SIO: No    Disposition: Pending placement, stabilization, medication optimization, & development of a safe discharge plan.     Attestations     This patient was seen and discussed with my attending physician.    Elis Paul, MS4  [facility]    Resident Attestation:      I was present with the medical student who participated in the service and documentation of the note. I have verified the history and personally performed the physical/mental status exam and medical decision making. I agree with the assessment and plan documented in the note.    Enma Ramesh MD, MSc, MPH, PhD candidate   Psychiatry resident, PGY1    Attestation:  This patient has been seen and evaluated by me, Donny Heller MD.  I have discussed this patient with the house staff team including the resident and medical student and I agree with the findings and plan in this note.    I have reviewed today's vital signs, medications, labs and imaging. Donny Heller MD , PhD.

## 2023-08-04 NOTE — PLAN OF CARE
Assessment/Intervention/Current Symtoms and Care Coordination  The patient's care was discussed with the treatment team and chart notes were reviewed.   Patient met with team.  No changes in past 24 hours.  Patient has been compliant with meds. Spends a great deal of catalina on the phone.     Patient's PD has been revoked and she is recommending placement at [facility] in [place] for TBI.  Patient interviewed and has been placed on their wait list.  .  Patient  continues to repeatedly requesting referrals  to IRTS programs however patient is in need of more structure/supervision than IRTS can provide.  Care closely coordinated with patient's [facility] CM Jose Durham  Updates records sent to Barb [facility]izcarra- [facility] today     Discharge Plan or Goal  [facility] or similar  Psychiatry  Therapy     Barriers to Discharge   Patient in need of further stability/medication mgmt  Placement     Referral Status  Referral in place for [facility]. Awaiting bed     Legal Status     Civil commitment/Aguiar through [facility] through 4/2024  Will need PD  Patient has [employer] - Restricted Recipient Program

## 2023-08-04 NOTE — PLAN OF CARE
Goal Outcome Evaluation:    Problem: Adult Behavioral Health Plan of Care  Goal: Plan of Care Review  Outcome: Progressing     Problem: Anxiety  Goal: Anxiety Reduction or Resolution  Outcome: Progressing     Patient was visible in the lounge area most of the shift. Remains hyper verbal. Walking back and forth the hallway. Pt denied all psych symptom, denied pain.  Presented with bright/cheerful affect. Social with peers and staff. Pt spent more time talking on the phone. Appetite and hygiene is adequate. Pt's girlfriend visited, both were observed getting too closed and touchy. Writer redirected both of them. Pt's girlfriend asked if they can talk somewhere private and  was informed that the unit don't allow that. Pt did not attend group this shift. Pt is medication complaint.

## 2023-08-05 PROCEDURE — 250N000013 HC RX MED GY IP 250 OP 250 PS 637

## 2023-08-05 PROCEDURE — 124N000002 HC R&B MH UMMC

## 2023-08-05 RX ADMIN — DIVALPROEX SODIUM 1250 MG: 500 TABLET, DELAYED RELEASE ORAL at 08:50

## 2023-08-05 RX ADMIN — OLANZAPINE 15 MG: 10 TABLET, ORALLY DISINTEGRATING ORAL at 19:59

## 2023-08-05 RX ADMIN — NICOTINE POLACRILEX 2 MG: 2 GUM, CHEWING ORAL at 13:37

## 2023-08-05 RX ADMIN — NICOTINE 1 PATCH: 21 PATCH, EXTENDED RELEASE TRANSDERMAL at 08:50

## 2023-08-05 RX ADMIN — ATOMOXETINE 60 MG: 60 CAPSULE ORAL at 08:50

## 2023-08-05 RX ADMIN — DIVALPROEX SODIUM 1250 MG: 500 TABLET, DELAYED RELEASE ORAL at 19:56

## 2023-08-05 RX ADMIN — NICOTINE POLACRILEX 2 MG: 2 GUM, CHEWING ORAL at 19:57

## 2023-08-05 RX ADMIN — Medication 1 G: at 08:50

## 2023-08-05 RX ADMIN — HYDROCHLOROTHIAZIDE 25 MG: 25 TABLET ORAL at 08:50

## 2023-08-05 ASSESSMENT — ACTIVITIES OF DAILY LIVING (ADL)
HYGIENE/GROOMING: INDEPENDENT
HYGIENE/GROOMING: INDEPENDENT
ADLS_ACUITY_SCORE: 28
ADLS_ACUITY_SCORE: 28
ORAL_HYGIENE: INDEPENDENT
ADLS_ACUITY_SCORE: 28
ORAL_HYGIENE: INDEPENDENT
ADLS_ACUITY_SCORE: 28
LAUNDRY: WITH SUPERVISION
DRESS: INDEPENDENT
DRESS: INDEPENDENT;STREET CLOTHES
ADLS_ACUITY_SCORE: 28

## 2023-08-05 NOTE — PLAN OF CARE
"Visible in the milieu, using the phone frequently or pacing the hallway with headphones on.   Girlfriend visited this afternoon, states visit went well.  Denies depression and anxiety, rating both 0/10.  Affect is bright, states his mood is \"good\".  Denies suicidal thoughts and hallucinations.  Is medication compliant.     Problem: Anxiety  Goal: Anxiety Reduction or Resolution  Outcome: Progressing     Problem: Sleep Disturbance  Goal: Adequate Sleep/Rest  Outcome: Progressing     Problem: Suicidal Behavior  Goal: Suicidal Behavior is Absent or Managed  Outcome: Progressing     Problem: Plan of Care - These are the overarching goals to be used throughout the patient stay.    Goal: Optimal Comfort and Wellbeing  Intervention: Provide Person-Centered Care  Recent Flowsheet Documentation  Taken 8/5/2023 1357 by Lori Caal RN  Trust Relationship/Rapport: care explained     Problem: Seclusion/Restraint, Behavioral  Goal: Absence of Harm or Injury  Intervention: Protect Dignity, Rights, and Personal Wellbeing  Recent Flowsheet Documentation  Taken 8/5/2023 1357 by Lori Caal RN  Trust Relationship/Rapport: care explained     Problem: Adult Behavioral Health Plan of Care  Goal: Plan of Care Review  Recent Flowsheet Documentation  Taken 8/5/2023 1357 by Lori Caal RN  Patient Agreement with Plan of Care: agrees  Goal: Develops/Participates in Therapeutic [place] to Support Successful Transition  Intervention: Foster Therapeutic [place]  Recent Flowsheet Documentation  Taken 8/5/2023 1357 by Lori Caal RN  Trust Relationship/Rapport: care explained   Goal Outcome Evaluation:    Plan of Care Reviewed With: patient                   "

## 2023-08-05 NOTE — PLAN OF CARE
Problem: Sleep Disturbance  Goal: Adequate Sleep/Rest  Outcome: Progressing   Goal Outcome Evaluation:  Pt in bed at the start of this shift. Appears to have slept for 6.75 hours during rounds. No PRN's given. No c/o pain or discomfort. Dines all MH symptoms. No safety or behavioral concern this shift. Nursing staff to continue to monitor and offer support.

## 2023-08-05 NOTE — PLAN OF CARE
Problem: Adult Behavioral Health Plan of Care  Goal: Plan of Care Review  Outcome: Progressing  Flowsheets (Taken 8/5/2023 1600)  Patient Agreement with Plan of Care: agrees     Problem: Anxiety  Goal: Anxiety Reduction or Resolution  Outcome: Progressing   Goal Outcome Evaluation:    Plan of Care Reviewed With: patient      Patient socialized approprietly with staff and peers. Spent sometime watching movies with peers. Was equally observed pacing the hallway with ear headphone on. Denied all psych symptoms and readily contracted for safety. Affect was bright on approach and mood was pleasant. Patient was alert and oriented and speech was clear and coherent. Vitals were stable and and appetite was good, evidence by patient eating 100% dinner and double portions of snack. No psychotic behavior observed. Will continue to encourage

## 2023-08-05 NOTE — PLAN OF CARE
Goal Outcome Evaluation:    Plan of Care Reviewed With: patient             Pt observed in the milieu, socialized with staff and peers, watched TV, paced the hallway, and listen to music the headphone. Pt denies all mental health psych symptoms and contracted for safety. Pt described as good and affect brighten during interaction. Pt speech clear and coherent, organized thought process and maintained a good eye contact. Adequate fluid and food intake, voiding freely and denies any BM issue.

## 2023-08-06 PROCEDURE — 250N000013 HC RX MED GY IP 250 OP 250 PS 637

## 2023-08-06 PROCEDURE — 124N000002 HC R&B MH UMMC

## 2023-08-06 RX ADMIN — NICOTINE 1 PATCH: 21 PATCH, EXTENDED RELEASE TRANSDERMAL at 08:27

## 2023-08-06 RX ADMIN — ATOMOXETINE 60 MG: 60 CAPSULE ORAL at 08:27

## 2023-08-06 RX ADMIN — OLANZAPINE 15 MG: 10 TABLET, ORALLY DISINTEGRATING ORAL at 20:39

## 2023-08-06 RX ADMIN — DIVALPROEX SODIUM 1250 MG: 500 TABLET, DELAYED RELEASE ORAL at 20:39

## 2023-08-06 RX ADMIN — DIVALPROEX SODIUM 1250 MG: 500 TABLET, DELAYED RELEASE ORAL at 08:27

## 2023-08-06 RX ADMIN — NICOTINE POLACRILEX 2 MG: 2 GUM, CHEWING ORAL at 10:53

## 2023-08-06 RX ADMIN — NICOTINE POLACRILEX 2 MG: 2 GUM, CHEWING ORAL at 20:40

## 2023-08-06 RX ADMIN — Medication 1 G: at 08:26

## 2023-08-06 RX ADMIN — HYDROCHLOROTHIAZIDE 25 MG: 25 TABLET ORAL at 08:27

## 2023-08-06 ASSESSMENT — ACTIVITIES OF DAILY LIVING (ADL)
ADLS_ACUITY_SCORE: 28
ADLS_ACUITY_SCORE: 28
DRESS: INDEPENDENT
ADLS_ACUITY_SCORE: 28
ORAL_HYGIENE: INDEPENDENT
ADLS_ACUITY_SCORE: 28
HYGIENE/GROOMING: INDEPENDENT
ADLS_ACUITY_SCORE: 28

## 2023-08-06 NOTE — CARE CONFERENCE
Problem: Sleep Disturbance  Goal: Adequate Sleep/Rest  Outcome: Progressing   Goal Outcome Evaluation:    Pt in bed at the start of this shift. Appears to have slept for 6 hours during rounds. No PRN's given. No c/o pain or discomfort. Dines all MH symptoms. No safety or behavioral concern this shift. Nursing staff to continue to monitor and offer support.

## 2023-08-06 NOTE — PLAN OF CARE
Visible in the milieu. Paces the hallway while listening to music via headphones or talking on the phone.  Denies all mental health symptoms including : depression, anxiety, suicidal thoughts and hallucinations.  Speech tends to be hyper verbal and loud at times.  Girlfriend visited this afternoon.  Social and appropriate with peers.     Problem: Anxiety  Goal: Anxiety Reduction or Resolution  Outcome: Progressing     Problem: Sleep Disturbance  Goal: Adequate Sleep/Rest  Outcome: Progressing     Problem: Suicidal Behavior  Goal: Suicidal Behavior is Absent or Managed  Outcome: Progressing     Problem: Plan of Care - These are the overarching goals to be used throughout the patient stay.    Goal: Optimal Comfort and Wellbeing  Intervention: Provide Person-Centered Care  Recent Flowsheet Documentation  Taken 8/6/2023 1354 by Lori Caal RN  Trust Relationship/Rapport: care explained     Problem: Seclusion/Restraint, Behavioral  Goal: Absence of Harm or Injury  Intervention: Protect Dignity, Rights, and Personal Wellbeing  Recent Flowsheet Documentation  Taken 8/6/2023 1354 by Lori Caal RN  Trust Relationship/Rapport: care explained     Problem: Adult Behavioral Health Plan of Care  Goal: Plan of Care Review  Recent Flowsheet Documentation  Taken 8/6/2023 1354 by Lori Caal RN  Patient Agreement with Plan of Care: agrees  Goal: Develops/Participates in Therapeutic [place] to Support Successful Transition  Intervention: Foster Therapeutic [place]  Recent Flowsheet Documentation  Taken 8/6/2023 1354 by Lori Caal RN  Trust Relationship/Rapport: care explained   Goal Outcome Evaluation:    Plan of Care Reviewed With: patient

## 2023-08-06 NOTE — PLAN OF CARE
"  Problem: Adult Behavioral Health Plan of Care  Goal: Plan of Care Review  Outcome: Progressing  Flowsheets (Taken 8/6/2023 1651)  Patient Agreement with Plan of Care: agrees     Problem: Anxiety  Goal: Anxiety Reduction or Resolution  Outcome: Progressing   Goal Outcome Evaluation:    Plan of Care Reviewed With: patient        During room check, a Vape was found in the patient's room wrapped in tissue paper. When asked where it came from,  patient stated \" I found it in the pants given to me by staff from my locker during the day shift.\" Patient  showed staff a $50 bill in his keeping, but refused to hand it over to the nurse. He stated \" Its my money. I have the right to keep it, and I am okay keeping it.\" Patient was told the unit or hospital will not be responsible, if he miss places his $50 bill and he replied \" I know. I will keep it.\"   He was less visible in the milieu during shift, but still paced the hallway while listening to music with headphones and talking on the phone with girl friend. Denied all mental health symptoms. Will continue to monitor.  "

## 2023-08-07 PROCEDURE — 124N000002 HC R&B MH UMMC

## 2023-08-07 PROCEDURE — 250N000013 HC RX MED GY IP 250 OP 250 PS 637

## 2023-08-07 PROCEDURE — 99232 SBSQ HOSP IP/OBS MODERATE 35: CPT | Mod: GC | Performed by: PSYCHIATRY & NEUROLOGY

## 2023-08-07 RX ADMIN — OLANZAPINE 15 MG: 10 TABLET, ORALLY DISINTEGRATING ORAL at 20:56

## 2023-08-07 RX ADMIN — NICOTINE POLACRILEX 2 MG: 2 GUM, CHEWING ORAL at 20:57

## 2023-08-07 RX ADMIN — NICOTINE 1 PATCH: 21 PATCH, EXTENDED RELEASE TRANSDERMAL at 07:54

## 2023-08-07 RX ADMIN — DIVALPROEX SODIUM 1250 MG: 500 TABLET, DELAYED RELEASE ORAL at 07:52

## 2023-08-07 RX ADMIN — ATOMOXETINE 60 MG: 60 CAPSULE ORAL at 07:52

## 2023-08-07 RX ADMIN — NICOTINE POLACRILEX 2 MG: 2 GUM, CHEWING ORAL at 19:06

## 2023-08-07 RX ADMIN — HYDROCHLOROTHIAZIDE 25 MG: 25 TABLET ORAL at 07:56

## 2023-08-07 RX ADMIN — HYDROXYZINE HYDROCHLORIDE 25 MG: 25 TABLET, FILM COATED ORAL at 07:53

## 2023-08-07 RX ADMIN — DIVALPROEX SODIUM 1250 MG: 500 TABLET, DELAYED RELEASE ORAL at 20:57

## 2023-08-07 RX ADMIN — Medication 1 G: at 07:52

## 2023-08-07 ASSESSMENT — ACTIVITIES OF DAILY LIVING (ADL)
HYGIENE/GROOMING: INDEPENDENT
ADLS_ACUITY_SCORE: 28
ORAL_HYGIENE: INDEPENDENT
ADLS_ACUITY_SCORE: 28
DRESS: INDEPENDENT
ADLS_ACUITY_SCORE: 28

## 2023-08-07 NOTE — PROGRESS NOTES
----------------------------------------------------------------------------------------------------------  [facility]  Psychiatry Progress Note  Hospital Day #17     Interim History:     The patient's care was discussed with the treatment team and chart notes were reviewed.    Vitals: VSS  Sleep: 3.75 hours (08/07/23 0704)  Scheduled medications: Took all scheduled medications as prescribed  Psychiatric PRN medications: Hydroxyzine, Zyprexa    Staff Report/Interval Events:     Slept for average of 6 hours this weekend. Girlfriend visited him over the weekend, which went well. Notably found a vape and $50 on his person during room checks. He was not willing to give the money to staff. Medication adherent, did not attend group sessions over the weekend. Denies psych symptoms.     Subjective:     Patient Interview:    Patient reports having a good weekend, but did note that a vape and $50 was found in his belongings. Had a phone conversation regarding finding placement in a group home, but the caller's line ended abruptly. Etienne is feeling distraught about the possibility that he may have jeopardized his chances of getting into the group home in [place]. He is interested in finding out more about a TBI clinic at [place]. He continues to endorse depression and inquires about starting Zoloft. When told that since his Strattera was increased and can help with depression, he was agreeable. Lastly, he was open to speaking with a  again to talk about his goals.    ROS:    Patient denies acute psychiatric concerns.  Patient denies acute medical concerns.     Objective:     Vitals:  BP (!) 170/94 (BP Location: Right arm)   Pulse 107   Temp 99.1  F (37.3  C) (Oral)   Resp 20   Wt 122.7 kg (270 lb 6.4 oz)   SpO2 95%   BMI 37.65 kg/m      Allergies:  Allergies   Allergen Reactions    Penicillins        Current Medications:  Scheduled:  Current  "Facility-Administered Medications   Medication Dose Route Frequency    atomoxetine  60 mg Oral QAM    divalproex sodium delayed-release  1,250 mg Oral BID    fish oil-omega-3 fatty acids  1 g Oral Daily    hydrochlorothiazide  25 mg Oral Daily    nicotine  1 patch Transdermal Daily    And    nicotine   Transdermal Q8H PAULETTE    OLANZapine zydis  15 mg Oral At Bedtime       PRN:  Current Facility-Administered Medications   Medication Dose Route Frequency    hydrOXYzine  25 mg Oral Q4H PRN    loperamide  2 mg Oral 4x Daily PRN    melatonin  3 mg Oral At Bedtime PRN    nicotine  2 mg Buccal Q1H PRN    OLANZapine  10 mg Oral TID PRN    Or    OLANZapine  10 mg Intramuscular TID PRN       Labs and Imaging:  New results:   No results found for this or any previous visit (from the past 24 hour(s)).    Data this admission:  - CBC unremarkable  - CMP Co2 31  - TSH normal  - UDS negative  - Vit D ordered on 07/24  - Hgb A1c 5.7  - Lipids, HDL 31  - Vit B12 normal  - Folate normal  - Urinalysis unremarkable  - EKG ordered on 07/24     Mental Status Exam:     Oriented to:  Grossly Oriented  General:  Awake and Alert  Appearance:  appears stated age  Behavior/Attitude:  calm, cooperative and engaged  Eye Contact:  downcast  Psychomotor: normal, no catatonia present  Speech:  soft volume/tone and spontaneous  Language: Fluent in English with appropriate syntax and vocabulary.  Mood: \"Flowing\"  Affect: appropriate, restricted and sad  Thought Process:  linear, coherent and goal directed  Thought Content:  No SI/HI/AH/VH; No apparent delusions  Associations:  intact  Insight:  fair   Judgment:  limited due to trying to purchase a car while hospitalized   Impulse control: fair, due to ADHD/TBI  Attention Span:  grossly intact and adequate for conversation  Concentration:  grossly intact  Recent and Remote Memory:  not formally assessed  Fund of Knowledge:  average  Muscle Strength and Tone: normal  Gait and Station: Normal     " Psychiatric Assessment     Etienne Dupree is a 33 year old male previously diagnosed with Bipolar Disorder Type 1, substance use disorder (meth, alcohol, cocaine, ecstasy, marijuana), TBI, ADHD, and PTSD who presented by EMS after his PD was revoked in the context of manic like symptoms likely from substance use. Most recent psychiatric hospitalization was in May 2023. Significant symptoms on admission include irritability, tangential, pressured speech. The MSE on admission was pertinent for irritability, elevated, tangential. Biological contributions to mental health presentation include long standing history of substance use, previous diagnosis of Bipolar Disorder type 1 which he takes Depakote and Zyprexa. Psychological contributions to mental health presentation include poor insight, poor distress tolerance,and cluster B traits. Social factors contributing to mental health presentation include being on MICD commitment, conflicts with daughter, ongoing legal charges, financial difficulties.     In summary, differential is still in evolution. The patient's reported symptoms of pressured speech, irritability are consistent with prior diagnosis of Bipolar Disorder Type 1, however, patient recently used substances which is contributing to the overall presentation. Utox was positive for amphetamines. Will consider Substance Induced Shweta vs Bipolar 1 Disorder, current episode shweta. He will likely benefit from med optimization and substance use cessation this admission. More collateral will be needed to gather a more accurate history.      Given that he currently has out of control behaviors, aggression and shweta, patient warrants inpatient psychiatric hospitalization to maintain his safety.      Psychiatric Plan by Diagnosis     Today's changes:  -  consultation placed    # Bipolar Disorder vs Substance Induced Shweta  1. Medications:  - Depakote 1250mg BID  - Zyprexa 15mg at bedtime  - Wellbutrin 300 mg  held due to manic-like behaviors     2. Pertinent Labs/Monitoring:   - VPA level 73.6 ug/mL (7/25)  - EKG 5/14 was 391, EKG 7/24 normal with QTc of 387  - EKG scheduled for 8/7     3. Additional Plans:  - Patient will be treated in therapeutic milieu with appropriate individual and group therapies as described    # Nicotine Use Disorder  - Nicotine Patch  - Nicotine Gum     #Anxiety  - Hydroxyzine 25 mg PRN     #ADHD  - Strattera 60 mg    2. Pertinent Labs/Monitoring:   - EKG unremarkable- QTc 387.      3. Additional Plans:  - Patient will be treated in therapeutic milieu with appropriate individual and group therapies as described     Psychiatric Hospital Course:    In the ED,  Patient was Chart reviewed and was found to be on multiple psychopharmacology medications such as Zyprexa, Depakote, Wellbutrin which are his PTA medications. His current PTA medication were continued due to severe agitation and aggression while in the emergency room, Haldol 5 mg, Ativan 2 mg, Benadryl 50 mg were added for severe agitation and aggression.     After patient arrived to the unit, his PTA Strattera and Wellbutrin were held due to his manic like symptoms. Nicotine gum ordered 7/22/23. VPA level was ordered for 7/23/23 which 78.0, VPA on 7/25 73.6.     Etienne Dupree was [facility] [address] [facility] after his PD was revoked by his .   Medications:  PTA Depakote & Zyprexa were continued.   PTA Wellbutrin was held due to julianna.    No new medications started at the time of admission  Strattera 40 mg restarted on 07/31/2023, increased to PTA dose of 60 mg for feelings of depression  Discontinued prazosin on 08/1/2023  Strattera increased to PTA dose of 60 mg on 08/04/2023     The risks, benefits, alternatives, and side effects were discussed and understood by the patient and other caregivers.       Medical Assessment and Plan     Medical diagnoses to be addressed this admission:    #  Hypertension  Hydrochlorothiazide 25 mg  Hydralazine for SBP >160 or DBP >100    # Supplements  Fish oil      Medical course:  Patient was physically examined by the ED prior to being transferred to the unit and was found to be medically stable and appropriate for admission.      Consults:  none     Checklist     Legal Status: Court Hold     Safety Assessment:   Behavioral Orders   Procedures    Assault precautions    Code 1 - Restrict to Unit    Code 2    Elopement precautions    Routine Programming     As clinically indicated    Status 15     Every 15 minutes.       Risk Assessment:  Risk for harm is moderate.  Risk factors: substance use, trauma, impulsive and past behaviors  Protective factors: [facility]     SIO: No    Disposition: Pending placement, stabilization, medication optimization, & development of a safe discharge plan.     Attestations     This patient was seen and discussed with my attending physician.    Elis Paul, MS4  [facility]    Resident Attestation:      I was present with the medical student who participated in the service and documentation of the note. I have verified the history and personally performed the physical/mental status exam and medical decision making. I agree with the assessment and plan documented in the note.    Enma Ramesh MD, MSc, MPH, PhD candidate   Psychiatry resident, PGY1

## 2023-08-07 NOTE — PLAN OF CARE
"  Problem: Plan of Care - These are the overarching goals to be used throughout the patient stay.    Goal: Plan of Care Review  Description: The Plan of Care Review/Shift note should be completed every shift.  The Outcome Evaluation is a brief statement about your assessment that the patient is improving, declining, or no change.  This information will be displayed automatically on your shift note.  Outcome: Progressing  Flowsheets (Taken 8/7/2023 1450)  Plan of Care Reviewed With: patient  Overall Patient Progress: improving  Goal: Patient-Specific Goal (Individualized)  Description: You can add care plan individualizations to a care plan. Examples of Individualization might be:  \"Parent requests to be called daily at 9am for status\", \"I have a hard time hearing out of my right ear\", or \"Do not touch me to wake me up as it startles me\".  Outcome: Progressing  Goal: Absence of Hospital-Acquired Illness or Injury  Outcome: Progressing  Intervention: Identify and Manage Fall Risk  Recent Flowsheet Documentation  Taken 8/7/2023 0800 by Beverly Iqbal RN  Safety Promotion/Fall Prevention: safety round/check completed  Intervention: Prevent Skin Injury  Recent Flowsheet Documentation  Taken 8/7/2023 0800 by Beverly Iqbal RN  Body Position: position changed independently  Goal: Optimal Comfort and Wellbeing  Outcome: Progressing  Goal: Readiness for Transition of Care  Outcome: Progressing     Problem: Seclusion/Restraint, Behavioral  Goal: Absence of Harm or Injury  Outcome: Progressing  Intervention: Protect Skin and Joint Integrity  Recent Flowsheet Documentation  Taken 8/7/2023 0800 by Beverly Iqbal RN  Body Position: position changed independently     Problem: Adult Behavioral Health Plan of Care  Goal: Plan of Care Review  Outcome: Progressing  Flowsheets (Taken 8/7/2023 1450)  Plan of Care Reviewed With: patient  Overall Patient Progress: improving  Goal: Patient-Specific Goal (Individualization)  Description: " "You can add care plan individualizations to a care plan. Examples of Individualization might be:  \"Parent requests to be called daily at 9am for status\", \"I have a hard time hearing out of my right ear\", or \"Do not touch me to wake me up as it startles me\".  Outcome: Progressing  Goal: Individualized Daily Interaction Plan (IDIP)  Outcome: Progressing  Goal: Adheres to Safety Considerations for Self and Others  Outcome: Progressing  Goal: Absence of New-Onset Illness or Injury  Outcome: Progressing  Goal: Optimized Coping Skills in Response to Life Stressors  Outcome: Progressing  Goal: Develops/Participates in Therapeutic [place] to Support Successful Transition  Outcome: Progressing     Problem: Anxiety  Goal: Anxiety Reduction or Resolution  Outcome: Progressing     Problem: Sleep Disturbance  Goal: Adequate Sleep/Rest  Outcome: Progressing     Problem: Suicidal Behavior  Goal: Suicidal Behavior is Absent or Managed  Outcome: Progressing   Goal Outcome Evaluation:      Plan of Care Reviewed With: patient    Overall Patient Progress: improving  Patient has been visible in the milieu.Hyper verbal.Socially appropriate.No escalation of behaviors.NO safety concerns.Patient seeks out staff all the time for requests here and there.Medication complaint.Patient has been anxious about his bank account,placement in [facility] vs [facility].PRN Atarax given one time for anxiety with relief.Patient denies all other MH issues.Intake adequate.BP runs high,Bp medication given with effect.  Temp: 99.1  F (37.3  C) Temp src: Oral BP: (!) 170/94 Pulse: 107   Resp: 20 SpO2: 95 % O2 Device: None (Room air)              "

## 2023-08-07 NOTE — PROGRESS NOTES
"SPIRITUAL HEALTH SERVICES  SPIRITUAL ASSESSMENT Progress Note  [facility] ([facility] [address]     REFERRAL SOURCE: I did visit this afternoon patient Etienne per epic consult order. Pt was so busy talking with someone by phone on my both visit attempts. On my second visit attempts, I told him that if it is the good time to visit him that I can wait until he finish his phone conversation. Instead pt shout with his high volume said, \"I am not interested. Not at this time. I am talking with some one and it's very important issue about my life. Right now, I need this phone conversation more than anything else.\" Pt decline to talk with this . In order to make the pt calm, I told him, I am so sorry for inconvenient and then I let him to be alone to continue his phone conversation. As I observer the pt current situation and frustration at this moment, it is not right time to talk with the pt with his anger instead I left him where he was so quietly.     PLAN: I will remain open to provide spiritual care for the pt as needed.    Ozzie Arias M.Div. ([place]), M.Th., D.Min., [facility]  Staff   Pager [phone]    "

## 2023-08-07 NOTE — PLAN OF CARE
"  Problem: Adult Behavioral Health Plan of Care  Goal: Plan of Care Review  Outcome: Progressing  Flowsheets (Taken 8/7/2023 1700)  Patient Agreement with Plan of Care: agrees   Goal Outcome Evaluation:    Plan of Care Reviewed With: patient      Patient presented a somber face and a flat and guarded affect through most of the shift. He isolated himself in his room and only came out for dinner and for phone calls. On assessment , he told the writer \" My money is gone. Somebody spent it all. I am worried and anxious. Nothing seem to be working well for me. I wanted to go back to my group home , but they refused. [place]. My sister had surgery today. I feel for her. I am worried something might happened to her. She is in [facility] and I am  anxious.\" He  denied all psych symptoms and did not pace the hallway as usual. Will continue to monitor and encourage   "

## 2023-08-08 PROBLEM — R45.1 AGITATION: Status: RESOLVED | Noted: 2023-07-21 | Resolved: 2023-08-08

## 2023-08-08 PROBLEM — F60.9 PERSONALITY DISORDER (H): Status: RESOLVED | Noted: 2022-09-13 | Resolved: 2023-08-08

## 2023-08-08 PROBLEM — F23 ACUTE PSYCHOSIS (H): Status: RESOLVED | Noted: 2023-07-21 | Resolved: 2023-08-08

## 2023-08-08 PROBLEM — Z76.5 MALINGERING: Status: RESOLVED | Noted: 2021-04-16 | Resolved: 2023-08-08

## 2023-08-08 PROBLEM — F15.221: Status: RESOLVED | Noted: 2018-08-03 | Resolved: 2023-08-08

## 2023-08-08 PROCEDURE — 250N000013 HC RX MED GY IP 250 OP 250 PS 637

## 2023-08-08 PROCEDURE — 99232 SBSQ HOSP IP/OBS MODERATE 35: CPT | Mod: GC | Performed by: PSYCHIATRY & NEUROLOGY

## 2023-08-08 PROCEDURE — 124N000002 HC R&B MH UMMC

## 2023-08-08 RX ADMIN — DIVALPROEX SODIUM 1250 MG: 500 TABLET, DELAYED RELEASE ORAL at 07:57

## 2023-08-08 RX ADMIN — NICOTINE POLACRILEX 2 MG: 2 GUM, CHEWING ORAL at 20:32

## 2023-08-08 RX ADMIN — OLANZAPINE 15 MG: 10 TABLET, ORALLY DISINTEGRATING ORAL at 20:32

## 2023-08-08 RX ADMIN — DIVALPROEX SODIUM 1250 MG: 500 TABLET, DELAYED RELEASE ORAL at 20:32

## 2023-08-08 RX ADMIN — NICOTINE POLACRILEX 2 MG: 2 GUM, CHEWING ORAL at 08:54

## 2023-08-08 RX ADMIN — ATOMOXETINE 60 MG: 60 CAPSULE ORAL at 07:58

## 2023-08-08 RX ADMIN — HYDROCHLOROTHIAZIDE 25 MG: 25 TABLET ORAL at 07:58

## 2023-08-08 RX ADMIN — NICOTINE 1 PATCH: 21 PATCH, EXTENDED RELEASE TRANSDERMAL at 08:02

## 2023-08-08 RX ADMIN — NICOTINE POLACRILEX 2 MG: 2 GUM, CHEWING ORAL at 17:50

## 2023-08-08 RX ADMIN — Medication 1 G: at 07:58

## 2023-08-08 ASSESSMENT — ACTIVITIES OF DAILY LIVING (ADL)
ADLS_ACUITY_SCORE: 28
ADLS_ACUITY_SCORE: 29
DRESS: INDEPENDENT
ADLS_ACUITY_SCORE: 28
ORAL_HYGIENE: INDEPENDENT
ADLS_ACUITY_SCORE: 29
ADLS_ACUITY_SCORE: 28
HYGIENE/GROOMING: INDEPENDENT
ADLS_ACUITY_SCORE: 28
ADLS_ACUITY_SCORE: 28
ADLS_ACUITY_SCORE: 29
ADLS_ACUITY_SCORE: 28

## 2023-08-08 NOTE — PLAN OF CARE
Goal Outcome Evaluation:    Problem: Sleep Disturbance  Goal: Adequate Sleep/Rest  Outcome: Progressing       Pt appears to have slept for  6,75 hours. No c/o pain or discomfort. No PRN's given. No safety or behavioral concern this shift. Nursing to continue to monitor and offer support.

## 2023-08-08 NOTE — PLAN OF CARE
"  Pt is visibile in the milieu. Often observed talking to someone on the p[aris. Pt is able to join community meeting and join groups as well. He did some pacing on the halls.  Pt nutrition and hydration is adequate. Pt denied anxiety, depression, SI and hallucinations. His goal today is to talk to his  about discharge.       Problem: Adult Behavioral Health Plan of Care  Goal: Patient-Specific Goal (Individualization)  Description: You can add care plan individualizations to a care plan. Examples of Individualization might be:  \"Parent requests to be called daily at 9am for status\", \"I have a hard time hearing out of my right ear\", or \"Do not touch me to wake me up as it startles me\".  Outcome: Progressing  Flowsheets (Taken 8/8/2023 1043)  Patient Personal Strengths: expressive of needs     Problem: Anxiety  Goal: Anxiety Reduction or Resolution  Outcome: Progressing     Problem: Sleep Disturbance  Goal: Adequate Sleep/Rest  Outcome: Progressing     Problem: Suicidal Behavior  Goal: Suicidal Behavior is Absent or Managed  Outcome: Progressing     Problem: Adult Behavioral Health Plan of Care  Goal: Patient-Specific Goal (Individualization)  Description: You can add care plan individualizations to a care plan. Examples of Individualization might be:  \"Parent requests to be called daily at 9am for status\", \"I have a hard time hearing out of my right ear\", or \"Do not touch me to wake me up as it startles me\".  Outcome: Progressing  Flowsheets (Taken 8/8/2023 1043)  Patient Personal Strengths: expressive of needs     Problem: Anxiety  Goal: Anxiety Reduction or Resolution  Outcome: Progressing     Problem: Sleep Disturbance  Goal: Adequate Sleep/Rest  Outcome: Progressing     Problem: Suicidal Behavior  Goal: Suicidal Behavior is Absent or Managed  Outcome: Progressing   Goal Outcome Evaluation:    Plan of Care Reviewed With: patient                   "

## 2023-08-08 NOTE — PROGRESS NOTES
"  ----------------------------------------------------------------------------------------------------------  [facility]  Psychiatry Progress Note  Hospital Day #18     Interim History:     The patient's care was discussed with the treatment team and chart notes were reviewed.    Vitals: VSS  Sleep: 6.75 hours (08/08/23 0600)  Scheduled medications: Took all scheduled medications as prescribed  Psychiatric PRN medications: Hydroxyzine, Zyprexa    Staff Report/Interval Events:     Slept for 6.75 hours last night.  attempted to visit twice, but pt was on the phone and became irritated upon approach, saying he was not interested. Reports to staff, \"My money is gone. Somebody spent it all. I am worried and anxious. Nothing seem to be working well for me. I wanted to go back to my group home , but they refused. [place]. My sister had surgery today. I feel for her. I am worried something might happened to her. She is in [facility] and I am anxious.\"  Medication adherent, did not attend group sessions. Did not pace the hallway as usual.     Subjective:     Patient Interview:  Etienne was participating in a group session and declined to meet with the team this morning.       ROS:    Patient denies acute psychiatric concerns.  Patient denies acute medical concerns.     Objective:     Vitals:  BP (!) 170/94 (BP Location: Right arm)   Pulse 107   Temp 99.1  F (37.3  C) (Oral)   Resp 20   Wt 122.7 kg (270 lb 6.4 oz)   SpO2 95%   BMI 37.65 kg/m      Allergies:  Allergies   Allergen Reactions    Penicillins        Current Medications:  Scheduled:  Current Facility-Administered Medications   Medication Dose Route Frequency    atomoxetine  60 mg Oral QAM    divalproex sodium delayed-release  1,250 mg Oral BID    fish oil-omega-3 fatty acids  1 g Oral Daily    hydrochlorothiazide  25 mg Oral Daily    nicotine  1 patch " "Transdermal Daily    And    nicotine   Transdermal Q8H PAULETTE    OLANZapine zydis  15 mg Oral At Bedtime       PRN:  Current Facility-Administered Medications   Medication Dose Route Frequency    hydrOXYzine  25 mg Oral Q4H PRN    loperamide  2 mg Oral 4x Daily PRN    melatonin  3 mg Oral At Bedtime PRN    nicotine  2 mg Buccal Q1H PRN    OLANZapine  10 mg Oral TID PRN    Or    OLANZapine  10 mg Intramuscular TID PRN       Labs and Imaging:  New results:   No results found for this or any previous visit (from the past 24 hour(s)).    Data this admission:  - CBC unremarkable  - CMP Co2 31  - TSH normal  - UDS negative  - Vit D ordered on 07/24  - Hgb A1c 5.7  - Lipids, HDL 31  - Vit B12 normal  - Folate normal  - Urinalysis unremarkable  - EKG ordered on 07/24     Mental Status Exam:     Oriented to:  Grossly Oriented  General:  Awake and Alert  Appearance:  appears stated age  Behavior/Attitude:  calm, cooperative and engaged  Eye Contact:  downcast  Psychomotor: normal, no catatonia present  Speech:  soft volume/tone and spontaneous  Language: Fluent in English with appropriate syntax and vocabulary.  Mood: \"Flowing\"  Affect: appropriate, restricted and sad  Thought Process:  linear, coherent and goal directed  Thought Content:  No SI/HI/AH/VH; No apparent delusions  Associations:  intact  Insight:  fair   Judgment:  limited due to trying to purchase a car while hospitalized   Impulse control: fair, due to ADHD/TBI  Attention Span:  grossly intact and adequate for conversation  Concentration:  grossly intact  Recent and Remote Memory:  not formally assessed  Fund of Knowledge:  average  Muscle Strength and Tone: normal  Gait and Station: Normal     Psychiatric Assessment     Etienne Dupree is a 33 year old male previously diagnosed with Bipolar Disorder Type 1, substance use disorder (meth, alcohol, cocaine, ecstasy, marijuana), TBI, ADHD, and PTSD who presented by EMS after his PD was revoked in the context of " manic like symptoms likely from substance use. Most recent psychiatric hospitalization was in May 2023. Significant symptoms on admission include irritability, tangential, pressured speech. The MSE on admission was pertinent for irritability, elevated, tangential. Biological contributions to mental health presentation include long standing history of substance use, previous diagnosis of Bipolar Disorder type 1 which he takes Depakote and Zyprexa. Psychological contributions to mental health presentation include poor insight, poor distress tolerance,and cluster B traits. Social factors contributing to mental health presentation include being on MICD commitment, conflicts with daughter, ongoing legal charges, financial difficulties.     In summary, differential is still in evolution. The patient's reported symptoms of pressured speech, irritability are consistent with prior diagnosis of Bipolar Disorder Type 1, however, patient recently used substances which is contributing to the overall presentation. Utox was positive for amphetamines. Will consider Substance Induced Shweta vs Bipolar 1 Disorder, current episode shweta. He will likely benefit from med optimization and substance use cessation this admission. More collateral will be needed to gather a more accurate history.      Given that he currently has out of control behaviors, aggression and shweta, patient warrants inpatient psychiatric hospitalization to maintain his safety.      Psychiatric Plan by Diagnosis     Today's changes:  - none       # Bipolar Disorder vs Substance Induced Shweta  1. Medications:  - Depakote 1250mg BID  - Zyprexa 15mg at bedtime  - Wellbutrin 300 mg held due to manic-like behaviors     2. Pertinent Labs/Monitoring:   - VPA level 73.6 ug/mL (7/25)  - EKG 5/14 was 391, EKG 7/24 normal with QTc of 387  - EKG scheduled for 8/7     3. Additional Plans:  - Patient will be treated in therapeutic milieu with appropriate individual and group  therapies as described    # Nicotine Use Disorder  - Nicotine Patch  - Nicotine Gum     #Anxiety  - Hydroxyzine 25 mg PRN     #ADHD  - Strattera 60 mg    2. Pertinent Labs/Monitoring:   - EKG unremarkable- QTc 387.      3. Additional Plans:  - Patient will be treated in therapeutic milieu with appropriate individual and group therapies as described     Psychiatric Hospital Course:    In the ED,  Patient was Chart reviewed and was found to be on multiple psychopharmacology medications such as Zyprexa, Depakote, Wellbutrin which are his PTA medications. His current PTA medication were continued due to severe agitation and aggression while in the emergency room, Haldol 5 mg, Ativan 2 mg, Benadryl 50 mg were added for severe agitation and aggression.     After patient arrived to the unit, his PTA Strattera and Wellbutrin were held due to his manic like symptoms. Nicotine gum ordered 7/22/23. VPA level was ordered for 7/23/23 which 78.0, VPA on 7/25 73.6.     Etienne Dupree was [facility] [address] hold after his PD was revoked by his .   Medications:  PTA Depakote & Zyprexa were continued.   PTA Wellbutrin was held due to julianna.    No new medications started at the time of admission  Strattera 40 mg restarted on 07/31/2023, increased to PTA dose of 60 mg for feelings of depression  Discontinued prazosin on 08/1/2023  Strattera increased to PTA dose of 60 mg on 08/04/2023     The risks, benefits, alternatives, and side effects were discussed and understood by the patient and other caregivers.       Medical Assessment and Plan     Medical diagnoses to be addressed this admission:    # Hypertension  Hydrochlorothiazide 25 mg  Hydralazine for SBP >160 or DBP >100    # Supplements  Fish oil      Medical course:  Patient was physically examined by the ED prior to being transferred to the unit and was found to be medically stable and appropriate for admission.      Consults:  none     Checklist      Legal Status: Court Hold     Safety Assessment:   Behavioral Orders   Procedures    Assault precautions    Code 1 - Restrict to Unit    Code 2    Elopement precautions    Routine Programming     As clinically indicated    Status 15     Every 15 minutes.       Risk Assessment:  Risk for harm is moderate.  Risk factors: substance use, trauma, impulsive and past behaviors  Protective factors: [facility]     SIO: No    Disposition: Pending placement, stabilization, medication optimization, & development of a safe discharge plan.     Attestations     This patient was seen and discussed with my attending physician.    Elis Paul, MS4  [facility]    Resident Attestation:      I was present with the medical student who participated in the service and documentation of the note. I have verified the history and personally performed the physical/mental status exam and medical decision making. I agree with the assessment and plan documented in the note.    Enma Ramesh MD, MSc, MPH, PhD candidate   Psychiatry resident, PGY1

## 2023-08-08 NOTE — PLAN OF CARE
Problem: Adult Behavioral Health Plan of Care  Goal: Plan of Care Review  Outcome: Progressing  Flowsheets (Taken 8/8/2023 1700)  Patient Agreement with Plan of Care: agrees     Problem: Anxiety  Goal: Anxiety Reduction or Resolution  Outcome: Progressing   Goal Outcome Evaluation:    Plan of Care Reviewed With: patient      Patient was visibile in the milieu during the evening shift, unlike last evening when he stayed isolated to room and unengaged with staff and peers. He spent quite a considerable amount of time talking on the phone and pacing the hallways and denied anxiety, depression, SI and hallucinations. He was quiet and calm, when shift stated, but became anxious and irritable because staff would not let him read and reply to his emails. He equally was upset and almost coded because staff locked the book closet in the dinning area.Instead of asking staff to open it, he yelled at staff. However, he was easily redirected to his room. He denied SI/HI/SIB/PAIN and other psych symptoms.Will continue monitor, support, and encourage.

## 2023-08-08 NOTE — CARE PLAN
[facility] Occupational Therapy Group Intervention Note     08/08/23 1210   Group Therapy Session   Group Attendance attended group session   Time Session Began 1015   Time Session Ended 1200   Total Time (minutes) 65   Group Type task skill;life skill;psychoeducation   Group Topic Covered coping skills/lifestyle management;relapse prevention;cognitive activities;structured socialization   Group Session Detail 1) Mental health management group focused on trust within recovery. Education was provided on the connection between trust mindset and quality of life, specifically within various roles and occupations.    2) Clinic - coping skill exploration, creative expression within personally meaningful activities, and observation of social, cognitive, and kinesthetic performance skills   Patient Response/Contribution cooperative with task;organized;discussed personal experience with topic;listened actively   Patient Participation Detail 1) Attentive throughout brief video. Reportedly resonated with this topic d/t experiences in hockey and social dynamics throughout childhood and into adulthood. Expressed an optimistic outlook on trusting others vs initial paranoia and cynicism.     2) Congruent affect. IND to gather materials, organize work space, sequence task, and reach task completion to fi a beaded bracelet. Demonstrated fair social engagement upon approach.      Madeline Rosenberg OT on 8/8/2023 at 12:11 PM

## 2023-08-09 PROCEDURE — 250N000013 HC RX MED GY IP 250 OP 250 PS 637

## 2023-08-09 PROCEDURE — 124N000002 HC R&B MH UMMC

## 2023-08-09 PROCEDURE — 99231 SBSQ HOSP IP/OBS SF/LOW 25: CPT | Mod: GC | Performed by: PSYCHIATRY & NEUROLOGY

## 2023-08-09 RX ADMIN — NICOTINE POLACRILEX 2 MG: 2 GUM, CHEWING ORAL at 08:18

## 2023-08-09 RX ADMIN — DIVALPROEX SODIUM 1250 MG: 500 TABLET, DELAYED RELEASE ORAL at 08:15

## 2023-08-09 RX ADMIN — NICOTINE POLACRILEX 2 MG: 2 GUM, CHEWING ORAL at 16:58

## 2023-08-09 RX ADMIN — DIVALPROEX SODIUM 1250 MG: 500 TABLET, DELAYED RELEASE ORAL at 20:23

## 2023-08-09 RX ADMIN — OLANZAPINE 15 MG: 10 TABLET, ORALLY DISINTEGRATING ORAL at 20:24

## 2023-08-09 RX ADMIN — ATOMOXETINE 60 MG: 60 CAPSULE ORAL at 08:15

## 2023-08-09 RX ADMIN — HYDROCHLOROTHIAZIDE 25 MG: 25 TABLET ORAL at 08:15

## 2023-08-09 RX ADMIN — Medication 1 G: at 08:15

## 2023-08-09 RX ADMIN — NICOTINE 1 PATCH: 21 PATCH, EXTENDED RELEASE TRANSDERMAL at 08:17

## 2023-08-09 RX ADMIN — NICOTINE POLACRILEX 2 MG: 2 GUM, CHEWING ORAL at 19:37

## 2023-08-09 ASSESSMENT — ACTIVITIES OF DAILY LIVING (ADL)
ADLS_ACUITY_SCORE: 29

## 2023-08-09 NOTE — TELEPHONE ENCOUNTER
I agree with decision to send patient to the ED. It appears he was seen in the ED yesterday.     Mariana Banks MD    Quality 431: Preventive Care And Screening: Unhealthy Alcohol Use - Screening: Patient not identified as an unhealthy alcohol user when screened for unhealthy alcohol use using a systematic screening method Detail Level: Detailed Quality 226: Preventive Care And Screening: Tobacco Use: Screening And Cessation Intervention: Patient screened for tobacco use and is an ex/non-smoker

## 2023-08-09 NOTE — PROGRESS NOTES
----------------------------------------------------------------------------------------------------------  [facility]  Psychiatry Progress Note  Hospital Day #19     Interim History:     The patient's care was discussed with the treatment team and chart notes were reviewed.    Vitals: VSS  Sleep: 6.75 hours (08/09/23 0600)  Scheduled medications: Took all scheduled medications as prescribed  Psychiatric PRN medications: Hydroxyzine, Zyprexa    Staff Report/Interval Events:     Slept for 6.5 hours last night. Noted to be engaging more with groups and peers compared to the day before where he was isolative. Did have have an instance of irritation and yelling at staff after he was informed that he could not check his emails and after the book closet was locked. Had an otherwise eventful evening. Medication adherent.     Subjective:     Patient Interview:    Etienne declined an interview this morning.       ROS:    Patient denies acute psychiatric concerns.  Patient denies acute medical concerns.     Objective:     Vitals:  BP (!) 152/98 (BP Location: Left arm, Patient Position: Sitting, Cuff Size: Adult Large)   Pulse 76   Temp 98  F (36.7  C) (Oral)   Resp 20   Wt 119.9 kg (264 lb 4.8 oz)   SpO2 99%   BMI 36.80 kg/m      Allergies:  Allergies   Allergen Reactions    Penicillins        Current Medications:  Scheduled:  Current Facility-Administered Medications   Medication Dose Route Frequency    atomoxetine  60 mg Oral QAM    divalproex sodium delayed-release  1,250 mg Oral BID    fish oil-omega-3 fatty acids  1 g Oral Daily    hydrochlorothiazide  25 mg Oral Daily    nicotine  1 patch Transdermal Daily    And    nicotine   Transdermal Q8H PAULETTE    OLANZapine zydis  15 mg Oral At Bedtime       PRN:  Current Facility-Administered Medications   Medication Dose Route Frequency    hydrOXYzine  25 mg Oral Q4H PRN    loperamide  2 mg Oral 4x Daily PRN    melatonin  3 mg  Oral At Bedtime PRN    nicotine  2 mg Buccal Q1H PRN    OLANZapine  10 mg Oral TID PRN    Or    OLANZapine  10 mg Intramuscular TID PRN       Labs and Imaging:  New results:   No results found for this or any previous visit (from the past 24 hour(s)).    Data this admission:  - CBC unremarkable  - CMP Co2 31  - TSH normal  - UDS negative  - Vit D ordered on 07/24  - Hgb A1c 5.7  - Lipids, HDL 31  - Vit B12 normal  - Folate normal  - Urinalysis unremarkable  - EKG ordered on 07/24     Mental Status Exam:     Oriented to:  Grossly Oriented  General:  Awake and Alert  Appearance:  appears stated age  Behavior/Attitude:  calm, cooperative and engaged  Eye Contact:  downcast  Psychomotor: normal, no catatonia present  Speech:  appropriate volume/tone  Language: Fluent in English with appropriate syntax and vocabulary.  Mood: Not formally assessed  Affect: irritable  Thought Process:  linear, coherent and goal directed  Thought Content:  No SI/HI/AH/VH; No apparent delusions  Associations:  intact  Insight:  fair   Judgment:  limited due to trying to purchase a car while hospitalized   Impulse control: fair, due to ADHD/TBI  Attention Span:  grossly intact and adequate for conversation  Concentration:  grossly intact  Recent and Remote Memory:  not formally assessed  Fund of Knowledge:  average  Muscle Strength and Tone: normal  Gait and Station: Normal     Psychiatric Assessment     Etienne Dupree is a 33 year old male previously diagnosed with Bipolar Disorder Type 1, substance use disorder (meth, alcohol, cocaine, ecstasy, marijuana), TBI, ADHD, and PTSD who presented by EMS after his PD was revoked in the context of manic like symptoms likely from substance use. Most recent psychiatric hospitalization was in May 2023. Significant symptoms on admission include irritability, tangential, pressured speech. The MSE on admission was pertinent for irritability, elevated, tangential. Biological contributions to mental  health presentation include long standing history of substance use, previous diagnosis of Bipolar Disorder type 1 which he takes Depakote and Zyprexa. Psychological contributions to mental health presentation include poor insight, poor distress tolerance,and cluster B traits. Social factors contributing to mental health presentation include being on MICD commitment, conflicts with daughter, ongoing legal charges, financial difficulties.     In summary, differential is still in evolution. The patient's reported symptoms of pressured speech, irritability are consistent with prior diagnosis of Bipolar Disorder Type 1, however, patient recently used substances which is contributing to the overall presentation. Utox was positive for amphetamines. Will consider Substance Induced Shweta vs Bipolar 1 Disorder, current episode shweta. He will likely benefit from med optimization and substance use cessation this admission. More collateral will be needed to gather a more accurate history.      Given that he currently has out of control behaviors, aggression and shweta, patient warrants inpatient psychiatric hospitalization to maintain his safety.      Psychiatric Plan by Diagnosis     Today's changes:  - No changes today    # Bipolar Disorder vs Substance Induced Shweta  1. Medications:  - Depakote 1250mg BID  - Zyprexa 15mg at bedtime  - Wellbutrin 300 mg held due to manic-like behaviors     2. Pertinent Labs/Monitoring:   - VPA level 73.6 ug/mL (7/25)  - EKG 5/14 was 391, EKG 7/24 normal with QTc of 387  - EKG scheduled for 8/7     3. Additional Plans:  - Patient will be treated in therapeutic milieu with appropriate individual and group therapies as described    # Nicotine Use Disorder  - Nicotine Patch  - Nicotine Gum     #Anxiety  - Hydroxyzine 25 mg PRN     #ADHD  - Strattera 60 mg    2. Pertinent Labs/Monitoring:   - EKG unremarkable- QTc 387.      3. Additional Plans:  - Patient will be treated in therapeutic milieu with  appropriate individual and group therapies as described     Psychiatric Hospital Course:      In the ED,  Patient was Chart reviewed and was found to be on multiple psychopharmacology medications such as Zyprexa, Depakote, Wellbutrin which are his PTA medications. His current PTA medication were continued due to severe agitation and aggression while in the emergency room, Haldol 5 mg, Ativan 2 mg, Benadryl 50 mg were added for severe agitation and aggression.     After patient arrived to the unit, his PTA Strattera and Wellbutrin were held due to his manic like symptoms. Nicotine gum ordered 7/22/23. VPA level was ordered for 7/23/23 which 78.0, VPA on 7/25 73.6.     Etienne Dupree was [facility] hold after his PD was revoked by his .   Medications:  PTA Depakote & Zyprexa were continued.   PTA Wellbutrin was held due to julianna.    No new medications started at the time of admission  Strattera 40 mg restarted on 07/31/2023, increased to PTA dose of 60 mg for feelings of depression  Discontinued prazosin on 08/1/2023  Strattera increased to PTA dose of 60 mg on 08/04/2023     The risks, benefits, alternatives, and side effects were discussed and understood by the patient and other caregivers.       Medical Assessment and Plan     Medical diagnoses to be addressed this admission:    # Hypertension  Hydrochlorothiazide 25 mg  Hydralazine for SBP >160 or DBP >100    # Supplements  Fish oil      Medical course:  Patient was physically examined by the ED prior to being transferred to the unit and was found to be medically stable and appropriate for admission.      Consults:  none     Checklist     Legal Status: Court Hold     Safety Assessment:   Behavioral Orders   Procedures    Assault precautions    Code 1 - Restrict to Unit    Code 2    Elopement precautions    Routine Programming     As clinically indicated    Status 15     Every 15 minutes.       Risk Assessment:  Risk for harm  is moderate.  Risk factors: substance use, trauma, impulsive and past behaviors  Protective factors: [facility]     SIO: No    Disposition: Pending placement, stabilization, medication optimization, & development of a safe discharge plan.     Attestations     This patient was seen and discussed with my attending physician.    Elis Paul, MS4  [facility]    Resident Attestation:      I was present with the medical student who participated in the service and documentation of the note. I have verified the history and personally performed the physical/mental status exam and medical decision making. I agree with the assessment and plan documented in the note.    Enma Ramesh MD, MSc, MPH, PhD candidate   Psychiatry resident, PGY1

## 2023-08-09 NOTE — PLAN OF CARE
"Goal Outcome Evaluation:         Problem: Plan of Care - These are the overarching goals to be used throughout the patient stay.    Goal: Plan of Care Review  Description: The Plan of Care Review/Shift note should be completed every shift.  The Outcome Evaluation is a brief statement about your assessment that the patient is improving, declining, or no change.  This information will be displayed automatically on your shift note.  Outcome: Progressing         Pt is presented with irritability, anger and frustrations. New order for girlfriend Amita not to visit! Some money $251 was found in pt room plus a check of $.1. Pt stated, \"It was in my pocket and it was not checked.\" Pt hygiene is appropriate. Good intake of foods and fluids. Pt denies all mental health symptoms. Speech remains hyper verbal. Pt declined meeting with the TEAM this morning. Blood pressure 128/88, pulse 76, temperature 97.7  F (36.5  C), temperature source Oral, resp. rate 18, weight 119.9 kg (264 lb 4.8 oz), SpO2 96 %.           "

## 2023-08-09 NOTE — PLAN OF CARE
Problem: Sleep Disturbance  Goal: Adequate Sleep/Rest  Outcome: Progressing   Goal Outcome Evaluation:     Pt appears to have slept for 6.5 hours. Was observed sitting up in room a few times during rounds. Denies pain and all MH symptoms. No outburst behaviors or safety concern this shift. No request for PRN this shift. Had an uneventful night. Will continue pt's POC and offer support as need arises.

## 2023-08-10 PROCEDURE — 250N000013 HC RX MED GY IP 250 OP 250 PS 637

## 2023-08-10 PROCEDURE — 99231 SBSQ HOSP IP/OBS SF/LOW 25: CPT | Mod: GC | Performed by: PSYCHIATRY & NEUROLOGY

## 2023-08-10 PROCEDURE — 124N000002 HC R&B MH UMMC

## 2023-08-10 RX ADMIN — NICOTINE POLACRILEX 2 MG: 2 GUM, CHEWING ORAL at 14:54

## 2023-08-10 RX ADMIN — NICOTINE POLACRILEX 2 MG: 2 GUM, CHEWING ORAL at 17:49

## 2023-08-10 RX ADMIN — HYDROCHLOROTHIAZIDE 25 MG: 25 TABLET ORAL at 08:05

## 2023-08-10 RX ADMIN — OLANZAPINE 15 MG: 10 TABLET, ORALLY DISINTEGRATING ORAL at 20:53

## 2023-08-10 RX ADMIN — DIVALPROEX SODIUM 1250 MG: 500 TABLET, DELAYED RELEASE ORAL at 20:53

## 2023-08-10 RX ADMIN — NICOTINE POLACRILEX 2 MG: 2 GUM, CHEWING ORAL at 08:08

## 2023-08-10 RX ADMIN — ATOMOXETINE 60 MG: 60 CAPSULE ORAL at 08:05

## 2023-08-10 RX ADMIN — DIVALPROEX SODIUM 1250 MG: 500 TABLET, DELAYED RELEASE ORAL at 08:05

## 2023-08-10 RX ADMIN — HYDROXYZINE HYDROCHLORIDE 25 MG: 25 TABLET, FILM COATED ORAL at 14:55

## 2023-08-10 RX ADMIN — NICOTINE 1 PATCH: 21 PATCH, EXTENDED RELEASE TRANSDERMAL at 08:09

## 2023-08-10 RX ADMIN — Medication 1 G: at 08:05

## 2023-08-10 ASSESSMENT — ACTIVITIES OF DAILY LIVING (ADL)
ADLS_ACUITY_SCORE: 29
ADLS_ACUITY_SCORE: 29
DRESS: INDEPENDENT
ADLS_ACUITY_SCORE: 29
LAUNDRY: WITH SUPERVISION
ADLS_ACUITY_SCORE: 29
ADLS_ACUITY_SCORE: 29
HYGIENE/GROOMING: INDEPENDENT
ORAL_HYGIENE: INDEPENDENT
ADLS_ACUITY_SCORE: 29

## 2023-08-10 NOTE — PROGRESS NOTES
----------------------------------------------------------------------------------------------------------  [facility]  Psychiatry Progress Note  Hospital Day #20     Interim History:     The patient's care was discussed with the treatment team and chart notes were reviewed.    Vitals: VSS  Sleep: 6.75 hours (08/10/23 0600)  Scheduled medications: Took all scheduled medications as prescribed  Psychiatric PRN medications: Hydroxyzine, Zyprexa    Staff Report/Interval Events:     Slept for 6.75 hours last night. Noted to be irritable and frustrated after being told his girlfriend could no longer visit. $251 and a check was found in his belongings. Was otherwise visible in the milieu and interactive with others on the unit. Medication adherent.     Subjective:     Patient Interview:    Etienne declined an interview this morning.       ROS:    Patient denies acute psychiatric concerns.  Patient denies acute medical concerns.     Objective:     Vitals:  /88 (BP Location: Left arm)   Pulse 76   Temp 97.7  F (36.5  C) (Oral)   Resp 18   Wt 119.9 kg (264 lb 4.8 oz)   SpO2 96%   BMI 36.80 kg/m      Allergies:  Allergies   Allergen Reactions    Penicillins        Current Medications:  Scheduled:  Current Facility-Administered Medications   Medication Dose Route Frequency    atomoxetine  60 mg Oral QAM    divalproex sodium delayed-release  1,250 mg Oral BID    fish oil-omega-3 fatty acids  1 g Oral Daily    hydrochlorothiazide  25 mg Oral Daily    nicotine  1 patch Transdermal Daily    And    nicotine   Transdermal Q8H PAULETTE    OLANZapine zydis  15 mg Oral At Bedtime       PRN:  Current Facility-Administered Medications   Medication Dose Route Frequency    hydrOXYzine  25 mg Oral Q4H PRN    loperamide  2 mg Oral 4x Daily PRN    melatonin  3 mg Oral At Bedtime PRN    nicotine  2 mg Buccal Q1H PRN    OLANZapine  10 mg Oral TID PRN    Or    OLANZapine  10 mg Intramuscular  TID PRN       Labs and Imaging:  New results:   No results found for this or any previous visit (from the past 24 hour(s)).    Data this admission:  - CBC unremarkable  - CMP Co2 31  - TSH normal  - UDS negative  - Vit D ordered on 07/24  - Hgb A1c 5.7  - Lipids, HDL 31  - Vit B12 normal  - Folate normal  - Urinalysis unremarkable  - EKG ordered on 07/24     Mental Status Exam:     Oriented to:  Grossly Oriented  General:  Awake, laying in bed with back turned towards door  Appearance:  appears stated age  Behavior/Attitude:  calm and refuses to participate  Eye Contact:   not formally assessed  Psychomotor: normal, no catatonia present  Speech:  appropriate volume/tone  Language: Fluent in English with appropriate syntax and vocabulary.  Mood: Not formally assessed  Affect: irritable  Thought Process:  linear, coherent and goal directed  Thought Content:  No SI/HI/AH/VH; No apparent delusions  Associations:  intact  Insight:  fair   Judgment:  limited due to trying to purchase a car while hospitalized   Impulse control: fair, due to ADHD/TBI  Attention Span:  grossly intact and adequate for conversation  Concentration:  grossly intact  Recent and Remote Memory:  not formally assessed  Fund of Knowledge:  average  Muscle Strength and Tone: normal  Gait and Station: Normal     Psychiatric Assessment     Etienne Dupree is a 33 year old male previously diagnosed with Bipolar Disorder Type 1, substance use disorder (meth, alcohol, cocaine, ecstasy, marijuana), TBI, ADHD, and PTSD who presented by EMS after his PD was revoked in the context of manic like symptoms likely from substance use. Most recent psychiatric hospitalization was in May 2023. Significant symptoms on admission include irritability, tangential, pressured speech. The MSE on admission was pertinent for irritability, elevated, tangential. Biological contributions to mental health presentation include long standing history of substance use, previous  diagnosis of Bipolar Disorder type 1 which he takes Depakote and Zyprexa. Psychological contributions to mental health presentation include poor insight, poor distress tolerance,and cluster B traits. Social factors contributing to mental health presentation include being on MICD commitment, conflicts with daughter, ongoing legal charges, financial difficulties.     In summary, differential is still in evolution. The patient's reported symptoms of pressured speech, irritability are consistent with prior diagnosis of Bipolar Disorder Type 1, however, patient recently used substances which is contributing to the overall presentation. Utox was positive for amphetamines. Will consider Substance Induced Shweta vs Bipolar 1 Disorder, current episode shweta. He will likely benefit from med optimization and substance use cessation this admission. More collateral will be needed to gather a more accurate history.      Given that he currently has out of control behaviors, aggression and shweta, patient warrants inpatient psychiatric hospitalization to maintain his safety.      Psychiatric Plan by Diagnosis     Today's changes:  - No changes for today    # Bipolar Disorder vs Substance Induced Shweta  1. Medications:  - Depakote 1250mg BID  - Zyprexa 15mg at bedtime  - Wellbutrin 300 mg held due to manic-like behaviors     2. Pertinent Labs/Monitoring:   - VPA level 73.6 ug/mL (7/25)  - EKG 5/14 was 391, EKG 7/24 normal with QTc of 387  - EKG scheduled for 8/7     3. Additional Plans:  - Patient will be treated in therapeutic milieu with appropriate individual and group therapies as described    # Nicotine Use Disorder  - Nicotine Patch  - Nicotine Gum     #Anxiety  - Hydroxyzine 25 mg PRN     #ADHD  - Strattera 60 mg    2. Pertinent Labs/Monitoring:   - EKG unremarkable- QTc 387.      3. Additional Plans:  - Patient will be treated in therapeutic milieu with appropriate individual and group therapies as described     Psychiatric  Hospital Course:      In the ED,  Patient was Chart reviewed and was found to be on multiple psychopharmacology medications such as Zyprexa, Depakote, Wellbutrin which are his PTA medications. His current PTA medication were continued due to severe agitation and aggression while in the emergency room, Haldol 5 mg, Ativan 2 mg, Benadryl 50 mg were added for severe agitation and aggression.     After patient arrived to the unit, his PTA Strattera and Wellbutrin were held due to his manic like symptoms. Nicotine gum ordered 7/22/23. VPA level was ordered for 7/23/23 which 78.0, VPA on 7/25 73.6.     Etienne Dupree [facility] hold after his PD was revoked by his .   Medications:  PTA Depakote & Zyprexa were continued.   PTA Wellbutrin was held due to julianna.    No new medications started at the time of admission  Strattera 40 mg restarted on 07/31/2023, increased to PTA dose of 60 mg for feelings of depression  Discontinued prazosin on 08/1/2023  Strattera increased to PTA dose of 60 mg on 08/04/2023     The risks, benefits, alternatives, and side effects were discussed and understood by the patient and other caregivers.       Medical Assessment and Plan     Medical diagnoses to be addressed this admission:    # Hypertension  Hydrochlorothiazide 25 mg  Hydralazine for SBP >160 or DBP >100    # Supplements  Fish oil      Medical course:  Patient was physically examined by the ED prior to being transferred to the unit and was found to be medically stable and appropriate for admission.      Consults:  none     Checklist     Legal Status: Court Hold     Safety Assessment:   Behavioral Orders   Procedures    Assault precautions    Code 1 - Restrict to Unit    Code 2    Elopement precautions    Routine Programming     As clinically indicated    Status 15     Every 15 minutes.       Risk Assessment:  Risk for harm is moderate.  Risk factors: substance use, trauma, impulsive and past  behaviors  Protective factors: [facility]     SIO: No    Disposition: Pending placement, stabilization, medication optimization, & development of a safe discharge plan.     Attestations     This patient was seen and discussed with my attending physician.    Elis Paul, MS4  [facility]    Resident Attestation:      I was present with the medical student who participated in the service and documentation of the note. I have verified the history and personally performed the physical/mental status exam and medical decision making. I agree with the assessment and plan documented in the note.    Enma Ramesh MD, MSc, MPH, PhD candidate   Psychiatry resident, PGY1

## 2023-08-10 NOTE — PLAN OF CARE
Goal Outcome Evaluation:         Pt was visible majority of the shift on the lounge watching TV and interacting with select peers. Pt appears withdrawn to self. Was more quite and presented with flat blunted affect. Pt is well groomed and good intake of foods and fluids. Speech remains hyper verbal. mood is labile. Denies all mental health symptoms. Denies pain or discomfort. Blood pressure 137/87, pulse 57, temperature 98  F (36.7  C), temperature source Oral, resp. rate 18, weight 119.5 kg (263 lb 6.4 oz), SpO2 97 %.

## 2023-08-10 NOTE — PLAN OF CARE
Assessment/Intervention/Current Symtoms and Care Coordination:  The patient's care was discussed with the treatment team and chart notes were reviewed.   Patient met with team.  No changes in past 24 hours.  Patient has been compliant with meds. Spends a great deal of catalina on the phone.    - Writer provided update to pt's CM, Jose Durham.CM reports pt has been accepted at [facility] in [place] for TBI. He is on the wait list.   - Patient attended Rule 20 hearing today at 1:30 PM.   - Jose will visit patient on August 23rd around  11:00 am.       Discharge Plan or Goal:  [facility] [place] or Group Home   Psychiatry  Therapy     Barriers to Discharge:  Patient in need of further stability/medication mgmt  Placement     Referral Status:  On the wait list for [facility] in [place] for TBI     Legal Status:  Civil commitment/Aguiar through [facility] through 4/2024  Will need PD  Patient has [employer] - Restricted Recipient Program   County: [facility]  Contacts:  [facility]: Jose Durham       Upcoming Meetings and Dates/Important Information and next steps:  Team note: CM will visit pt on 8/23 at 11am.

## 2023-08-10 NOTE — PLAN OF CARE
Problem: Sleep Disturbance  Goal: Adequate Sleep/Rest  Outcome: Progressing   Goal Outcome Evaluation:    Pt appears to have slept for hours. Was observed sitting up in room a few times during rounds. Denies pain and all MH symptoms. No outburst behaviors or safety concern this shift. No request for PRN this shift. Had an uneventful night. Will continue pt's POC and offer support as need arises.

## 2023-08-10 NOTE — PLAN OF CARE
Goal Outcome Evaluation:           Problem: Plan of Care - These are the overarching goals to be used throughout the patient stay.    Goal: Plan of Care Review  Description: The Plan of Care Review/Shift note should be completed every shift.  The Outcome Evaluation is a brief statement about your assessment that the patient is improving, declining, or no change.  This information will be displayed automatically on your shift note.  8/9/2023 2225 by Lyubov Mckeon RN  Outcome: Progressing  Pt had a calmer evening. However, continue fascinating about why the girl friend can't visit? Pt was advices to speak to the TEAM tomorrow morning. Pt was visible majority of the shift on the lounge watching TV and interacting with peers. Pt is well groomed and good intake of foods and fluids. Speech remains hyper verbal. Behavior is argumentative and demanding. Affect is flat and mood is labile. Denies all mental health symptoms. Denies pain or discomfort. Pt declined EKG done in the morning. VSS. Blood pressure 128/88, pulse 76, temperature 97.7  F (36.5  C), temperature source Oral, resp. rate 18, weight 119.9 kg (264 lb 4.8 oz), SpO2 96 %.

## 2023-08-11 ENCOUNTER — DOCUMENTATION ONLY (OUTPATIENT)
Dept: FAMILY MEDICINE | Facility: CLINIC | Age: 33
End: 2023-08-11
Payer: MEDICAID

## 2023-08-11 PROCEDURE — 250N000013 HC RX MED GY IP 250 OP 250 PS 637

## 2023-08-11 PROCEDURE — 124N000002 HC R&B MH UMMC

## 2023-08-11 PROCEDURE — 99232 SBSQ HOSP IP/OBS MODERATE 35: CPT | Mod: GC | Performed by: PSYCHIATRY & NEUROLOGY

## 2023-08-11 RX ORDER — HYDROCHLOROTHIAZIDE 25 MG/1
25 TABLET ORAL DAILY
Qty: 30 TABLET | Refills: 0 | Status: SHIPPED | OUTPATIENT
Start: 2023-08-12 | End: 2023-08-14

## 2023-08-11 RX ORDER — OLANZAPINE 10 MG/1
15 TABLET ORAL AT BEDTIME
Qty: 45 TABLET | Refills: 0 | Status: SHIPPED | OUTPATIENT
Start: 2023-08-11 | End: 2023-08-14

## 2023-08-11 RX ORDER — CHLORAL HYDRATE 500 MG
1 CAPSULE ORAL DAILY
Qty: 30 CAPSULE | Refills: 0 | Status: SHIPPED | OUTPATIENT
Start: 2023-08-12 | End: 2023-08-14

## 2023-08-11 RX ORDER — ATOMOXETINE 60 MG/1
60 CAPSULE ORAL EVERY MORNING
Qty: 30 CAPSULE | Refills: 0 | Status: SHIPPED | OUTPATIENT
Start: 2023-08-12 | End: 2023-08-14

## 2023-08-11 RX ORDER — DIVALPROEX SODIUM 250 MG/1
1250 TABLET, DELAYED RELEASE ORAL 2 TIMES DAILY
Qty: 300 TABLET | Refills: 0 | Status: SHIPPED | OUTPATIENT
Start: 2023-08-11 | End: 2023-08-14

## 2023-08-11 RX ORDER — NICOTINE 21 MG/24HR
1 PATCH, TRANSDERMAL 24 HOURS TRANSDERMAL DAILY
Qty: 30 PATCH | Refills: 0 | Status: SHIPPED | OUTPATIENT
Start: 2023-08-12 | End: 2023-08-14

## 2023-08-11 RX ORDER — HYDROXYZINE HYDROCHLORIDE 25 MG/1
25 TABLET, FILM COATED ORAL EVERY 4 HOURS PRN
Qty: 15 TABLET | Refills: 0 | Status: SHIPPED | OUTPATIENT
Start: 2023-08-11 | End: 2023-08-14

## 2023-08-11 RX ADMIN — NICOTINE POLACRILEX 2 MG: 2 GUM, CHEWING ORAL at 16:12

## 2023-08-11 RX ADMIN — HYDROCHLOROTHIAZIDE 25 MG: 25 TABLET ORAL at 07:49

## 2023-08-11 RX ADMIN — DIVALPROEX SODIUM 1250 MG: 500 TABLET, DELAYED RELEASE ORAL at 07:49

## 2023-08-11 RX ADMIN — Medication 1 G: at 07:49

## 2023-08-11 RX ADMIN — DIVALPROEX SODIUM 1250 MG: 500 TABLET, DELAYED RELEASE ORAL at 20:19

## 2023-08-11 RX ADMIN — NICOTINE POLACRILEX 2 MG: 2 GUM, CHEWING ORAL at 11:39

## 2023-08-11 RX ADMIN — OLANZAPINE 15 MG: 10 TABLET, ORALLY DISINTEGRATING ORAL at 20:19

## 2023-08-11 RX ADMIN — NICOTINE POLACRILEX 2 MG: 2 GUM, CHEWING ORAL at 20:01

## 2023-08-11 RX ADMIN — ATOMOXETINE 60 MG: 60 CAPSULE ORAL at 07:49

## 2023-08-11 RX ADMIN — NICOTINE 1 PATCH: 21 PATCH, EXTENDED RELEASE TRANSDERMAL at 07:51

## 2023-08-11 ASSESSMENT — ACTIVITIES OF DAILY LIVING (ADL)
ADLS_ACUITY_SCORE: 29

## 2023-08-11 NOTE — PLAN OF CARE
Problem: Sleep Disturbance  Goal: Adequate Sleep/Rest  Outcome: Progressing   Goal Outcome Evaluation:  Patient appears to have slept for 6.75 hours. Awake briefly for snacks but went back to sleep soon after. No requests for prn's or complaints of pain. No behavioral issues. Safety checks in place.

## 2023-08-11 NOTE — PROGRESS NOTES
----------------------------------------------------------------------------------------------------------  [facility]  Psychiatry Progress Note  Hospital Day #21     Interim History:     The patient's care was discussed with the treatment team and chart notes were reviewed.    Vitals: VSS  Sleep: 6.75 hours (08/11/23 0600)  Scheduled medications: Took all scheduled medications as prescribed  Psychiatric PRN medications: Hydroxyzine, Zyprexa    Staff Report/Interval Events:     Slept for 6.75 hours last night. No overnight events. Placement was found for him in [place] on 8/15. Was otherwise visible in the milieu and interactive with others on the unit. Was seen pacing the hallways; labile mood, and calm with staff. Medication adherent.     Subjective:     Patient Interview:    Etienne was seen sitting out in the milieu today. He is feeling happy about having found placement sooner than expected, despite it not being his first choice. He inquires about why his girlfriend was not permitted to visit him anymore, and was reassured that it was because of her actions towards staff and not following the unit's rules regarding what is allowed to be brought in. He was encouraged to call her and tell her to get in touch with the unit to see if she may be allowed again. Discusses that he was thinking deeply about his goals, which why he was declining to meet with the team this past week. He remarks about having felt depressed for the last week regarding the surround situation of staff finding his belongings and his girlfriend being not allowed to visit anymore. Does not feel like his feelings of depression are a side effect of the Strattera. He feels more focused since starting the Strattera.    ROS:    Patient denies acute psychiatric concerns.  Patient denies acute medical concerns.     Objective:     Vitals:  /87 (BP Location: Right arm, Patient Position: Sitting, Cuff  "Size: Adult Large)   Pulse 67   Temp 97.1  F (36.2  C) (Temporal)   Resp 16   Wt 119.5 kg (263 lb 6.4 oz)   SpO2 98%   BMI 36.67 kg/m      Allergies:  Allergies   Allergen Reactions    Penicillins        Current Medications:  Scheduled:  Current Facility-Administered Medications   Medication Dose Route Frequency    atomoxetine  60 mg Oral QAM    divalproex sodium delayed-release  1,250 mg Oral BID    fish oil-omega-3 fatty acids  1 g Oral Daily    hydrochlorothiazide  25 mg Oral Daily    nicotine  1 patch Transdermal Daily    And    nicotine   Transdermal Q8H PAULETTE    OLANZapine zydis  15 mg Oral At Bedtime       PRN:  Current Facility-Administered Medications   Medication Dose Route Frequency    hydrOXYzine  25 mg Oral Q4H PRN    loperamide  2 mg Oral 4x Daily PRN    melatonin  3 mg Oral At Bedtime PRN    nicotine  2 mg Buccal Q1H PRN    OLANZapine  10 mg Oral TID PRN    Or    OLANZapine  10 mg Intramuscular TID PRN       Labs and Imaging:  New results:   No results found for this or any previous visit (from the past 24 hour(s)).    Data this admission:  - CBC unremarkable  - CMP Co2 31  - TSH normal  - UDS negative  - Vit D ordered on 07/24  - Hgb A1c 5.7  - Lipids, HDL 31  - Vit B12 normal  - Folate normal  - Urinalysis unremarkable  - EKG ordered on 07/24     Mental Status Exam:     Oriented to:  Grossly Oriented  General:  Awake, sitting in chair in milieu  Appearance:  appears stated age  Behavior/Attitude:  calm and cooperative  Eye Contact:  appropriate, intense at times  Psychomotor: normal, no catatonia present  Speech:  appropriate volume/tone  Language: Fluent in English with appropriate syntax and vocabulary.  Mood: \"Depressed\"  Affect: incongruent with mood,  bright and stable  Thought Process:  linear, coherent and goal directed  Thought Content:  No SI/HI/AH/VH; No apparent delusions  Associations:  intact  Insight:  fair   Judgment:  limited due to trying to purchase a car while hospitalized "   Impulse control: fair, due to ADHD/TBI  Attention Span:  grossly intact and adequate for conversation  Concentration:  grossly intact  Recent and Remote Memory:  not formally assessed  Fund of Knowledge:  average  Muscle Strength and Tone: normal  Gait and Station: Normal     Psychiatric Assessment     Etienne Dupree is a 33 year old male previously diagnosed with Bipolar Disorder Type 1, substance use disorder (meth, alcohol, cocaine, ecstasy, marijuana), TBI, ADHD, and PTSD who presented by EMS after his PD was revoked in the context of manic like symptoms likely from substance use. Most recent psychiatric hospitalization was in May 2023. Significant symptoms on admission include irritability, tangential, pressured speech. The MSE on admission was pertinent for irritability, elevated, tangential. Biological contributions to mental health presentation include long standing history of substance use, previous diagnosis of Bipolar Disorder type 1 which he takes Depakote and Zyprexa. Psychological contributions to mental health presentation include poor insight, poor distress tolerance,and cluster B traits. Social factors contributing to mental health presentation include being on MICD commitment, conflicts with daughter, ongoing legal charges, financial difficulties.     In summary, differential is still in evolution. The patient's reported symptoms of pressured speech, irritability are consistent with prior diagnosis of Bipolar Disorder Type 1, however, patient recently used substances which is contributing to the overall presentation. Utox was positive for amphetamines. Will consider Substance Induced Shweta vs Bipolar 1 Disorder, current episode shweta. He will likely benefit from med optimization and substance use cessation this admission. More collateral will be needed to gather a more accurate history.      Given that he currently has out of control behaviors, aggression and shweta, patient warrants inpatient  psychiatric hospitalization to maintain his safety.      Psychiatric Plan by Diagnosis     Today's changes:  - No changes for today    # Bipolar Disorder vs Substance Induced Shweta  1. Medications:  - Depakote 1250mg BID  - Zyprexa 15mg at bedtime  - Wellbutrin 300 mg held due to manic-like behaviors     2. Pertinent Labs/Monitoring:   - VPA level 73.6 ug/mL (7/25)  - EKG 5/14 was 391, EKG 7/24 normal with QTc of 387  - EKG scheduled for 8/7     3. Additional Plans:  - Patient will be treated in therapeutic milieu with appropriate individual and group therapies as described    # Nicotine Use Disorder  - Nicotine Patch  - Nicotine Gum     #Anxiety  - Hydroxyzine 25 mg PRN     #ADHD  - Strattera 60 mg    2. Pertinent Labs/Monitoring:   - EKG unremarkable- QTc 387.      3. Additional Plans:  - Patient will be treated in therapeutic milieu with appropriate individual and group therapies as described     Psychiatric Hospital Course:      In the ED,  Patient was Chart reviewed and was found to be on multiple psychopharmacology medications such as Zyprexa, Depakote, Wellbutrin which are his PTA medications. His current PTA medication were continued due to severe agitation and aggression while in the emergency room, Haldol 5 mg, Ativan 2 mg, Benadryl 50 mg were added for severe agitation and aggression.     After patient arrived to the unit, his PTA Strattera and Wellbutrin were held due to his manic like symptoms. Nicotine gum ordered 7/22/23. VPA level was ordered for 7/23/23 which 78.0, VPA on 7/25 73.6.     Etienne Dupree was [facility] a [facility] hold after his PD was revoked by his .   Medications:  PTA Depakote & Zyprexa were continued.   PTA Wellbutrin was held due to shweta.    No new medications started at the time of admission  Strattera 40 mg restarted on 07/31/2023, increased to PTA dose of 60 mg for feelings of depression  Discontinued prazosin on 08/1/2023  Strattera increased to PTA  dose of 60 mg on 08/04/2023     The risks, benefits, alternatives, and side effects were discussed and understood by the patient and other caregivers.       Medical Assessment and Plan     Medical diagnoses to be addressed this admission:    # Hypertension  Hydrochlorothiazide 25 mg  Hydralazine for SBP >160 or DBP >100    # Supplements  Fish oil      Medical course:  Patient was physically examined by the ED prior to being transferred to the unit and was found to be medically stable and appropriate for admission.      Consults:  none     Checklist     Legal Status: Court Hold     Safety Assessment:   Behavioral Orders   Procedures    Assault precautions    Code 1 - Restrict to Unit    Code 2    Elopement precautions    Routine Programming     As clinically indicated    Status 15     Every 15 minutes.       Risk Assessment:  Risk for harm is moderate.  Risk factors: substance use, trauma, impulsive and past behaviors  Protective factors: [facility]     SIO: No    Disposition: Pending placement, stabilization, medication optimization, & development of a safe discharge plan.     Attestations     This patient was seen and discussed with my attending physician.    Elis Paul, MS4  [facility]    Resident Attestation:      I was present with the medical student who participated in the service and documentation of the note. I have verified the history and personally performed the physical/mental status exam and medical decision making. I agree with the assessment and plan documented in the note.    Enma Ramesh MD, MSc, MPH, PhD candidate   Psychiatry resident, PGY1

## 2023-08-11 NOTE — PLAN OF CARE
Patient is alert and oriented x4. He was mostly withdrawn into his room. Mood was calm with a flat affect. He appeared guarded and dismissive during check in. He denied SI/SIB/AVH. He did not go to groups. He paced in the hallway a few times with headphones on. He denied any physical discomfort. He is medication compliant, no medication side effects noted. Grooming is adequate. He is eating and drink adequately. Staff will continue top monitor.

## 2023-08-11 NOTE — PROGRESS NOTES
received call from [facility], where patient is currently at. Patient has tentative plan to discharge to an IRTS facility on 8/15/23 and provider working with patient needs to be able to write prescriptions, as patient is restricted recipient with [employer]. SW had PCP fill out restricted recipient form and then faxed completed form to [employer] Restricted Recipient program.     BRIAN Muir

## 2023-08-11 NOTE — PLAN OF CARE
Patient is alert and oriented x4. Patient is present in the milieu. Patient is social with some peers. Patient's mood is calm. Affect is flat and blunted. Patient denied all MH symptoms. Patient denied pain. Patient was medication compliant. Patient wanted his GF to visit this evening, there's an active order that GF can not visit at this time. Both patient and his GF were made aware of this order by MARITA MACHADO. No concerns at this time. Per CTC note, patient is discharging on Tuesday 8/15 going to [facility].

## 2023-08-11 NOTE — PLAN OF CARE
Assessment/Intervention/Current Symtoms and Care Coordination:  The patient's care was discussed with the treatment team and chart notes were reviewed.   Patient met with team.  No changes in past 24 hours.  Patient has been compliant with meds. Spends a great deal of catalina on the phone.     - Patient has been approved for admission at [facility] with admission date of 8/15 at discharge time TBD.  - Writer email pt's CM at [facility] to inquire on  Transport to [facility]. Awaiting response.   - Writer called [facility] restrictive recipient program. Writer left  requesting a call back.   - Writer received a call from Moriah with the RRP [facility] at [phone]. Moriah instructed this writer to call pt's PCP clinic which ([facility]) and request a referral to add discharging prescriber.  - Writer called [facility] and spoke with Blair at [phone]. Writer provided  Dr. Jamison's NPI and name of pharmacy([facility]) where the medications need to be sent.   - ANGELINA Gu) faxed intake forms to [facility] Fax [phone].        Discharge Plan or Goal:  [facility]   Psychiatry  Therapy     Barriers to Discharge:  Patient in need of further stability/medication mgmt  Placement     Referral Status:  On the wait list for [facility] for TBI     Legal Status:  Civil commitment/Aguiar through [facility] through 4/2024  Will need PD  Patient has UCARE - Restricted Recipient Program   County: [facility]  Contacts:  [facility]: Jose Durham       Upcoming Meetings and Dates/Important Information and next steps:  Follow up with CM on  transport. [facility].

## 2023-08-11 NOTE — PLAN OF CARE
The pt visible out in lounge most of the shift watching TV, social with selected peer and occasionally pacing on the hallway using headphone. Upon approach calm in mood, bright in affect, denied all MH concern and contracted for safety. No outburst behavioral concern noted or reported. Appetite good ate 100% supper and adequate fluid intake. Comply with med and care.    Problem: Plan of Care - These are the overarching goals to be used throughout the patient stay.    Goal: Plan of Care Review  Description: The Plan of Care Review/Shift note should be completed every shift.  The Outcome Evaluation is a brief statement about your assessment that the patient is improving, declining, or no change.  This information will be displayed automatically on your shift note.  Outcome: Progressing  Flowsheets (Taken 8/10/2023 1630)  Plan of Care Reviewed With: patient  Overall Patient Progress: improving   Goal Outcome Evaluation:    Plan of Care Reviewed With: patient Plan of Care Reviewed With: patient    Overall Patient Progress: improvingOverall Patient Progress: improving

## 2023-08-12 PROCEDURE — 250N000013 HC RX MED GY IP 250 OP 250 PS 637

## 2023-08-12 PROCEDURE — 124N000002 HC R&B MH UMMC

## 2023-08-12 RX ADMIN — NICOTINE POLACRILEX 2 MG: 2 GUM, CHEWING ORAL at 14:18

## 2023-08-12 RX ADMIN — NICOTINE POLACRILEX 2 MG: 2 GUM, CHEWING ORAL at 20:18

## 2023-08-12 RX ADMIN — Medication 1 G: at 08:49

## 2023-08-12 RX ADMIN — NICOTINE POLACRILEX 2 MG: 2 GUM, CHEWING ORAL at 11:28

## 2023-08-12 RX ADMIN — ATOMOXETINE 60 MG: 60 CAPSULE ORAL at 08:49

## 2023-08-12 RX ADMIN — DIVALPROEX SODIUM 1250 MG: 500 TABLET, DELAYED RELEASE ORAL at 08:49

## 2023-08-12 RX ADMIN — OLANZAPINE 15 MG: 10 TABLET, ORALLY DISINTEGRATING ORAL at 20:18

## 2023-08-12 RX ADMIN — HYDROCHLOROTHIAZIDE 25 MG: 25 TABLET ORAL at 08:49

## 2023-08-12 RX ADMIN — NICOTINE POLACRILEX 2 MG: 2 GUM, CHEWING ORAL at 17:51

## 2023-08-12 RX ADMIN — DIVALPROEX SODIUM 1250 MG: 500 TABLET, DELAYED RELEASE ORAL at 20:18

## 2023-08-12 RX ADMIN — NICOTINE 1 PATCH: 21 PATCH, EXTENDED RELEASE TRANSDERMAL at 08:51

## 2023-08-12 ASSESSMENT — ACTIVITIES OF DAILY LIVING (ADL)
ADLS_ACUITY_SCORE: 29
DRESS: INDEPENDENT
ADLS_ACUITY_SCORE: 29
HYGIENE/GROOMING: INDEPENDENT
ADLS_ACUITY_SCORE: 29
LAUNDRY: WITH SUPERVISION
ADLS_ACUITY_SCORE: 29
HYGIENE/GROOMING: INDEPENDENT
ORAL_HYGIENE: INDEPENDENT
ADLS_ACUITY_SCORE: 29
ADLS_ACUITY_SCORE: 29
ORAL_HYGIENE: INDEPENDENT
DRESS: INDEPENDENT
ADLS_ACUITY_SCORE: 29

## 2023-08-12 NOTE — PLAN OF CARE
"Pt has been on the phone a lot.  Pts' family came to visit.  Pt been inlounge watching TV, social with select peers.  Pt appears to be eating well.  Pt asked for pop from his locker which pt did get.    Problem: Plan of Care - These are the overarching goals to be used throughout the patient stay.    Goal: Plan of Care Review  Description: The Plan of Care Review/Shift note should be completed every shift.  The Outcome Evaluation is a brief statement about your assessment that the patient is improving, declining, or no change.  This information will be displayed automatically on your shift note.  Outcome: Not Progressing  Goal: Patient-Specific Goal (Individualized)  Description: You can add care plan individualizations to a care plan. Examples of Individualization might be:  \"Parent requests to be called daily at 9am for status\", \"I have a hard time hearing out of my right ear\", or \"Do not touch me to wake me up as it startles me\".  Outcome: Not Progressing  Goal: Absence of Hospital-Acquired Illness or Injury  Outcome: Not Progressing  Goal: Optimal Comfort and Wellbeing  Outcome: Not Progressing  Goal: Readiness for Transition of Care  Outcome: Not Progressing     Problem: Adult Behavioral Health Plan of Care  Goal: Plan of Care Review  Outcome: Not Progressing  Goal: Patient-Specific Goal (Individualization)  Description: You can add care plan individualizations to a care plan. Examples of Individualization might be:  \"Parent requests to be called daily at 9am for status\", \"I have a hard time hearing out of my right ear\", or \"Do not touch me to wake me up as it startles me\".  Outcome: Not Progressing  Goal: Individualized Daily Interaction Plan (IDIP)  Outcome: Not Progressing  Goal: Adheres to Safety Considerations for Self and Others  Outcome: Not Progressing  Goal: Absence of New-Onset Illness or Injury  Outcome: Not Progressing  Goal: Optimized Coping Skills in Response to Life Stressors  Outcome: Not " Progressing  Goal: Develops/Participates in Therapeutic [place] to Support Successful Transition  Outcome: Not Progressing   Goal Outcome Evaluation:

## 2023-08-12 NOTE — PLAN OF CARE
Problem: Sleep Disturbance  Goal: Adequate Sleep/Rest  Outcome: Progressing    Pt appeared to have slept for 6.75 hours. Pt did not complain of pain or discomfort, and no PRN medications were given. 15 minutes safety checks were in place. Staff will continue to offer support to pt.

## 2023-08-13 PROCEDURE — 250N000013 HC RX MED GY IP 250 OP 250 PS 637

## 2023-08-13 PROCEDURE — 124N000002 HC R&B MH UMMC

## 2023-08-13 RX ADMIN — OLANZAPINE 15 MG: 10 TABLET, ORALLY DISINTEGRATING ORAL at 19:01

## 2023-08-13 RX ADMIN — DIVALPROEX SODIUM 1250 MG: 500 TABLET, DELAYED RELEASE ORAL at 08:33

## 2023-08-13 RX ADMIN — NICOTINE POLACRILEX 2 MG: 2 GUM, CHEWING ORAL at 08:33

## 2023-08-13 RX ADMIN — NICOTINE POLACRILEX 2 MG: 2 GUM, CHEWING ORAL at 18:40

## 2023-08-13 RX ADMIN — NICOTINE POLACRILEX 2 MG: 2 GUM, CHEWING ORAL at 16:18

## 2023-08-13 RX ADMIN — DIVALPROEX SODIUM 1250 MG: 500 TABLET, DELAYED RELEASE ORAL at 19:01

## 2023-08-13 RX ADMIN — ATOMOXETINE 60 MG: 60 CAPSULE ORAL at 08:33

## 2023-08-13 RX ADMIN — NICOTINE 1 PATCH: 21 PATCH, EXTENDED RELEASE TRANSDERMAL at 08:34

## 2023-08-13 RX ADMIN — Medication 1 G: at 08:33

## 2023-08-13 RX ADMIN — HYDROCHLOROTHIAZIDE 25 MG: 25 TABLET ORAL at 08:33

## 2023-08-13 ASSESSMENT — ACTIVITIES OF DAILY LIVING (ADL)
ADLS_ACUITY_SCORE: 29
HYGIENE/GROOMING: INDEPENDENT
ADLS_ACUITY_SCORE: 29
LAUNDRY: WITH SUPERVISION
ORAL_HYGIENE: INDEPENDENT
ADLS_ACUITY_SCORE: 29
ADLS_ACUITY_SCORE: 29
HYGIENE/GROOMING: INDEPENDENT
DRESS: INDEPENDENT
ADLS_ACUITY_SCORE: 29
ADLS_ACUITY_SCORE: 29
DRESS: INDEPENDENT;STREET CLOTHES
ADLS_ACUITY_SCORE: 29
ADLS_ACUITY_SCORE: 29
ORAL_HYGIENE: INDEPENDENT

## 2023-08-13 NOTE — PLAN OF CARE
Problem: Sleep Disturbance  Goal: Adequate Sleep/Rest  Outcome: Progressing    Pt appeared to have slept for  7 hours. Pt did not complain of pain or discomfort, and no PRN medications were given. 15 minutes safety checks were in place. Staff will continue to offer support to pt.

## 2023-08-13 NOTE — PLAN OF CARE
"Pt has been medication compliant.  Pt appears to be eating well.  Pt social wit select peers.  Pt has not had any visitors this shift.  Pt appears to attends ADL's.  Pt has no behavior outbursts this shift.    Problem: Plan of Care - These are the overarching goals to be used throughout the patient stay.    Goal: Plan of Care Review  Description: The Plan of Care Review/Shift note should be completed every shift.  The Outcome Evaluation is a brief statement about your assessment that the patient is improving, declining, or no change.  This information will be displayed automatically on your shift note.  Outcome: Not Progressing  Goal: Patient-Specific Goal (Individualized)  Description: You can add care plan individualizations to a care plan. Examples of Individualization might be:  \"Parent requests to be called daily at 9am for status\", \"I have a hard time hearing out of my right ear\", or \"Do not touch me to wake me up as it startles me\".  Outcome: Not Progressing  Goal: Absence of Hospital-Acquired Illness or Injury  Outcome: Not Progressing  Goal: Optimal Comfort and Wellbeing  Outcome: Not Progressing  Goal: Readiness for Transition of Care  Outcome: Not Progressing   Goal Outcome Evaluation:                        "

## 2023-08-13 NOTE — PLAN OF CARE
Pt has a full range affect with irritable mood. Pt was guarded during check in. Pt rates anxiety at 0/10 and depression 0/10. Pt rates pain at 0/10. Pt reports no SI/HI/SIB and contracts for safety. Pt denies any hallucinations and not noted responding to any internal stimuli. Pt was medication compliant. No reported or observed medication side effects. Pt was visible on unit and social with select peers. Pt was restless this evening and refused vital signs. Continue current POC.      Plan of Care Reviewed With: patient Plan of Care Reviewed With: patient    Overall Patient Progress: no changeOverall Patient Progress: no change

## 2023-08-14 PROCEDURE — 99231 SBSQ HOSP IP/OBS SF/LOW 25: CPT | Mod: GC | Performed by: PSYCHIATRY & NEUROLOGY

## 2023-08-14 PROCEDURE — 250N000013 HC RX MED GY IP 250 OP 250 PS 637

## 2023-08-14 PROCEDURE — 124N000002 HC R&B MH UMMC

## 2023-08-14 RX ORDER — CHLORAL HYDRATE 500 MG
1 CAPSULE ORAL DAILY
Qty: 30 CAPSULE | Refills: 0 | Status: SHIPPED | OUTPATIENT
Start: 2023-08-14 | End: 2023-08-15

## 2023-08-14 RX ORDER — ATOMOXETINE 60 MG/1
60 CAPSULE ORAL EVERY MORNING
Qty: 30 CAPSULE | Refills: 0 | Status: SHIPPED | OUTPATIENT
Start: 2023-08-14 | End: 2023-08-15

## 2023-08-14 RX ORDER — NICOTINE 21 MG/24HR
1 PATCH, TRANSDERMAL 24 HOURS TRANSDERMAL DAILY
Qty: 30 PATCH | Refills: 0 | Status: SHIPPED | OUTPATIENT
Start: 2023-08-14 | End: 2023-08-15

## 2023-08-14 RX ORDER — OLANZAPINE 10 MG/1
15 TABLET ORAL AT BEDTIME
Qty: 45 TABLET | Refills: 0 | Status: SHIPPED | OUTPATIENT
Start: 2023-08-14

## 2023-08-14 RX ORDER — HYDROXYZINE HYDROCHLORIDE 25 MG/1
25 TABLET, FILM COATED ORAL EVERY 4 HOURS PRN
Qty: 15 TABLET | Refills: 0 | Status: SHIPPED | OUTPATIENT
Start: 2023-08-14 | End: 2023-08-15

## 2023-08-14 RX ORDER — HYDROCHLOROTHIAZIDE 25 MG/1
25 TABLET ORAL DAILY
Qty: 30 TABLET | Refills: 0 | Status: SHIPPED | OUTPATIENT
Start: 2023-08-14 | End: 2023-08-15

## 2023-08-14 RX ORDER — DIVALPROEX SODIUM 250 MG/1
1250 TABLET, DELAYED RELEASE ORAL 2 TIMES DAILY
Qty: 300 TABLET | Refills: 0 | Status: SHIPPED | OUTPATIENT
Start: 2023-08-14 | End: 2023-08-15

## 2023-08-14 RX ADMIN — OLANZAPINE 15 MG: 10 TABLET, ORALLY DISINTEGRATING ORAL at 20:25

## 2023-08-14 RX ADMIN — NICOTINE POLACRILEX 2 MG: 2 GUM, CHEWING ORAL at 15:46

## 2023-08-14 RX ADMIN — DIVALPROEX SODIUM 1250 MG: 500 TABLET, DELAYED RELEASE ORAL at 20:24

## 2023-08-14 RX ADMIN — HYDROCHLOROTHIAZIDE 25 MG: 25 TABLET ORAL at 08:36

## 2023-08-14 RX ADMIN — NICOTINE 1 PATCH: 21 PATCH, EXTENDED RELEASE TRANSDERMAL at 08:36

## 2023-08-14 RX ADMIN — DIVALPROEX SODIUM 1250 MG: 500 TABLET, DELAYED RELEASE ORAL at 08:36

## 2023-08-14 RX ADMIN — NICOTINE POLACRILEX 2 MG: 2 GUM, CHEWING ORAL at 17:17

## 2023-08-14 RX ADMIN — NICOTINE POLACRILEX 2 MG: 2 GUM, CHEWING ORAL at 12:52

## 2023-08-14 RX ADMIN — Medication 1 G: at 08:36

## 2023-08-14 RX ADMIN — NICOTINE POLACRILEX 2 MG: 2 GUM, CHEWING ORAL at 20:25

## 2023-08-14 RX ADMIN — ATOMOXETINE 60 MG: 60 CAPSULE ORAL at 08:36

## 2023-08-14 ASSESSMENT — ACTIVITIES OF DAILY LIVING (ADL)
ADLS_ACUITY_SCORE: 29
ORAL_HYGIENE: INDEPENDENT
ADLS_ACUITY_SCORE: 29
HYGIENE/GROOMING: INDEPENDENT
ORAL_HYGIENE: INDEPENDENT
ADLS_ACUITY_SCORE: 29
ADLS_ACUITY_SCORE: 29
HYGIENE/GROOMING: INDEPENDENT
ADLS_ACUITY_SCORE: 29
DRESS: INDEPENDENT
ADLS_ACUITY_SCORE: 29
ADLS_ACUITY_SCORE: 29
LAUNDRY: WITH SUPERVISION
DRESS: INDEPENDENT
ADLS_ACUITY_SCORE: 29
LAUNDRY: WITH SUPERVISION

## 2023-08-14 NOTE — PLAN OF CARE
The pt appeared sleeping most of the night without apparent respiratory distress or safety concern. The pt slept 6.25 hours. 15 minutes safety check on going and continue to monitor.    Problem: Sleep Disturbance  Goal: Adequate Sleep/Rest  Outcome: Progressing   Goal Outcome Evaluation:

## 2023-08-14 NOTE — PLAN OF CARE
Pt has been visible in the milieu, spends a majority of the shift on the phone; he is selectively social with peers. Pt does not attend OT groups. He attends all OT groups that are offered. Pt denies all mental health symptoms upon assessment. He spoke to writer about his daughter and mom visiting over the weekend and expressed that it went well. He continues to be concerned with whether or not girlfriend can visit. He has not had any behavioral issues this shift. Pt will discharge tomorrow morning, no other concerns at this time.    Problem: Anxiety  Goal: Anxiety Reduction or Resolution  Outcome: Progressing   Goal Outcome Evaluation:    Plan of Care Reviewed With: patient

## 2023-08-14 NOTE — PLAN OF CARE
Care Coordinator scheduled the following appointments:    Psychiatry appointment: Wednesday, August 23, 2023 @ 9:10am for 75 minutes via Telehealth  **Paperwork to be completed prior to your appointment will be sent to your email ([email or web address]). A link for your appointment and subsequent appointments will also be sent to your email.   Provider: Chantal Joy CNP  Location: [place] [address]  Phone: [phone]  Fax: [phone]  [facility] TO FAX AVS to above appointment, please  Follow-up appointment: Tuesday, September 19, 2023 @ 9:35am for 25 minutes via Telehealth    CC updated CTC and AVS    Erin Rosario  Care Coordinator  [email or web address]  [phone]

## 2023-08-14 NOTE — PROGRESS NOTES
"  ----------------------------------------------------------------------------------------------------------  [facility]  Psychiatry Progress Note  Hospital Day #24     Interim History:     The patient's care was discussed with the treatment team and chart notes were reviewed.    Vitals: VSS  Sleep: 6.25 hours (08/14/23 0600)  Scheduled medications: Took all scheduled medications as prescribed  Psychiatric PRN medications: Hydroxyzine, Zyprexa    Staff Report/Interval Events:     Friday night: Pt appeared to have slept for 6.75 hours. Pt did not complain of pain or discomfort, and no PRN medications were given.      Saturday night:Pt appeared to have slept for  7 hours. Pt did not complain of pain or discomfort, and no PRN medications were given.      Sunday night:he pt appeared sleeping most of the night without apparent respiratory distress or safety concern. The pt slept 6.25 hours      Subjective:     Patient Interview:  Etienne was interviewed in the milieu this morning. He is feeling excited about discharging tomorrow and reports no anxiety about it. Asks about his medications, especially the ones he was taken off of. He wants to make sure he has all the medications he needs for discharge.He reports his mood as \"okay\". Etienne asks about Ailyn (girlfriend) visiting again. He does not believe that she came to the unit on Friday. Etienne tells team that the unit manager said the doctors have to approve her to visit.     ROS:    Patient denies acute psychiatric concerns.  Patient denies acute medical concerns.     Objective:     Vitals:  /87 (Patient Position: Sitting, Cuff Size: Adult Large)   Pulse 62   Temp 96.8  F (36  C) (Oral)   Resp 16   Wt 119.5 kg (263 lb 6.4 oz)   SpO2 98%   BMI 36.67 kg/m      Allergies:  Allergies   Allergen Reactions    Penicillins        Current Medications:  Scheduled:  Current Facility-Administered Medications   Medication Dose " "Route Frequency    atomoxetine  60 mg Oral QAM    divalproex sodium delayed-release  1,250 mg Oral BID    fish oil-omega-3 fatty acids  1 g Oral Daily    hydrochlorothiazide  25 mg Oral Daily    nicotine  1 patch Transdermal Daily    And    nicotine   Transdermal Q8H PAULETTE    OLANZapine zydis  15 mg Oral At Bedtime       PRN:  Current Facility-Administered Medications   Medication Dose Route Frequency    hydrOXYzine  25 mg Oral Q4H PRN    loperamide  2 mg Oral 4x Daily PRN    melatonin  3 mg Oral At Bedtime PRN    nicotine  2 mg Buccal Q1H PRN    OLANZapine  10 mg Oral TID PRN    Or    OLANZapine  10 mg Intramuscular TID PRN       Labs and Imaging:  New results:   No results found for this or any previous visit (from the past 24 hour(s)).    Data this admission:  - CBC unremarkable  - CMP Co2 31  - TSH normal  - UDS negative  - Vit D ordered on 07/24  - Hgb A1c 5.7  - Lipids, HDL 31  - Vit B12 normal  - Folate normal  - Urinalysis unremarkable  - EKG ordered on 07/24     Mental Status Exam:     Oriented to:  Grossly Oriented  General:  Awake, sitting in chair in milieu  Appearance:  appears stated age  Behavior/Attitude:  calm and cooperative  Eye Contact:  appropriate, intense at times  Psychomotor: normal, no catatonia present  Speech:  appropriate volume/tone  Language: Fluent in English with appropriate syntax and vocabulary.  Mood: \"Depressed\"  Affect: incongruent with mood,  bright and stable  Thought Process:  linear, coherent and goal directed  Thought Content:  No SI/HI/AH/VH; No apparent delusions  Associations:  intact  Insight:  fair   Judgment:  limited due to trying to purchase a car while hospitalized   Impulse control: fair, due to ADHD/TBI  Attention Span:  grossly intact and adequate for conversation  Concentration:  grossly intact  Recent and Remote Memory:  not formally assessed  Fund of Knowledge:  average  Muscle Strength and Tone: normal  Gait and Station: Normal     Psychiatric Assessment "     Etienne Dupree is a 33 year old male previously diagnosed with Bipolar Disorder Type 1, substance use disorder (meth, alcohol, cocaine, ecstasy, marijuana), TBI, ADHD, and PTSD who presented by EMS after his PD was revoked in the context of manic like symptoms likely from substance use. Most recent psychiatric hospitalization was in May 2023. Significant symptoms on admission include irritability, tangential, pressured speech. The MSE on admission was pertinent for irritability, elevated, tangential. Biological contributions to mental health presentation include long standing history of substance use, previous diagnosis of Bipolar Disorder type 1 which he takes Depakote and Zyprexa. Psychological contributions to mental health presentation include poor insight, poor distress tolerance,and cluster B traits. Social factors contributing to mental health presentation include being on MICD commitment, conflicts with daughter, ongoing legal charges, financial difficulties.     In summary, differential is still in evolution. The patient's reported symptoms of pressured speech, irritability are consistent with prior diagnosis of Bipolar Disorder Type 1, however, patient recently used substances which is contributing to the overall presentation. Utox was positive for amphetamines. Will consider Substance Induced Shweta vs Bipolar 1 Disorder, current episode shweta. He will likely benefit from med optimization and substance use cessation this admission. More collateral will be needed to gather a more accurate history.      Given that he currently has out of control behaviors, aggression and shweta, patient warrants inpatient psychiatric hospitalization to maintain his safety.      Psychiatric Plan by Diagnosis     Today's changes:  - No changes for today    # Bipolar Disorder vs Substance Induced Shweta  1. Medications:  - Depakote 1250mg BID  - Zyprexa 15mg at bedtime  - Wellbutrin 300 mg held due to manic-like  behaviors     2. Pertinent Labs/Monitoring:   - VPA level 73.6 ug/mL (7/25)  - EKG 5/14 was 391, EKG 7/24 normal with QTc of 387  - EKG scheduled for 8/7     3. Additional Plans:  - Patient will be treated in therapeutic milieu with appropriate individual and group therapies as described    # Nicotine Use Disorder  - Nicotine Patch  - Nicotine Gum     #Anxiety  - Hydroxyzine 25 mg PRN     #ADHD  - Strattera 60 mg    2. Pertinent Labs/Monitoring:   - EKG unremarkable- QTc 387.      3. Additional Plans:  - Patient will be treated in therapeutic milieu with appropriate individual and group therapies as described     Psychiatric Hospital Course:      In the ED,  Patient was Chart reviewed and was found to be on multiple psychopharmacology medications such as Zyprexa, Depakote, Wellbutrin which are his PTA medications. His current PTA medication were continued due to severe agitation and aggression while in the emergency room, Haldol 5 mg, Ativan 2 mg, Benadryl 50 mg were added for severe agitation and aggression.     After patient arrived to the unit, his PTA Strattera and Wellbutrin were held due to his manic like symptoms. Nicotine gum ordered 7/22/23. VPA level was ordered for 7/23/23 which 78.0, VPA on 7/25 73.6.     Etienne Dupree was [facility] [address] after his PD was revoked by his .   Medications:  PTA Depakote & Zyprexa were continued.   PTA Wellbutrin was held due to julianna.    No new medications started at the time of admission  Strattera 40 mg restarted on 07/31/2023, increased to PTA dose of 60 mg for feelings of depression  Discontinued prazosin on 08/1/2023  Strattera increased to PTA dose of 60 mg on 08/04/2023     The risks, benefits, alternatives, and side effects were discussed and understood by the patient and other caregivers.       Medical Assessment and Plan     Medical diagnoses to be addressed this admission:    # Hypertension  Hydrochlorothiazide 25  mg  Hydralazine for SBP >160 or DBP >100    # Supplements  Fish oil      Medical course:  Patient was physically examined by the ED prior to being transferred to the unit and was found to be medically stable and appropriate for admission.      Consults:  none     Checklist     Legal Status: Court Hold     Safety Assessment:   Behavioral Orders   Procedures    Assault precautions    Code 1 - Restrict to Unit    Code 2    Elopement precautions    Routine Programming     As clinically indicated    Status 15     Every 15 minutes.       Risk Assessment:  Risk for harm is moderate.  Risk factors: substance use, trauma, impulsive and past behaviors  Protective factors: [facility]     SIO: No    Disposition: Pending placement, stabilization, medication optimization, & development of a safe discharge plan.     Attestations     This patient was seen and discussed with my attending physician.      Resident Attestation:      I was present with the medical student who participated in the service and documentation of the note. I have verified the history and personally performed the physical/mental status exam and medical decision making. I agree with the assessment and plan documented in the note.    Enma Ramesh MD, MSc, MPH, PhD candidate   Psychiatry resident, PGY1    Attestation:  This patient has been seen and evaluated by me, Donny Heller MD.  I have discussed this patient with the house staff team including the resident and medical student and I agree with the findings and plan in this note.    I have reviewed today's vital signs, medications, labs and imaging. Donny Heller MD , PhD.

## 2023-08-14 NOTE — PROVIDER NOTIFICATION
08/14/23 1147   Individualization/Patient Specific Goals   Patient Personal Strengths expressive of needs   Patient Vulnerabilities legal concerns;occupational insecurity;limited social skills;history of unsuccessful treatment;lacks insight into illness;substance abuse/addiction;poor impulse control;family/relationship conflict   Anxieties, Fears or Concerns None   Individualized Care Needs None   Interprofessional Rounds   Summary 1. Stabilization of psychiatric sx's 2. Medication mgmt 3.Safe with self /others 4. Substance use addressed. 5. Coordination of care with outpatient providers 6. Housing addressed/had interview for [facility] placement today 7. Psych f/u care in place   Participants CTC;nursing;psychiatrist   Behavioral Team Discussion   Participants Dr. Heller, Dr. Ramesh, Carmelina Mccord RN, Dennise Morton MA.LP,  Med students   Progress Patient has been stable- no beavioral issues, compliant with meds, eating and sleeping well.   Anticipated length of stay DC tomorrow   Continued Stay Criteria/Rationale PLacement arranged for tomorrow   Medical/Physical Stable   Plan Patient will transfer to [facility] tomorrw.   to transport at 1:00pm   Anticipated Discharge Disposition another healthcare facility

## 2023-08-14 NOTE — PLAN OF CARE
Goal Outcome Evaluation:    Plan of Care Reviewed With: patient      Problem: Suicidal Behavior  Goal: Suicidal Behavior is Absent or Managed  Outcome: Progressing  Note: Pt was guarded on approach. Calm and cooperative. Denied all psych symptoms. Stated he was looking forward to discharging and his case manger is forcing him to go to an [facility] in [place]. He watched tv and socialized with peers in the lounge during the shift.

## 2023-08-14 NOTE — PLAN OF CARE
Problem: Adult Behavioral Health Plan of Care  Goal: Plan of Care Review  Outcome: Progressing  Flowsheets (Taken 8/14/2023 1750)  Patient Agreement with Plan of Care: agrees   Goal Outcome Evaluation:    Plan of Care Reviewed With: patient          Pt was out in the lounge most of the shift. His affect was bright and pleasant. He socialized and engaged with peers. He was on the phone most of the shift. Vitals stable. He was out for dinner and snacks and had good food and fluids intake. He denied pain and all psych symptoms. Contracted for safety. He was cooperative and med compliant. Prn Nicotine gum 2 mg was given x 3 this shift. Girl friend visited this evening and pt stated the visit went well. Did not attend OT evening group. Pt will be discharging tomorrow. His discharged medications are in the medication locked box in the med room.

## 2023-08-15 ENCOUNTER — TELEPHONE (OUTPATIENT)
Dept: BEHAVIORAL HEALTH | Facility: CLINIC | Age: 33
End: 2023-08-15
Payer: MEDICAID

## 2023-08-15 VITALS
HEART RATE: 62 BPM | TEMPERATURE: 96.8 F | DIASTOLIC BLOOD PRESSURE: 87 MMHG | SYSTOLIC BLOOD PRESSURE: 126 MMHG | RESPIRATION RATE: 16 BRPM | WEIGHT: 263.4 LBS | OXYGEN SATURATION: 98 % | BODY MASS INDEX: 36.67 KG/M2

## 2023-08-15 PROCEDURE — 250N000013 HC RX MED GY IP 250 OP 250 PS 637

## 2023-08-15 PROCEDURE — 99238 HOSP IP/OBS DSCHRG MGMT 30/<: CPT | Mod: GC | Performed by: PSYCHIATRY & NEUROLOGY

## 2023-08-15 RX ORDER — HYDROXYZINE HYDROCHLORIDE 25 MG/1
25 TABLET, FILM COATED ORAL EVERY 4 HOURS PRN
Qty: 15 TABLET | Refills: 0
Start: 2023-08-15

## 2023-08-15 RX ORDER — CHLORAL HYDRATE 500 MG
1 CAPSULE ORAL DAILY
Qty: 30 CAPSULE | Refills: 0
Start: 2023-08-15 | End: 2023-11-22

## 2023-08-15 RX ORDER — DIVALPROEX SODIUM 250 MG/1
1250 TABLET, DELAYED RELEASE ORAL 2 TIMES DAILY
Qty: 300 TABLET | Refills: 0
Start: 2023-08-15 | End: 2023-11-16 | Stop reason: ALTCHOICE

## 2023-08-15 RX ORDER — HYDROCHLOROTHIAZIDE 25 MG/1
25 TABLET ORAL DAILY
Qty: 30 TABLET | Refills: 0
Start: 2023-08-15 | End: 2023-11-16

## 2023-08-15 RX ORDER — NICOTINE 21 MG/24HR
1 PATCH, TRANSDERMAL 24 HOURS TRANSDERMAL DAILY
Qty: 30 PATCH | Refills: 0
Start: 2023-08-15 | End: 2023-11-22

## 2023-08-15 RX ORDER — ATOMOXETINE 60 MG/1
60 CAPSULE ORAL EVERY MORNING
Qty: 30 CAPSULE | Refills: 0
Start: 2023-08-15 | End: 2023-11-16 | Stop reason: ALTCHOICE

## 2023-08-15 RX ADMIN — Medication 1 G: at 08:09

## 2023-08-15 RX ADMIN — HYDROCHLOROTHIAZIDE 25 MG: 25 TABLET ORAL at 08:09

## 2023-08-15 RX ADMIN — NICOTINE POLACRILEX 2 MG: 2 GUM, CHEWING ORAL at 10:49

## 2023-08-15 RX ADMIN — NICOTINE 1 PATCH: 21 PATCH, EXTENDED RELEASE TRANSDERMAL at 08:09

## 2023-08-15 RX ADMIN — ATOMOXETINE 60 MG: 60 CAPSULE ORAL at 08:09

## 2023-08-15 RX ADMIN — DIVALPROEX SODIUM 1250 MG: 500 TABLET, DELAYED RELEASE ORAL at 08:09

## 2023-08-15 ASSESSMENT — ACTIVITIES OF DAILY LIVING (ADL)
HYGIENE/GROOMING: INDEPENDENT
ADLS_ACUITY_SCORE: 29
ADLS_ACUITY_SCORE: 29
LAUNDRY: WITH SUPERVISION
DRESS: INDEPENDENT
ADLS_ACUITY_SCORE: 29
ADLS_ACUITY_SCORE: 29
ORAL_HYGIENE: INDEPENDENT
ADLS_ACUITY_SCORE: 29
ADLS_ACUITY_SCORE: 29

## 2023-08-15 NOTE — CARE PLAN
Assessment/Intervention/Current Symtoms and Care Coordination:  The patient's care was discussed with the treatment team and chart notes were reviewed.   Patient met with team.  Patient  discharging today to [facility].  He is very happy to be leaving today- stated he's heard good things about it and feels stable to leave  Affect bright, calm.  Patient shaved, showered.  Care has been coordinated with Luiz CM and [facility] .  Meds have been faxed to [facility] in [place].- coordinated [facility] Restricted Recipient Program     Discharge Plan or Goal:  [facility]   Psychiatry  Therapy     Barriers to Discharge:  None     Referral Status:  None today     Legal Status:  Civil commitment/Aguiar through [facility] through 4/2024  Provisional discharge complete  Patient has UCARE - Restricted Recipient Program     Contacts:  [facility]: Jose Durham       Upcoming Meetings and Dates/Important Information and next steps:

## 2023-08-15 NOTE — DISCHARGE SUMMARY
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 mg/dL  Above Desirable:  130-159 mg/dL  Borderline High:  160-189 mg/dL  High:  190-219 mg/dL  Very High:  Greater than or equal to 220 mg/dL   Vitamin B12     Status: Normal   Result Value Ref Range    Vitamin B12 704 232 - 1,245 pg/mL   Folate     Status: Normal   Result Value Ref Range    Folic Acid 9.8 4.6 - 34.8 ng/mL   CBC with platelets and differential     Status: None   Result Value Ref Range    WBC Count 7.8 4.0 - 11.0 10e3/uL    RBC Count 4.89 4.40 - 5.90 10e6/uL    Hemoglobin 14.5 13.3 - 17.7 g/dL    Hematocrit 43.4 40.0 - 53.0 %    MCV 89 78 - 100 fL    MCH 29.7 26.5 - 33.0 pg    MCHC 33.4 31.5 - 36.5 g/dL    RDW 13.1 10.0 - 15.0 %    Platelet Count 238 150 - 450 10e3/uL    % Neutrophils 55 %    % Lymphocytes 32 %    % Monocytes 9 %    % Eosinophils 2 %    % Basophils 1 %    % Immature Granulocytes 1 %    NRBCs per 100 WBC 0 <1 /100    Absolute Neutrophils 4.3 1.6 - 8.3 10e3/uL    Absolute Lymphocytes 2.5 0.8 - 5.3 10e3/uL    Absolute Monocytes 0.7 0.0 - 1.3 10e3/uL    Absolute Eosinophils 0.2 0.0 - 0.7 10e3/uL    Absolute Basophils 0.1 0.0 - 0.2 10e3/uL    Absolute Immature Granulocytes 0.1 <=0.4 10e3/uL    Absolute NRBCs 0.0 10e3/uL    RBC and Platelet Morphology     Status: None   Result Value Ref Range    Platelet Assessment  Automated Count Confirmed. Platelet morphology is normal.     Automated Count Confirmed. Platelet morphology is normal.    RBC Morphology Confirmed RBC Indices    Valproic acid     Status: Normal   Result Value Ref Range    Valproic acid 78.0   ug/mL   Valproic acid     Status: Normal   Result Value Ref Range    Valproic acid 73.6   ug/mL   Vitamin D Deficiency     Status: Normal   Result Value Ref Range    Vitamin D, Total (25-Hydroxy) 32 20 - 75 ug/L    Narrative    Season, race, dietary intake, and treatment affect the concentration of 25-hydroxy-Vitamin D. Values may decrease during winter months and increase during summer months. Values 20-29 ug/L may indicate Vitamin D insufficiency and values <20 ug/L may indicate Vitamin D deficiency.    Vitamin D determination is routinely performed by an immunoassay specific for 25 hydroxyvitamin D3.  If an individual is on vitamin D2(ergocalciferol) supplementation, please specify 25 OH vitamin D2 and D3 level determination by LCMSMS test VITD23.     EKG 12-lead, complete     Status: None   Result Value Ref Range    Systolic Blood Pressure  mmHg    Diastolic Blood Pressure  mmHg    Ventricular Rate 66 BPM    Atrial Rate 66 BPM    DE Interval 180 ms    QRS Duration 98 ms     ms    QTc 387 ms    P Axis 55 degrees    R AXIS 67 degrees    T Axis 52 degrees    Interpretation ECG       Sinus rhythm  Normal ECG  No previous ECGs available  Confirmed by MD MARLYS, JAMAR ([id number]) on 7/25/2023 10:32:43 AM     Neisseria gonorrhoeae PCR     Status: Normal    Specimen: Urine, Voided   Result Value Ref Range    Neisseria gonorrhoeae Negative Negative   Chlamydia trachomatis PCR     Status: Normal    Specimen: Urine, Voided   Result Value Ref Range    Chlamydia trachomatis Negative Negative   CBC with platelets differential     Status: Abnormal    Narrative    The following orders were  created for panel order CBC with platelets differential.  Procedure                               Abnormality         Status                     ---------                               -----------         ------                     CBC with platelets and d...[[id number]]  Abnormal            Final result                 Please view results for these tests on the individual orders.   Urine Drugs of Abuse Screen     Status: Abnormal    Narrative    The following orders were created for panel order Urine Drugs of Abuse Screen.  Procedure                               Abnormality         Status                     ---------                               -----------         ------                     Drug abuse screen 1 urin...[[id number]]  Abnormal            Final result                 Please view results for these tests on the individual orders.   CBC with platelets differential     Status: None    Narrative    The following orders were created for panel order CBC with platelets differential.  Procedure                               Abnormality         Status                     ---------                               -----------         ------                     CBC with platelets and d...[[id number]]                      Final result               RBC and Platelet Morphology[[id number]]                      Final result                 Please view results for these tests on the individual orders.

## 2023-08-15 NOTE — PLAN OF CARE
Problem: Sleep Disturbance  Goal: Adequate Sleep/Rest  Outcome: Progressing   Goal Outcome Evaluation:    Plan of Care Reviewed With: patient          Pt had an uneventful night. No signs or symptoms of pain or discomfort noted during 15 minutes safety checks. Pt appeared sleeping comfortable with no sign or symptom of distress noted. Pt appeared breathing normally. Pt had 6.75 hours of sleep. No prn given this shift. Will continue to monitor and will assist if need arise

## 2023-08-15 NOTE — TELEPHONE ENCOUNTER
9:59 AM: Intake received a call that a deputy is on the way to transport Pt and should arrive in the next 45 minutes to an hour. [facility] would like Pt to be ready by then.    10:01 AM: Intake informed CRN that [facility] is on the way and ETA is between 10:45am -11am.

## 2023-08-15 NOTE — PLAN OF CARE
Pt has been visible in the milieu, he is social with peers and pleasant upon approach. He denies all mental health symptoms upon assessment, including suicidal ideation. Pt is med compliant without issue, only received nicotine gum as PRN. No other concerns at this time, pt will discharge to [facility] facility this morning.    Problem: Suicidal Behavior  Goal: Suicidal Behavior is Absent or Managed  Outcome: Adequate for Care Transition   Goal Outcome Evaluation:    Plan of Care Reviewed With: patient

## 2023-11-16 ENCOUNTER — OFFICE VISIT (OUTPATIENT)
Dept: FAMILY MEDICINE | Facility: CLINIC | Age: 33
End: 2023-11-16
Payer: COMMERCIAL

## 2023-11-16 ENCOUNTER — TELEPHONE (OUTPATIENT)
Dept: FAMILY MEDICINE | Facility: CLINIC | Age: 33
End: 2023-11-16

## 2023-11-16 VITALS
OXYGEN SATURATION: 98 % | HEIGHT: 71 IN | SYSTOLIC BLOOD PRESSURE: 142 MMHG | HEART RATE: 92 BPM | DIASTOLIC BLOOD PRESSURE: 85 MMHG | WEIGHT: 273 LBS | RESPIRATION RATE: 16 BRPM | BODY MASS INDEX: 38.22 KG/M2 | TEMPERATURE: 98.2 F

## 2023-11-16 DIAGNOSIS — I10 ESSENTIAL (PRIMARY) HYPERTENSION: Primary | ICD-10-CM

## 2023-11-16 DIAGNOSIS — I10 HYPERTENSION, UNSPECIFIED TYPE: ICD-10-CM

## 2023-11-16 DIAGNOSIS — E66.812 CLASS 2 SEVERE OBESITY DUE TO EXCESS CALORIES WITH SERIOUS COMORBIDITY AND BODY MASS INDEX (BMI) OF 38.0 TO 38.9 IN ADULT (H): ICD-10-CM

## 2023-11-16 DIAGNOSIS — R73.03 PREDIABETES: ICD-10-CM

## 2023-11-16 DIAGNOSIS — F31.4 SEVERE DEPRESSED BIPOLAR I DISORDER WITHOUT PSYCHOTIC FEATURES (H): ICD-10-CM

## 2023-11-16 DIAGNOSIS — E66.01 CLASS 2 SEVERE OBESITY DUE TO EXCESS CALORIES WITH SERIOUS COMORBIDITY AND BODY MASS INDEX (BMI) OF 38.0 TO 38.9 IN ADULT (H): ICD-10-CM

## 2023-11-16 DIAGNOSIS — F17.210 CIGARETTE NICOTINE DEPENDENCE WITHOUT COMPLICATION: ICD-10-CM

## 2023-11-16 PROCEDURE — 99495 TRANSJ CARE MGMT MOD F2F 14D: CPT | Mod: 25

## 2023-11-16 PROCEDURE — 90471 IMMUNIZATION ADMIN: CPT

## 2023-11-16 PROCEDURE — 90686 IIV4 VACC NO PRSV 0.5 ML IM: CPT

## 2023-11-16 RX ORDER — HYDROCHLOROTHIAZIDE 25 MG/1
25 TABLET ORAL DAILY
Qty: 30 TABLET | Refills: 3 | Status: SHIPPED | OUTPATIENT
Start: 2023-11-16 | End: 2024-03-13

## 2023-11-16 RX ORDER — BUPROPION HYDROCHLORIDE 300 MG/1
300 TABLET ORAL EVERY MORNING
COMMUNITY

## 2023-11-16 RX ORDER — AMLODIPINE BESYLATE 5 MG/1
5 TABLET ORAL DAILY
Qty: 30 TABLET | Refills: 3 | Status: SHIPPED | OUTPATIENT
Start: 2023-11-16 | End: 2024-03-13

## 2023-11-16 RX ORDER — DIVALPROEX SODIUM 250 MG/1
250 TABLET, EXTENDED RELEASE ORAL DAILY
COMMUNITY
End: 2024-05-03

## 2023-11-16 RX ORDER — DIVALPROEX SODIUM 500 MG/1
1000 TABLET, EXTENDED RELEASE ORAL DAILY
COMMUNITY
End: 2024-05-03

## 2023-11-16 RX ORDER — ATOMOXETINE 60 MG/1
80 CAPSULE ORAL DAILY
COMMUNITY

## 2023-11-16 RX ORDER — NICOTINE 4 MG/1
INHALANT RESPIRATORY (INHALATION)
Qty: 50 EACH | Refills: 4 | Status: SHIPPED | OUTPATIENT
Start: 2023-11-16 | End: 2023-11-20

## 2023-11-16 ASSESSMENT — ENCOUNTER SYMPTOMS
DIZZINESS: 0
FEVER: 0
DECREASED CONCENTRATION: 1
FATIGUE: 0
AGITATION: 0
RESPIRATORY NEGATIVE: 1
NERVOUS/ANXIOUS: 0
PALPITATIONS: 0
CONFUSION: 0
HALLUCINATIONS: 0
LIGHT-HEADEDNESS: 0
HEADACHES: 0
TREMORS: 0
APPETITE CHANGE: 0
PARESTHESIAS: 0

## 2023-11-16 ASSESSMENT — PATIENT HEALTH QUESTIONNAIRE - PHQ9
SUM OF ALL RESPONSES TO PHQ QUESTIONS 1-9: 12
SUM OF ALL RESPONSES TO PHQ QUESTIONS 1-9: 12
10. IF YOU CHECKED OFF ANY PROBLEMS, HOW DIFFICULT HAVE THESE PROBLEMS MADE IT FOR YOU TO DO YOUR WORK, TAKE CARE OF THINGS AT HOME, OR GET ALONG WITH OTHER PEOPLE: SOMEWHAT DIFFICULT

## 2023-11-16 ASSESSMENT — PAIN SCALES - GENERAL: PAINLEVEL: NO PAIN (0)

## 2023-11-16 NOTE — PATIENT INSTRUCTIONS
Check BP twice weekly in the morning. Take before any cigarettes or caffeine. Log readings to bring to next appointment.    Take hydrochlorothiazide in the morning. Add amlodipine 5 mg in the evening.     Return in one month for a blood pressure follow up.

## 2023-11-16 NOTE — PROGRESS NOTES
"  Assessment & Plan     Essential (primary) hypertension  - /85 in clinic today. Patient reports that is good for him. Has tried ACE/ARB and beta blocker but didn't like the way they made him feel. Will add amlodipine 5 mg. Side effects reviewed with patient.  - amLODIPine (NORVASC) 5 MG tablet  Dispense: 30 tablet; Refill: 3  - hydrochlorothiazide (HYDRODIURIL) 25 MG tablet  Dispense: 30 tablet; Refill: 3  - Group home able to check blood pressures. Bring log to next appointment.  - Follow up in one month for labs, physical and BP follow up.     Prediabetes  - A1C 5.7. Likely multifactorial with obesity and second generation antipsychotic use (olanzapine).   - Discussed lifestyle modifications, exercise routine, cutting back sugary beverages and balanced diet    Class 2 severe obesity due to excess calories with serious comorbidity and body mass index (BMI) of 38.0 to 38.9 in adult (H)  - Diet and exercise guidelines discussed.    Severe depressed bipolar I disorder without psychotic features (H)  - Recently finished inpatient treatment. Living in a group home in [place]. Following closely with psychiatrist from [facility] for medication management as well as a [place] psychologist.   - Symptoms stable on current medication regimen.     Cigarette nicotine dependence without complication  - Patient has patches and gum, do not satisfy the urge to smoke. Inhalers have been effective in the past. He is trying to quit smoking.   - nicotine (NICOTROL) 10 MG inhaler  Dispense: 50 each; Refill: 4     MED REC REQUIRED  Post Medication Reconciliation Status:  Discharge medications reconciled and changed, see notes/ordersUpdated medication list to include changes made during inpatient mental health stay. Patient following closely with Dr. Troy Lindsey through [facility].     BMI:   Estimated body mass index is 38.08 kg/m  as calculated from the following:    Height as of this encounter: 1.803 m (5' 11\").    Weight as of this " encounter: 123.8 kg (273 lb).   Weight management plan: Discussed healthy diet and exercise guidelines    Depression Screening Follow Up        11/16/2023     1:59 PM   PHQ   PHQ-9 Total Score 12   Q9: Thoughts of better off dead/self-harm past 2 weeks Not at all         11/16/2023     1:59 PM   Last PHQ-9   1.  Little interest or pleasure in doing things 1   2.  Feeling down, depressed, or hopeless 1   3.  Trouble falling or staying asleep, or sleeping too much 1   4.  Feeling tired or having little energy 0   5.  Poor appetite or overeating 1   6.  Feeling bad about yourself 2   7.  Trouble concentrating 3   8.  Moving slowly or restless 3   Q9: Thoughts of better off dead/self-harm past 2 weeks 0   PHQ-9 Total Score 12     Follow Up Actions Taken  Crisis resource information provided in After Visit Summary  Patient counseled, no additional follow up at this time.  Patient following closely with psychiatrist for medication management. No thoughts of suicide or self-harm.       MEDICATIONS:        - Start taking amlodipine 5 mg daily in the evening       - Continue other medications without change  FUTURE APPOINTMENTS:       - Follow-up for annual visit and BP check in one month.     LENCHO Segovia [facility] WILFRIDO Clifton is a 33 year old, presenting for the following health issues:  New Patient and Hospital F/U        11/16/2023     2:20 PM   Additional Questions   Roomed by gloria   Accompanied by self     HPI   Hospital Follow-up Visit:  Hospital/Nursing Home/IP Rehab Facility:[facility]  Date of Admission: 11/1/23  Date of Discharge: 11/14/23  Reason(s) for Admission:   Mood Disorder     Anger     Adhd     Depression      Was your hospitalization related to COVID-19? No   Problems taking medications regularly:  None  Medication changes since discharge: None  Problems adhering to non-medication therapy:  None    Summary of  hospitalization:  [facility] information obtained and reviewed. Reviewed [facility] in August 2023 as well for julianna and psychosis.   Diagnostic Tests/Treatments reviewed.  Follow up needed: none  Other Healthcare Providers Involved in Patient s Care:          Group home, follow up with [place] mental health providers and psychiatrist through [facility].   Update since discharge: stable. Reports depression and ADHD symptoms have been stable, he has been feeling good since discharge. Is trying to cut back on smoking- currently smokes about 5 cigarettes per day. Requesting nicotrol inhaler.    He is under commitment until April 2024 due to mental health exacerbations and substance issues.     Plan of care communicated with patient. Instructions for  staff in AVS.      Hypertension Follow-up    Do you check your blood pressure regularly outside of the clinic? No   Are you following a low salt diet? No  Are your blood pressures ever more than 140 on the top number (systolic) OR more   than 90 on the bottom number (diastolic), for example 140/90? Unknown  On average, how many sweetened beverages do you drink each day (Examples: soda, juice, sweet tea, etc.  Do NOT count diet or artificially sweetened beverages)?   2  How many days per week do you miss taking your medication? 0  Taking hydrochlorothiazide 25 mg daily. Increase in Strattera by psychiatrist increased BP. 142/85 today. Previously tried losartan, lisinopril, and metoprolol and he feels they made him sleepy.     Nicotine/Tobacco Cessation  Etienne Dupree presents to discuss nicotine/tobacco cessation.    Tobacco Use    Smoking status: Every Day     Packs/day: 0     Types: Cigarettes, Vaping Device    Smokeless tobacco: Former   Vaping Use    Vaping Use: Every day   Substance and Sexual Activity    Alcohol use: Not Currently    Drug use: Not Currently     Types: Methamphetamines          No data to display              Prior treatments tried:  "Nicotine patch, Nicotine gum, and Nicotine inhaler    Review of Systems   Constitutional:  Negative for appetite change, fatigue and fever.   Respiratory: Negative.     Cardiovascular:  Negative for chest pain, palpitations and peripheral edema.   Neurological:  Negative for dizziness, tremors, light-headedness, headaches and paresthesias.   Psychiatric/Behavioral:  Positive for decreased concentration. Negative for agitation, behavioral problems, confusion, hallucinations, mood changes and self-injury. The patient is not nervous/anxious.           Objective    BP (!) 142/85   Pulse 92   Temp 98.2  F (36.8  C) (Tympanic)   Resp 16   Ht 1.803 m (5' 11\")   Wt 123.8 kg (273 lb)   SpO2 98%   BMI 38.08 kg/m    Body mass index is 38.08 kg/m .  Physical Exam   GENERAL: healthy, alert and no distress  NECK: no adenopathy, no asymmetry, masses, or scars and thyroid normal to palpation  RESP: lungs clear to auscultation - no rales, rhonchi or wheezes  CV: regular rate and rhythm, normal S1 S2, no S3 or S4, no murmur, click or rub, no peripheral edema and peripheral pulses strong  ABDOMEN: soft, nontender, no hepatosplenomegaly, no masses and bowel sounds normal  MS: no gross musculoskeletal defects noted, no edema    "

## 2023-11-16 NOTE — COMMUNITY RESOURCES LIST (ENGLISH)
11/16/2023   [facility] [employer]  N/A  For questions about this resource list or additional care needs, please contact your primary care clinic or care manager.  Phone: [phone]   Email: N/A   Address: [address] [place] [id number]   Hours: N/A        Financial Stability       Utility payment assistance  1  [facility] Distance: 1.96 miles      Phone/Virtual   [address] [place] [address]  Language: English  Hours: Mon - Tue 10:00 AM - 5:00 PM , Wed 10:00 AM - 7:00 PM , Thu 10:00 AM - 5:00 PM , Fri 10:00 AM - 7:00 PM , Sat 10:00 AM - 6:00 PM  Fees: Free, Self Pay   Phone: (361) 278-3625 Email: [email or web address] Website: [email or web address]     2  [facility] Distance: 2.25 miles      In-Person   [address] [place] [address]  Language: English  Hours: Mon - Fri 8:00 AM - 4:30 PM  Fees: Free   Email: seferino@[employer].org Website: https://www.[employer].org          Important Numbers & Websites       Emergency Services   911  City Services   311  Poison Control   (623) 709-5239  Suicide Prevention Lifeline   (491) 704-1889 (TALK)  Child Abuse Hotline   (421) 281-9222 (4-A-Child)  Sexual Assault Hotline   (344) 862-8566 (HOPE)  National Runaway Safeline   (680) 339-2055 (RUNAWAY)  All-Options Talkline   (920) 933-5816  Substance Abuse Referral   (558) 202-5738 (HELP)

## 2023-11-16 NOTE — TELEPHONE ENCOUNTER
Forms/Letter Request    Type of form/letter:  [employer] Restricted Recipient Referral Form    Have you been seen for this request: Yes 11/16/23    Do we have the form/letter: Yes: Faxed in    Who is the form from? [employer] (if other please explain)    Where did/will the form come from? form was faxed in    When is form/letter needed by: ASAP    How would you like the form/letter returned: Fax : [phone]    Patient Notified form requests are processed in 3-5 business days:Yes    Could we send this information to you in [employer] or would you prefer to receive a phone call?:   Patient would prefer a phone call   Okay to leave a detailed message?: Yes at Cell number on file:    Telephone Information:   Mobile [phone]

## 2023-11-17 NOTE — TELEPHONE ENCOUNTER
Form faxed to [facility] [phone] on 11/16/23. Original in abstract and copy in tc bin.

## 2023-11-20 ENCOUNTER — TELEPHONE (OUTPATIENT)
Dept: FAMILY MEDICINE | Facility: CLINIC | Age: 33
End: 2023-11-20
Payer: MEDICAID

## 2023-11-20 DIAGNOSIS — F17.210 CIGARETTE NICOTINE DEPENDENCE WITHOUT COMPLICATION: ICD-10-CM

## 2023-11-20 RX ORDER — NICOTINE 4 MG/1
INHALANT RESPIRATORY (INHALATION)
Qty: 168 EACH | Refills: 4 | Status: SHIPPED | OUTPATIENT
Start: 2023-11-20

## 2023-11-20 NOTE — TELEPHONE ENCOUNTER
[facility] calling in regards to nicotine inhaler (Nicotrol). Pharmacy states nicotrol only comes in boxes of 168, but 50 were ordered.     Routing to ordering provider to review and advise.    Robbie Griffin RN  [facility]

## 2023-11-21 DIAGNOSIS — I10 HYPERTENSION, UNSPECIFIED TYPE: ICD-10-CM

## 2023-11-21 DIAGNOSIS — F31.4 SEVERE DEPRESSED BIPOLAR I DISORDER WITHOUT PSYCHOTIC FEATURES (H): ICD-10-CM

## 2023-11-21 DIAGNOSIS — S06.9XAS TRAUMATIC BRAIN INJURY, WITH UNKNOWN LOSS OF CONSCIOUSNESS STATUS, SEQUELA (H): ICD-10-CM

## 2023-11-21 DIAGNOSIS — F15.94 METHAMPHETAMINE-INDUCED MOOD DISORDER (H): ICD-10-CM

## 2023-11-21 RX ORDER — HYDROCHLOROTHIAZIDE 25 MG/1
25 TABLET ORAL DAILY
Qty: 30 TABLET | Refills: 3 | Status: CANCELLED | OUTPATIENT
Start: 2023-11-21

## 2023-11-21 RX ORDER — CHLORAL HYDRATE 500 MG
1 CAPSULE ORAL DAILY
Qty: 30 CAPSULE | Refills: 0 | Status: CANCELLED | OUTPATIENT
Start: 2023-11-21

## 2023-11-21 NOTE — TELEPHONE ENCOUNTER
Writer called pharmacy to verify order as provider stated below. No further questions or concerns.     Robbie Griffin RN  [facility]

## 2023-11-21 NOTE — TELEPHONE ENCOUNTER
Patient's group home staff called requesting refills for meds be sent to [facility].  Verified which prescriptions with Kannan, nurse for group home.  Kannan stated the restrictions from insurance had been lifted and that is why they were requesting them.  These prescriptions had to come from the primary care provider.       Routing to refill pool.    Kristina Kjellberg, MSN, RN  [facility] Primary Care Triage

## 2023-11-22 DIAGNOSIS — F17.200 TOBACCO DEPENDENCE: ICD-10-CM

## 2023-11-22 DIAGNOSIS — I10 ESSENTIAL (PRIMARY) HYPERTENSION: ICD-10-CM

## 2023-11-22 DIAGNOSIS — S06.9XAS TRAUMATIC BRAIN INJURY, WITH UNKNOWN LOSS OF CONSCIOUSNESS STATUS, SEQUELA (H): Primary | ICD-10-CM

## 2023-11-22 RX ORDER — CHLORAL HYDRATE 500 MG
1 CAPSULE ORAL DAILY
Qty: 28 CAPSULE | Refills: 3 | Status: SHIPPED | OUTPATIENT
Start: 2023-11-22 | End: 2024-03-13

## 2023-11-22 RX ORDER — MULTIVITAMIN WITH FOLIC ACID 400 MCG
1 TABLET ORAL DAILY
Qty: 28 TABLET | Refills: 3 | Status: SHIPPED | OUTPATIENT
Start: 2023-11-22

## 2023-11-22 RX ORDER — NICOTINE 21 MG/24HR
1 PATCH, TRANSDERMAL 24 HOURS TRANSDERMAL DAILY
Qty: 30 PATCH | Refills: 0 | Status: SHIPPED | OUTPATIENT
Start: 2023-11-22 | End: 2024-03-13

## 2023-11-22 RX ORDER — IBUPROFEN 600 MG/1
600 TABLET, FILM COATED ORAL EVERY 6 HOURS PRN
Qty: 100 TABLET | Refills: 1 | Status: SHIPPED | OUTPATIENT
Start: 2023-11-22

## 2023-11-22 NOTE — TELEPHONE ENCOUNTER
Chalo from [facility] calling regarding patients medications. Patient is now restricted to only have Kimberly Greco fill prescriptions.     Patient needs a refill on Nicotine gum and patch. Pharmacy states they are unable to get the inhaler currently.   Pharmacy e sent refill requests for multivitamin, ibuprofen, and fish oil.      All medications and pharmacy pended. These medications are packaged in daily dose packages at the pharmacy- once they receive the refills they can get the prescriptions ready for the patient.       Per Pharmacy former provider Bhavana Tam in [place] is no longer able to refill any medications.      Routing to provider to review/refill. Sent high priority as pharmacy is waiting for all medications to be refilled.    Kelin JAVIER RN, BSN  [facility]

## 2023-11-28 RX ORDER — DIVALPROEX SODIUM 500 MG/1
1000 TABLET, EXTENDED RELEASE ORAL DAILY
OUTPATIENT
Start: 2023-11-28

## 2023-11-28 RX ORDER — OLANZAPINE 10 MG/1
15 TABLET ORAL AT BEDTIME
Qty: 45 TABLET | Refills: 0 | OUTPATIENT
Start: 2023-11-28

## 2023-11-28 RX ORDER — BUPROPION HYDROCHLORIDE 300 MG/1
300 TABLET ORAL EVERY MORNING
OUTPATIENT
Start: 2023-11-28

## 2023-11-28 RX ORDER — DIVALPROEX SODIUM 250 MG/1
250 TABLET, EXTENDED RELEASE ORAL DAILY
OUTPATIENT
Start: 2023-11-28

## 2023-11-28 RX ORDER — OMEGA-3 FATTY ACIDS/FISH OIL 300-1000MG
1 CAPSULE ORAL DAILY
Qty: 90 CAPSULE | Refills: 3 | OUTPATIENT
Start: 2023-11-28

## 2023-11-28 RX ORDER — ATOMOXETINE 60 MG/1
80 CAPSULE ORAL DAILY
OUTPATIENT
Start: 2023-11-28

## 2023-11-28 NOTE — TELEPHONE ENCOUNTER
Patient needs to contact Dr. Troy Lindsey for further refills. He is no longer under the care of [facility] providers for these medications.

## 2023-12-07 DIAGNOSIS — F17.200 TOBACCO DEPENDENCE: ICD-10-CM

## 2023-12-14 ENCOUNTER — TELEPHONE (OUTPATIENT)
Dept: FAMILY MEDICINE | Facility: CLINIC | Age: 33
End: 2023-12-14
Payer: MEDICAID

## 2023-12-14 NOTE — TELEPHONE ENCOUNTER
Patient Quality Outreach    Patient is due for the following:   Hypertension -  BP check    Next Steps:   Patient has upcoming appointment, these items will be addressed at that time.    Type of outreach:    Chart review performed, no outreach needed.      Questions for provider review:    None           Jeannie Richey MA

## 2024-02-08 ENCOUNTER — TELEPHONE (OUTPATIENT)
Dept: FAMILY MEDICINE | Facility: CLINIC | Age: 34
End: 2024-02-08
Payer: MEDICAID

## 2024-02-08 DIAGNOSIS — Z02.9 ADMINISTRATIVE ENCOUNTER: Primary | ICD-10-CM

## 2024-02-08 NOTE — TELEPHONE ENCOUNTER
Order/Referral Request    Who is requesting: [facility] Restricted Recipient Program    Orders being requested: referral for discharging prescriptions    Reason service is needed/diagnosis:     When are orders needed by: ASAP    Has this been discussed with Provider: Unsure    Does patient have a preference on a Group/Provider/Facility? [facility]  [id number]  [facility]    Does patient have an appointment scheduled?: N/A        Could we send this information to you in [facility] or would you prefer to receive a phone call?:   No preference   Okay to leave a detailed message?: Call Blanca at [facility] if questions    Carolee QUICK [facility]

## 2024-02-09 ENCOUNTER — TELEPHONE (OUTPATIENT)
Dept: FAMILY MEDICINE | Facility: CLINIC | Age: 34
End: 2024-02-09
Payer: MEDICAID

## 2024-02-09 NOTE — TELEPHONE ENCOUNTER
Patient Quality Outreach    Patient is due for the following:   Physical Preventive Adult Physical    Next Steps:   Schedule a Adult Preventative    Type of outreach:    Sent [employer] message.      Questions for provider review:    None           Bibiana Ramirez MA

## 2024-02-12 NOTE — TELEPHONE ENCOUNTER
Printed referral order. Faxed to Dr Ap Lyman, [phone], fax # on the referral order, right fax confirmed  at  Called and left a voicemail message that  I faxed to the fax # on the referral order and to disregard previous message unless there is another # to fax this to.  Mae Arcos [facility]   Primary Care

## 2024-02-12 NOTE — TELEPHONE ENCOUNTER
Received a fax back with a note stating that:  Provider not at [facility]. Fax number is for [employer], not [facility].  Called Blanca and left a voicemail message to return our call as I am not sure where to fax the referral order to.  Placed in Waiting to  hear back basket.  Mae Arcos MA  [facility]   Primary Care

## 2024-02-12 NOTE — TELEPHONE ENCOUNTER
Joanne calling again for the referral. Blanca states that the hospital keeps calling her.  Mae Arcos MA  [facility]   Primary Care

## 2024-02-12 NOTE — TELEPHONE ENCOUNTER
Called and spoke to Blanca. She will fax over the form that needs to be completed for the referral and the fax # should be on there. Placed sticky note to watch for this fax.  Mae Arcos MA  [facility]   Primary Care

## 2024-02-12 NOTE — TELEPHONE ENCOUNTER
Called Blanca and left a voicemail message to return our call with a fax # to fax the referral. Printed the referral and placed in waiting to hear back basket.  Mae Arcos MA  [facility]   Primary Care

## 2024-02-12 NOTE — TELEPHONE ENCOUNTER
Received [place] Restricted  Recipient Program form. Placed in Kimberly Greco's box.  Mae Arcos MA  [facility]   Primary Care

## 2024-02-12 NOTE — TELEPHONE ENCOUNTER
Received provider signed form. Faxed to [facility], [phone], right fax confirmed at 3:14 pm today, 2/12/2024. Copy to TC and abstracting.  Mae Arcos MA  [facility]   Primary Care

## 2024-02-22 ENCOUNTER — VIRTUAL VISIT (OUTPATIENT)
Dept: FAMILY MEDICINE | Facility: CLINIC | Age: 34
End: 2024-02-22
Payer: COMMERCIAL

## 2024-02-22 DIAGNOSIS — Z53.9 NO SHOW: Primary | ICD-10-CM

## 2024-02-22 PROCEDURE — 99207 PR NO SHOW FOR SCHEDULED APPT: CPT | Performed by: PHYSICIAN ASSISTANT

## 2024-02-22 ASSESSMENT — PATIENT HEALTH QUESTIONNAIRE - PHQ9
SUM OF ALL RESPONSES TO PHQ QUESTIONS 1-9: 5
10. IF YOU CHECKED OFF ANY PROBLEMS, HOW DIFFICULT HAVE THESE PROBLEMS MADE IT FOR YOU TO DO YOUR WORK, TAKE CARE OF THINGS AT HOME, OR GET ALONG WITH OTHER PEOPLE: SOMEWHAT DIFFICULT
SUM OF ALL RESPONSES TO PHQ QUESTIONS 1-9: 5

## 2024-02-22 NOTE — PROGRESS NOTES
"    Instructions Relayed to Patient by Virtual Roomer:     Patient is active on shoutr:   Relayed following to patient: \"It looks like you are active on shoutr, are you able to join the visit this way? If not, do you need us to send you a link now or would you like your provider to send a link via text or email when they are ready to initiate the visit?\"    Reminded patient to ensure they were logged on to virtual visit by arrival time listed. Documented in appointment notes if patient had flexibility to initiate visit sooner than arrival time. If pediatric virtual visit, ensured pediatric patient along with parent/guardian will be present for video visit.     Patient offered the website [email or web address] and/or phone number to shoutr Help line: [phone]    Etienne is a 33 year old who is being evaluated via a billable video visit.      How would you like to obtain your AVS? SendbloomharLeanWagon  If the video visit is dropped, the invitation should be resent by: Email [email or web address]  Will anyone else be joining your video visit? No  {If patient encounters technical issues they should call [phone] :657269}        Assessment & Plan   Problem List Items Addressed This Visit    None         *** minutes spent by me on the date of the encounter doing chart review, history and exam, documentation and further activities per the note   MED REC REQUIRED  Post Medication Reconciliation Status:         Subjective   Etienne is a 33 year old, presenting for the following health issues:  Hospital F/U        2/22/2024     2:26 PM   Additional Questions   Roomed by Mariposa   Accompanied by self         2/22/2024     2:26 PM   Patient Reported Additional Medications   Patient reports taking the following new medications None     HPI       Hospital Follow-up Visit:    Hospital/Nursing Home/IP Rehab Facility:  [facility]   Date of Admission: 12/3/2023  Date of Discharge: 2/16/2024  Reason(s) for " "Admission: Mental health    Was your hospitalization related to COVID-19? No   Problems taking medications regularly:  Yes  Medication changes since discharge: None  Problems adhering to non-medication therapy:  None    Summary of hospitalization:  CareEverywhere information obtained and reviewed  Diagnostic Tests/Treatments reviewed.  Follow up needed: psychiatry  Other Healthcare Providers Involved in Patient s Care:         {those currently involved after discharge:952062::\"None\"}  Update since discharge: {:699424} {TIP  Include information from family/caregivers, SNF, Care Coordination :488420}        Plan of care communicated with {:105137}     {Reference  Coding guidelines- Moderate Complexity F2F/Video within 7 - 14 days of discharge [id number], High Complexity F2F/Video within 7 days [id number] or edvxjp28 days 7803113 :571819}      {additonal problems for provider to add (Optional):409669}  {additonal problems for provider to add (Optional):046306}    {ROS Picklists (Optional):944719}      Objective           Vitals:  No vitals were obtained today due to virtual visit.    Physical Exam   {video visit exam brief selected:005397}    {Diagnostic Test Results (Optional):186520}      Video-Visit Details    Type of service:  Video Visit   Video Start Time: {video visit start/end time for provider to select:758730}  Video End Time:{video visit start/end time for provider to select:208636}    Originating Location (pt. Location): {video visit patient location:252596::\"Home\"}  {PROVIDER LOCATION On-site should be selected for visits conducted from your clinic location or adjoining [facility] hospital, academic office, or other nearby [facility] building. Off-site should be selected for all other provider locations, including home:457774}  Distant Location (provider location):  {virtual location provider:692755}  Platform used for Video Visit: {Virtual Visit Platforms:500123::\"[employer]\"}  Signed Electronically by: Denise ALEJANDRO" ERI Carranza  {Email feedback regarding this note to [email or web address]   :541358}  .undefined[^^

## 2024-02-22 NOTE — PROGRESS NOTES
Patient did not respond to 3 video link invitations and to phone calls.   Visit was canceled. Patient was left a voice to reschedule at his convenience.     Denise Carranza PAC

## 2024-03-11 DIAGNOSIS — I10 HYPERTENSION, UNSPECIFIED TYPE: ICD-10-CM

## 2024-03-11 DIAGNOSIS — F17.200 TOBACCO DEPENDENCE: ICD-10-CM

## 2024-03-11 DIAGNOSIS — S06.9XAS TRAUMATIC BRAIN INJURY, WITH UNKNOWN LOSS OF CONSCIOUSNESS STATUS, SEQUELA (H): ICD-10-CM

## 2024-03-11 DIAGNOSIS — I10 ESSENTIAL (PRIMARY) HYPERTENSION: ICD-10-CM

## 2024-03-13 RX ORDER — DIVALPROEX SODIUM 500 MG/1
1500 TABLET, EXTENDED RELEASE ORAL AT BEDTIME
Qty: 84 TABLET | Refills: 7 | OUTPATIENT
Start: 2024-03-13

## 2024-03-13 RX ORDER — CHLORAL HYDRATE 500 MG
CAPSULE ORAL
Qty: 28 CAPSULE | Refills: 7 | Status: SHIPPED | OUTPATIENT
Start: 2024-03-13

## 2024-03-13 RX ORDER — OLANZAPINE 20 MG/1
20 TABLET ORAL AT BEDTIME
Qty: 28 TABLET | Refills: 7 | OUTPATIENT
Start: 2024-03-13

## 2024-03-13 RX ORDER — HYDROCHLOROTHIAZIDE 25 MG/1
25 TABLET ORAL DAILY
Qty: 2 TABLET | Refills: 7 | Status: SHIPPED | OUTPATIENT
Start: 2024-03-13

## 2024-03-13 RX ORDER — ATOMOXETINE 60 MG/1
CAPSULE ORAL
Qty: 2 CAPSULE | Refills: 7 | OUTPATIENT
Start: 2024-03-13

## 2024-03-13 RX ORDER — AMLODIPINE BESYLATE 5 MG/1
5 TABLET ORAL EVERY EVENING
Qty: 28 TABLET | Refills: 7 | Status: SHIPPED | OUTPATIENT
Start: 2024-03-13

## 2024-03-13 RX ORDER — NICOTINE 21 MG/24HR
PATCH, TRANSDERMAL 24 HOURS TRANSDERMAL
Qty: 28 PATCH | Refills: 7 | Status: SHIPPED | OUTPATIENT
Start: 2024-03-13

## 2024-03-13 RX ORDER — BUPROPION HYDROCHLORIDE 300 MG/1
TABLET ORAL
Qty: 28 TABLET | Refills: 7 | OUTPATIENT
Start: 2024-03-13

## 2024-03-13 NOTE — TELEPHONE ENCOUNTER
Pharmacy calling to check on the status of the refill request.  Mae Arcos MA  [facility]   Primary Care

## 2024-03-13 NOTE — TELEPHONE ENCOUNTER
Patient needs to see his mental health provider for atomoxetine, bupropion, depakote and olanzapine refills.

## 2024-03-14 RX ORDER — DIVALPROEX SODIUM 500 MG/1
1500 TABLET, EXTENDED RELEASE ORAL AT BEDTIME
Qty: 84 TABLET | Refills: 7 | OUTPATIENT
Start: 2024-03-14

## 2024-03-14 RX ORDER — OLANZAPINE 20 MG/1
20 TABLET ORAL AT BEDTIME
Qty: 28 TABLET | Refills: 7 | OUTPATIENT
Start: 2024-03-14

## 2024-03-14 RX ORDER — ATOMOXETINE 60 MG/1
CAPSULE ORAL
Qty: 2 CAPSULE | Refills: 7 | OUTPATIENT
Start: 2024-03-14

## 2024-03-14 RX ORDER — BUPROPION HYDROCHLORIDE 300 MG/1
TABLET ORAL
Qty: 28 TABLET | Refills: 7 | OUTPATIENT
Start: 2024-03-14

## 2024-03-14 NOTE — TELEPHONE ENCOUNTER
RN attempted to call pt regarding provider message below and to schedule visit with provider. Pt answered and then hung up.     Mary Kate Monae RN

## 2024-03-15 NOTE — TELEPHONE ENCOUNTER
RN received incoming call from Chalo at [facility] about patient's psychiatric medications.     Pharmacy staff stated their system showed patient as restricted to Kimberly Greco, and wondering if she would be taking over prescribing his psychiatric medications: hydroxyzine 50 mg, atomoxetine 60 mg, bupropion  mg, olanzapine 20 mg and Depakote  mg 3 tabs at bedtime. RN relayed provider message below         Chalo verbalized understanding that the patient would need an appointment for Kimberly Greco to prescribe these medications. Chalo informed RN prescriptions were going through under another psychiatric provider and he would be able to fill them. Chalo thanked for RN time and no further needs at this time.     Bernardo Meza, BROOKLYNN, RN, PHN  [facility] Primary [facility]

## 2024-03-23 DIAGNOSIS — F17.200 TOBACCO DEPENDENCE: ICD-10-CM

## 2024-05-03 ENCOUNTER — HOSPITAL ENCOUNTER (EMERGENCY)
Facility: CLINIC | Age: 34
Discharge: HOME | End: 2024-05-03
Attending: EMERGENCY MEDICINE | Admitting: EMERGENCY MEDICINE
Payer: MEDICAID

## 2024-05-03 VITALS
HEART RATE: 77 BPM | TEMPERATURE: 97.7 F | SYSTOLIC BLOOD PRESSURE: 161 MMHG | RESPIRATION RATE: 20 BRPM | DIASTOLIC BLOOD PRESSURE: 94 MMHG | OXYGEN SATURATION: 98 %

## 2024-05-03 DIAGNOSIS — F19.11 HISTORY OF SUBSTANCE ABUSE (H): ICD-10-CM

## 2024-05-03 DIAGNOSIS — F31.9 BIPOLAR 1 DISORDER (H): ICD-10-CM

## 2024-05-03 PROBLEM — F31.4 SEVERE DEPRESSED BIPOLAR I DISORDER WITHOUT PSYCHOTIC FEATURES (H): Status: ACTIVE | Noted: 2022-10-13

## 2024-05-03 LAB — ALCOHOL BREATH TEST: 0 (ref 0–0.01)

## 2024-05-03 PROCEDURE — 99284 EMERGENCY DEPT VISIT MOD MDM: CPT

## 2024-05-03 PROCEDURE — 82075 ASSAY OF BREATH ETHANOL: CPT

## 2024-05-03 RX ORDER — ATOMOXETINE 80 MG/1
80 CAPSULE ORAL DAILY
Qty: 30 CAPSULE | Refills: 0 | Status: SHIPPED | OUTPATIENT
Start: 2024-05-03

## 2024-05-03 RX ORDER — DIVALPROEX SODIUM 500 MG/1
1500 TABLET, DELAYED RELEASE ORAL AT BEDTIME
Qty: 90 TABLET | Refills: 0 | Status: SHIPPED | OUTPATIENT
Start: 2024-05-03

## 2024-05-03 RX ORDER — BUPROPION HYDROCHLORIDE 300 MG/1
300 TABLET ORAL EVERY MORNING
Qty: 30 TABLET | Refills: 0 | Status: SHIPPED | OUTPATIENT
Start: 2024-05-03

## 2024-05-03 ASSESSMENT — ACTIVITIES OF DAILY LIVING (ADL)
ADLS_ACUITY_SCORE: 37

## 2024-05-03 NOTE — ED PROVIDER NOTES
History     Chief Complaint:  Mental Health Problem       HPI   Etienne Dupree is a 33 year old male who presents to the emergency department by ambulance [place] and [place].  He stated he would come to the hospital so he can get his medications refilled.  The  in the [facility] area states that the patient and his girlfriend had been in to the department early in the morning hours that he had been agitated and abusing staff at that time and left.  The girlfriend is not here at this time.  Patient denies to me any suicidal thought or wanting to harm anybody.  And denies any current drug use.      Independent Historian:    As noted above    Review of External Notes:  Old charts and notes reviewed    Medications:    atomoxetine (STRATTERA) 80 MG capsule  buPROPion (WELLBUTRIN XL) 300 MG 24 hr tablet  divalproex sodium delayed-release (DEPAKOTE) 500 MG DR tablet  amLODIPine (NORVASC) 5 MG tablet  atomoxetine (STRATTERA) 60 MG capsule  buPROPion (WELLBUTRIN XL) 300 MG 24 hr tablet  fish oil-omega-3 fatty acids 1000 MG capsule  hydrochlorothiazide (HYDRODIURIL) 25 MG tablet  hydrOXYzine (ATARAX) 25 MG tablet  ibuprofen (ADVIL/MOTRIN) 600 MG tablet  Multiple Vitamin (DAILY-RAMO) TABS  nicotine (NICODERM CQ) 21 MG/24HR 24 hr patch  nicotine (NICOTROL) 10 MG inhaler  OLANZapine (ZYPREXA) 10 MG tablet  omega 3 1000 MG CAPS        Past Medical History:    Past Medical History:   Diagnosis Date    ADHD (attention deficit hyperactivity disorder)     Bipolar 1 disorder (H)     Depressive disorder        Past Surgical History:    No past surgical history on file.       Physical Exam   Patient Vitals for the past 24 hrs:   BP Temp Temp src Pulse Resp SpO2   05/03/24 0713 -- 97.7  F (36.5  C) Temporal 77 -- --   05/03/24 0658 (!) 161/94 -- -- -- 20 98 %        Physical Exam  Constitutional: White male sleeping with the covers pulled over his head.   Awakes and answers questions for me  HENT: No signs of trauma.   Eyes: EOM are normal. Pupils are equal, round, and reactive to light.   Neck: Normal range of motion. No JVD present. No cervical adenopathy.  Cardiovascular: Regular rhythm.  Exam reveals no gallop and no friction rub.    No murmur heard.  Pulmonary/Chest: Bilateral breath sounds normal. No wheezes, rhonchi or rales.  Abdominal: Soft. No tenderness. No rebound or guarding.   Musculoskeletal: No edema. No tenderness.   Lymphadenopathy: No lymphadenopathy.   Neurological: Alert and oriented to person, place, and time. Normal strength. Coordination normal.   Skin: Skin is warm and dry. No rash noted. No erythema.    Psych: Calm affect no agitation denies suicidal homicidal or psychotic thinking    Emergency Department Course       Imaging:  No orders to display       Laboratory:  Labs Ordered and Resulted from Time of ED Arrival to Time of ED Departure   ALCOHOL BREATH TEST POCT - Normal       Result Value    Alcohol Breath Test 0.0          Procedures   None    Emergency Department Course & Assessments:    Interventions:  Medications - No data to display     Assessments:  Seen and evaluated    Independent Interpretation (X-rays, CTs, rhythm strip):  None    Consultations/Discussion of Management or Tests:  [facility]       Social Determinants of Health affecting care:  Stress adjustment disorders     Disposition:  Discharge    Impression & Plan           Medical Decision Making:  Patient was seen after arriving by ambulance he was trying to sleep but he was calm and able to talk and make sense.  His alcohol level was 0 he did not give us a urine sample but denied any recent chemical use he admitted to [facility] that he used methamphetamine maybe a week ago.  He requested refills of his psychiatric medications which I have given him a 30-day supply and stressed that is important to follow-up with his psychiatry to get additional medications and to make sure his  mental health is good.  He was evaluated by [facility] who did not feel he required admission and was not holdable.  We have been working on contacting his girlfriend to come and pick him up.  Patient will be discharged with resources for mental health and chemical abuse.        Diagnosis:    ICD-10-CM    1. Bipolar 1 disorder (H)  F31.9       2. History of substance abuse (H)  F19.11            Discharge Medications:  Discharge Medication List as of 5/3/2024  9:55 AM        START taking these medications    Details   !! atomoxetine (STRATTERA) 80 MG capsule Take 1 capsule (80 mg) by mouth daily, Disp-30 capsule, R-0, Local Print      !! buPROPion (WELLBUTRIN XL) 300 MG 24 hr tablet Take 1 tablet (300 mg) by mouth every morning, Disp-30 tablet, R-0, Local Print      divalproex sodium delayed-release (DEPAKOTE) 500 MG DR tablet Take 3 tablets (1,500 mg) by mouth at bedtime, Disp-90 tablet, R-0, Local Print       !! - Potential duplicate medications found. Please discuss with provider.             Devin Lowry MD  5/3/2024   No att. providers found              Devin Lowry MD  05/03/24 3067

## 2024-05-03 NOTE — ED TRIAGE NOTES
[place] requested to be transferred to hospital for medication refill; pt was brought into ED earlier this AM by his girlfriend but refused to check in. Pt is hyperactive and rambling but cooperative with staff. Pt states he hasn't slept in 2 nights. Personal  belongings placed in room locker and pt wanded by security.      Triage Assessment (Adult)       Row Name 05/03/24 0659          Triage Assessment    Airway WDL WDL        Respiratory WDL    Respiratory WDL WDL        Skin Circulation/Temperature WDL    Skin Circulation/Temperature WDL WDL        Cardiac WDL    Cardiac WDL WDL        Peripheral/Neurovascular WDL    Peripheral Neurovascular WDL WDL        Cognitive/Neuro/Behavioral WDL    Cognitive/Neuro/Behavioral WDL X     Level of Consciousness alert     Arousal Level opens eyes spontaneously     Orientation oriented x 4     Speech spontaneous;pace/rate variance     Mood/Behavior hyperactive (agitated, impulsive);cooperative

## 2024-05-03 NOTE — CONSULTS
Diagnostic Evaluation Consultation  Crisis Assessment    Patient Name: Etienne Dupree  Age:  33 year old  Legal Sex: male  Gender Identity: male  Pronouns: he/him  Race: White  Ethnicity: Not  or   Language: English      Patient was assessed: In person      Patient location: [facility] EMERGENCY DEPT                                 Referral Data and Chief Complaint  Etienne Dupree presents to the ED with family/friends. Patient is presenting to the ED for the following concerns: Anxiety, Paranoia, Substance use.   Factors that make the mental health crisis life threatening or complex are:  Pt presented to the ED due to disorganized behavior and wanting medication refilled. Pt called 911 and was brought to the ED for further evaluation. Pt was also in the ED with his girlfriend earlier this morning but left before being seen. Pt was agitated at that time and verbally abusive with staff.      Informed Consent and Assessment Methods  Explained the crisis assessment process, including applicable information disclosures and limits to confidentiality, assessed understanding of the process, and obtained consent to proceed with the assessment.  Assessment methods included conducting a formal interview with patient, review of medical records, collaboration with medical staff, and obtaining relevant collateral information from family and community providers when available.  : done     Patient response to interventions: acceptance expressed, verbalizes understanding  Coping skills were attempted to reduce the crisis:  Pt was somewaht receptive to ED interventions,     History of the Crisis   When writer met with Pt he was laying on his gurney with his blanket over his face.Throughout assessment Pt did not remove the blanket from his face. Pt presents with a tired and somewhat irritable affect. Pts mood is congruent with this presentation. Pt is oriented x4. Pt was cooperative during  assessment. Pt has a psychiatric hx of bipolar disorder and polysubstance use.  Pt has a hx of past IP MH admissions. Pts most recent admission was on 12/3/23 at [facility]. Pts current outpatient providers are a psychiatrist through [facility]. Pt currently presents to the ED due to agitation and disorganized behavior. Pt was requesting medication refills. During assessment Pt did not endorse any SI/SIB/HI or AH/VH. Pt denied any recent substance use but stated that he use meth a week ago. Pt endorsed that he does want resources for MONTY tx. Pt was oriented and did not appear to be acutely disorganized or experiencing any acute psychosis sx.    Brief Psychosocial History  Family:  Single, Children no  Support System:  Partner  Employment Status:  unemployed  Source of Income:  other community resources  Financial Environmental Concerns:  insurance, none, unable to afford medication(s), unable to afford rent/mortgage  Current Hobbies:  music, television/movies/videos, group/social activities  Barriers in Personal Life:  mental health concerns    Significant Clinical History  Current Anxiety Symptoms:  anxious  Current Depression/Trauma:  avoidance, irritable, hopelessness  Current Somatic Symptoms:  anxious  Current Psychosis/Thought Disturbance:  forgetful, displaces blame, impulsive  Current Eating Symptoms:     Chemical Use History:  Alcohol: None  Benzodiazepines: None  Opiates: None  Cocaine: None  Marijuana: None  Other Use: Methamphetamines   Past diagnosis:  Bipolar Disorder, Substance Use Disorder  Family history:  No known history of mental health or chemical health concerns  Past treatment:  Individual therapy, Inpatient Hospitalization, Case management  Details of most recent treatment:  Pt has a hx of past IP MH admissions. Pts most recent admission was on 12/3/23 at [facility]. Pts current outpatient providers are a psychiatrist through [facility].  Other relevant history:           Collateral Information  Is there collateral information: Yes     Collateral information name, relationship, phone number:  Pts girlfriend Amita # [phone].       Additional collateral information:  Writer attempted to call Pts girlfriend Amita # [phone]. Writer called and call went right to voicemail but the voicemail box was full and writer was unable to leave a message.     Risk Assessment  [place]edar Suicide Severity Rating Scale Full Clinical Version:  Suicidal Ideation  Q1 Wish to be Dead (Lifetime): No  Q2 Non-Specific Active Suicidal Thoughts (Lifetime): No     Suicidal Behavior (Lifetime)  Actual Attempt (Lifetime): No  Has subject engaged in non-suicidal self-injurious behavior? (Lifetime): No  Interrupted Attempts (Lifetime): No  Aborted or Self-Interrupted Attempt (Lifetime): No  Preparatory Acts or Behavior (Lifetime): No    Cedar Suicide Severity Rating Scale Recent:              Environmental or Psychosocial Events: impulsivity/recklessness, ongoing abuse of substances, unstable housing, homelessness, challenging interpersonal relationships  Protective Factors: Protective Factors: reality testing ability, able to access care without barriers, help seeking    Does the patient have thoughts of harming others? Feels Like Hurting Others: no  Previous Attempt to Hurt Others: no  Is the patient engaging in sexually inappropriate behavior?: no    Is the patient engaging in sexually inappropriate behavior?  no        Mental Status Exam   Affect: Other (Pt talked to writer with blanket over his head during assessment)  Appearance: Appropriate  Attention Span/Concentration: Attentive  Eye Contact: Engaged    Fund of Knowledge: Appropriate   Language /Speech Content: Fluent  Language /Speech Volume: Normal  Language /Speech Rate/Productions: Normal  Recent Memory: Intact  Remote Memory: Intact  Mood: Normal  Orientation to Person: Yes   Orientation to Place: Yes  Orientation to Time of Day:  Yes  Orientation to Date: Yes     Situation (Do they understand why they are here?): Yes  Psychomotor Behavior: Normal  Thought Content: Clear  Thought Form: Intact   :       Medication  Psychotropic medications:   Medication Orders - Psychiatric (From admission, onward)      Start     Dose/Rate Route Frequency Ordered Stop    05/03/24 0000  atomoxetine (STRATTERA) 80 MG capsule         80 mg Oral DAILY 05/03/24 0949      05/03/24 0000  buPROPion (WELLBUTRIN XL) 300 MG 24 hr tablet         300 mg Oral EVERY MORNING 05/03/24 0949               Current Care Team  Patient Care Team:  Clinic - [facility] as PCP - General  Gifty Infante APRN CNP as Assigned Behavioral Health Provider  Kimberly Greco APRN CNP as Assigned PCP    Diagnosis  Patient Active Problem List   Diagnosis Code    Tobacco dependence F17.200    Borderline personality disorder (H) F60.3    Antisocial personality disorder (H) F60.2    Essential (primary) hypertension I10    Methamphetamine abuse (H) F15.10    Suicidal behavior with attempted self-injury (H) T14.91XA    Severe depressed bipolar I disorder without psychotic features (H) F31.4    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, S09.90XS    Amphetamine use disorder, 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    Attention deficit hyperactivity disorder (ADHD), combined type F90.2    Aggressive behavior R46.89    History of suicide attempt Z91.51    Left breast lump N63.20    Polysubstance abuse (H) F19.10    Class 2 severe obesity due to excess calories with serious comorbidity in adult (H) E66.01       Primary Problem This Admission  Active Hospital Problems    Severe depressed bipolar I disorder without psychotic features (H)      Polysubstance abuse (H)        Clinical Summary and Substantiation of Recommendations   Pt is a 32 y/o male  with a psychiatric hx of bipolar disorder and polysubstance use. At this time [facility] admission is  not being recommended due to Pt not endorsing any SI/SIB/HI or AH/VH. Pt did not appear to be disorganized or experiencing any acute psychosis sx. Pt was able to fully safety plan with writer. Pts current presentation appears to be chronic in nature and Pts current sx appear to be at Pts baseline. Pt does appear to be at higher risk of death by suicide accidental or intentional due to mental health hx and substance use sx.  At this time IP MH admission does not appear to be the most therapeutically beneficial intervention/ level of care for Pt. Pt appears to be able to use and motivated to engage in supportive mental health/ community resources.          Patient coping skills attempted to reduce the crisis:  Pt was somewaht receptive to ED interventions,    Disposition  Recommended disposition: Individual Therapy, Medication Management, Substance Abuse Disorder Treatment        Reviewed case and recommendations with attending provider. Attending Name: MD Lowry       Attending concurs with disposition: yes       Patient and/or validated legal guardian concurs with disposition:   yes       Final disposition:  discharge    Legal status on admission: Voluntary/Patient has signed consent for treatment    Assessment Details   Total duration spent with the patient: 30 min     CPT code(s) utilized: 52187 - Psychotherapy for Crisis - 60 (30-74*) min    Yasemin Vo [facility], Psychotherapist  [facility] - Triage & Transition Services  Callback: [phone]

## 2024-05-03 NOTE — ED NOTES
Bed: ED16  Expected date: 5/3/24  Expected time: 6:45 AM  Means of arrival: Ambulance  Comments:  Oralia 592 33M  issues/refill meds

## 2024-05-03 NOTE — DISCHARGE INSTRUCTIONS
Mental health recommendations    Please follow-up with your outpatient team about your visit today.    2.  One of our care coordinators will reach out to you after your discharge.  If you have any questions about additional resources please call our coordinators at # [phone]    3.  Please use aftercare plan and already established coping skills and safety planning as needed.     4.  Please call 911 and/or return to the emergency department if her symptoms worsen or your safety becomes compromised.       Please go to [email or web address] to access a free [employer] phone. It is important for you to be able to have a phone so that your care team can contact you regarding your care. You can also access this information at the [facility] [employer] at [address] [facility], [place] [address].        Diversion and Recovery Team (DART)  Find support to improve your quality of life. Our team of , a nurse, a peer , and a vocational services worker specialize in harm reduction and recovery-oriented services. We'll work with you where you're at. We can support you to reduce or regulate your use, engage with treatment or work on long-term recovery.  Eligibility  Voluntary program  [employer] resident 18 or older, living with a substance use disorder and experiencing housing instability  Learn more  Email [email or web address]    Walk-in behavioral health support  [address] [place] [facility]  We serve [employer] residents 18+, focusing on needs related to mental health and substance use disorder.  Walk in anytime. Open daily from 9 a.m. to 9 p.m.    Case management:     [employer]    Call [employer] [facility] at [phone].    You can call that number Monday through Friday, 8 a.m. to 4 p.m. except holidays.    You will speak with someone to help you identify your needs and offer options for service.    We may refer you to services or agencies for treatment  and support. These could include:    Adult rehabilitative mental health services (ARMHS)  Community support program access  Intensive residential treatment  Outpatient mental health services  Case management  Assertive Community Treatment (ACT)  Supportive housing  Supportive employment  Chemical health assessment and treatment  Connections to resources in the community        You may self refer for most Crisis Residence services. This is a short-term service, typically 3-10 days, for stabilization when experiencing a mental health crisis. They provide housing and treatment services that integrate mental health, medical, and substance use care.     [facility] - [facility] [employer]  Main phone: [phone]   Direct: [phone]   [address] [place] [address]      [facility] [place] [facility]abilization [facility]   [phone] [address] [facility] [address]      [facility]elfina [facility]  Main phone: [phone]   Direct: [phone]   [address] [place] [address]      [facility]  [phone]   [address] [place] [address]      [facility] Crisis **requires referral from a medical facility  [phone] [address] [place] [address]      Wherever you attend for a crisis residence, if possible bring any medications you may have in their prescribed bottles.       Substance Use Disorder Direct Access Resources    It is recommended that you abstain from all mood altering chemicals. Please contact the sober support hotline (981-093-5957) as needed; phones are answered 24 hours a day, 7 days a week.    To access substance use treatment you must have a comprehensive assessment completed to begin any treatment program.     If uninsured, please contact your county of residence for eligibility screen to substance use disorder evaluation and treatment:    [place] - [phone]   [place]  [phone]   [facility]  [phone]   [place]  [phone]   [facility] [phone]   [place]  [phone]   [facility] [phone]   [place] - [phone]     If you have private insurance, call the customer service number on the back of your insurance card to find an in-network substance abuse use disorder assessment. The ideal provider will be a treatment facility, licensed in the state of [place].     Community MONT[place] Evaluations: Clients may call their county for a full list of providers - Availability and services listed belo are subject to change, please call the provider to confirm    [facility] [place] [facility]  [phone]  [address] [place] [address], [address]  *Please call the above number to schedule a comprehensive assessment for determination of level of care needs. In person and virtual appointments available Mon-Fri.    [facility], [address] [place] [address], First Floor, [address], [place] [address] (next to the outpatient lab)    Phone: [phone]   Provides bridging services to people with Opiate Use Disorders (OUD) seeking care. This is a front door to Medication Assisted Treatments (MAT), ages 16+  Walk In hours: Monday-Friday 9:00am-3:00pm    [facility]  [phone]  Walk in Assessments: Mon-Friday 7a-1:45p  [address], [place], [address]    [facility] Recovery - [facility]  Central Access [phone]  [address] [place] [address], [address]  *by appointment only    [place]  [phone] (phone consultation available )  Locations in: [place], [place], [facility], and [place], [place]  [place] virtual IOP [phone] or visit [email or web address]   Also offers LGBTQ programming     [facility] [place]  [phone]  [address] [facility]  [place], [place], [address]  *Currently only offered via telehealth - call to set up an appointment    [facility] Adult Mental Health  [address] [place] [address],  [address]  [facility] Recovery Program  For more information to to make a referral call:  [phone]  Walk-in on Fridays  9-11 a.m.    [facility]  [phone]  [address] [place], [place], [address]  *available by appointments only    Nic rutherford  [phone]  [address] [place], [place], [address]  *available by appointment only    [facility]  [phone]  [address] [place], [place], [address]  *walk in assessments available M-F starting at 7 am.    [facility]  [phone]  Locations: [facility], [facility], and [place]  *Walk in assessments availble M-F starting at 8 am -virtual only    [facility]  [phone]  [address] [place] [address]    [facility]  Virtual + Locations: [place], [place], [place], [place], [facility], [facility], [place], [place]   [phone]  *available by appointment only    [facility]  [phone]  [address] [facility] [address]  [place], [address]    [facility]  [phone]  [address] [place], [address]  *available by appointment    [facility]  [phone]  [address] [place], [place], [address]  *walk ins available M-TH from 9-3    [facility]  MAT program: [phone]  [address] [place], [place], [address]    [place]  [phone]  Same day substance use disorder assessments are available Monday - Friday, via walk-in or by appointment at the [place] location.  [address], [address], [place] [address]     [facility] - adolescent and adult SUDs services  [phone]  Offer services Monday through Friday, as well as evening hours Monday through Thursday. Normally, a first appointment will be scheduled within one week  [email or web address]  Locations all over [place]    If you are intoxicated, you may be  required to detox at a detox facility before starting treatment. The following are detox facilities that you can self present to. All detox facilities are able to help you complete an assessment prior to discharge if you choose:    [facility]: Arrive at a [facility] for immediate medical evaluation    [facility]: [address] [place], [place], [address].         [phone]    [facility]: [address] [place], [place], [address]  [phone]     [facility]): [address] [place], [place], [address]  [phone]     [facility]: [address] [place], [place], [address], [phone]         Ways to help cope with sobriety:    -- Take prescribed medicines as scheduled  -- Keep follow-up appointments  -- Talk to others about your concerns  -- Get regular exercise  -- Practice deep breathing skills  -- Eat a healthy diet  -- Use community resources, including hotline numbers, Community Health crisis and support meetings  -- Stay sober and avoid places/people/things associated with substance use  --Maintain a daily schedule/routine  --Get at least 7-8 hours of sleep per night  --Create a list 10--20 healthy activities that you can do that are enjoyable and do not involve substance use  --Create daily goals (approx. 1-4 goals) per day and work to achieve them throughout the day.       Free Resources:    [place] Recovery Connection ([facility])  [facility] connects people seeking recovery to resources that help foster and sustain long-term recovery. Whether you are seeking resources for treatment, transportation, housing, job training, education, health care or other pathways to recovery, [facility] is a great place to start.  Phone: 233.185.9917. www.minnesotaEventure Interactive.Headspace (Great listing of all types of recovery and non-recovery related resources)    Alcoholics Anonymous  Phone: 8-812-ALCOHOL  Website: HTTP://WWW.AA.ORG/  [facility] [place] (983-326-8489 or  http://a[place].org)  [facility] [place] (132-754-2714 or www.aastpaul.org)     Narcotics Anonymous  Phone: 180.489.6803  Website: www.Pixelligent.Avatar Reality.    [employer] [facility]  [address] [place] [address], [address], [place], [place],  Phone: [phone]  Drop-in Hours: Monday-Friday 9-11:30 am. By appointment at other times.  Provides: [facility] is a drop-in center on the [place] side of [place] that provides a safe space for individuals who are homeless and have a history of chemical use. Sobriety is not a requirement but drugs and alcohol are not allowed on the property.  Services: Non-clients can access drop-in services such as Recovery and Harm Reduction Groups, referrals to case management, community activities, shower facilities, and a pool table. Individuals who are homeless and have chemical health needs may be eligible for enrollment into [facility]'s case management program. Clients and  work together to access benefits, treatment, health care, shelter, and external housing resources.      [facility] HOMELESSNESS RESOURCES       For shelter tonight, start with Adult Shelter Connect Overnight Shelter: [phone]  *Phone lines are closed between 5:30-7:30 pm    [address] (enter on the [address] side near the [address] [place] [address])  Walk-in Hours: 9 am - 5:30 pm Monday-Friday and 1 pm - 5:30 pm Saturday and Sunday    [facility] [place] [facility]  [phone] [address] [facility]  [phone]  [facility]  [phone]  [address] [facility]  [phone]  [address] [facility]  [phone]  [facility] [address] [place] [address]    To start making a plan for a more long-term solution, contact the Coordinated Entry Homeless Assistance Program:    Coordinated Entry Homeless Assistance  [facility]  [address] [place] [address],  [place] [address]  [phone]  Open Wednesdays and Thursdays 8 am-2 pm  The Coordinated Entry System is the [facility]'s approach to organizing and providing housing services for people experiencing homelessness in [facility].  Because housing resources are limited, this process is designed to ensure that individuals and families with the highest vulnerability, service needs, and length of homelessness receive top priority in housing placement.    Assessment changes due to COVID-19 virus    [facility] family assessors will now be conducting assessments by phone. If you are working with a family staying in a place other than a [facility]-funded shelter and is eligible for Coordinated Entry, continue to contact Front Door  at [phone] to set up an assessment.    For single adults, [facility] will be suspending drop-in hours at [facility]. To have a Coordinated Entry assessment completed, call the [facility]' certified assessors: Kaz at [phone] or Leilani at [phone] directly to set up a time to meet    Call 211 at any time on any day for questions about services and resources available to you.      Family Shelters    [facility] Shelter Team  [phone]  [address] [facility], Floors 5 & 6              Youth, families & disabled adults    Hours: Mon-Fri 8:00 am-4:30 pm.  After-Hours Shelter Team  [address]         Drop-Ins    [facility]  [phone]  [address] [place] [address] ([facility] [place])              Showers/Laundry (8-11am)              Health Care              Employment Services              Breakfast (7-8 am)              Lunch (11:30am-12:30pm)    Hours: Mon-Sat: Summer 7am-3pm; Winter 7am-4pm.         [facility] [phone]  [address] [place] [address]  Hours: Mon, Wed and Fri 9:00-11:30 am      Clothing    [facility]  [phone]  [address] [place] [address]  Hours: M-Th 10-11:30am &  1:30-3:30pm; Sat/Sun 9:30-10:30 am         Free Meals & Showers    [facility]  [phone]  [address] [facility]  Dinner: Mon-Fri 5:30-6:30pm (No showers)    [facility]  [phone]  Meals ([address]): Women & Children M-F 8:30-9am; Everyone: M-F 12-1pm; Sat/Sun, 10:30-11:30 am  Showers ([address]): Mon-Fri 9-10 am    [facility]  [phone]  [address]  Dinner: Thurs - Mon 6 pm  Showers: Daily 8 - 4:30 pm    Health Care    [facility] Care for the Homeless  [phone]  Clinics are in 11 [place] shelters/drop-in centers.  Hours: Mon-Fri at varying sites. Call for sites/times. No insurance or appts required to receive care.  Clinic sites: [facility] [place], [facility], [facility], [facility], [facility], [facility], [facility], [facility], [facility], [facility]      Domestic/Sexual Violence Survivors    [facility]  [phone]  [address] [place] [address]            Singles women, families, survivors of prostitution & domestic abuse    Rape & Sexual Violence Hotline  752-417-BUSB    [facility] Toll-Free 24h crisis line  [phone]     [facility] Crisis [facility]  [phone]      Rule 25 Chemical Health Assessments    [facility]  [phone] [address] [facility]  Hours: Mon-Fri, 8 am-2:30 pm (first come, first served)    [facility]  [phone]  [address] [facility]  Walk-in: M-F, 7am-4pm (First come, first served or by appt)      Mental Health Care    [facility]  Suicidal: [phone] Consultation: [phone]  [address] [facility], 24/7 Crisis Intervention Center     Walk-in Counseling Center  [phone]  Hours: Mon, Wed, Fri 1-3pm; Mon-Thurs 6:30-8:30pm      Veterans Services    [employer] Service Office  [phone]  Hours: Mon-Fri 8am-4:30 pm; 1st Floor [facility]  Resource & Referral  [phone]  [address]; Hours: Mon-Fri 7a-5p    [place] Assistance [place] for [facility]ets (MAC)  [phone]    [facility] Supportive Services for Veterans & Families ([facility])  [phone] [address]

## 2024-05-07 ENCOUNTER — HOSPITAL ENCOUNTER (EMERGENCY)
Facility: CLINIC | Age: 34
Discharge: HOME | End: 2024-05-07
Attending: EMERGENCY MEDICINE | Admitting: EMERGENCY MEDICINE
Payer: MEDICAID

## 2024-05-07 VITALS
OXYGEN SATURATION: 97 % | SYSTOLIC BLOOD PRESSURE: 134 MMHG | RESPIRATION RATE: 8 BRPM | TEMPERATURE: 99.8 F | HEART RATE: 65 BPM | DIASTOLIC BLOOD PRESSURE: 79 MMHG

## 2024-05-07 DIAGNOSIS — R41.82 ALTERED MENTAL STATUS, UNSPECIFIED ALTERED MENTAL STATUS TYPE: ICD-10-CM

## 2024-05-07 DIAGNOSIS — F19.10 SUBSTANCE ABUSE (H): ICD-10-CM

## 2024-05-07 LAB
ALBUMIN SERPL BCG-MCNC: 4.8 G/DL (ref 3.5–5.2)
ALP SERPL-CCNC: 86 U/L (ref 40–150)
ALT SERPL W P-5'-P-CCNC: 26 U/L (ref 0–70)
ANION GAP SERPL CALCULATED.3IONS-SCNC: 10 MMOL/L (ref 7–15)
ANION GAP SERPL CALCULATED.3IONS-SCNC: 22 MMOL/L (ref 7–15)
AST SERPL W P-5'-P-CCNC: 34 U/L (ref 0–45)
ATRIAL RATE - MUSE: 95 BPM
BASE EXCESS BLDV CALC-SCNC: -4 MMOL/L (ref -3–3)
BASE EXCESS BLDV CALC-SCNC: 0 MMOL/L (ref -3–3)
BASOPHILS # BLD AUTO: 0 10E3/UL (ref 0–0.2)
BASOPHILS NFR BLD AUTO: 1 %
BILIRUB SERPL-MCNC: 0.6 MG/DL
BUN SERPL-MCNC: 14.2 MG/DL (ref 6–20)
BUN SERPL-MCNC: 15.4 MG/DL (ref 6–20)
CALCIUM SERPL-MCNC: 8.5 MG/DL (ref 8.6–10)
CALCIUM SERPL-MCNC: 9.7 MG/DL (ref 8.6–10)
CHLORIDE SERPL-SCNC: 104 MMOL/L (ref 98–107)
CHLORIDE SERPL-SCNC: 97 MMOL/L (ref 98–107)
CREAT SERPL-MCNC: 0.9 MG/DL (ref 0.67–1.17)
CREAT SERPL-MCNC: 1.29 MG/DL (ref 0.67–1.17)
DEPRECATED HCO3 PLAS-SCNC: 20 MMOL/L (ref 22–29)
DEPRECATED HCO3 PLAS-SCNC: 24 MMOL/L (ref 22–29)
DIASTOLIC BLOOD PRESSURE - MUSE: NORMAL MMHG
EGFRCR SERPLBLD CKD-EPI 2021: 74 ML/MIN/1.73M2
EGFRCR SERPLBLD CKD-EPI 2021: >90 ML/MIN/1.73M2
EOSINOPHIL # BLD AUTO: 0.1 10E3/UL (ref 0–0.7)
EOSINOPHIL NFR BLD AUTO: 1 %
ERYTHROCYTE [DISTWIDTH] IN BLOOD BY AUTOMATED COUNT: 13 % (ref 10–15)
ETHANOL SERPL-MCNC: <0.01 G/DL
GLUCOSE SERPL-MCNC: 203 MG/DL (ref 70–99)
GLUCOSE SERPL-MCNC: 81 MG/DL (ref 70–99)
HCO3 BLDV-SCNC: 20 MMOL/L (ref 21–28)
HCO3 BLDV-SCNC: 26 MMOL/L (ref 21–28)
HCT VFR BLD AUTO: 42.1 % (ref 40–53)
HGB BLD-MCNC: 14.1 G/DL (ref 13.3–17.7)
IMM GRANULOCYTES # BLD: 0 10E3/UL
IMM GRANULOCYTES NFR BLD: 0 %
INTERPRETATION ECG - MUSE: NORMAL
LACTATE BLD-SCNC: 0.6 MMOL/L
LACTATE BLD-SCNC: 7.5 MMOL/L
LACTATE SERPL-SCNC: 1 MMOL/L (ref 0.7–2)
LYMPHOCYTES # BLD AUTO: 2.2 10E3/UL (ref 0.8–5.3)
LYMPHOCYTES NFR BLD AUTO: 32 %
MAGNESIUM SERPL-MCNC: 2.4 MG/DL (ref 1.7–2.3)
MCH RBC QN AUTO: 28.6 PG (ref 26.5–33)
MCHC RBC AUTO-ENTMCNC: 33.5 G/DL (ref 31.5–36.5)
MCV RBC AUTO: 85 FL (ref 78–100)
MONOCYTES # BLD AUTO: 0.8 10E3/UL (ref 0–1.3)
MONOCYTES NFR BLD AUTO: 12 %
NEUTROPHILS # BLD AUTO: 3.8 10E3/UL (ref 1.6–8.3)
NEUTROPHILS NFR BLD AUTO: 54 %
NRBC # BLD AUTO: 0 10E3/UL
NRBC BLD AUTO-RTO: 0 /100
P AXIS - MUSE: 67 DEGREES
PCO2 BLDV: 35 MM HG (ref 40–50)
PCO2 BLDV: 49 MM HG (ref 40–50)
PH BLDV: 7.34 [PH] (ref 7.32–7.43)
PH BLDV: 7.37 [PH] (ref 7.32–7.43)
PLATELET # BLD AUTO: 258 10E3/UL (ref 150–450)
PO2 BLDV: 117 MM HG (ref 25–47)
PO2 BLDV: 41 MM HG (ref 25–47)
POTASSIUM SERPL-SCNC: 2.7 MMOL/L (ref 3.4–5.3)
POTASSIUM SERPL-SCNC: 3.8 MMOL/L (ref 3.4–5.3)
PR INTERVAL - MUSE: 166 MS
PROT SERPL-MCNC: 7.9 G/DL (ref 6.4–8.3)
QRS DURATION - MUSE: 104 MS
QT - MUSE: 444 MS
QTC - MUSE: 557 MS
R AXIS - MUSE: 58 DEGREES
RBC # BLD AUTO: 4.93 10E6/UL (ref 4.4–5.9)
SAO2 % BLDV: 73 % (ref 70–75)
SAO2 % BLDV: 98 % (ref 70–75)
SODIUM SERPL-SCNC: 138 MMOL/L (ref 135–145)
SODIUM SERPL-SCNC: 139 MMOL/L (ref 135–145)
SYSTOLIC BLOOD PRESSURE - MUSE: NORMAL MMHG
T AXIS - MUSE: 60 DEGREES
VENTRICULAR RATE- MUSE: 95 BPM
WBC # BLD AUTO: 7 10E3/UL (ref 4–11)

## 2024-05-07 PROCEDURE — 93005 ELECTROCARDIOGRAM TRACING: CPT

## 2024-05-07 PROCEDURE — 96361 HYDRATE IV INFUSION ADD-ON: CPT

## 2024-05-07 PROCEDURE — 82077 ASSAY SPEC XCP UR&BREATH IA: CPT | Performed by: EMERGENCY MEDICINE

## 2024-05-07 PROCEDURE — 82803 BLOOD GASES ANY COMBINATION: CPT

## 2024-05-07 PROCEDURE — 258N000003 HC RX IP 258 OP 636: Performed by: EMERGENCY MEDICINE

## 2024-05-07 PROCEDURE — 96366 THER/PROPH/DIAG IV INF ADDON: CPT

## 2024-05-07 PROCEDURE — 96365 THER/PROPH/DIAG IV INF INIT: CPT

## 2024-05-07 PROCEDURE — 250N000011 HC RX IP 250 OP 636: Performed by: EMERGENCY MEDICINE

## 2024-05-07 PROCEDURE — 99285 EMERGENCY DEPT VISIT HI MDM: CPT | Mod: 25

## 2024-05-07 PROCEDURE — 80053 COMPREHEN METABOLIC PANEL: CPT | Performed by: EMERGENCY MEDICINE

## 2024-05-07 PROCEDURE — 85004 AUTOMATED DIFF WBC COUNT: CPT | Performed by: EMERGENCY MEDICINE

## 2024-05-07 PROCEDURE — 83735 ASSAY OF MAGNESIUM: CPT | Performed by: EMERGENCY MEDICINE

## 2024-05-07 PROCEDURE — 36415 COLL VENOUS BLD VENIPUNCTURE: CPT | Performed by: EMERGENCY MEDICINE

## 2024-05-07 PROCEDURE — 999N000157 HC STATISTIC RCP TIME EA 10 MIN

## 2024-05-07 PROCEDURE — 83605 ASSAY OF LACTIC ACID: CPT

## 2024-05-07 RX ORDER — POTASSIUM CHLORIDE 7.45 MG/ML
10 INJECTION INTRAVENOUS
Status: COMPLETED | OUTPATIENT
Start: 2024-05-07 | End: 2024-05-07

## 2024-05-07 RX ORDER — POTASSIUM CHLORIDE 29.8 MG/ML
20 INJECTION INTRAVENOUS ONCE
Status: DISCONTINUED | OUTPATIENT
Start: 2024-05-07 | End: 2024-05-07

## 2024-05-07 RX ADMIN — POTASSIUM CHLORIDE 10 MEQ: 7.46 INJECTION, SOLUTION INTRAVENOUS at 14:13

## 2024-05-07 RX ADMIN — POTASSIUM CHLORIDE 10 MEQ: 7.46 INJECTION, SOLUTION INTRAVENOUS at 13:16

## 2024-05-07 RX ADMIN — SODIUM CHLORIDE 2000 ML: 9 INJECTION, SOLUTION INTRAVENOUS at 11:24

## 2024-05-07 ASSESSMENT — ACTIVITIES OF DAILY LIVING (ADL)
ADLS_ACUITY_SCORE: 37

## 2024-05-07 NOTE — ED TRIAGE NOTES
Pt was acting eradic and sweating profusely after doing meth at girl friend [place]   Ems gave 400 mg ketamine and 10 droperidol im

## 2024-05-07 NOTE — ED PROVIDER NOTES
Emergency Department Note      History of Present Illness     Chief Complaint  Drug Overdose    HPI  Etienne Dupree is a 33 year old male with history of ADHD, bipolar 1 disorder, depression, methamphetamine abuse, tobacco dependence, and suicidal behavior who presents to the ED via EMS for evaluation of a drug overdose. EMS reports patient has been extremely agitated for the past 12 hours. He was at his girlfriend's house, presumably using meth. Patient was profusely diaphoretic and verbally aggressive when EMS arrived on scene. EMS administered 400 mg of ketamine and 10 of droperidol IM en route. Report 102 systolic and fluctuating heart rate up to the 140s. . EMS reports previous hospitalization for mental health. Patient was unresponsive to voice secondary to sedation at time of evaluation.         History limited due to the condition of the patient.     Independent Historian  EMS as detailed above.    Review of External Notes  None  Past Medical History   Medical History and Problem List  ADHD  Bipolar 1 disorder   Depression   Tobacco dependence  Borderline personality disorder  Antisocial personality disorder  Hypertension   Methamphetamine abuse   Suicidal behavior  Methamphetamine-induced mood disorder   ADHD  Aggressive behavior  Suicide attempt  Left breast lump  Polysubstance abuse   Obesity   PTSD  Schizophrenia   Encephalopathy   Alcohol abuse   Paranoid psychosis  Altered mental status     Medications  Amlodipine   Atomoxetine   Bupropion  Depakote  Hydrochlorothiazide   Hydroxyzine  Nicotrol   Olanzapine     Surgical History   Esophagogastroduodenoscopy   [place] teeth extraction   Physical Exam   Patient Vitals for the past 24 hrs:   BP Temp Temp src Pulse Resp SpO2   05/07/24 1700 126/80 -- -- 54 14 98 %   05/07/24 1645 (!) 141/85 -- -- 53 13 98 %   05/07/24 1630 (!) 151/92 -- -- 60 16 100 %   05/07/24 1615 (!) 142/82 -- -- 56 (!) 33 100 %   05/07/24 1600 (!) 152/89 -- -- 61 13 100 %    05/07/24 1545 (!) 140/84 -- -- 56 (!) 9 100 %   05/07/24 1530 (!) 140/80 -- -- 57 15 100 %   05/07/24 1528 (!) 140/80 -- -- 53 14 100 %   05/07/24 1515 131/78 -- -- 58 14 100 %   05/07/24 1500 129/73 -- -- 61 12 100 %   05/07/24 1430 -- -- -- 64 (!) 8 100 %   05/07/24 1415 125/68 -- -- 61 13 100 %   05/07/24 1400 118/64 -- -- 63 16 100 %   05/07/24 1330 124/71 -- -- 71 15 100 %   05/07/24 1315 126/69 -- -- 67 15 100 %   05/07/24 1228 137/74 -- -- 80 15 100 %   05/07/24 1213 135/71 -- -- 81 -- 100 %   05/07/24 1201 -- -- -- 84 12 100 %   05/07/24 1200 125/64 -- -- 83 16 100 %   05/07/24 1159 -- -- -- 84 17 100 %   05/07/24 1130 128/62 -- -- 92 20 99 %   05/07/24 1122 -- 99.8  F (37.7  C) Temporal -- -- --   05/07/24 1106 (!) 154/80 -- -- 106 (!) 8 98 %     Physical Exam    General: Laying on the ED bed  HEENT: Normocephalic, atraumatic  Cardiac: Radial and DP pulses 2+, regular tachycardia  Pulm: Lung sounds clear bilaterally, no respiratory distress  GI: Abdomen soft, nontender, no rigidity or guarding  MSK: C spine with no step-offs.  Extremities x4 without bony deformity, no instability.  Skin: Warm and dry  Neuro: Sedated, does not not respond to voice or noxious stimuli, pupils 2 mm and minimally reactive    Diagnostics   Lab Results   Labs Ordered and Resulted from Time of ED Arrival to Time of ED Departure   COMPREHENSIVE METABOLIC PANEL - Abnormal       Result Value    Sodium 139      Potassium 2.7 (*)     Carbon Dioxide (CO2) 20 (*)     Anion Gap 22 (*)     Urea Nitrogen 15.4      Creatinine 1.29 (*)     GFR Estimate 74      Calcium 9.7      Chloride 97 (*)     Glucose 203 (*)     Alkaline Phosphatase 86      AST 34      ALT 26      Protein Total 7.9      Albumin 4.8      Bilirubin Total 0.6     MAGNESIUM - Abnormal    Magnesium 2.4 (*)    ISTAT GASES LACTATE VENOUS POCT - Abnormal    Lactic Acid POCT 7.5 (*)     Bicarbonate Venous POCT 20 (*)     O2 Sat, Venous POCT 98 (*)     pCO2 Venous POCT 35 (*)      pH Venous POCT 7.37      pO2 Venous POCT 117 (*)     Base Excess/Deficit (+/-) POCT -4.0 (*)    BASIC METABOLIC PANEL - Abnormal    Sodium 138      Potassium 3.8      Chloride 104      Carbon Dioxide (CO2) 24      Anion Gap 10      Urea Nitrogen 14.2      Creatinine 0.90      GFR Estimate >90      Calcium 8.5 (*)     Glucose 81     LACTIC ACID WHOLE BLOOD - Normal    Lactic Acid 1.0     ISTAT GASES LACTATE VENOUS POCT - Normal    Lactic Acid POCT 0.6      Bicarbonate Venous POCT 26      O2 Sat, Venous POCT 73      pCO2 Venous POCT 49      pH Venous POCT 7.34      pO2 Venous POCT 41      Base Excess/Deficit (+/-) POCT 0.0     ETHYL ALCOHOL LEVEL - Normal    Alcohol ethyl <0.01     CBC WITH PLATELETS AND DIFFERENTIAL    WBC Count 7.0      RBC Count 4.93      Hemoglobin 14.1      Hematocrit 42.1      MCV 85      MCH 28.6      MCHC 33.5      RDW 13.0      Platelet Count 258      % Neutrophils 54      % Lymphocytes 32      % Monocytes 12      % Eosinophils 1      % Basophils 1      % Immature Granulocytes 0      NRBCs per 100 WBC 0      Absolute Neutrophils 3.8      Absolute Lymphocytes 2.2      Absolute Monocytes 0.8      Absolute Eosinophils 0.1      Absolute Basophils 0.0      Absolute Immature Granulocytes 0.0      Absolute NRBCs 0.0         Imaging  No orders to display       EKG   ECG taken at 1122, ECG read at 1133  Normal sinus rhythm  Nonspecific T wave abnormality   Prolonged QT   Prolonged QT new as compared to prior, dated 7/22/23.  Rate 95 bpm. WI interval 166 ms. QRS duration 104 ms. QT/QTc 444/557 ms. P-R-T axes 67 58 60.     ECG  ECG taken at 1642, ECG read at 1646  Normal sinus rhythm with sinus arrhythmia  Septal infarct, age undetermined    Prolonged QTc resolved as compared to prior, dated today.  Rate 62 bpm. WI interval 206 ms. QRS duration 92 ms. QT/QTc 426/432 ms. P-R-T axes 59 46 61.       Independent Interpretation  None  ED Course    Medications Administered  Medications   sodium chloride 0.9%  BOLUS 2,000 mL (0 mLs Intravenous Stopped 5/7/24 1213)   potassium chloride 10 mEq in 100 mL sterile water infusion (0 mEq Intravenous Stopped 5/7/24 1519)       Procedures  Procedures     Discussion of Management  None    Social Determinants of Health adding to complexity of care  None    ED Course  ED Course as of 05/07/24 1803   Tue May 07, 2024   1055 I obtained patient history and performed a physical exam.    1307 I reassesed patient.      Medical Decision Making / Diagnosis   CMS Diagnoses: The Lactic acid level is elevated due to substance abuse, at this time there is no sign of severe sepsis or septic shock.    MIPS     None    MDM  Etienne Dupree is a 33-year-old male who presents after an episode of agitation prior to arrival that was treated with ketamine and droperidol, sedated on arrival to the ED. Tachycardic, otherwise stable vital signs. Protecting his airway, no hypoxemia and VBG shows no evidence of hypoventilation, but does show a lactic acidosis which I suspect is secondary to the patient's substance abuse prior to arrival.  Patient was given a 2 L bolus of fluids, lactate cleared on repeat.  Other lab work is significant for hypokalemia, treated here with potassium chloride. EKG shows QTc prolongation which I suspect has been caused by a combination of the methamphetamines use today as well as the droperidol administered prior to arrival to the ED, compounded by hypokalemia. He was observed in the ED and upon reevaluation, he is still quite somnolent.  Repeat EKG shows normalization of his QTc.  Repeat BMP shows normalization of the potassium. Plan is discharge home once sober if no further medical complaints.    Disposition  Care of the patient was transferred to my colleague Dr. Worthy pending reevaluation once sober.     ICD-10 Codes:    ICD-10-CM    1. Substance abuse (H)  F19.10       2. Altered mental status, unspecified altered mental status type  R41.82              [place]  Disclosure:  I, Monica Schrader, am serving as a scribe at 11:07 AM on 5/7/2024 to document services personally performed by Francisco Javier Rivera MD based on my observations and the provider's statements to me.     [employer]      Francisco Javier Rivera MD  05/07/24 8005

## 2024-05-08 NOTE — ED NOTES
"Patient demanding a cab ride to take home.  Declines bus token.  Girlfriend states she has no gas money.   Patient provided address [address]  in [place].  Went to arrange cab ride.   Went in to discharge patient.  Had been on the phone with his girlfriend.  States \"I have no place to go!\".  Instructed patient he is discharged; he has also been here for 12 hours being closely monitored the entire time.  Yells \"Yeah, I was sleeping!  Now I want to kill myself\".  Reminded patient he had not mentioned anything about being suicidal earlier. \"Yeah, well what do you know!\"  Dr aware.   GF called again.  [facility] request I speak with her.  She requested to talk with him so call was transferred into his room.  Patient yelling into the phone.  Then getting dressed & requests belongings.    Patient irate about being discharged.  Offered him to stay for eval regarding MH but he is not willing to engage in conversation.    "

## 2024-05-08 NOTE — DISCHARGE INSTRUCTIONS
Please follow-up with your primary care doctor as needed.  If you need help with addiction services, please contact behavioral health providers affiliated with the [facility].

## 2024-05-08 NOTE — ED NOTES
"Wakes up for short time  Does not really answer questions other than \"yeah\".  Rolls over back to sleep.  "

## 2024-07-06 ENCOUNTER — HEALTH MAINTENANCE LETTER (OUTPATIENT)
Age: 34
End: 2024-07-06

## 2025-02-20 ENCOUNTER — TELEPHONE (OUTPATIENT)
Dept: FAMILY MEDICINE | Facility: CLINIC | Age: 35
End: 2025-02-20
Payer: MEDICAID

## 2025-02-20 NOTE — TELEPHONE ENCOUNTER
Patient Quality Outreach    Patient is due for the following:   Physical Preventive Adult Physical      Topic Date Due    Hepatitis A Vaccine (1 of 2 - Risk 2-dose series) Never done    Flu Vaccine (1) 09/01/2024    COVID-19 Vaccine (4 - 2024-25 season) 09/01/2024       Action(s) Taken:   Schedule a Adult Preventative    Type of outreach:    Sent [employer] message.    Questions for provider review:    None       Kelin Myrick RN